# Patient Record
Sex: FEMALE | Race: OTHER | URBAN - METROPOLITAN AREA
[De-identification: names, ages, dates, MRNs, and addresses within clinical notes are randomized per-mention and may not be internally consistent; named-entity substitution may affect disease eponyms.]

---

## 2018-11-21 ENCOUNTER — INPATIENT (INPATIENT)
Facility: HOSPITAL | Age: 58
LOS: 6 days | Discharge: NURSING, REHAB OR LONG-TERM CARE | DRG: 38 | End: 2018-11-28
Attending: PSYCHIATRY & NEUROLOGY | Admitting: PSYCHIATRY & NEUROLOGY
Payer: COMMERCIAL

## 2018-11-21 ENCOUNTER — TRANSCRIPTION ENCOUNTER (OUTPATIENT)
Age: 58
End: 2018-11-21

## 2018-11-21 VITALS
WEIGHT: 186.95 LBS | SYSTOLIC BLOOD PRESSURE: 174 MMHG | HEART RATE: 102 BPM | RESPIRATION RATE: 19 BRPM | OXYGEN SATURATION: 98 % | DIASTOLIC BLOOD PRESSURE: 107 MMHG

## 2018-11-21 DIAGNOSIS — I63.9 CEREBRAL INFARCTION, UNSPECIFIED: ICD-10-CM

## 2018-11-21 LAB
ALBUMIN SERPL ELPH-MCNC: 4.4 G/DL — SIGNIFICANT CHANGE UP (ref 3.3–5)
ALP SERPL-CCNC: 111 U/L — SIGNIFICANT CHANGE UP (ref 40–120)
ALT FLD-CCNC: 20 U/L — SIGNIFICANT CHANGE UP (ref 10–45)
ANION GAP SERPL CALC-SCNC: 14 MMOL/L — SIGNIFICANT CHANGE UP (ref 5–17)
APTT BLD: 29.9 SEC — SIGNIFICANT CHANGE UP (ref 27.5–36.3)
AST SERPL-CCNC: 14 U/L — SIGNIFICANT CHANGE UP (ref 10–40)
BASOPHILS # BLD AUTO: 0 K/UL — SIGNIFICANT CHANGE UP (ref 0–0.2)
BASOPHILS NFR BLD AUTO: 0.8 % — SIGNIFICANT CHANGE UP (ref 0–2)
BILIRUB SERPL-MCNC: 0.3 MG/DL — SIGNIFICANT CHANGE UP (ref 0.2–1.2)
BUN SERPL-MCNC: 13 MG/DL — SIGNIFICANT CHANGE UP (ref 7–23)
CALCIUM SERPL-MCNC: 10.3 MG/DL — SIGNIFICANT CHANGE UP (ref 8.4–10.5)
CHLORIDE SERPL-SCNC: 96 MMOL/L — SIGNIFICANT CHANGE UP (ref 96–108)
CO2 SERPL-SCNC: 23 MMOL/L — SIGNIFICANT CHANGE UP (ref 22–31)
CREAT SERPL-MCNC: 0.6 MG/DL — SIGNIFICANT CHANGE UP (ref 0.5–1.3)
EOSINOPHIL # BLD AUTO: 0 K/UL — SIGNIFICANT CHANGE UP (ref 0–0.5)
EOSINOPHIL NFR BLD AUTO: 0.6 % — SIGNIFICANT CHANGE UP (ref 0–6)
GLUCOSE BLDC GLUCOMTR-MCNC: 181 MG/DL — HIGH (ref 70–99)
GLUCOSE SERPL-MCNC: 333 MG/DL — HIGH (ref 70–99)
HCT VFR BLD CALC: 37.7 % — SIGNIFICANT CHANGE UP (ref 34.5–45)
HGB BLD-MCNC: 13.8 G/DL — SIGNIFICANT CHANGE UP (ref 11.5–15.5)
INR BLD: 1.03 RATIO — SIGNIFICANT CHANGE UP (ref 0.88–1.16)
LYMPHOCYTES # BLD AUTO: 0.8 K/UL — LOW (ref 1–3.3)
LYMPHOCYTES # BLD AUTO: 16.2 % — SIGNIFICANT CHANGE UP (ref 13–44)
MCHC RBC-ENTMCNC: 30.4 PG — SIGNIFICANT CHANGE UP (ref 27–34)
MCHC RBC-ENTMCNC: 36.5 GM/DL — HIGH (ref 32–36)
MCV RBC AUTO: 83.3 FL — SIGNIFICANT CHANGE UP (ref 80–100)
MONOCYTES # BLD AUTO: 0.4 K/UL — SIGNIFICANT CHANGE UP (ref 0–0.9)
MONOCYTES NFR BLD AUTO: 7.2 % — SIGNIFICANT CHANGE UP (ref 2–14)
NEUTROPHILS # BLD AUTO: 3.7 K/UL — SIGNIFICANT CHANGE UP (ref 1.8–7.4)
NEUTROPHILS NFR BLD AUTO: 75.3 % — SIGNIFICANT CHANGE UP (ref 43–77)
PLATELET # BLD AUTO: 226 K/UL — SIGNIFICANT CHANGE UP (ref 150–400)
POTASSIUM SERPL-MCNC: 4.7 MMOL/L — SIGNIFICANT CHANGE UP (ref 3.5–5.3)
POTASSIUM SERPL-SCNC: 4.7 MMOL/L — SIGNIFICANT CHANGE UP (ref 3.5–5.3)
PROT SERPL-MCNC: 8.1 G/DL — SIGNIFICANT CHANGE UP (ref 6–8.3)
PROTHROM AB SERPL-ACNC: 11.7 SEC — SIGNIFICANT CHANGE UP (ref 10–12.9)
RBC # BLD: 4.52 M/UL — SIGNIFICANT CHANGE UP (ref 3.8–5.2)
RBC # FLD: 11.5 % — SIGNIFICANT CHANGE UP (ref 10.3–14.5)
SODIUM SERPL-SCNC: 133 MMOL/L — LOW (ref 135–145)
TROPONIN T, HIGH SENSITIVITY RESULT: 6 NG/L — SIGNIFICANT CHANGE UP (ref 0–51)
WBC # BLD: 4.9 K/UL — SIGNIFICANT CHANGE UP (ref 3.8–10.5)
WBC # FLD AUTO: 4.9 K/UL — SIGNIFICANT CHANGE UP (ref 3.8–10.5)

## 2018-11-21 PROCEDURE — 99255 IP/OBS CONSLTJ NEW/EST HI 80: CPT

## 2018-11-21 PROCEDURE — 99291 CRITICAL CARE FIRST HOUR: CPT

## 2018-11-21 PROCEDURE — 0042T: CPT

## 2018-11-21 PROCEDURE — 70450 CT HEAD/BRAIN W/O DYE: CPT | Mod: 26,59

## 2018-11-21 PROCEDURE — 70496 CT ANGIOGRAPHY HEAD: CPT | Mod: 26

## 2018-11-21 PROCEDURE — 70551 MRI BRAIN STEM W/O DYE: CPT | Mod: 26

## 2018-11-21 PROCEDURE — 70498 CT ANGIOGRAPHY NECK: CPT | Mod: 26

## 2018-11-21 RX ORDER — GLUCAGON INJECTION, SOLUTION 0.5 MG/.1ML
1 INJECTION, SOLUTION SUBCUTANEOUS ONCE
Qty: 0 | Refills: 0 | Status: DISCONTINUED | OUTPATIENT
Start: 2018-11-21 | End: 2018-11-28

## 2018-11-21 RX ORDER — CARBAMAZEPINE 200 MG
100 TABLET ORAL
Qty: 0 | Refills: 0 | Status: DISCONTINUED | OUTPATIENT
Start: 2018-11-21 | End: 2018-11-22

## 2018-11-21 RX ORDER — SODIUM CHLORIDE 9 MG/ML
1000 INJECTION INTRAMUSCULAR; INTRAVENOUS; SUBCUTANEOUS ONCE
Qty: 0 | Refills: 0 | Status: COMPLETED | OUTPATIENT
Start: 2018-11-21 | End: 2018-11-21

## 2018-11-21 RX ORDER — ASPIRIN/CALCIUM CARB/MAGNESIUM 324 MG
81 TABLET ORAL DAILY
Qty: 0 | Refills: 0 | Status: DISCONTINUED | OUTPATIENT
Start: 2018-11-21 | End: 2018-11-22

## 2018-11-21 RX ORDER — ATORVASTATIN CALCIUM 80 MG/1
80 TABLET, FILM COATED ORAL AT BEDTIME
Qty: 0 | Refills: 0 | Status: DISCONTINUED | OUTPATIENT
Start: 2018-11-21 | End: 2018-11-28

## 2018-11-21 RX ORDER — ACETAMINOPHEN 500 MG
1000 TABLET ORAL ONCE
Qty: 0 | Refills: 0 | Status: COMPLETED | OUTPATIENT
Start: 2018-11-21 | End: 2018-11-21

## 2018-11-21 RX ORDER — DEXTROSE 50 % IN WATER 50 %
15 SYRINGE (ML) INTRAVENOUS ONCE
Qty: 0 | Refills: 0 | Status: DISCONTINUED | OUTPATIENT
Start: 2018-11-21 | End: 2018-11-28

## 2018-11-21 RX ORDER — SODIUM CHLORIDE 9 MG/ML
1000 INJECTION INTRAMUSCULAR; INTRAVENOUS; SUBCUTANEOUS
Qty: 0 | Refills: 0 | Status: DISCONTINUED | OUTPATIENT
Start: 2018-11-21 | End: 2018-11-25

## 2018-11-21 RX ORDER — DEXTROSE 50 % IN WATER 50 %
25 SYRINGE (ML) INTRAVENOUS ONCE
Qty: 0 | Refills: 0 | Status: DISCONTINUED | OUTPATIENT
Start: 2018-11-21 | End: 2018-11-28

## 2018-11-21 RX ORDER — SODIUM CHLORIDE 9 MG/ML
1000 INJECTION, SOLUTION INTRAVENOUS
Qty: 0 | Refills: 0 | Status: DISCONTINUED | OUTPATIENT
Start: 2018-11-21 | End: 2018-11-28

## 2018-11-21 RX ORDER — ENOXAPARIN SODIUM 100 MG/ML
40 INJECTION SUBCUTANEOUS EVERY 24 HOURS
Qty: 0 | Refills: 0 | Status: DISCONTINUED | OUTPATIENT
Start: 2018-11-21 | End: 2018-11-28

## 2018-11-21 RX ORDER — INSULIN LISPRO 100/ML
VIAL (ML) SUBCUTANEOUS
Qty: 0 | Refills: 0 | Status: DISCONTINUED | OUTPATIENT
Start: 2018-11-21 | End: 2018-11-28

## 2018-11-21 RX ORDER — DEXTROSE 50 % IN WATER 50 %
12.5 SYRINGE (ML) INTRAVENOUS ONCE
Qty: 0 | Refills: 0 | Status: DISCONTINUED | OUTPATIENT
Start: 2018-11-21 | End: 2018-11-28

## 2018-11-21 RX ADMIN — Medication 1000 MILLIGRAM(S): at 18:00

## 2018-11-21 RX ADMIN — SODIUM CHLORIDE 125 MILLILITER(S): 9 INJECTION INTRAMUSCULAR; INTRAVENOUS; SUBCUTANEOUS at 15:37

## 2018-11-21 RX ADMIN — Medication 400 MILLIGRAM(S): at 17:33

## 2018-11-21 RX ADMIN — SODIUM CHLORIDE 1000 MILLILITER(S): 9 INJECTION INTRAMUSCULAR; INTRAVENOUS; SUBCUTANEOUS at 13:00

## 2018-11-21 RX ADMIN — ENOXAPARIN SODIUM 40 MILLIGRAM(S): 100 INJECTION SUBCUTANEOUS at 17:43

## 2018-11-21 RX ADMIN — Medication 100 MILLIGRAM(S): at 21:56

## 2018-11-21 RX ADMIN — ATORVASTATIN CALCIUM 80 MILLIGRAM(S): 80 TABLET, FILM COATED ORAL at 21:49

## 2018-11-21 NOTE — DISCHARGE NOTE ADULT - CARE PROVIDER_API CALL
Madisyn Suarez (NP; RN), NP in Family Health  [address]  [address]  [place] [address]  Phone: [phone]  Fax: [phone]    Shannon Gaspar), EndocrinologyMetabDiabetes; Internal Medicine  [address] [place] [address]  Phone: [phone]  Fax: [phone]

## 2018-11-21 NOTE — DISCHARGE NOTE ADULT - NS AS DC FOLLOWUP STROKE INST
Stroke (includes: TIA/SAH/ICH/Ischemic Stroke)/diabetes diabetes/Smoking Cessation/Stroke (includes: TIA/SAH/ICH/Ischemic Stroke)

## 2018-11-21 NOTE — DISCHARGE NOTE ADULT - MEDICATION SUMMARY - MEDICATIONS TO TAKE
I will START or STAY ON the medications listed below when I get home from the hospital:    Lorne Childrens Aspirin 81 mg oral tablet, chewable  -- 1 tab(s) by mouth once a day  -- Indication: For Cerebral infarction    Prinivil 5 mg oral tablet  -- 1 tab(s) by mouth once a day  -- Indication: For Hypertension    PROzac 20 mg oral capsule  -- 1 cap(s) by mouth once a day  -- Indication: For Muscle Spasm    Lantus 100 units/mL subcutaneous solution  -- 5 unit(s) subcutaneous once a day   -- Do not drink alcoholic beverages when taking this medication.  It is very important that you take or use this exactly as directed.  Do not skip doses or discontinue unless directed by your doctor.  Keep in refrigerator.  Do not freeze.    -- Indication: For DM    HumaLOG 100 units/mL injectable solution  -- 3 milliliter(s) injectable 3 times a day   -- Indication: For DM    Lipitor 80 mg oral tablet  -- 1 tab(s) by mouth once a day (at bedtime)  -- Indication: For CVA (cerebral vascular accident)    Plavix 75 mg oral tablet  -- 1 tab(s) by mouth once a day  -- Indication: For CVA (cerebral vascular accident) I will START or STAY ON the medications listed below when I get home from the hospital:    Lorne Childrens Aspirin 81 mg oral tablet, chewable  -- 1 tab(s) by mouth once a day  -- Indication: For Cerebral infarction    Prinivil 5 mg oral tablet  -- 1 tab(s) by mouth once a day  -- Indication: For Hypertension    PROzac 20 mg oral capsule  -- 1 cap(s) by mouth once a day  -- Indication: For Muscle Spasm    HumaLOG 100 units/mL injectable solution  -- 3 milliliter(s) injectable 3 times a day   -- Indication: For diabetes     Lantus 100 units/mL subcutaneous solution  -- 5 unit(s) subcutaneous once a day (at bedtime)   -- Do not drink alcoholic beverages when taking this medication.  It is very important that you take or use this exactly as directed.  Do not skip doses or discontinue unless directed by your doctor.  Keep in refrigerator.  Do not freeze.    -- Indication: For diabetes     Lipitor 80 mg oral tablet  -- 1 tab(s) by mouth once a day (at bedtime)  -- Indication: For CVA (cerebral vascular accident)    Plavix 75 mg oral tablet  -- 1 tab(s) by mouth once a day  -- Indication: For CVA (cerebral vascular accident)

## 2018-11-21 NOTE — DISCHARGE NOTE ADULT - MEDICATION SUMMARY - MEDICATIONS TO STOP TAKING
I will STOP taking the medications listed below when I get home from the hospital:    carBAMazepine 100 mg oral capsule, extended release  -- 1 cap(s) by mouth 2 times a day    fluvastatin  -- 20 milligram(s) by mouth once a day (at bedtime)    glipiZIDE    Norvasc 5 mg oral tablet  -- 1 tab(s) by mouth once a day

## 2018-11-21 NOTE — ED PROVIDER NOTE - PROGRESS NOTE DETAILS
Neurology met patient at doorway in ED.  Went to CT scan.  No neurointervention but neurology accepts to stroke unit.

## 2018-11-21 NOTE — H&P ADULT - HISTORY OF PRESENT ILLNESS
Pt is a 57 yo F with a PMH of HTN, DM and Stroke x2 (w/ residual left facial droop) who presented to [facility] with left sided hemiplegia, transferred for possible thrombectomy. LKN 0130. Symptoms unchanged since awakening with them at 0600. Was not a candidate for tPA. Pt denies any recent fevers, chills, HA, dizziness, neck pain, visual changes, CP, SOB, cough, nausea, vomiting, abdominal pain, changes in BMs/urination. NIHSS: 15. MRS: 1.

## 2018-11-21 NOTE — ED PROVIDER NOTE - OBJECTIVE STATEMENT
58F PMH CVAx2, HTN, DM presents with left sided weakness and facial droop.  Patient was last seen normal at 1:30am 58F PMH CVAx2, HTN, DM presents with left sided weakness and facial droop.  Patient was last seen normal at 1:30am.  Patient woke up with symptoms.  Patient does have history of left sided weakness s/p CVA but reports weakness is worse today.  Patient denies changes in vision or tingling.  PAtient was taken to another hospital and transferred to [facility] today for possible thrombectomy.  Patient denies fevers, chills, chest pain, abd pain, dyspnea.  Code Stroke activated upon arrival.

## 2018-11-21 NOTE — H&P ADULT - NSHPPHYSICALEXAM_GEN_ALL_CORE
General Exam:   General appearance: No acute distress      Neurological Exam:  Mental Status: Orientated to self, date and place.  Attention intact. Mild to Moderate dysarthria. Speech fluent.  Cranial Nerves:   PERRL, EOMI, LHH, sustained right beating nystagmus on right gzae, nonsustained left beating nystagmus on left gaze. Moderate left lower facial droop.  Hearing intact to finger rub bilaterally.  Tongue, uvula and palate midline.  Sternocleidomastoid and Trapezius intact bilaterally.    Motor:   Tone: slightly increased in the LLE, decreased in the LUE.              Strength: 5/5 RUE and RLE. 0/5 LUE and LLE.  Pronator drift: none                 Dysmetria: None to finger-nose-finger on the right  No truncal ataxia.    Tremor: No resting, postural or action tremor.  No myoclonus.    Sensation: loss of light touch on the left.    Left Toe Upgoing. Right Toe Downgoing.

## 2018-11-21 NOTE — ED ADULT NURSE NOTE - NSIMPLEMENTINTERV_GEN_ALL_ED
Implemented All Fall Risk Interventions:  [place] to call system. Call bell, personal items and telephone within reach. Instruct patient to call for assistance. Room bathroom lighting operational. Non-slip footwear when patient is off stretcher. Physically safe environment: no spills, clutter or unnecessary equipment. Stretcher in lowest position, wheels locked, appropriate side rails in place. Provide visual cue, wrist band, yellow gown, etc. Monitor gait and stability. Monitor for mental status changes and reorient to person, place, and time. Review medications for side effects contributing to fall risk. Reinforce activity limits and safety measures with patient and family.

## 2018-11-21 NOTE — DISCHARGE NOTE ADULT - HOSPITAL COURSE
HPI:   8-year-old right-handed white lady first evaluated at [facility] on 11/21/18 with left hemiparesis. 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 Perhaps her previous strokes were due to small vessel disease. On 11/21/18, she awoke with a dense left hemiparesis. CT head (11/21/18) showed a chronic right lentiform nucleus lacunar infarct. CTA neck (11/21/18) showed a probable right ICA occlusion with some calcified plaque. CTA head (11/21/18) showed that the right ICA reconstituted perhaps through the ACOM and possibly a large right PCOM. CTP (11/21/18) showed a core infarct of 27 cc and a large hypoperfused area of 125 cc in the right MCA distribution. Given that there was no intracranial occlusion, she was excluded from endovascular thrombectomy. MRI brain subsequently showed R MCA distribution infarct. MRA head and neck to my eye showed severe to critical stenosis of proximal/extracranial right ICA. Conventional angiogram confirmed severe proximal/extracranial right ICA stenosis s/p carotid artery stenting for secondary stroke prevention in this patient with symptomatic right ICA severe stenosis.     Impression:   Cerebral embolism with cerebral infarction. Right MCA distribution stroke - likely etiology being large vessel disease i.e. symptomatic extracranial R ICA severe stenosis leading to artery to artery embolism     NEURO: Neuro exam without acute change, continue close monitoring for neurologic deterioration, permissive hypertension; underwent selective cerebral angiography on  11/24/18 to eval for degree of sx stenosis, dual antiplatelet  and stain for secondary stroke prevention, titrate statin to LDL goal less than 70,MRI Brain, MRA head/Neck with T1 Fat sat results as above. CTA Head/Neck results as above, Physical therapy/OT recommends AR.  Patient enrolled in Stroke AF trial and randomized to a LOOP  .    Dysphagia: passed, regular diet    Pt was admitted to the Stroke Service for further evaluation.     Hospital Course:     Pt was started on Asa 81mg and Atorvastatin 80mg.   CAROTID DOPPLERS (11/2/7/18): The stent and stented cervical right common and internal   carotid arteries are widely patent. There is a flow-limiting stenosis of the right external carotid artery.No hemodynamically significant is present on the left.    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 Decreased, but present flow related enhancement of the more distal right   ICA. Previously, no flow related enhancement was identified within the right ICA on CTA from 11/21/2018.    MRA BRAIN: Diminished, but present flow related signal within the skull base and supraclinoid right internal carotid artery. iminished flow related signal within the right MCA.    MRI Head (11/21/18): Acute right MCA infarct.    Head CT, perfussion, CTA Head/Neck (11/21/18)   Brain CT: No identifiable or infarct. Neck   CTA: The abrupt cut off of the proximal right internal carotid artery with associated scattered areas of calcified plaque. This may be   secondary to occlusive thrombus or arterial dissection. A mild narrowing of the takeoff of the right vertebral artery.Normal left internal carotid and left vertebral arteries.    Brain CTA: Markedly diminished flow within the petrous and cavernous segments of the right internal carotid artery with reconstitution of the distal right internal carotid artery likely through collateralization and/or cross filling.. Mildly decreased flow within the right middle cerebral artery as compared with the left without focal stenosis.    Perfusion maps: Mismatch area are of 102 mL with evidence of core infarct and moderately large penumbra. There is a decreased cerebral blood flow and decreased cerebral blood volume in the right frontoparietal region with corresponding elevation of mean transit time.    A1c was 9.2; Endocrine recommended follow up with Dr Gaspar as outpatient, glipizide stopped, changed to lantus 5 units daily and humalog 3 units with meals TID  TTE: unremarkable  Cardiology recommended TTE with bubble study     Etiology of pt’s CVA is likely cardioembolic    Pt’s hospital course was complicated by none    Pt was seen and evaluated by OT/PT/Speech and Swallow, who recommended regular diet, acute rehab  Speech and Swallow recommended regular diet    Pt is currently medically stable for discharge    Discharge Plan:    Please continue all meds as listed above.    Follow-up: Madisyn Suarez (phone number [phone]) and Dr Gaspar

## 2018-11-21 NOTE — H&P ADULT - NSHPLABSRESULTS_GEN_ALL_CORE
Vital Signs Last 24 Hrs  T(C): 37.1 (21 Nov 2018 12:25), Max: 37.1 (21 Nov 2018 12:25)  T(F): 98.8 (21 Nov 2018 12:25), Max: 98.8 (21 Nov 2018 12:25)  HR: 94 (21 Nov 2018 12:25) (94 - 102)  BP: 186/75 (21 Nov 2018 12:25) (174/107 - 186/75)  BP(mean): 108 (21 Nov 2018 12:25) (108 - 108)  RR: 14 (21 Nov 2018 12:25) (14 - 19)  SpO2: 99% (21 Nov 2018 12:25) (98% - 99%)    11-21    133<L>  |  96  |  13  ----------------------------<  333<H>  4.7   |  23  |  0.60    Ca    10.3      21 Nov 2018 12:04    TPro  8.1  /  Alb  4.4  /  TBili  0.3  /  DBili  x   /  AST  14  /  ALT  20  /  AlkPhos  111  11-21    MEDICATIONS  (STANDING):  atorvastatin 80 milliGRAM(s) Oral at bedtime  carBAMazepine ER Capsule. 100 milliGRAM(s) Oral two times a day  sodium chloride 0.9% Bolus 1000 milliLiter(s) IV Bolus once  sodium chloride 0.9%. 1000 milliLiter(s) (125 mL/Hr) IV Continuous <Continuous>    MEDICATIONS  (PRN):

## 2018-11-21 NOTE — DISCHARGE NOTE ADULT - CARE PROVIDERS DIRECT ADDRESSES
,DirectAddress_[email or web address]

## 2018-11-21 NOTE — ED PROVIDER NOTE - MEDICAL DECISION MAKING DETAILS
58F pmh CVA presents with left sided weakness.  Code stroke.  CT CTA Head.  Neurology.  LAbs.  Admission 58F pmh CVA presents with left sided weakness.  Code stroke.  CT CTA Head.  Neurology.  LAbs.  Admission.   Diana: 58 year old female transferred from OSH for stroke rescue. no tpa given. left sided weakness. Patient arrived, code stroke activated. no intervention at this time. admit to neuro.

## 2018-11-21 NOTE — H&P ADULT - ASSESSMENT
Pt is a 57 yo F with a PMH of HTN, DM and Stroke x2 (w/ residual left facial droop) who presented with left sided hemiplegia. LKN 0130. Not a candidate for tPA. Exam shows LHH, Left lower facial droop, Moderate dysarthria, LUE/LLE 0/5, Left toe upgoing. CTA and CTP showing a right cervical ICA occlusion and a small core infarct with a large mismatch. Considering location of occlusion risk of propagation with occlusion of the right MCA is very high, therefore pt is not a candidate for thrombectomy. NIHSS: 15. MRS: 1.    Plan:  - Neuro Checks Q4hr  - MRI Head w/o  - TTE w/ bubble study  - PT/OT  - Dysphagia Screen, if fails then Speech and Swallow Eval  - Pt received ASA 162mg and Plavix 75mg at [facility]  - Start Aspirin 81mg PO QD (tomorrow) and Lipitor 80mg PO HS when passed dysphagia  - Lipid Panel and HbA1c  - 1L NS bolus (given)  - c/w NS @125 ml/hr  - Permissive HTN to SBP 220mmhg for now  - DVT ppx Pt is a 57 yo F with a PMH of HTN, DM and Stroke x2 (w/ residual left facial droop) who presented with left sided hemiplegia. LKN 0130. Not a candidate for tPA. Exam shows LHH, Left lower facial droop, Moderate dysarthria, LUE/LLE 0/5, Left toe upgoing. CTA and CTP showing a right cervical ICA occlusion and a small core infarct with a large mismatch. Considering location of occlusion risk of propagation with occlusion of the right MCA is very high, therefore pt is not a candidate for thrombectomy. NIHSS: 15. MRS: 1.    Plan:  - Neuro Checks Q4hr  - MRI Head w/o  - TTE w/ bubble study  - PT/OT  - Dysphagia Screen, if fails then Speech and Swallow Eval  - Pt received ASA 162mg and Plavix 75mg at [facility]  - Start Aspirin 81mg PO QD (tomorrow) and Lipitor 80mg PO HS when passed dysphagia  - Lipid Panel and HbA1c  - 1L NS bolus (given)  - c/w NS @125 ml/hr  - Permissive HTN to SBP 220mmhg for now  - Pt on Carbamazepine since stroke in 3/2018 for seizure ppx, though has never had a seizure  - DVT ppx

## 2018-11-21 NOTE — DISCHARGE NOTE ADULT - PATIENT PORTAL LINK FT
You can access the "[employer] Patient Portal, offered by [facility], by registering with the following website: [email or web address]

## 2018-11-21 NOTE — H&P ADULT - ATTENDING COMMENTS
58-year-old right-handed ?white lady first evaluated at [facility] on 11/21/18 with left hemiparesis. She has a history of "2 strokes" with residual left facial palsy. She was last known normal at 1:30 AM on 11/21/18. She went to bed, and awoke at 6 AM, tried to walk, and fell due to a dense left hemiparesis. Medications at home included possibly clopidogrel (uncertain) but not aspirin. She probably takes a cholesterol-lowering medication. She has been on carbamazepine for seizure prophylaxis since 3/18, but there is no history of seizures. ROS otherwise negative. Exam. Alert and attentive; probably a mild left homonymous hemianopia (delayed responses to hand motion in left visual field and extinction); mild-moderate left central facial palsy; a few beats of nystagmus at end gaze bilaterally-either physiological or perhaps due to carbamazepine; mild dysarthria; left arm-no movement; left leg-iliopsoas 2+/5; anterior tibialis 0/5; sensory-moderate-severely decreased light touch and temperature sensation on left; remainder of neurologic exam is nonfocal. CT head (11/21/18) to my eye showed a chronic right lentiform nucleus lacunar infarct. CTA neck (11/21/18) to my eye showed a probable right ICA occlusion with some calcified plaque. CTA head (11/21/18) to my eye showed that the right ICA reconstituted perhaps through the ACOM and possibly a large right PCOM. CTP (11/21/18) to my eye showed a core infarct of 27 cc, and a large hypoperfused area of 125 cc in the right MCA distribution. Impression. 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 Perhaps her previous strokes were due to small vessel disease. On 11/21/18, she awoke with a dense left hemiparesis, most likely due to right MCA infarction due to extracranial right ICA occlusion (large artery atherosclerosis more likely than dissection) probably with artery-artery embolism, and less likely with perfusion insufficiency. Given that there was no intracranial occlusion, she was excluded from endovascular thrombectomy. Plan. MRI brain/MRA neck and head; elective TTE; permissive hypertension; if she can swallow, would start Aggrenox; if she is unable to swallow, then start aspirin per rectum; stop carbamazepine; PT/OT.

## 2018-11-21 NOTE — DISCHARGE NOTE ADULT - CARE PLAN
Principal Discharge DX:	Cerebrovascular accident (CVA), unspecified mechanism  Goal:	prevent recurrent stroke  Assessment and plan of treatment:	- follow up with vascular neurology  - continue medications: plavix for 3 months, ASA and rest of other meds indefinitely

## 2018-11-21 NOTE — DISCHARGE NOTE ADULT - PLAN OF CARE
prevent recurrent stroke - follow up with vascular neurology  - continue medications: plavix for 3 months, ASA and rest of other meds indefinitely

## 2018-11-22 LAB
ANION GAP SERPL CALC-SCNC: 14 MMOL/L — SIGNIFICANT CHANGE UP (ref 5–17)
BUN SERPL-MCNC: 9 MG/DL — SIGNIFICANT CHANGE UP (ref 7–23)
CALCIUM SERPL-MCNC: 9.6 MG/DL — SIGNIFICANT CHANGE UP (ref 8.4–10.5)
CHLORIDE SERPL-SCNC: 97 MMOL/L — SIGNIFICANT CHANGE UP (ref 96–108)
CO2 SERPL-SCNC: 18 MMOL/L — LOW (ref 22–31)
CREAT SERPL-MCNC: 0.55 MG/DL — SIGNIFICANT CHANGE UP (ref 0.5–1.3)
GLUCOSE BLDC GLUCOMTR-MCNC: 148 MG/DL — HIGH (ref 70–99)
GLUCOSE BLDC GLUCOMTR-MCNC: 175 MG/DL — HIGH (ref 70–99)
GLUCOSE BLDC GLUCOMTR-MCNC: 187 MG/DL — HIGH (ref 70–99)
GLUCOSE BLDC GLUCOMTR-MCNC: 238 MG/DL — HIGH (ref 70–99)
GLUCOSE SERPL-MCNC: 221 MG/DL — HIGH (ref 70–99)
HBA1C BLD-MCNC: 9.2 % — HIGH (ref 4–5.6)
HCT VFR BLD CALC: 35.9 % — SIGNIFICANT CHANGE UP (ref 34.5–45)
HGB BLD-MCNC: 13.2 G/DL — SIGNIFICANT CHANGE UP (ref 11.5–15.5)
MCHC RBC-ENTMCNC: 30.7 PG — SIGNIFICANT CHANGE UP (ref 27–34)
MCHC RBC-ENTMCNC: 36.7 GM/DL — HIGH (ref 32–36)
MCV RBC AUTO: 83.8 FL — SIGNIFICANT CHANGE UP (ref 80–100)
PLATELET # BLD AUTO: 149 K/UL — LOW (ref 150–400)
POTASSIUM SERPL-MCNC: 4.6 MMOL/L — SIGNIFICANT CHANGE UP (ref 3.5–5.3)
POTASSIUM SERPL-SCNC: 4.6 MMOL/L — SIGNIFICANT CHANGE UP (ref 3.5–5.3)
RBC # BLD: 4.29 M/UL — SIGNIFICANT CHANGE UP (ref 3.8–5.2)
RBC # FLD: 11.6 % — SIGNIFICANT CHANGE UP (ref 10.3–14.5)
SODIUM SERPL-SCNC: 129 MMOL/L — LOW (ref 135–145)
WBC # BLD: 4.5 K/UL — SIGNIFICANT CHANGE UP (ref 3.8–10.5)
WBC # FLD AUTO: 4.5 K/UL — SIGNIFICANT CHANGE UP (ref 3.8–10.5)

## 2018-11-22 PROCEDURE — 70450 CT HEAD/BRAIN W/O DYE: CPT | Mod: 26

## 2018-11-22 PROCEDURE — 99233 SBSQ HOSP IP/OBS HIGH 50: CPT

## 2018-11-22 RX ORDER — ACETAMINOPHEN 500 MG
1000 TABLET ORAL ONCE
Qty: 0 | Refills: 0 | Status: COMPLETED | OUTPATIENT
Start: 2018-11-22 | End: 2018-11-22

## 2018-11-22 RX ORDER — METOCLOPRAMIDE HCL 10 MG
10 TABLET ORAL ONCE
Qty: 0 | Refills: 0 | Status: COMPLETED | OUTPATIENT
Start: 2018-11-22 | End: 2018-11-23

## 2018-11-22 RX ORDER — ONDANSETRON 8 MG/1
4 TABLET, FILM COATED ORAL ONCE
Qty: 0 | Refills: 0 | Status: COMPLETED | OUTPATIENT
Start: 2018-11-22 | End: 2018-11-22

## 2018-11-22 RX ORDER — ASPIRIN AND DIPYRIDAMOLE 25; 200 MG/1; MG/1
1 CAPSULE, EXTENDED RELEASE ORAL
Qty: 0 | Refills: 0 | Status: DISCONTINUED | OUTPATIENT
Start: 2018-11-22 | End: 2018-11-24

## 2018-11-22 RX ORDER — SODIUM CHLORIDE 9 MG/ML
2 INJECTION INTRAMUSCULAR; INTRAVENOUS; SUBCUTANEOUS
Qty: 0 | Refills: 0 | Status: DISCONTINUED | OUTPATIENT
Start: 2018-11-22 | End: 2018-11-24

## 2018-11-22 RX ORDER — TRAMADOL HYDROCHLORIDE 50 MG/1
25 TABLET ORAL ONCE
Qty: 0 | Refills: 0 | Status: DISCONTINUED | OUTPATIENT
Start: 2018-11-22 | End: 2018-11-22

## 2018-11-22 RX ADMIN — ASPIRIN AND DIPYRIDAMOLE 1 CAPSULE(S): 25; 200 CAPSULE, EXTENDED RELEASE ORAL at 17:13

## 2018-11-22 RX ADMIN — ONDANSETRON 4 MILLIGRAM(S): 8 TABLET, FILM COATED ORAL at 03:27

## 2018-11-22 RX ADMIN — Medication 1000 MILLIGRAM(S): at 13:10

## 2018-11-22 RX ADMIN — SODIUM CHLORIDE 125 MILLILITER(S): 9 INJECTION INTRAMUSCULAR; INTRAVENOUS; SUBCUTANEOUS at 05:00

## 2018-11-22 RX ADMIN — Medication 1000 MILLIGRAM(S): at 21:04

## 2018-11-22 RX ADMIN — Medication 1: at 17:13

## 2018-11-22 RX ADMIN — TRAMADOL HYDROCHLORIDE 25 MILLIGRAM(S): 50 TABLET ORAL at 22:57

## 2018-11-22 RX ADMIN — ATORVASTATIN CALCIUM 80 MILLIGRAM(S): 80 TABLET, FILM COATED ORAL at 21:29

## 2018-11-22 RX ADMIN — ENOXAPARIN SODIUM 40 MILLIGRAM(S): 100 INJECTION SUBCUTANEOUS at 17:14

## 2018-11-22 RX ADMIN — Medication 400 MILLIGRAM(S): at 03:32

## 2018-11-22 RX ADMIN — TRAMADOL HYDROCHLORIDE 25 MILLIGRAM(S): 50 TABLET ORAL at 23:27

## 2018-11-22 RX ADMIN — SODIUM CHLORIDE 2 GRAM(S): 9 INJECTION INTRAMUSCULAR; INTRAVENOUS; SUBCUTANEOUS at 17:13

## 2018-11-22 RX ADMIN — Medication 400 MILLIGRAM(S): at 12:46

## 2018-11-22 RX ADMIN — Medication 400 MILLIGRAM(S): at 20:34

## 2018-11-22 RX ADMIN — Medication 2: at 09:20

## 2018-11-22 RX ADMIN — Medication 1: at 12:13

## 2018-11-22 RX ADMIN — Medication 100 MILLIGRAM(S): at 05:50

## 2018-11-22 RX ADMIN — ONDANSETRON 4 MILLIGRAM(S): 8 TABLET, FILM COATED ORAL at 22:36

## 2018-11-22 RX ADMIN — Medication 1000 MILLIGRAM(S): at 04:02

## 2018-11-22 NOTE — PROVIDER CONTACT NOTE (OTHER) - ASSESSMENT
pt is alert and oriented. pt is vomiting after zofran IV push and guarding right side of head. /86 hr 82,  resp17/99%RA

## 2018-11-22 NOTE — PROGRESS NOTE ADULT - SUBJECTIVE AND OBJECTIVE BOX
THE PATIENT WAS SEEN AND EXAMINED BY ME WITH THE HOUSESTAFF AND STROKE TEAM DURING MORNING ROUNDS.   HPI:  57 y/o woman with PMH of HTN, DM and Stroke x2 (w/ residual left facial droop) who presented to [facility] with left sided hemiplegia, transferred for possible thrombectomy. LKN 0130. Symptoms unchanged since awakening with them at 0600. Was not a candidate for tPA. Pt denies any recent fevers, chills, HA, dizziness, neck pain, visual changes, CP, SOB, cough, nausea, vomiting, abdominal pain, changes in BMs/urination. NIHSS: 15. MRS: 1.     SUBJECTIVE: No events overnight.  No new neurologic complaints.      aspirin enteric coated 81 milliGRAM(s) Oral daily  atorvastatin 80 milliGRAM(s) Oral at bedtime  carBAMazepine ER Capsule. 100 milliGRAM(s) Oral two times a day  dextrose 40% Gel 15 Gram(s) Oral once PRN  dextrose 5%. 1000 milliLiter(s) IV Continuous <Continuous>  dextrose 50% Injectable 12.5 Gram(s) IV Push once  dextrose 50% Injectable 25 Gram(s) IV Push once  dextrose 50% Injectable 25 Gram(s) IV Push once  enoxaparin Injectable 40 milliGRAM(s) SubCutaneous every 24 hours  glucagon  Injectable 1 milliGRAM(s) IntraMuscular once PRN  insulin lispro (HumaLOG) corrective regimen sliding scale   SubCutaneous three times a day before meals  sodium chloride 2 Gram(s) Oral two times a day  sodium chloride 0.9%. 1000 milliLiter(s) IV Continuous <Continuous>      PHYSICAL EXAM:   Vital Signs Last 24 Hrs  T(C): 37.3 (22 Nov 2018 07:54), Max: 37.3 (22 Nov 2018 07:54)  T(F): 99.2 (22 Nov 2018 07:54), Max: 99.2 (22 Nov 2018 07:54)  HR: 80 (22 Nov 2018 06:00) (73 - 102)  BP: 133/89 (22 Nov 2018 06:00) (133/89 - 188/88)  BP(mean): 98 (22 Nov 2018 06:00) (98 - 108)  RR: 15 (22 Nov 2018 06:00) (14 - 24)  SpO2: 98% (22 Nov 2018 06:00) (98% - 100%)    General: No acute distress  HEENT: EOM intact, mild left homonymous hemianopia   Abdomen: Soft, nontender, nondistended   Extremities: No edema    NEUROLOGICAL EXAM:  Mental status: Awake, alert, oriented x3, fluent speech, no neglect, able to follow commands  Cranial Nerves: mild-moderate left central facial palsy, probably a mild left homonymous hemianopia (delayed responses to hand motion in left visual field and extinction), a few beats of nystagmus at end gaze bilaterally, mild dysarthria, Motor exam: Normal tone, no drift, RUE 5/5, RLE 5/5, Left side hemiparesis LUE 0/5, LLE iliopsoas 2+/5; anterior tibialis 0/5  Sensation: Moderate-severely decreased light touch and temperature sensation on left   Coordination/ Gait: No dysmetria    LABS:                        13.2   4.5   )-----------( 149      ( 22 Nov 2018 04:24 )             35.9    11-22    129<L>  |  97  |  9   ----------------------------<  221<H>  4.6   |  18<L>  |  0.55    Ca    9.6      22 Nov 2018 04:24    TPro  8.1  /  Alb  4.4  /  TBili  0.3  /  DBili  x   /  AST  14  /  ALT  20  /  AlkPhos  111  11-21  PT/INR - ( 21 Nov 2018 12:04 )   PT: 11.7 sec;   INR: 1.03 ratio         PTT - ( 21 Nov 2018 12:04 )  PTT:29.9 sec      IMAGING: Reviewed by me.     Head CT, perfussion, CTA Head/Neck (11/21/18) Brain CT: No identifiable or infarct. Neck CTA: The abrupt cut off of the proximal right internal carotid artery with associated scattered areas of calcified plaque. This may be   secondary to occlusive thrombus or arterial dissection. A mild narrowing of the takeoff of the right vertebral artery.Normal left internal carotid and left vertebral arteries.    Brain CTA: Markedly diminished flow within the petrous and cavernous segments of the right internal carotid artery with reconstitution of the distal right internal carotid artery likely through collateralization and/or cross filling.. Mildly decreased flow within the right middle cerebral artery as compared with the left without focal stenosis.    Perfusion maps: Mismatch area are of 102 mL with evidence of core infarct and moderately large penumbra. There is a decreased cerebral blood flow and decreased cerebral blood volume in the right frontoparietal region with corresponding elevation of mean transit time. THE PATIENT WAS SEEN AND EXAMINED BY ME WITH THE HOUSESTAFF AND STROKE TEAM DURING MORNING ROUNDS.   HPI:  59 y/o woman with PMH of HTN, DM and Stroke x2 (w/ residual left facial droop) who presented to [facility] with left sided hemiplegia, transferred for possible thrombectomy. MAYON 0130. Symptoms unchanged since awakening with them at 0600. Was not a candidate for tPA. Pt denies any recent fevers, chills, HA, dizziness, neck pain, visual changes, CP, SOB, cough, nausea, vomiting, abdominal pain, changes in BMs/urination. NIHSS: 15. MRS: 1.     SUBJECTIVE: States her LLE weakness  is improved vs admission  No new neurologic complaints.      aspirin enteric coated 81 milliGRAM(s) Oral daily  atorvastatin 80 milliGRAM(s) Oral at bedtime  carBAMazepine ER Capsule. 100 milliGRAM(s) Oral two times a day  dextrose 40% Gel 15 Gram(s) Oral once PRN  dextrose 5%. 1000 milliLiter(s) IV Continuous <Continuous>  dextrose 50% Injectable 12.5 Gram(s) IV Push once  dextrose 50% Injectable 25 Gram(s) IV Push once  dextrose 50% Injectable 25 Gram(s) IV Push once  enoxaparin Injectable 40 milliGRAM(s) SubCutaneous every 24 hours  glucagon  Injectable 1 milliGRAM(s) IntraMuscular once PRN  insulin lispro (HumaLOG) corrective regimen sliding scale   SubCutaneous three times a day before meals  sodium chloride 2 Gram(s) Oral two times a day  sodium chloride 0.9%. 1000 milliLiter(s) IV Continuous <Continuous>      PHYSICAL EXAM:   Vital Signs Last 24 Hrs  T(C): 37.3 (22 Nov 2018 07:54), Max: 37.3 (22 Nov 2018 07:54)  T(F): 99.2 (22 Nov 2018 07:54), Max: 99.2 (22 Nov 2018 07:54)  HR: 80 (22 Nov 2018 06:00) (73 - 102)  BP: 133/89 (22 Nov 2018 06:00) (133/89 - 188/88)  BP(mean): 98 (22 Nov 2018 06:00) (98 - 108)  RR: 15 (22 Nov 2018 06:00) (14 - 24)  SpO2: 98% (22 Nov 2018 06:00) (98% - 100%)    General: No acute distress  HEENT: EOM intact, mild left homonymous hemianopia   Abdomen: Soft, nontender, nondistended   Extremities: No edema    NEUROLOGICAL EXAM:  Mental status: Awake, alert, oriented x3, fluent speech, no neglect, able to follow commands  Cranial Nerves: mild left central facial palsy, probably a mild left homonymous hemianopia, a few beats of nystagmus at end gaze bilaterally, mild dysarthria, Motor exam: Normal tone, no drift, RUE 5/5, RLE 5/5, Left side hemiparesis LUE 0/5, LLE iliopsoas 2+/5; anterior tibialis 0/5  Sensation: Moderate-severely decreased light touch and temperature sensation on left   Coordination/ Gait: No dysmetria    LABS:                        13.2   4.5   )-----------( 149      ( 22 Nov 2018 04:24 )             35.9    11-22    129<L>  |  97  |  9   ----------------------------<  221<H>  4.6   |  18<L>  |  0.55    Ca    9.6      22 Nov 2018 04:24    TPro  8.1  /  Alb  4.4  /  TBili  0.3  /  DBili  x   /  AST  14  /  ALT  20  /  AlkPhos  111  11-21  PT/INR - ( 21 Nov 2018 12:04 )   PT: 11.7 sec;   INR: 1.03 ratio         PTT - ( 21 Nov 2018 12:04 )  PTT:29.9 sec      IMAGING: Reviewed by me.     Head CT, perfussion, CTA Head/Neck (11/21/18) Brain CT: No identifiable or infarct. Neck CTA: The abrupt cut off of the proximal right internal carotid artery with associated scattered areas of calcified plaque. This may be   secondary to occlusive thrombus or arterial dissection. A mild narrowing of the takeoff of the right vertebral artery.Normal left internal carotid and left vertebral arteries.    Brain CTA: Markedly diminished flow within the petrous and cavernous segments of the right internal carotid artery with reconstitution of the distal right internal carotid artery likely through collateralization and/or cross filling.. Mildly decreased flow within the right middle cerebral artery as compared with the left without focal stenosis.    Perfusion maps: Mismatch area are of 102 mL with evidence of core infarct and moderately large penumbra. There is a decreased cerebral blood flow and decreased cerebral blood volume in the right frontoparietal region with corresponding elevation of mean transit time. THE PATIENT WAS SEEN AND EXAMINED BY ME WITH THE HOUSESTAFF AND STROKE TEAM DURING MORNING ROUNDS.   HPI:  59 y/o woman with PMH of HTN, DM and Stroke x2 (w/ residual left facial droop) who presented to [facility] with left sided hemiplegia, transferred for possible thrombectomy. MAYON 0130. Symptoms unchanged since awakening with them at 0600. Was not a candidate for tPA. Pt denies any recent fevers, chills, HA, dizziness, neck pain, visual changes, CP, SOB, cough, nausea, vomiting, abdominal pain, changes in BMs/urination. NIHSS: 15. MRS: 1.     SUBJECTIVE: States her LLE weakness  is improved vs admission  No new neurologic complaints.      aspirin enteric coated 81 milliGRAM(s) Oral daily  atorvastatin 80 milliGRAM(s) Oral at bedtime  carBAMazepine ER Capsule. 100 milliGRAM(s) Oral two times a day  dextrose 40% Gel 15 Gram(s) Oral once PRN  dextrose 5%. 1000 milliLiter(s) IV Continuous <Continuous>  dextrose 50% Injectable 12.5 Gram(s) IV Push once  dextrose 50% Injectable 25 Gram(s) IV Push once  dextrose 50% Injectable 25 Gram(s) IV Push once  enoxaparin Injectable 40 milliGRAM(s) SubCutaneous every 24 hours  glucagon  Injectable 1 milliGRAM(s) IntraMuscular once PRN  insulin lispro (HumaLOG) corrective regimen sliding scale   SubCutaneous three times a day before meals  sodium chloride 2 Gram(s) Oral two times a day  sodium chloride 0.9%. 1000 milliLiter(s) IV Continuous <Continuous>      PHYSICAL EXAM:   Vital Signs Last 24 Hrs  T(C): 37.3 (22 Nov 2018 07:54), Max: 37.3 (22 Nov 2018 07:54)  T(F): 99.2 (22 Nov 2018 07:54), Max: 99.2 (22 Nov 2018 07:54)  HR: 80 (22 Nov 2018 06:00) (73 - 102)  BP: 133/89 (22 Nov 2018 06:00) (133/89 - 188/88)  BP(mean): 98 (22 Nov 2018 06:00) (98 - 108)  RR: 15 (22 Nov 2018 06:00) (14 - 24)  SpO2: 98% (22 Nov 2018 06:00) (98% - 100%)    General: No acute distress  HEENT: EOM intact, mild left homonymous hemianopia   Abdomen: Soft, nontender, nondistended   Extremities: No edema    NEUROLOGICAL EXAM:  Mental status: Awake, alert, oriented x3, fluent speech, no neglect, able to follow commands  Cranial Nerves: mild left central facial palsy,  left homonymous hemianopia, a few beats of nystagmus at end gaze bilaterally, mild dysarthria,   Motor exam: Normal tone, no drift, RUE 5/5, RLE 5/5, Left side hemiparesis LUE 0/5, LLE iliopsoas 2+/5; anterior tibialis 0/5  Sensation: Moderate-severely decreased light touch and temperature sensation on left   Coordination/ Gait: No dysmetria    LABS:                        13.2   4.5   )-----------( 149      ( 22 Nov 2018 04:24 )             35.9    11-22    129<L>  |  97  |  9   ----------------------------<  221<H>  4.6   |  18<L>  |  0.55    Ca    9.6      22 Nov 2018 04:24    TPro  8.1  /  Alb  4.4  /  TBili  0.3  /  DBili  x   /  AST  14  /  ALT  20  /  AlkPhos  111  11-21  PT/INR - ( 21 Nov 2018 12:04 )   PT: 11.7 sec;   INR: 1.03 ratio         PTT - ( 21 Nov 2018 12:04 )  PTT:29.9 sec      IMAGING: Reviewed by me.     Head CT, perfussion, CTA Head/Neck (11/21/18) Brain CT: No identifiable or infarct. Neck CTA: The abrupt cut off of the proximal right internal carotid artery with associated scattered areas of calcified plaque. This may be   secondary to occlusive thrombus or arterial dissection. A mild narrowing of the takeoff of the right vertebral artery.Normal left internal carotid and left vertebral arteries.    Brain CTA: Markedly diminished flow within the petrous and cavernous segments of the right internal carotid artery with reconstitution of the distal right internal carotid artery likely through collateralization and/or cross filling.. Mildly decreased flow within the right middle cerebral artery as compared with the left without focal stenosis.    Perfusion maps: Mismatch area are of 102 mL with evidence of core infarct and moderately large penumbra. There is a decreased cerebral blood flow and decreased cerebral blood volume in the right frontoparietal region with corresponding elevation of mean transit time.

## 2018-11-22 NOTE — PROVIDER CONTACT NOTE (OTHER) - ASSESSMENT
pt is alert and oriented. pt is neurologically stable. pt complain of right side of head pain/ headache 7/10, pt describe as throbbing pain.

## 2018-11-22 NOTE — PROGRESS NOTE ADULT - ASSESSMENT
ASSESSMENT: 58-year-old right-handed white lady first evaluated at [facility] on 11/21/18 with left hemiparesis. 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 Perhaps her previous strokes were due to small vessel disease. On 11/21/18, she awoke with a dense left hemiparesis. CT head (11/21/18) to my eye showed a chronic right lentiform nucleus lacunar infarct. CTA neck (11/21/18) to my eye showed a probable right ICA occlusion with some calcified plaque. CTA head (11/21/18) to my eye showed that the right ICA reconstituted perhaps through the ACOM and possibly a large right PCOM. CTP (11/21/18) to my eye showed a core infarct of 27 cc, and a large hypoperfused area of 125 cc in the right MCA distribution, MRI brain shows RMCA ischemic infract. Given that there was no intracranial occlusion, she was excluded from endovascular thrombectomy.    Impression: Left hemiparesis likely due to right MCA infarction due to extracranial right ICA occlusion (large artery atherosclerosis more likely than dissection) probably with artery-artery embolism, and less likely with perfusion insufficiency.     NEURO: Continue close monitoring for neurologic deterioration, SBP <160 with titration to normotensive, ASA and stain for secondary stroke prevention, titrate statin to LDL goal less than 70, Pending offivciat l report of MRI Brain. CTA Head/Neck results as above, Physical therapy/OT eval/treatment.     ANTITHROMBOTIC THERAPY: ASA for secondary stroke prevention    PULMONARY: CXR clear, protecting airway, saturating well     CARDIOVASCULAR: check TTE, cardiac monitoring no events, off ebony deluna, Dr Wheeler (Cardiology) contacted for further recommendations      SBP goal: <160    GASTROINTESTINAL: Dysphagia screen passed, tolerating diet     Diet: Pureed    RENAL: BUN/Cr stable, good urine output, hyponatremia, varsha start 2mg Na Tabs, will continue to monitor      Na Goal: Greater than 135     Harper: N    HEMATOLOGY: H/H stable, Platelets normal      DVT ppx:  LMWH     ID: afebrile, no leukocytosis     OTHER: Plan/goals of care discussed with pt at bedside    DISPOSITION: Rehab or home depending on PT eval once stable and workup is complete    CORE MEASURES:        Admission NIHSS: 15     TPA:  NO      LDL/HDL:     Depression Screen:      Statin Therapy: Y     Dysphagia Screen: PASS     Smoking  NO      Afib NO     Stroke Education YES ASSESSMENT: 58-year-old right-handed white lady first evaluated at [facility] on 11/21/18 with left hemiparesis. 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 Perhaps her previous strokes were due to small vessel disease. On 11/21/18, she awoke with a dense left hemiparesis. CT head (11/21/18) to my eye showed a chronic right lentiform nucleus lacunar infarct. CTA neck (11/21/18) to my eye showed a probable right ICA occlusion with some calcified plaque. CTA head (11/21/18) to my eye showed that the right ICA reconstituted perhaps through the ACOM and possibly a large right PCOM. CTP (11/21/18) to my eye showed a core infarct of 27 cc, and a large hypoperfused area of 125 cc in the right MCA distribution, MRI brain shows RMCA ischemic infract. Given that there was no intracranial occlusion, she was excluded from endovascular thrombectomy.    Impression: Left hemiparesis likely due to right MCA infarction due to extracranial right ICA occlusion (large artery atherosclerosis more likely than dissection) probably with artery-artery embolism, and less likely with perfusion insufficiency.     NEURO: Continue close monitoring for neurologic deterioration, SBP <160 with titration to normotensive, ASA and stain for secondary stroke prevention, titrate statin to LDL goal less than 70, will stop stop carbamazepine, no hx of seizures, MRA head/Neck with T1 Fat sat ordered to r/o dissection, Pending official report of MRI Brain. CTA Head/Neck results as above, Physical therapy/OT eval/treatment.     ANTITHROMBOTIC THERAPY: D/C ASA and will start Aggrenox for secondary stroke prevention    PULMONARY: CXR clear, protecting airway, saturating well     CARDIOVASCULAR: check TTE, cardiac monitoring no events, off ebony drip, Dr Wheeler (Cardiology) contacted for further recommendations, Will consider for possible AF study      SBP goal: <160    GASTROINTESTINAL: Dysphagia screen passed, tolerating diet     Diet: Pureed    RENAL: BUN/Cr stable, good urine output, hyponatremia, will start 2mg Na Tabs, will continue to monitor      Na Goal: Greater than 135     Harper: N    HEMATOLOGY: H/H stable, Platelets normal      DVT ppx:  LMWH     ID: afebrile, no leukocytosis     OTHER: Plan/goals of care discussed with pt at bedside    DISPOSITION: Rehab or home depending on PT eval once stable and workup is complete    CORE MEASURES:        Admission NIHSS: 15     TPA:  NO      LDL/HDL:     Depression Screen:      Statin Therapy: Y     Dysphagia Screen: PASS     Smoking  NO      Afib NO     Stroke Education YES ASSESSMENT: 58-year-old right-handed white lady first evaluated at [facility] on 11/21/18 with left hemiparesis. 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 Perhaps her previous strokes were due to small vessel disease. On 11/21/18, she awoke with a dense left hemiparesis. CT head (11/21/18) to my eye showed a chronic right lentiform nucleus lacunar infarct. CTA neck (11/21/18) to my eye showed a probable right ICA occlusion with some calcified plaque. CTA head (11/21/18) to my eye showed that the right ICA reconstituted perhaps through the ACOM and possibly a large right PCOM. CTP (11/21/18) to my eye showed a core infarct of 27 cc, and a large hypoperfused area of 125 cc in the right MCA distribution, MRI brain shows RMCA ischemic infarct. Given that there was no intracranial occlusion, she was excluded from endovascular thrombectomy.    Impression: Left hemiparesis likely due to right MCA infarction due to extracranial right ICA occlusion (large artery atherosclerosis more likely than dissection) probably with artery-artery embolism, and less likely with perfusion insufficiency.     NEURO: Continue close monitoring for neurologic deterioration, permissive hypertension;, antiplatelet agent and stain for secondary stroke prevention, titrate statin to LDL goal less than 70, will stop carbamazepine, no hx of seizures, MRA head/Neck with T1 Fat sat ordered to r/o dissection, Pending official report of MRI Brain. CTA Head/Neck results as above, Physical therapy/OT eval/treatment.     ANTITHROMBOTIC THERAPY: D/C ASA and will start Aggrenox for secondary stroke prevention    PULMONARY: CXR clear, protecting airway, saturating well     CARDIOVASCULAR: check TTE, cardiac monitoring no events, off ebony deluna, Dr Wheeler (Cardiology) contacted for further recommendations, Will consider for possible AF study      SBP goal: <160    GASTROINTESTINAL: Dysphagia screen passed, tolerating diet     Diet: Pureed    RENAL: BUN/Cr stable, good urine output, hyponatremia, will start 2mg Na Tabs, will continue to monitor      Na Goal: Greater than 135     Harper: N    HEMATOLOGY: H/H stable, Platelets normal      DVT ppx:  LMWH     ID: afebrile, no leukocytosis     OTHER: Plan/goals of care discussed with pt at bedside    DISPOSITION: Rehab or home depending on PT eval once stable and workup is complete    CORE MEASURES:        Admission NIHSS: 15     TPA:  NO      LDL/HDL:     Depression Screen:      Statin Therapy: Y     Dysphagia Screen: PASS     Smoking  NO      Afib NO     Stroke Education YES

## 2018-11-22 NOTE — CHART NOTE - NSCHARTNOTEFT_GEN_A_CORE
59 y/o woman with PMH of HTN, DM and Stroke x2 (w/ residual left facial droop) who presented to [facility] with left sided hemiplegia, transferred for possible thrombectomy. NIHSS: 15. MRS: 1- on admission.  Impression: Left hemiparesis likely due to right MCA infarction due to extracranial right ICA occlusion (large artery atherosclerosis more likely than dissection) probably with artery-artery embolism, and less likely with perfusion insufficiency.     Pt c/o of headache 8/10 and nausea. 1 episode of emesis. No c/p, sob, dizziness, visual changes, or abdominal pain. Had HA and nausea last night as well.     PHYSICAL EXAM:    General: No acute distress  HEENT: EOM intact, mild left homonymous hemianopia   Abdomen: Soft, nontender, nondistended   Extremities: No edema    NEUROLOGICAL EXAM:  Mental status: Awake, alert, oriented x3, fluent speech, no neglect, able to follow commands  Cranial Nerves: mild left central facial palsy,  left homonymous hemianopia, a few beats of nystagmus at end gaze bilaterally, mild dysarthria,   Motor exam: Normal tone, no drift, RUE 5/5, RLE 5/5, Left side hemiparesis LUE 0/5, LLE iliopsoas 2+/5; anterior tibialis 0/5  Sensation: Moderate-severely decreased light touch and temperature sensation on left   Coordination/ Gait: No dysmetria, gait deferred.    Plan: IV Tylenol ordered for HA, with some relief.  IV Zofran ordered for nausea/vomiting. Urgent Head CT no Cont ordered. On exam patient states her HA is better, no longer pounding on right temple, and she no longer feels nauseated. Will f/u results of Head CT no cont.

## 2018-11-23 DIAGNOSIS — I10 ESSENTIAL (PRIMARY) HYPERTENSION: ICD-10-CM

## 2018-11-23 DIAGNOSIS — E78.5 HYPERLIPIDEMIA, UNSPECIFIED: ICD-10-CM

## 2018-11-23 DIAGNOSIS — E11.65 TYPE 2 DIABETES MELLITUS WITH HYPERGLYCEMIA: ICD-10-CM

## 2018-11-23 LAB
ANION GAP SERPL CALC-SCNC: 12 MMOL/L — SIGNIFICANT CHANGE UP (ref 5–17)
BUN SERPL-MCNC: 9 MG/DL — SIGNIFICANT CHANGE UP (ref 7–23)
CALCIUM SERPL-MCNC: 9.4 MG/DL — SIGNIFICANT CHANGE UP (ref 8.4–10.5)
CHLORIDE SERPL-SCNC: 99 MMOL/L — SIGNIFICANT CHANGE UP (ref 96–108)
CO2 SERPL-SCNC: 21 MMOL/L — LOW (ref 22–31)
CREAT SERPL-MCNC: 0.62 MG/DL — SIGNIFICANT CHANGE UP (ref 0.5–1.3)
GLUCOSE BLDC GLUCOMTR-MCNC: 157 MG/DL — HIGH (ref 70–99)
GLUCOSE BLDC GLUCOMTR-MCNC: 174 MG/DL — HIGH (ref 70–99)
GLUCOSE BLDC GLUCOMTR-MCNC: 218 MG/DL — HIGH (ref 70–99)
GLUCOSE BLDC GLUCOMTR-MCNC: 227 MG/DL — HIGH (ref 70–99)
GLUCOSE SERPL-MCNC: 186 MG/DL — HIGH (ref 70–99)
HCT VFR BLD CALC: 37.1 % — SIGNIFICANT CHANGE UP (ref 34.5–45)
HGB BLD-MCNC: 13 G/DL — SIGNIFICANT CHANGE UP (ref 11.5–15.5)
MCHC RBC-ENTMCNC: 29.4 PG — SIGNIFICANT CHANGE UP (ref 27–34)
MCHC RBC-ENTMCNC: 35 GM/DL — SIGNIFICANT CHANGE UP (ref 32–36)
MCV RBC AUTO: 84 FL — SIGNIFICANT CHANGE UP (ref 80–100)
PLATELET # BLD AUTO: 219 K/UL — SIGNIFICANT CHANGE UP (ref 150–400)
POTASSIUM SERPL-MCNC: 4 MMOL/L — SIGNIFICANT CHANGE UP (ref 3.5–5.3)
POTASSIUM SERPL-SCNC: 4 MMOL/L — SIGNIFICANT CHANGE UP (ref 3.5–5.3)
RBC # BLD: 4.42 M/UL — SIGNIFICANT CHANGE UP (ref 3.8–5.2)
RBC # FLD: 12.3 % — SIGNIFICANT CHANGE UP (ref 10.3–14.5)
SODIUM SERPL-SCNC: 132 MMOL/L — LOW (ref 135–145)
WBC # BLD: 4.9 K/UL — SIGNIFICANT CHANGE UP (ref 3.8–10.5)
WBC # FLD AUTO: 4.9 K/UL — SIGNIFICANT CHANGE UP (ref 3.8–10.5)

## 2018-11-23 PROCEDURE — 70548 MR ANGIOGRAPHY NECK W/DYE: CPT | Mod: 26

## 2018-11-23 PROCEDURE — 33282: CPT

## 2018-11-23 PROCEDURE — 70544 MR ANGIOGRAPHY HEAD W/O DYE: CPT | Mod: 26

## 2018-11-23 PROCEDURE — 99233 SBSQ HOSP IP/OBS HIGH 50: CPT | Mod: 57

## 2018-11-23 PROCEDURE — 99255 IP/OBS CONSLTJ NEW/EST HI 80: CPT | Mod: GC

## 2018-11-23 RX ORDER — INSULIN LISPRO 100/ML
3 VIAL (ML) SUBCUTANEOUS
Qty: 0 | Refills: 0 | Status: DISCONTINUED | OUTPATIENT
Start: 2018-11-23 | End: 2018-11-28

## 2018-11-23 RX ORDER — ACETAMINOPHEN 500 MG
1000 TABLET ORAL EVERY 6 HOURS
Qty: 0 | Refills: 0 | Status: COMPLETED | OUTPATIENT
Start: 2018-11-23 | End: 2018-11-24

## 2018-11-23 RX ORDER — INSULIN LISPRO 100/ML
VIAL (ML) SUBCUTANEOUS AT BEDTIME
Qty: 0 | Refills: 0 | Status: DISCONTINUED | OUTPATIENT
Start: 2018-11-23 | End: 2018-11-28

## 2018-11-23 RX ORDER — ONDANSETRON 8 MG/1
4 TABLET, FILM COATED ORAL EVERY 8 HOURS
Qty: 0 | Refills: 0 | Status: DISCONTINUED | OUTPATIENT
Start: 2018-11-23 | End: 2018-11-28

## 2018-11-23 RX ORDER — INSULIN GLARGINE 100 [IU]/ML
5 INJECTION, SOLUTION SUBCUTANEOUS AT BEDTIME
Qty: 0 | Refills: 0 | Status: DISCONTINUED | OUTPATIENT
Start: 2018-11-23 | End: 2018-11-28

## 2018-11-23 RX ADMIN — ONDANSETRON 4 MILLIGRAM(S): 8 TABLET, FILM COATED ORAL at 20:15

## 2018-11-23 RX ADMIN — ONDANSETRON 4 MILLIGRAM(S): 8 TABLET, FILM COATED ORAL at 09:05

## 2018-11-23 RX ADMIN — Medication 10 MILLIGRAM(S): at 08:37

## 2018-11-23 RX ADMIN — ASPIRIN AND DIPYRIDAMOLE 1 CAPSULE(S): 25; 200 CAPSULE, EXTENDED RELEASE ORAL at 17:04

## 2018-11-23 RX ADMIN — Medication 3 UNIT(S): at 17:04

## 2018-11-23 RX ADMIN — SODIUM CHLORIDE 2 GRAM(S): 9 INJECTION INTRAMUSCULAR; INTRAVENOUS; SUBCUTANEOUS at 17:04

## 2018-11-23 RX ADMIN — SODIUM CHLORIDE 2 GRAM(S): 9 INJECTION INTRAMUSCULAR; INTRAVENOUS; SUBCUTANEOUS at 08:38

## 2018-11-23 RX ADMIN — ASPIRIN AND DIPYRIDAMOLE 1 CAPSULE(S): 25; 200 CAPSULE, EXTENDED RELEASE ORAL at 06:08

## 2018-11-23 RX ADMIN — Medication 1: at 17:03

## 2018-11-23 RX ADMIN — ENOXAPARIN SODIUM 40 MILLIGRAM(S): 100 INJECTION SUBCUTANEOUS at 17:04

## 2018-11-23 RX ADMIN — ATORVASTATIN CALCIUM 80 MILLIGRAM(S): 80 TABLET, FILM COATED ORAL at 21:32

## 2018-11-23 RX ADMIN — Medication 2: at 08:35

## 2018-11-23 RX ADMIN — INSULIN GLARGINE 5 UNIT(S): 100 INJECTION, SOLUTION SUBCUTANEOUS at 22:33

## 2018-11-23 RX ADMIN — SODIUM CHLORIDE 125 MILLILITER(S): 9 INJECTION INTRAMUSCULAR; INTRAVENOUS; SUBCUTANEOUS at 03:00

## 2018-11-23 RX ADMIN — Medication 2: at 12:42

## 2018-11-23 NOTE — PROGRESS NOTE ADULT - ASSESSMENT
58-year-old right-handed white lady first evaluated at [facility] on 11/21/18 with left hemiparesis. 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 Perhaps her previous strokes were due to small vessel disease. On 11/21/18, she awoke with a dense left hemiparesis. CT head (11/21/18) to my eye showed a chronic right lentiform nucleus lacunar infarct. CTA neck (11/21/18) to my eye showed a probable right ICA occlusion with some calcified plaque. CTA head (11/21/18) to my eye showed that the right ICA reconstituted perhaps through the ACOM and possibly a large right PCOM. CTP (11/21/18) to my eye showed a core infarct of 27 cc, and a large hypoperfused area of 125 cc in the right MCA distribution, MRI brain shows RMCA ischemic infarct. Given that there was no intracranial occlusion, she was excluded from endovascular thrombectomy.    Impression: Left hemiparesis likely due to right MCA infarction due to extracranial right ICA occlusion (large artery atherosclerosis more likely than dissection) probably with artery-artery embolism, and less likely with perfusion insufficiency.     NEURO: Continue close monitoring for neurologic deterioration, permissive hypertension;, antiplatelet agent and stain for secondary stroke prevention, titrate statin to LDL goal less than 70, will stop carbamazepine, no hx of seizures, MRA head/Neck with T1 Fat sat ordered to r/o dissection, Pending official report of MRI Brain. CTA Head/Neck results as above, Physical therapy/OT eval/treatment.     ANTITHROMBOTIC THERAPY:  Aggrenox for secondary stroke prevention    PULMONARY: CXR clear, protecting airway, saturating well     CARDIOVASCULAR: check TTE, cardiac monitoring no events, off ebony drkylee, Dr Wheeler (Cardiology) contacted for further recommendations, Will consider for possible AF study      SBP goal: <160    GASTROINTESTINAL: Dysphagia screen passed, tolerating diet     Diet: Pureed    RENAL: BUN/Cr stable, good urine output, hyponatremia, will start 2mg Na Tabs, will continue to monitor      Na Goal: Greater than 135     Harper: N    HEMATOLOGY: H/H stable, Platelets normal      DVT ppx:  LMWH     ID: afebrile, no leukocytosis     OTHER: Plan/goals of care discussed with pt at bedside    DISPOSITION: Rehab or home depending on PT eval once stable and workup is complete    CORE MEASURES:        Admission NIHSS: 15     TPA:  NO      LDL/HDL:     Depression Screen:      Statin Therapy: Y     Dysphagia Screen: PASS     Smoking  NO      Afib NO     Stroke Education YES 58-year-old right-handed white lady first evaluated at [facility] on 11/21/18 with left hemiparesis. 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 Perhaps her previous strokes were due to small vessel disease. On 11/21/18, she awoke with a dense left hemiparesis. CT head (11/21/18) to my eye showed a chronic right lentiform nucleus lacunar infarct. CTA neck (11/21/18) to my eye showed a probable right ICA occlusion with some calcified plaque. CTA head (11/21/18) to my eye showed that the right ICA reconstituted perhaps through the ACOM and possibly a large right PCOM. CTP (11/21/18) to my eye showed a core infarct of 27 cc, and a large hypoperfused area of 125 cc in the right MCA distribution, MRI brain shows RMCA ischemic infarct. Given that there was no intracranial occlusion, she was excluded from endovascular thrombectomy.    Impression: Left hemiparesis likely due to right MCA infarction due to extracranial right ICA occlusion (large artery atherosclerosis more likely than dissection) probably with artery-artery embolism, and less likely with perfusion insufficiency.     NEURO: Continue close monitoring for neurologic deterioration, permissive hypertension;, antiplatelet agent and stain for secondary stroke prevention, titrate statin to LDL goal less than 70, will stop carbamazepine, no hx of seizures, MRA head/Neck with T1 Fat sat ordered to r/o dissection, Pending official report of MRI Brain. CTA Head/Neck results as above, Physical therapy/OT recommends AR.  Patient enrolled in Stroke AF trial and will get a LOOP placed.    ANTITHROMBOTIC THERAPY:  Aggrenox for secondary stroke prevention    PULMONARY: CXR clear, protecting airway, saturating well     CARDIOVASCULAR: check TTE, cardiac monitoring no events, off ebony deluna, Dr Wheeler (Cardiology) contacted for further recommendations, enrolled in AF study      SBP goal: <160    GASTROINTESTINAL: Dysphagia screen passed, tolerating diet, episode of emesis this morning-if persists, we could discontinue the aggrenox and change to asa, will continue to monitor     Diet: Pureed    RENAL: BUN/Cr stable, good urine output, hyponatremia, will start 2mg Na Tabs, will continue to monitor      Na Goal: Greater than 135     Harper: N    HEMATOLOGY: H/H without acute change, Platelets normal      DVT ppx:  LMWH     ID: afebrile, no leukocytosis     OTHER: Endocrine consulted for A1C of 9.2: patient only on humalog correctional scale with meals. Would add a bedtime correctional as well.  hold off on lantus. Would just add humalog 3 units TID with meals.  -On discharge, patient going to acute rehab, so would c/w insulin, regimen to be determined. When home, would likely add metformin- titrated up to max dose as well as glipizide, consult appreciated.   Plan/goals of care discussed with pt at bedside    DISPOSITION: Acute rehab once stable and workup is complete    CORE MEASURES:        Admission NIHSS: 15     TPA:  NO      LDL/HDL:     Depression Screen:      Statin Therapy: Y     Dysphagia Screen: PASS     Smoking  NO      Afib NO     Stroke Education YES 58-year-old right-handed white lady first evaluated at [facility] on 11/21/18 with left hemiparesis. 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 Perhaps her previous strokes were due to small vessel disease. On 11/21/18, she awoke with a dense left hemiparesis. CT head (11/21/18) to my eye showed a chronic right lentiform nucleus lacunar infarct. CTA neck (11/21/18) to my eye showed a probable right ICA occlusion with some calcified plaque. CTA head (11/21/18) to my eye showed that the right ICA reconstituted perhaps through the ACOM and possibly a large right PCOM. CTP (11/21/18) to my eye showed a core infarct of 27 cc, and a large hypoperfused area of 125 cc in the right MCA distribution, MRI brain shows RMCA ischemic infarct. Given that there was no intracranial occlusion, she was excluded from endovascular thrombectomy.    Impression: Left hemiparesis likely due to right MCA infarction due to extracranial right ICA occlusion (large artery atherosclerosis more likely than dissection) probably with artery-artery embolism, and less likely with perfusion insufficiency.     NEURO: Continue close monitoring for neurologic deterioration, permissive hypertension;, antiplatelet agent and stain for secondary stroke prevention, titrate statin to LDL goal less than 70, will stop carbamazepine, no hx of seizures, MRA head/Neck with T1 Fat sat ordered to r/o dissection, Pending official report of MRI Brain. CTA Head/Neck results as above, Physical therapy/OT recommends AR. Headache occurred on 11/22/18 and was probably due to Aggrenox. If recurs and is intolerable, may change Aggrenox to ASA.    Patient enrolled in Stroke AF trial and will get a LOOP placed.    ANTITHROMBOTIC THERAPY:  Aggrenox for secondary stroke prevention    PULMONARY: CXR clear, protecting airway, saturating well     CARDIOVASCULAR: check TTE, cardiac monitoring no events, off ebony deluna, Dr Wheeler (Cardiology) contacted for further recommendations, enrolled in AF study      SBP goal: <160    GASTROINTESTINAL: Dysphagia screen passed, tolerating diet, episode of emesis this morning-if persists, we could discontinue the aggrenox and change to asa, will continue to monitor     Diet: Pureed    RENAL: BUN/Cr stable, good urine output, hyponatremia, will start 2mg Na Tabs, will continue to monitor      Na Goal: Greater than 135     Harper: N    HEMATOLOGY: H/H without acute change, Platelets normal      DVT ppx:  LMWH     ID: afebrile, no leukocytosis     OTHER: Endocrine consulted for A1C of 9.2: patient only on humalog correctional scale with meals. Would add a bedtime correctional as well.  hold off on lantus. Would just add humalog 3 units TID with meals.  -On discharge, patient going to acute rehab, so would c/w insulin, regimen to be determined. When home, would likely add metformin- titrated up to max dose as well as glipizide, consult appreciated.   Plan/goals of care discussed with pt at bedside    DISPOSITION: Acute rehab once stable and workup is complete    CORE MEASURES:        Admission NIHSS: 15     TPA:  NO      LDL/HDL:     Depression Screen:      Statin Therapy: Y     Dysphagia Screen: PASS     Smoking  NO      Afib NO     Stroke Education YES

## 2018-11-23 NOTE — DIETITIAN INITIAL EVALUATION ADULT. - PERTINENT MEDS FT
MEDICATIONS  (STANDING):  atorvastatin 80 milliGRAM(s) Oral at bedtime  dextrose 5%. 1000 milliLiter(s) (50 mL/Hr) IV Continuous <Continuous>  dextrose 50% Injectable 12.5 Gram(s) IV Push once  dextrose 50% Injectable 25 Gram(s) IV Push once  dextrose 50% Injectable 25 Gram(s) IV Push once  dipyridamole 200 mG/aspirin 25 mG 1 Capsule(s) Oral two times a day  enoxaparin Injectable 40 milliGRAM(s) SubCutaneous every 24 hours  insulin lispro (HumaLOG) corrective regimen sliding scale   SubCutaneous three times a day before meals  metoclopramide Injectable 10 milliGRAM(s) IV Push once  sodium chloride 2 Gram(s) Oral two times a day  sodium chloride 0.9%. 1000 milliLiter(s) (125 mL/Hr) IV Continuous <Continuous>

## 2018-11-23 NOTE — PROGRESS NOTE ADULT - SUBJECTIVE AND OBJECTIVE BOX
Neurology Follow up note    Patient is a 58y old  Female who presents with a chief complaint of     Subjective:Interval History - No events overnight    Objective:   Vital Signs Last 24 Hrs  T(C): 36.7 (23 Nov 2018 10:00), Max: 36.8 (22 Nov 2018 20:00)  T(F): 98.1 (23 Nov 2018 10:00), Max: 98.3 (22 Nov 2018 20:00)  HR: 97 (23 Nov 2018 12:00) (70 - 100)  BP: 138/75 (23 Nov 2018 12:00) (138/75 - 196/80)  BP(mean): 94 (23 Nov 2018 12:00) (84 - 119)  RR: 19 (23 Nov 2018 12:00) (13 - 19)  SpO2: 100% (23 Nov 2018 12:00) (98% - 100%)    General: No acute distress  HEENT: EOM intact, mild left homonymous hemianopia   Abdomen: Soft, nontender, nondistended   Extremities: No edema    NEUROLOGICAL EXAM:  Mental status: Awake, alert, oriented x3, fluent speech, no neglect, able to follow commands  Cranial Nerves: mild left central facial palsy,  left homonymous hemianopia, a few beats of nystagmus at end gaze bilaterally, mild dysarthria,   Motor exam: Normal tone, no drift, RUE 5/5, RLE 5/5, Left side hemiparesis LUE 0/5, LLE iliopsoas 2+/5; anterior tibialis 0/5  Sensation: Moderate-severely decreased light touch and temperature sensation on left   Coordination/ Gait: No dysmetria    Other:  11-23    132<L>  |  99  |  9   ----------------------------<  186<H>  4.0   |  21<L>  |  0.62    Ca    9.4      23 Nov 2018 06:00               13.0   4.9   )-----------( 219      ( 23 Nov 2018 06:00 )             37.1     MEDICATIONS  (STANDING):  atorvastatin 80 milliGRAM(s) Oral at bedtime  dextrose 5%. 1000 milliLiter(s) (50 mL/Hr) IV Continuous <Continuous>  dextrose 50% Injectable 12.5 Gram(s) IV Push once  dextrose 50% Injectable 25 Gram(s) IV Push once  dextrose 50% Injectable 25 Gram(s) IV Push once  dipyridamole 200 mG/aspirin 25 mG 1 Capsule(s) Oral two times a day  enoxaparin Injectable 40 milliGRAM(s) SubCutaneous every 24 hours  insulin lispro (HumaLOG) corrective regimen sliding scale   SubCutaneous three times a day before meals  sodium chloride 2 Gram(s) Oral two times a day  sodium chloride 0.9%. 1000 milliLiter(s) (125 mL/Hr) IV Continuous <Continuous>    MEDICATIONS  (PRN):  dextrose 40% Gel 15 Gram(s) Oral once PRN Blood Glucose LESS THAN 70 milliGRAM(s)/deciliter  glucagon  Injectable 1 milliGRAM(s) IntraMuscular once PRN Glucose LESS THAN 70 milligrams/deciliter  ondansetron Injectable 4 milliGRAM(s) IV Push every 8 hours PRN Nausea and/or Vomiting

## 2018-11-23 NOTE — PHYSICAL THERAPY INITIAL EVALUATION ADULT - DIAGNOSIS, PT EVAL
Decr. functional mobility 2/2 L-sided weakness, decr. sitting/standing balance, postural control; gait dysfunction

## 2018-11-23 NOTE — PROGRESS NOTE ADULT - ASSESSMENT
58-year-old right-handed white lady first evaluated at [facility] on 11/21/18 with left hemiparesis. 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 Perhaps her previous strokes were due to small vessel disease. On 11/21/18, she awoke with a dense left hemiparesis. CT head (11/21/18) to my eye showed a chronic right lentiform nucleus lacunar infarct. CTA neck (11/21/18) to my eye showed a probable right ICA occlusion with some calcified plaque. CTA head (11/21/18) to my eye showed that the right ICA reconstituted perhaps through the ACOM and possibly a large right PCOM. CTP (11/21/18) to my eye showed a core infarct of 27 cc, and a large hypoperfused area of 125 cc in the right MCA distribution, MRI brain shows RMCA ischemic infarct. Given that there was no intracranial occlusion, she was excluded from endovascular thrombectomy.  Impression: Left hemiparesis likely due to right MCA infarction due to extracranial right ICA occlusion (large artery atherosclerosis more likely than dissection) probably with artery-artery embolism, and less likely with perfusion insufficiency.     Plan  [] endorcrine consult for DM management, A1C 9.2  [] aggrenox BID, to be given with food to present nausea/vom  [] continue DVT ppx  [] continue lipitor  [] PT/OT: acute rehab

## 2018-11-23 NOTE — PHYSICAL THERAPY INITIAL EVALUATION ADULT - PERTINENT HX OF CURRENT PROBLEM, REHAB EVAL
59 yo F with a PMH of HTN, DM and Stroke x2 (w/ residual left facial droop) who presented to [facility] with left sided hemiplegia, transferred for possible thrombectomy. Pt not a candidate for tPA. 59 yo F with a PMH of HTN, DM and Stroke x2 (w/ residual left facial droop) who presented to [facility] with left sided hemiplegia, transferred for possible thrombectomy. Pt not a candidate for tPA. MR head (11/21): acute R MCA infarct.

## 2018-11-23 NOTE — CONSULT NOTE ADULT - PROBLEM SELECTOR RECOMMENDATION 9
-Patient with Type 2 DM (uncontrolled A1C 9.2) on glipizide only at home.  -Inpatient, patient only on humalog correctional scale with meals. Would add a bedtime correctional as well.   -AM FS on , so would hold off on lantus. Would just add humalog 3 units TID with meals.  -On discharge, patient going to acute rehab, so would c/w insulin, regimen to be determined. When home, would likely add metformin- titrated up to max dose as well as glipizide.  -Can f/u with endocrinology at [address] [place] [address] [phone] -Patient with Type 2 DM (uncontrolled A1C 9.2) on glipizide only at home.  -Inpatient, patient only on humalog correctional scale with meals. Would add a bedtime correctional as well.   -Would start lantus 5 units qhs. Would also add humalog 3 units TID with meals.  -On discharge, patient going to acute rehab, so would c/w insulin, regimen to be determined. When home, would likely add metformin- titrated up to max dose as well as glipizide.  -Can f/u with endocrinology at [address] [place] [address] [phone]

## 2018-11-23 NOTE — DIETITIAN INITIAL EVALUATION ADULT. - ENERGY NEEDS
Ht: 68"  Wt: 197  BMI: 30  kg/m2   IBW: 140 (+/-10%)     141% IBW  Edema: none        Skin: no pressure injuries documented

## 2018-11-23 NOTE — PHYSICAL THERAPY INITIAL EVALUATION ADULT - IMPAIRMENTS FOUND, PT EVAL
posture/gait, locomotion, and balance/muscle strength/ROM/cognitive impairment/fine motor/decreased midline orientation/gross motor

## 2018-11-23 NOTE — OCCUPATIONAL THERAPY INITIAL EVALUATION ADULT - ADL RETRAINING, OT EVAL
Goals: Pt will perform UB dressing with minimal assist within 2 weeks.  Pt will perform LB dressing with moderate assist within 2 weeks.

## 2018-11-23 NOTE — CONSULT NOTE ADULT - PROBLEM SELECTOR RECOMMENDATION 2
-BP uncontrolled. Goal <130/80, however with recent CVA, BP control as per neuro. Consider adding ACEi.

## 2018-11-23 NOTE — PHYSICAL THERAPY INITIAL EVALUATION ADULT - GENERAL OBSERVATIONS, REHAB EVAL
Pt received seated in [facility], (+) stroke unit monitoring, NAD. Pt alert, agreeable to PT eval. Pt noted to have mild L lower quadrant field cut.

## 2018-11-23 NOTE — PROVIDER CONTACT NOTE (OTHER) - ASSESSMENT
pt is alert and oriented, pt denies chest pain. pt had 5 beats wide complex. VS hr 61, resp 15, sao2 99%ra, b/p 145/79

## 2018-11-23 NOTE — PHYSICAL THERAPY INITIAL EVALUATION ADULT - IMPAIRED TRANSFERS: SIT/STAND, REHAB EVAL
impaired balance/impaired coordination/decreased sensation/impaired motor control/impaired postural control/decreased strength/decreased ROM

## 2018-11-23 NOTE — PROGRESS NOTE ADULT - SUBJECTIVE AND OBJECTIVE BOX
THE PATIENT WAS SEEN AND EXAMINED BY ME WITH THE HOUSESTAFF AND STROKE TEAM DURING MORNING ROUNDS.   HPI:  59 y/o woman with PMH of HTN, DM and Stroke x2 (w/ residual left facial droop) who presented to [facility] with left sided hemiplegia, transferred for possible thrombectomy. LKN 0130. Symptoms unchanged since awakening with them at 0600. Was not a candidate for tPA. Pt denies any recent fevers, chills, HA, dizziness, neck pain, visual changes, CP, SOB, cough, nausea, vomiting, abdominal pain, changes in BMs/urination. NIHSS: 15. MRS: 1.     SUBJECTIVE: No events overnight.  No new neurologic complaints.      atorvastatin 80 milliGRAM(s) Oral at bedtime  dextrose 40% Gel 15 Gram(s) Oral once PRN  dextrose 5%. 1000 milliLiter(s) IV Continuous <Continuous>  dextrose 50% Injectable 12.5 Gram(s) IV Push once  dextrose 50% Injectable 25 Gram(s) IV Push once  dextrose 50% Injectable 25 Gram(s) IV Push once  dipyridamole 200 mG/aspirin 25 mG 1 Capsule(s) Oral two times a day  enoxaparin Injectable 40 milliGRAM(s) SubCutaneous every 24 hours  glucagon  Injectable 1 milliGRAM(s) IntraMuscular once PRN  insulin lispro (HumaLOG) corrective regimen sliding scale   SubCutaneous three times a day before meals  metoclopramide Injectable 10 milliGRAM(s) IV Push once  sodium chloride 2 Gram(s) Oral two times a day  sodium chloride 0.9%. 1000 milliLiter(s) IV Continuous <Continuous>      PHYSICAL EXAM:   Vital Signs Last 24 Hrs  T(C): 36.8 (22 Nov 2018 20:00), Max: 36.8 (22 Nov 2018 20:00)  T(F): 98.3 (22 Nov 2018 20:00), Max: 98.3 (22 Nov 2018 20:00)  HR: 83 (23 Nov 2018 06:00) (70 - 93)  BP: 196/80 (23 Nov 2018 06:00) (140/69 - 196/80)  BP(mean): 115 (23 Nov 2018 06:00) (84 - 119)  RR: 14 (23 Nov 2018 06:00) (13 - 26)  SpO2: 99% (23 Nov 2018 06:00) (98% - 99%)    General: No acute distress  HEENT: EOM intact, mild left homonymous hemianopia   Abdomen: Soft, nontender, nondistended   Extremities: No edema    NEUROLOGICAL EXAM:  Mental status: Awake, alert, oriented x3, fluent speech, no neglect, able to follow commands  Cranial Nerves: mild left central facial palsy,  left homonymous hemianopia, a few beats of nystagmus at end gaze bilaterally, mild dysarthria,   Motor exam: Normal tone, no drift, RUE 5/5, RLE 5/5, Left side hemiparesis LUE 0/5, LLE iliopsoas 2+/5; anterior tibialis 0/5  Sensation: Moderate-severely decreased light touch and temperature sensation on left   Coordination/ Gait: No dysmetria    LABS:                        13.0   4.9   )-----------( 219      ( 23 Nov 2018 06:00 )             37.1    11-23    132<L>  |  99  |  9   ----------------------------<  186<H>  4.0   |  21<L>  |  0.62    Ca    9.4      23 Nov 2018 06:00    TPro  8.1  /  Alb  4.4  /  TBili  0.3  /  DBili  x   /  AST  14  /  ALT  20  /  AlkPhos  111  11-21  PT/INR - ( 21 Nov 2018 12:04 )   PT: 11.7 sec;   INR: 1.03 ratio         PTT - ( 21 Nov 2018 12:04 )  PTT:29.9 sec  Hemoglobin A1C, Whole Blood: 9.2 % (11-22 @ 08:24)      IMAGING: Reviewed by me.   MRI Head (11/21/18): Acute right MCA infarct.    Head CT, perfussion, CTA Head/Neck (11/21/18)   Brain CT: No identifiable or infarct. Neck   CTA: The abrupt cut off of the proximal right internal carotid artery with associated scattered areas of calcified plaque. This may be   secondary to occlusive thrombus or arterial dissection. A mild narrowing of the takeoff of the right vertebral artery.Normal left internal carotid and left vertebral arteries.    Brain CTA: Markedly diminished flow within the petrous and cavernous segments of the right internal carotid artery with reconstitution of the distal right internal carotid artery likely through collateralization and/or cross filling.. Mildly decreased flow within the right middle cerebral artery as compared with the left without focal stenosis.    Perfusion maps: Mismatch area are of 102 mL with evidence of core infarct and moderately large penumbra. There is a decreased cerebral blood flow and decreased cerebral blood volume in the right frontoparietal region with corresponding elevation of mean transit time. THE PATIENT WAS SEEN AND EXAMINED BY ME WITH THE HOUSESTAFF AND STROKE TEAM DURING MORNING ROUNDS.   HPI:  57 y/o woman with PMH of HTN, DM and Stroke x2 (w/ residual left facial droop) who presented to [facility] with left sided hemiplegia, transferred for possible thrombectomy. LKN 0130. Symptoms unchanged since awakening with them at 0600. Was not a candidate for tPA. Pt denies any recent fevers, chills, HA, dizziness, neck pain, visual changes, CP, SOB, cough, nausea, vomiting, abdominal pain, changes in BMs/urination. NIHSS: 15. MRS: 1.     SUBJECTIVE: No events overnight.  No new neurologic complaints.      atorvastatin 80 milliGRAM(s) Oral at bedtime  dextrose 40% Gel 15 Gram(s) Oral once PRN  dextrose 5%. 1000 milliLiter(s) IV Continuous <Continuous>  dextrose 50% Injectable 12.5 Gram(s) IV Push once  dextrose 50% Injectable 25 Gram(s) IV Push once  dextrose 50% Injectable 25 Gram(s) IV Push once  dipyridamole 200 mG/aspirin 25 mG 1 Capsule(s) Oral two times a day  enoxaparin Injectable 40 milliGRAM(s) SubCutaneous every 24 hours  glucagon  Injectable 1 milliGRAM(s) IntraMuscular once PRN  insulin lispro (HumaLOG) corrective regimen sliding scale   SubCutaneous three times a day before meals  metoclopramide Injectable 10 milliGRAM(s) IV Push once  sodium chloride 2 Gram(s) Oral two times a day  sodium chloride 0.9%. 1000 milliLiter(s) IV Continuous <Continuous>      PHYSICAL EXAM:   Vital Signs Last 24 Hrs  T(C): 36.8 (22 Nov 2018 20:00), Max: 36.8 (22 Nov 2018 20:00)  T(F): 98.3 (22 Nov 2018 20:00), Max: 98.3 (22 Nov 2018 20:00)  HR: 83 (23 Nov 2018 06:00) (70 - 93)  BP: 196/80 (23 Nov 2018 06:00) (140/69 - 196/80)  BP(mean): 115 (23 Nov 2018 06:00) (84 - 119)  RR: 14 (23 Nov 2018 06:00) (13 - 26)  SpO2: 99% (23 Nov 2018 06:00) (98% - 99%)    General: No acute distress  HEENT: EOM intact, mild left homonymous hemianopia   Abdomen: Soft, nontender, nondistended   Extremities: No edema    NEUROLOGICAL EXAM:  Mental status: Awake, alert, oriented x3, fluent speech, no neglect, able to follow commands  Cranial Nerves: mild left central facial palsy,  left homonymous hemianopia, a few beats of nystagmus at end gaze bilaterally, mild dysarthria,   Motor exam: Normal tone, no drift, RUE 5/5, RLE 5/5, Left side hemiparesis LUE 0/5, LLE iliopsoas 3/5; anterior tibialis 0/5  Sensation: Moderate-severely decreased light touch and temperature sensation on left   Coordination/ Gait: No dysmetria    LABS:                        13.0   4.9   )-----------( 219      ( 23 Nov 2018 06:00 )             37.1    11-23    132<L>  |  99  |  9   ----------------------------<  186<H>  4.0   |  21<L>  |  0.62    Ca    9.4      23 Nov 2018 06:00    TPro  8.1  /  Alb  4.4  /  TBili  0.3  /  DBili  x   /  AST  14  /  ALT  20  /  AlkPhos  111  11-21  PT/INR - ( 21 Nov 2018 12:04 )   PT: 11.7 sec;   INR: 1.03 ratio         PTT - ( 21 Nov 2018 12:04 )  PTT:29.9 sec  Hemoglobin A1C, Whole Blood: 9.2 % (11-22 @ 08:24)      IMAGING: Reviewed by me.   MRI Head (11/21/18): Acute right MCA infarct.    Head CT, perfussion, CTA Head/Neck (11/21/18)   Brain CT: No identifiable or infarct. Neck   CTA: The abrupt cut off of the proximal right internal carotid artery with associated scattered areas of calcified plaque. This may be   secondary to occlusive thrombus or arterial dissection. A mild narrowing of the takeoff of the right vertebral artery.Normal left internal carotid and left vertebral arteries.    Brain CTA: Markedly diminished flow within the petrous and cavernous segments of the right internal carotid artery with reconstitution of the distal right internal carotid artery likely through collateralization and/or cross filling.. Mildly decreased flow within the right middle cerebral artery as compared with the left without focal stenosis.    Perfusion maps: Mismatch area are of 102 mL with evidence of core infarct and moderately large penumbra. There is a decreased cerebral blood flow and decreased cerebral blood volume in the right frontoparietal region with corresponding elevation of mean transit time. THE PATIENT WAS SEEN AND EXAMINED BY ME WITH THE HOUSESTAFF AND STROKE TEAM DURING MORNING ROUNDS.   HPI:  57 y/o woman with PMH of HTN, DM and Stroke x2 (w/ residual left facial droop) who presented to [facility] with left sided hemiplegia, transferred for possible thrombectomy. LKN 0130. Symptoms unchanged since awakening with them at 0600. Was not a candidate for tPA. Pt denies any recent fevers, chills, HA, dizziness, neck pain, visual changes, CP, SOB, cough, nausea, vomiting, abdominal pain, changes in BMs/urination. NIHSS: 15. MRS: 1.     SUBJECTIVE: Last night developed H/A, nausea and vomiting. Now feels better.    atorvastatin 80 milliGRAM(s) Oral at bedtime  dextrose 40% Gel 15 Gram(s) Oral once PRN  dextrose 5%. 1000 milliLiter(s) IV Continuous <Continuous>  dextrose 50% Injectable 12.5 Gram(s) IV Push once  dextrose 50% Injectable 25 Gram(s) IV Push once  dextrose 50% Injectable 25 Gram(s) IV Push once  dipyridamole 200 mG/aspirin 25 mG 1 Capsule(s) Oral two times a day  enoxaparin Injectable 40 milliGRAM(s) SubCutaneous every 24 hours  glucagon  Injectable 1 milliGRAM(s) IntraMuscular once PRN  insulin lispro (HumaLOG) corrective regimen sliding scale   SubCutaneous three times a day before meals  metoclopramide Injectable 10 milliGRAM(s) IV Push once  sodium chloride 2 Gram(s) Oral two times a day  sodium chloride 0.9%. 1000 milliLiter(s) IV Continuous <Continuous>      PHYSICAL EXAM:   Vital Signs Last 24 Hrs  T(C): 36.8 (22 Nov 2018 20:00), Max: 36.8 (22 Nov 2018 20:00)  T(F): 98.3 (22 Nov 2018 20:00), Max: 98.3 (22 Nov 2018 20:00)  HR: 83 (23 Nov 2018 06:00) (70 - 93)  BP: 196/80 (23 Nov 2018 06:00) (140/69 - 196/80)  BP(mean): 115 (23 Nov 2018 06:00) (84 - 119)  RR: 14 (23 Nov 2018 06:00) (13 - 26)  SpO2: 99% (23 Nov 2018 06:00) (98% - 99%)    General: No acute distress  HEENT: EOM intact, mild left homonymous hemianopia   Abdomen: Soft, nontender, nondistended   Extremities: No edema    NEUROLOGICAL EXAM:  Mental status: Awake, alert, oriented x3, fluent speech, no neglect, able to follow commands  Cranial Nerves: mild left central facial palsy,  left homonymous hemianopia, a few beats of nystagmus at end gaze bilaterally, mild dysarthria,   Motor exam: Normal tone, no drift, RUE 5/5, RLE 5/5, Left side hemiparesis LUE 0/5, LLE iliopsoas 3/5; anterior tibialis 0/5  Sensation: Moderate-severely decreased light touch and temperature sensation on left   Coordination/ Gait: No dysmetria    LABS:                        13.0   4.9   )-----------( 219      ( 23 Nov 2018 06:00 )             37.1    11-23    132<L>  |  99  |  9   ----------------------------<  186<H>  4.0   |  21<L>  |  0.62    Ca    9.4      23 Nov 2018 06:00    TPro  8.1  /  Alb  4.4  /  TBili  0.3  /  DBili  x   /  AST  14  /  ALT  20  /  AlkPhos  111  11-21  PT/INR - ( 21 Nov 2018 12:04 )   PT: 11.7 sec;   INR: 1.03 ratio         PTT - ( 21 Nov 2018 12:04 )  PTT:29.9 sec  Hemoglobin A1C, Whole Blood: 9.2 % (11-22 @ 08:24)      IMAGING: Reviewed by me.   MRI Head (11/21/18): Acute right MCA infarct.    Head CT, perfussion, CTA Head/Neck (11/21/18)   Brain CT: No identifiable or infarct. Neck   CTA: The abrupt cut off of the proximal right internal carotid artery with associated scattered areas of calcified plaque. This may be   secondary to occlusive thrombus or arterial dissection. A mild narrowing of the takeoff of the right vertebral artery.Normal left internal carotid and left vertebral arteries.    Brain CTA: Markedly diminished flow within the petrous and cavernous segments of the right internal carotid artery with reconstitution of the distal right internal carotid artery likely through collateralization and/or cross filling.. Mildly decreased flow within the right middle cerebral artery as compared with the left without focal stenosis.    Perfusion maps: Mismatch area are of 102 mL with evidence of core infarct and moderately large penumbra. There is a decreased cerebral blood flow and decreased cerebral blood volume in the right frontoparietal region with corresponding elevation of mean transit time.

## 2018-11-23 NOTE — DIETITIAN INITIAL EVALUATION ADULT. - OTHER INFO
Pt seen for: Length Of Stay    Adm dx: CVA   GI issues: current N/V   Last BM: 11/20   Food allergies: shellfish   Vit/supplement PTA:  vitamin D

## 2018-11-23 NOTE — OCCUPATIONAL THERAPY INITIAL EVALUATION ADULT - PLANNED THERAPY INTERVENTIONS, OT EVAL
ADL retraining/bed mobility training/strengthening/orthotic fitting/training/stretching/transfer training/balance training/parent/caregiver training.../neuromuscular re-education/ROM

## 2018-11-23 NOTE — DIETITIAN INITIAL EVALUATION ADULT. - ADHERENCE
No diet restrictions, per diet hx: eats cereal or eggs at breakfast, skips lunch, no particular meal pattern at dinner, avoids juice, drinks diet beverages. Doesn't consistently check BG, when she does it's in low 200's, had A1c checked last month, can't remember result. A1c this ad 9.2

## 2018-11-23 NOTE — PHYSICAL THERAPY INITIAL EVALUATION ADULT - RANGE OF MOTION, PT EVAL
GOAL: Pt will improve R UE/LE ROM by 1/2 grade to improve level of independence with mobility skills and ADLs in 2 weeks.

## 2018-11-23 NOTE — OCCUPATIONAL THERAPY INITIAL EVALUATION ADULT - PERTINENT HX OF CURRENT PROBLEM, REHAB EVAL
57 y/o woman with PMH of HTN, DM and Stroke x2 (w/ residual left facial droop) who presented to [facility] with left sided hemiplegia, transferred for possible thrombectomy. MAYON 0130. Symptoms unchanged since awakening with them at 0600. Was not a candidate for tPA.

## 2018-11-23 NOTE — PHYSICAL THERAPY INITIAL EVALUATION ADULT - BALANCE DISTURBANCE, IDENTIFIED IMPAIRMENT CONTRIBUTE, REHAB EVAL
impaired coordination/decreased strength/impaired postural control/impaired motor control/decreased ROM/decreased sensation

## 2018-11-23 NOTE — CONSULT NOTE ADULT - SUBJECTIVE AND OBJECTIVE BOX
HPI:  Pt is a 57 yo F with a PMH of HTN, DM Type 2 (A1C 9.2) and Stroke x2 (w/ residual left facial droop) who presented to [facility] with left sided hemiplegia, transferred for possible thrombectomy. LKN 0130. Symptoms unchanged since awakening with them at 0600. Was not a candidate for tPA. Pt denies any recent fevers, chills, HA, dizziness, neck pain, visual changes, CP, SOB, cough, nausea, vomiting, abdominal pain, changes in BMs/urination. NIHSS: 15. MRS: 1. (21 Nov 2018 12:48)    Endocrine History:  Patient reports a hx of Type 2 DM for past 3 years. Follows with PMD. Patient currently taking glipizide 5mg BID. Patient reports that up until a year ago, patient was on mixed insulin Humulin 70/30 15 units BID and was told by PMD can switch to pills with good control. Patient denies ever having been on metformin. Checks FS 2X/day. In -220 and in PM also 200-250. Denies any hypoglycemia. For breakfast eats oatmeal, raisin bran cereal, or farina cereal. Skips lunch. For dinner eats soap, rice/chicken, pasta. No juices or soda. Followed with optho 1 year ago, no retinopathy. Reports neuropathy in feet.     PAST MEDICAL & SURGICAL HISTORY:  Hypertension  CVA (cerebral vascular accident)      FAMILY HISTORY:  Family hx of Type 2 DM in brother      Social History: No smoking. Social alcohol use    Outpatient Medications:  glipizide  carbamezapine  fluvastatin  amlodopine    MEDICATIONS  (STANDING):  atorvastatin 80 milliGRAM(s) Oral at bedtime  dextrose 5%. 1000 milliLiter(s) (50 mL/Hr) IV Continuous <Continuous>  dextrose 50% Injectable 12.5 Gram(s) IV Push once  dextrose 50% Injectable 25 Gram(s) IV Push once  dextrose 50% Injectable 25 Gram(s) IV Push once  dipyridamole 200 mG/aspirin 25 mG 1 Capsule(s) Oral two times a day  enoxaparin Injectable 40 milliGRAM(s) SubCutaneous every 24 hours  insulin lispro (HumaLOG) corrective regimen sliding scale   SubCutaneous three times a day before meals  sodium chloride 2 Gram(s) Oral two times a day  sodium chloride 0.9%. 1000 milliLiter(s) (125 mL/Hr) IV Continuous <Continuous>    MEDICATIONS  (PRN):  dextrose 40% Gel 15 Gram(s) Oral once PRN Blood Glucose LESS THAN 70 milliGRAM(s)/deciliter  glucagon  Injectable 1 milliGRAM(s) IntraMuscular once PRN Glucose LESS THAN 70 milligrams/deciliter  ondansetron Injectable 4 milliGRAM(s) IV Push every 8 hours PRN Nausea and/or Vomiting      Allergies    No Known Drug Allergies  shellfish (Anaphylaxis)    Intolerances      Review of Systems:  Constitutional: No fever  Eyes: No blurry vision  Neuro: No tremors. Weakness on left side  HEENT: No pain  Cardiovascular: No chest pain, palpitations  Respiratory: No SOB, no cough  GI: No nausea, vomiting, abdominal pain  : No dysuria  Skin: no rash  Endocrine: no polyuria, polydipsia  Hem/lymph: no swelling    ALL OTHER SYSTEMS REVIEWED AND NEGATIVE      PHYSICAL EXAM:  VITALS: T(C): 36.7 (11-23-18 @ 10:00)  T(F): 98.1 (11-23-18 @ 10:00), Max: 98.3 (11-22-18 @ 20:00)  HR: 97 (11-23-18 @ 12:00) (72 - 100)  BP: 138/75 (11-23-18 @ 12:00) (138/75 - 196/80)  RR:  (14 - 19)  SpO2:  (98% - 100%)  Wt(kg): --  GENERAL: NAD, well-groomed, well-developed  EYES: No proptosis, no lid lag, anicteric  HEENT:  Atraumatic, Normocephalic, moist mucous membranes. left facial droop  THYROID: Normal size, no palpable nodules  RESPIRATORY: Clear to auscultation bilaterally; No rales, rhonchi, wheezing  CARDIOVASCULAR: Regular rate and rhythm; No murmurs; no peripheral edema  GI: Soft, nontender, non distended, normal bowel sounds  SKIN: Dry, intact, No rashes or lesions  MUSCULOSKELETAL: Full range of motion, left sided UE and LE weakness  PSYCH: Alert and oriented x 3, normal affect, normal mood  CUSHING'S SIGNS: no striae    POCT Blood Glucose.: 227 mg/dL (11-23-18 @ 12:40)  POCT Blood Glucose.: 218 mg/dL (11-23-18 @ 08:33)  POCT Blood Glucose.: 148 mg/dL (11-22-18 @ 21:38)  POCT Blood Glucose.: 175 mg/dL (11-22-18 @ 16:40)  POCT Blood Glucose.: 187 mg/dL (11-22-18 @ 12:08)  POCT Blood Glucose.: 238 mg/dL (11-22-18 @ 09:17)  POCT Blood Glucose.: 181 mg/dL (11-21-18 @ 21:44)  POCT Blood Glucose.: 328 mg/dL (11-21-18 @ 11:50)                            13.0   4.9   )-----------( 219      ( 23 Nov 2018 06:00 )             37.1       11-23    132<L>  |  99  |  9   ----------------------------<  186<H>  4.0   |  21<L>  |  0.62    EGFR if : 117  EGFR if non : 101    Ca    9.4      11-23    TPro  8.1  /  Alb  4.4  /  TBili  0.3  /  DBili  x   /  AST  14  /  ALT  20  /  AlkPhos  111  11-21        Hemoglobin A1C, Whole Blood: 9.2 % <H> [4.0 - 5.6] (11-22-18 @ 08:24)

## 2018-11-24 DIAGNOSIS — I63.9 CEREBRAL INFARCTION, UNSPECIFIED: ICD-10-CM

## 2018-11-24 LAB
ANION GAP SERPL CALC-SCNC: 16 MMOL/L — SIGNIFICANT CHANGE UP (ref 5–17)
APPEARANCE UR: ABNORMAL
BASOPHILS # BLD AUTO: 0 K/UL — SIGNIFICANT CHANGE UP (ref 0–0.2)
BASOPHILS NFR BLD AUTO: 0.3 % — SIGNIFICANT CHANGE UP (ref 0–2)
BILIRUB UR-MCNC: NEGATIVE — SIGNIFICANT CHANGE UP
BUN SERPL-MCNC: 11 MG/DL — SIGNIFICANT CHANGE UP (ref 7–23)
CALCIUM SERPL-MCNC: 9.4 MG/DL — SIGNIFICANT CHANGE UP (ref 8.4–10.5)
CHLORIDE SERPL-SCNC: 97 MMOL/L — SIGNIFICANT CHANGE UP (ref 96–108)
CHOLEST SERPL-MCNC: 179 MG/DL — SIGNIFICANT CHANGE UP (ref 10–199)
CO2 SERPL-SCNC: 19 MMOL/L — LOW (ref 22–31)
COLOR SPEC: YELLOW — SIGNIFICANT CHANGE UP
CREAT SERPL-MCNC: 0.64 MG/DL — SIGNIFICANT CHANGE UP (ref 0.5–1.3)
DIFF PNL FLD: ABNORMAL
EOSINOPHIL # BLD AUTO: 0 K/UL — SIGNIFICANT CHANGE UP (ref 0–0.5)
EOSINOPHIL NFR BLD AUTO: 0.3 % — SIGNIFICANT CHANGE UP (ref 0–6)
GLUCOSE BLDC GLUCOMTR-MCNC: 149 MG/DL — HIGH (ref 70–99)
GLUCOSE BLDC GLUCOMTR-MCNC: 172 MG/DL — HIGH (ref 70–99)
GLUCOSE BLDC GLUCOMTR-MCNC: 194 MG/DL — HIGH (ref 70–99)
GLUCOSE BLDC GLUCOMTR-MCNC: 221 MG/DL — HIGH (ref 70–99)
GLUCOSE SERPL-MCNC: 200 MG/DL — HIGH (ref 70–99)
GLUCOSE UR QL: 1000 MG/DL
HCT VFR BLD CALC: 34.5 % — SIGNIFICANT CHANGE UP (ref 34.5–45)
HDLC SERPL-MCNC: 39 MG/DL — LOW
HGB BLD-MCNC: 12.3 G/DL — SIGNIFICANT CHANGE UP (ref 11.5–15.5)
KETONES UR-MCNC: ABNORMAL
LEUKOCYTE ESTERASE UR-ACNC: NEGATIVE — SIGNIFICANT CHANGE UP
LIPID PNL WITH DIRECT LDL SERPL: 106 MG/DL — SIGNIFICANT CHANGE UP
LYMPHOCYTES # BLD AUTO: 0.9 K/UL — LOW (ref 1–3.3)
LYMPHOCYTES # BLD AUTO: 13.9 % — SIGNIFICANT CHANGE UP (ref 13–44)
MCHC RBC-ENTMCNC: 29.9 PG — SIGNIFICANT CHANGE UP (ref 27–34)
MCHC RBC-ENTMCNC: 35.7 GM/DL — SIGNIFICANT CHANGE UP (ref 32–36)
MCV RBC AUTO: 83.7 FL — SIGNIFICANT CHANGE UP (ref 80–100)
MONOCYTES # BLD AUTO: 0.5 K/UL — SIGNIFICANT CHANGE UP (ref 0–0.9)
MONOCYTES NFR BLD AUTO: 7.8 % — SIGNIFICANT CHANGE UP (ref 2–14)
NEUTROPHILS # BLD AUTO: 4.8 K/UL — SIGNIFICANT CHANGE UP (ref 1.8–7.4)
NEUTROPHILS NFR BLD AUTO: 77.6 % — HIGH (ref 43–77)
NITRITE UR-MCNC: NEGATIVE — SIGNIFICANT CHANGE UP
PH UR: 5.5 — SIGNIFICANT CHANGE UP (ref 5–8)
PLATELET # BLD AUTO: 220 K/UL — SIGNIFICANT CHANGE UP (ref 150–400)
POTASSIUM SERPL-MCNC: 3.9 MMOL/L — SIGNIFICANT CHANGE UP (ref 3.5–5.3)
POTASSIUM SERPL-SCNC: 3.9 MMOL/L — SIGNIFICANT CHANGE UP (ref 3.5–5.3)
PROT UR-MCNC: NEGATIVE MG/DL — SIGNIFICANT CHANGE UP
RBC # BLD: 4.12 M/UL — SIGNIFICANT CHANGE UP (ref 3.8–5.2)
RBC # FLD: 11.6 % — SIGNIFICANT CHANGE UP (ref 10.3–14.5)
SODIUM SERPL-SCNC: 132 MMOL/L — LOW (ref 135–145)
SP GR SPEC: 1.02 — SIGNIFICANT CHANGE UP (ref 1.01–1.02)
TOTAL CHOLESTEROL/HDL RATIO MEASUREMENT: 4.6 RATIO — SIGNIFICANT CHANGE UP (ref 3.3–7.1)
TRIGL SERPL-MCNC: 171 MG/DL — HIGH (ref 10–149)
UROBILINOGEN FLD QL: 1 MG/DL — SIGNIFICANT CHANGE UP
WBC # BLD: 6.2 K/UL — SIGNIFICANT CHANGE UP (ref 3.8–10.5)
WBC # FLD AUTO: 6.2 K/UL — SIGNIFICANT CHANGE UP (ref 3.8–10.5)

## 2018-11-24 PROCEDURE — 99233 SBSQ HOSP IP/OBS HIGH 50: CPT

## 2018-11-24 PROCEDURE — 71045 X-RAY EXAM CHEST 1 VIEW: CPT | Mod: 26

## 2018-11-24 RX ORDER — ASPIRIN/CALCIUM CARB/MAGNESIUM 324 MG
81 TABLET ORAL DAILY
Qty: 0 | Refills: 0 | Status: DISCONTINUED | OUTPATIENT
Start: 2018-11-24 | End: 2018-11-28

## 2018-11-24 RX ORDER — AMLODIPINE BESYLATE 2.5 MG/1
5 TABLET ORAL DAILY
Qty: 0 | Refills: 0 | Status: DISCONTINUED | OUTPATIENT
Start: 2018-11-24 | End: 2018-11-24

## 2018-11-24 RX ORDER — CLOPIDOGREL BISULFATE 75 MG/1
75 TABLET, FILM COATED ORAL DAILY
Qty: 0 | Refills: 0 | Status: DISCONTINUED | OUTPATIENT
Start: 2018-11-24 | End: 2018-11-28

## 2018-11-24 RX ADMIN — Medication 400 MILLIGRAM(S): at 01:43

## 2018-11-24 RX ADMIN — ENOXAPARIN SODIUM 40 MILLIGRAM(S): 100 INJECTION SUBCUTANEOUS at 17:03

## 2018-11-24 RX ADMIN — SODIUM CHLORIDE 2 GRAM(S): 9 INJECTION INTRAMUSCULAR; INTRAVENOUS; SUBCUTANEOUS at 05:47

## 2018-11-24 RX ADMIN — INSULIN GLARGINE 5 UNIT(S): 100 INJECTION, SOLUTION SUBCUTANEOUS at 21:51

## 2018-11-24 RX ADMIN — SODIUM CHLORIDE 125 MILLILITER(S): 9 INJECTION INTRAMUSCULAR; INTRAVENOUS; SUBCUTANEOUS at 19:20

## 2018-11-24 RX ADMIN — Medication 1000 MILLIGRAM(S): at 02:13

## 2018-11-24 RX ADMIN — Medication 81 MILLIGRAM(S): at 17:03

## 2018-11-24 RX ADMIN — CLOPIDOGREL BISULFATE 75 MILLIGRAM(S): 75 TABLET, FILM COATED ORAL at 17:03

## 2018-11-24 RX ADMIN — Medication 1: at 17:42

## 2018-11-24 RX ADMIN — Medication 3 UNIT(S): at 17:44

## 2018-11-24 RX ADMIN — ONDANSETRON 4 MILLIGRAM(S): 8 TABLET, FILM COATED ORAL at 08:56

## 2018-11-24 RX ADMIN — ATORVASTATIN CALCIUM 80 MILLIGRAM(S): 80 TABLET, FILM COATED ORAL at 21:46

## 2018-11-24 RX ADMIN — ASPIRIN AND DIPYRIDAMOLE 1 CAPSULE(S): 25; 200 CAPSULE, EXTENDED RELEASE ORAL at 05:47

## 2018-11-24 NOTE — CONSULT NOTE ADULT - PROBLEM SELECTOR RECOMMENDATION 9
orders per Stroke unit team   For cerebral angiogram Monday   Check TTE   ASA   Permissive Hypertension  s/p ILR

## 2018-11-24 NOTE — CONSULT NOTE ADULT - SUBJECTIVE AND OBJECTIVE BOX
CHIEF COMPLAINT:  CVA     HISTORY OF PRESENT ILLNESS:      57 y/o female admitted to the Stroke unit with PMH of HTN, DM and Stroke x2 (w/ residual left facial droop) who presented to [facility] with left sided hemiplegia, transferred for possible thrombectomy. SANTOSH 0130. Symptoms unchanged since awakening with them at 0600. Was not a candidate for tPA. Pt denies any recent fevers, chills, HA, dizziness, neck pain, visual changes, CP, SOB, cough, nausea, vomiting, abdominal pain, changes in BMs/urination. NIHSS: 15. MRS: 1.   Underwent Internal loop recorder implant to rule out PAF.  Denies chest pain, shortness of breath or palpitations.         PAST MEDICAL & SURGICAL HISTORY:  Hypertension  CVA (cerebral vascular accident)          MEDICATIONS:  aspirin  chewable 81 milliGRAM(s) Oral daily  clopidogrel Tablet 75 milliGRAM(s) Oral daily  enoxaparin Injectable 40 milliGRAM(s) SubCutaneous every 24 hours        ondansetron Injectable 4 milliGRAM(s) IV Push every 8 hours PRN      atorvastatin 80 milliGRAM(s) Oral at bedtime  dextrose 40% Gel 15 Gram(s) Oral once PRN  dextrose 50% Injectable 12.5 Gram(s) IV Push once  dextrose 50% Injectable 25 Gram(s) IV Push once  dextrose 50% Injectable 25 Gram(s) IV Push once  glucagon  Injectable 1 milliGRAM(s) IntraMuscular once PRN  insulin glargine Injectable (LANTUS) 5 Unit(s) SubCutaneous at bedtime  insulin lispro (HumaLOG) corrective regimen sliding scale   SubCutaneous at bedtime  insulin lispro (HumaLOG) corrective regimen sliding scale   SubCutaneous three times a day before meals  insulin lispro Injectable (HumaLOG) 3 Unit(s) SubCutaneous three times a day before meals    dextrose 5%. 1000 milliLiter(s) IV Continuous <Continuous>  sodium chloride 0.9%. 1000 milliLiter(s) IV Continuous <Continuous>      FAMILY HISTORY:      SOCIAL HISTORY:    [ ] Non-smoker  [ ] Smoker  [ ] Alcohol    Allergies    No Known Drug Allergies  shellfish (Anaphylaxis)    Intolerances    	    REVIEW OF SYSTEMS:  CONSTITUTIONAL: No fever, weight loss, +fatigue  EYES: No eye pain, visual disturbances, or discharge  ENMT:  No difficulty hearing, tinnitus, vertigo; No sinus or throat pain  NECK: No pain or stiffness  RESPIRATORY: No cough, wheezing, chills or hemoptysis; No Shortness of Breath  CARDIOVASCULAR: No chest pain, palpitations, passing out, dizziness, or leg swelling  GASTROINTESTINAL: No abdominal or epigastric pain. No nausea, vomiting, or hematemesis; No diarrhea or constipation. No melena or hematochezia.  GENITOURINARY: No dysuria, frequency, hematuria, or incontinence  NEUROLOGICAL: + headaches, memory loss, loss of strength, numbness, or tremors  SKIN: No itching, burning, rashes, or lesions   LYMPH Nodes: No enlarged glands  ENDOCRINE: No heat or cold intolerance; No hair loss  MUSCULOSKELETAL:+joint pain or swelling; No muscle, back, or extremity pain  PSYCHIATRIC: No depression, anxiety, mood swings, or difficulty sleeping  HEME/LYMPH: No easy bruising, or bleeding gums  ALLERY AND IMMUNOLOGIC: No hives or eczema	    [ ] All others negative	  [ ] Unable to obtain    PHYSICAL EXAM:  T(C): 36.8 (11-24-18 @ 20:00), Max: 38.3 (11-24-18 @ 00:00)  HR: 86 (11-24-18 @ 20:00) (79 - 110)  BP: 157/78 (11-24-18 @ 20:00) (157/78 - 218/95)  RR: 16 (11-24-18 @ 20:00) (12 - 24)  SpO2: 97% (11-24-18 @ 20:00) (95% - 100%)  Wt(kg): --  I&O's Summary    23 Nov 2018 07:01  -  24 Nov 2018 07:00  --------------------------------------------------------  IN: 2875 mL / OUT: 0 mL / NET: 2875 mL        Appearance: NAD  HEENT:   Normal oral mucosa, PERRL, EOMI	  Lymphatic: No lymphadenopathy  Cardiovascular: Normal S1 S2, No JVD, No murmurs, No edema  Respiratory: Lungs clear to auscultation	  Psychiatry: A & O x 3, Mood & affect appropriate  Gastrointestinal:  Soft, Non-tender, + BS	  Skin: No rashes, No ecchymoses, No cyanosis	  Extremities: Decreased range of motion, No clubbing, cyanosis or edema  Vascular: Peripheral pulses palpable 2+ bilaterally  NEUROLOGICAL EXAM:  Mental status: Awake, alert, oriented x3, fluent speech, no neglect, able to follow commands  Cranial Nerves: mild left central facial palsy, VFF, a few beats of nystagmus at end gaze bilaterally, mild dysarthria,   Motor exam: Normal tone, no drift, RUE 5/5, RLE 5/5, Left side hemiparesis LUE prox/distally 0/5, LLE iliopsoas 4/5; anterior tibialis 0/5  Sensation: Moderate-severely decreased light touch and temperature sensation on left   Coordination/ Gait: No dysmetria    TELEMETRY: 	SR    ECG:  	NSR, no acute ischemic stt changes   RADIOLOGY:     INTERPRETATION:  Contrast-enhanced MRA of the neck and noncontrast MRA of   the [place].    CLINICAL INDICATION: Right ICA occlusion, acute right MCA territory   infarct.    TECHNIQUE: Precontrast 3-D time-of-flight images through the [place] and carotid bifurcations and 2-D time-of-flight images through the   neck were obtained.  3-D time of flight images through the neck and axial   T1-weighted images of the brain were also obtained after the dynamic   intravenous administration of 10 cc of Gadavist. 0 cc were discarded.    Finally, noncontrast axial T1 weighted images with fat saturation and   neck were obtained.    Images reformatted using MIP protocol targeted over the head and neck.      COMPARISON: CTA head neck 11/21/2018.    FINDINGS:     MRA NECK:    There is crescentic high signal involving the origin of the right   internal carotid artery on axial T1 fat-saturated images, consistent with   the presence of arterial dissection.    There is essentially lack of flow related enhancement of the origin of   the right ICA. There is decreased, but present flow related enhancement   of the more distal right ICA. No flow related enhancement was identified   within the right ICA on CT angiogram from 11/21/2018.    Normal flow-related enhancement of the left internal carotid artery,   bilateral common carotid arteries, and bilateral vertebral arteries.    Left carotid bifurcation and origins of the common carotid and vertebral   arteries are unremarkable.    MRA BRAIN:    There is diminished but present flow related signal within the skull base   and supraclinoid right internal carotid artery. There is diminished flow   related signal within the right MCA.    Normal flow-related signal within the bilateral anterior, left middle,   and bilateral posterior cerebral arteries, the basilar artery, and the   intradural vertebral arteries.    No vascular aneurysm.    IMPRESSION:    MRA NECK:    Findings consistent with arterial dissection involving the origin of the   right internal carotid artery.    Decreased, but present flow related enhancement of the more distal right   ICA. Previously, no flow related enhancement was identified within the   right ICA on CTA from 11/21/2018.    MRA BRAIN:    Diminished,  but present flow related signal within the skull base and   supraclinoid right internal carotid artery.    Diminished flow related signal within the right MCA.        EXAM:  MR ANGIO NECK IC                          EXAM:  MR ANGIO BRAIN                          INTERPRETATION:  Contrast-enhanced MRA of the neck and noncontrast MRA of   the [place].    CLINICAL INDICATION: Right ICA occlusion, acute right MCA territory   infarct.    TECHNIQUE: Precontrast 3-D time-of-flight images through the [place] and carotid bifurcations and 2-D time-of-flight images through the   neck were obtained.  3-D time of flight images through the neck and axial   T1-weighted images of the brain were also obtained after the dynamic   intravenous administration of 10 cc of Gadavist. 0 cc were discarded.    Finally, noncontrast axial T1 weighted images with fat saturation and   neck were obtained.    Images reformatted using MIP protocol targeted over the head and neck.      COMPARISON: CTA head neck 11/21/2018.    FINDINGS:     MRA NECK:    There is crescentic high signal involving the origin of the right   internal carotid artery on axial T1 fat-saturated images, consistent with   the presence of arterial dissection.    There is essentially lack of flow related enhancement of the origin of   the right ICA. There is decreased, but present flow related enhancement   of the more distal right ICA. No flow related enhancement was identified   within the right ICA on CT angiogram from 11/21/2018.    Normal flow-related enhancement of the left internal carotid artery,   bilateral common carotid arteries, and bilateral vertebral arteries.    Left carotid bifurcation and origins of the common carotid and vertebral   arteries are unremarkable.    MRA BRAIN:    There is diminished but present flow related signal within the skull base   and supraclinoid right internal carotid artery. There is diminished flow   related signal within the right MCA.    Normal flow-related signal within the bilateral anterior, left middle,   and bilateral posterior cerebral arteries, the basilar artery, and the   intradural vertebral arteries.    No vascular aneurysm.    IMPRESSION:    MRA NECK:    Findings consistent with arterial dissection involving the origin of the   right internal carotid artery.    Decreased, but present flow related enhancement of the more distal right   ICA. Previously, no flow related enhancement was identified within the   right ICA on CTA from 11/21/2018.    MRA BRAIN:    Diminished,  but present flow related signal within the skull base and   supraclinoid right internal carotid artery.    Diminished flow related signal within the right MCA.    EXAM:  CT BRAIN                            PROCEDURE DATE:  11/22/2018            INTERPRETATION:    CLINICAL INDICATION: Right middle cerebral artery infarct    5mm axial sections of the brain were obtained from base to vertex,   without the intravenous administration of contrast material. Coronal and   sagittal computer generated reconstructed views are available.    Comparison is made with the prior CT and MRI of 11/21/2018.    There has been normal evolution of the acute right middle cerebral artery   infarct in the right frontal temporal cortex and a small portion of the   posterior distribution of the right middle cerebral artery territory.   There is no hemorrhage. The ventricles are not enlarged. There is no   midline shift.        IMPRESSION: Normal evolution of an acute infarct in the posterior portion   of the right middle cerebral artery territory compared with 11/21/2018.   No hemorrhage                      MILA STAFFORD M.D., ATTENDING RADIOLOGIST  This document has been electronically signed. Nov 23 2018  8:59AM                < end of copied text >       < from: CT Angio Head w/ IV Cont (11.21.18 @ 12:39) >    EXAM:  CT ANGIO NECK (W)AW IC                          EXAM:  CT ANGIO BRAIN (W)AW IC                          EXAM:  CT BRAIN STROKE PROTOCOL                          EXAM:  CT PERFUSION W MAPS IC                            PROCEDURE DATE:  11/21/2018            INTERPRETATION:  CT angiography of the [place] and neck.   Noncontrast brain CT. Perfusion maps.    CLINICAL INDICATION: Left hemiplegia        TECHNIQUE: Direct axial CT scanning of the [place] and neck was   obtained from the vertex to the level of the clavicular heads after the   dynamic intravenous injection of 70 cc of Omnipaque 350. Sagittal and   coronal maximum intensity projection reformats were provided.    Three-dimensional reconstructions were performed by the radiologist using   the [employer] workstation.    COMPARISON: Left hemiplegia, rule out acute infarct    FINDINGS:     Brain CT:    No acute major distribution infarct is identified. A chronic appearing   right lentiform nucleus infarct is present. No acute hemorrhage is   identified.    No hydrocephalus, midline shift or extra-axial collections are present.   The orbits, paranasal sinuses and tympanomastoid cavities are not   remarkable in appearance.    Neck CTA:    The aortic arch and great vessels are patent.    The common carotid arteries are patent bilaterally. Both vertebral   arteries are well visualized and appear codominant contributing to a   normal-appearing vertebral basilar confluence.    There is minimal narrowing of the takeoff of the right vertebral artery.   A left vertebral artery origin is normal.    Evaluation of the right-sided circulation demonstrates abrupt cut off of   the right internal carotid artery or this may represent a dissection or   noncalcified thrombus. There are calcifications distal to the cut off   consistent with calcified plaque. There is no identifiable flow within   the cervical segment of the internal carotid artery. A    The left internal and external carotid arteries are patent.    Brain CTA:    There is diminished flow within the petrous and proximal cavernous   segments of the right internal carotid artery. There is a reconstitution   of the distal right ICA likely through cross filling. A patent anterior   communicating artery is present.    There is a calcified plaque involving the cavernous segments bilaterally.     The right middle cerebral artery is patent but demonstrates slightly   decreased flow compared with the left. No focal stenosis is identified.   The anterior cerebral and left middle cerebral arteries are patent.    There is a prominent right posterior communicating artery with hypoplasia   of the right P1 segment. The basilar artery and left posterior cerebral   arteries are patent.     Perfusion maps:    There is cerebral blood flow volume less than 30% is equal to 27 ml Tmax   greater than 6 seconds volume is 125 ml. there is a mismatch volume of   102 mL with a fairly large penumbra.    Impression:    Brain CT: No identifiable or infarct.      NeckCTA: The abrupt cut off of the proximal right internal carotid   artery with associated scattered areas of calcified plaque. This may be   secondary to occlusive thrombus or arterial dissection. A mild narrowing   of the takeoff of the right vertebral artery.  Normal left internal carotid and left vertebral arteries.    Brain CTA: Markedly diminished flow within the petrous and cavernous   segments of the right internal carotid artery with reconstitution of the   distal right internal carotid artery likely through collateralization   and/or cross filling.. Mildly decreased flow within the right middle   cerebral artery as compared with the left without focal stenosis.    Perfusion maps: Mismatch area are of 102 mL with evidence of core infarct   and moderately large penumbra. There is a decreased cerebral blood flow   and decreased cerebral blood volume in the right frontoparietal region   with corresponding elevation of mean transit time.    The results of this examination were discussedwith Dr. Valdez 12:30 PM on   11/21/2018                    TERESA REARDON M.D., ATTENDING RADIOLOGIST  This document has been electronically signed. Nov 21 2018 12:52PM                < end of copied text >    OTHER: 	  	  LABS:	 	    CARDIAC MARKERS:                                  12.3   6.2   )-----------( 220      ( 24 Nov 2018 02:13 )             34.5     11-24    132<L>  |  97  |  11  ----------------------------<  200<H>  3.9   |  19<L>  |  0.64    Ca    9.4      24 Nov 2018 02:02      proBNP:   Lipid Profile:   HgA1c:   TSH:

## 2018-11-24 NOTE — PROGRESS NOTE ADULT - SUBJECTIVE AND OBJECTIVE BOX
THE PATIENT WAS SE[facility] AND [facility]DS.   HPI:  57 y/o woman with PMH of HTN, DM and Stroke x2 (w/ residual left facial droop) who presented to [facility] with left sided hemiplegia, transferred for possible thrombectomy. MAYON 0130. Symptoms unchanged since awakening with them at 0600. Was not a candidate for tPA. Pt denies any recent fevers, chills, HA, dizziness, neck pain, visual changes, CP, SOB, cough, nausea, vomiting, abdominal pain, changes in BMs/urination. NIHSS: 15. MRS: 1.       SUBJECTIVE: No events overnight.  No new neurologic complaints.      amLODIPine   Tablet 5 milliGRAM(s) Oral daily  atorvastatin 80 milliGRAM(s) Oral at bedtime  dextrose 40% Gel 15 Gram(s) Oral once PRN  dextrose 5%. 1000 milliLiter(s) IV Continuous <Continuous>  dextrose 50% Injectable 12.5 Gram(s) IV Push once  dextrose 50% Injectable 25 Gram(s) IV Push once  dextrose 50% Injectable 25 Gram(s) IV Push once  dipyridamole 200 mG/aspirin 25 mG 1 Capsule(s) Oral two times a day  enoxaparin Injectable 40 milliGRAM(s) SubCutaneous every 24 hours  glucagon  Injectable 1 milliGRAM(s) IntraMuscular once PRN  insulin glargine Injectable (LANTUS) 5 Unit(s) SubCutaneous at bedtime  insulin lispro (HumaLOG) corrective regimen sliding scale   SubCutaneous at bedtime  insulin lispro (HumaLOG) corrective regimen sliding scale   SubCutaneous three times a day before meals  insulin lispro Injectable (HumaLOG) 3 Unit(s) SubCutaneous three times a day before meals  ondansetron Injectable 4 milliGRAM(s) IV Push every 8 hours PRN  sodium chloride 2 Gram(s) Oral two times a day  sodium chloride 0.9%. 1000 milliLiter(s) IV Continuous <Continuous>      PHYSICAL EXAM:   Vital Signs Last 24 Hrs  T(C): 37.5 (24 Nov 2018 04:00), Max: 38.3 (24 Nov 2018 00:00)  T(F): 99.5 (24 Nov 2018 04:00), Max: 101 (24 Nov 2018 00:00)  HR: 110 (24 Nov 2018 08:00) (79 - 110)  BP: 218/95 (24 Nov 2018 08:00) (138/75 - 218/95)  BP(mean): 128 (24 Nov 2018 08:00) (79 - 128)  RR: 21 (24 Nov 2018 08:00) (12 - 24)  SpO2: 98% (24 Nov 2018 08:00) (96% - 100%)    General: No acute distress  HEENT: EOM intact, mild left homonymous hemianopia   Abdomen: Soft, nontender, nondistended   Extremities: No edema    NEUROLOGICAL EXAM:  Mental status: Awake, alert, oriented x3, fluent speech, no neglect, able to follow commands  Cranial Nerves: mild left central facial palsy,  left homonymous hemianopia, a few beats of nystagmus at end gaze bilaterally, mild dysarthria,   Motor exam: Normal tone, no drift, RUE 5/5, RLE 5/5, Left side hemiparesis LUE 0/5, LLE iliopsoas 3/5; anterior tibialis 0/5  Sensation: Moderate-severely decreased light touch and temperature sensation on left   Coordination/ Gait: No dysmetria    LABS:                        12.3   6.2   )-----------( 220      ( 24 Nov 2018 02:13 )             34.5    11-24    132<L>  |  97  |  11  ----------------------------<  200<H>  3.9   |  19<L>  |  0.64    Ca    9.4      24 Nov 2018 02:02      Hemoglobin A1C, Whole Blood: 9.2 % (11-22 @ 08:24)      IMAGING: Reviewed by me.     MRI Head (11/21/18): Acute right MCA infarct.    Head CT, perfussion, CTA Head/Neck (11/21/18)   Brain CT: No identifiable or infarct. Neck   CTA: The abrupt cut off of the proximal right internal carotid artery with associated scattered areas of calcified plaque. This may be   secondary to occlusive thrombus or arterial dissection. A mild narrowing of the takeoff of the right vertebral artery.Normal left internal carotid and left vertebral arteries.    Brain CTA: Markedly diminished flow within the petrous and cavernous segments of the right internal carotid artery with reconstitution of the distal right internal carotid artery likely through collateralization and/or cross filling.. Mildly decreased flow within the right middle cerebral artery as compared with the left without focal stenosis.    Perfusion maps: Mismatch area are of 102 mL with evidence of core infarct and moderately large penumbra. There is a decreased cerebral blood flow and decreased cerebral blood volume in the right frontoparietal region with corresponding elevation of mean transit time. THE PATIENT WAS SE[facility]OUNDS.   HPI:  59 y/o woman with PMH of HTN, DM and Stroke x2 (w/ residual left facial droop) who presented to [facility] with left sided hemiplegia, transferred for possible thrombectomy. MAYON 0130. Symptoms unchanged since awakening with them at 0600. Was not a candidate for tPA. Pt denies any recent fevers, chills, HA, dizziness, neck pain, visual changes, CP, SOB, cough, nausea, vomiting, abdominal pain, changes in BMs/urination. NIHSS: 15. MRS: 1.       SUBJECTIVE: No events overnight.  No new neurologic complaints.      amLODIPine   Tablet 5 milliGRAM(s) Oral daily  atorvastatin 80 milliGRAM(s) Oral at bedtime  dextrose 40% Gel 15 Gram(s) Oral once PRN  dextrose 5%. 1000 milliLiter(s) IV Continuous <Continuous>  dextrose 50% Injectable 12.5 Gram(s) IV Push once  dextrose 50% Injectable 25 Gram(s) IV Push once  dextrose 50% Injectable 25 Gram(s) IV Push once  dipyridamole 200 mG/aspirin 25 mG 1 Capsule(s) Oral two times a day  enoxaparin Injectable 40 milliGRAM(s) SubCutaneous every 24 hours  glucagon  Injectable 1 milliGRAM(s) IntraMuscular once PRN  insulin glargine Injectable (LANTUS) 5 Unit(s) SubCutaneous at bedtime  insulin lispro (HumaLOG) corrective regimen sliding scale   SubCutaneous at bedtime  insulin lispro (HumaLOG) corrective regimen sliding scale   SubCutaneous three times a day before meals  insulin lispro Injectable (HumaLOG) 3 Unit(s) SubCutaneous three times a day before meals  ondansetron Injectable 4 milliGRAM(s) IV Push every 8 hours PRN  sodium chloride 2 Gram(s) Oral two times a day  sodium chloride 0.9%. 1000 milliLiter(s) IV Continuous <Continuous>      PHYSICAL EXAM:   Vital Signs Last 24 Hrs  T(C): 37.5 (24 Nov 2018 04:00), Max: 38.3 (24 Nov 2018 00:00)  T(F): 99.5 (24 Nov 2018 04:00), Max: 101 (24 Nov 2018 00:00)  HR: 110 (24 Nov 2018 08:00) (79 - 110)  BP: 218/95 (24 Nov 2018 08:00) (138/75 - 218/95)  BP(mean): 128 (24 Nov 2018 08:00) (79 - 128)  RR: 21 (24 Nov 2018 08:00) (12 - 24)  SpO2: 98% (24 Nov 2018 08:00) (96% - 100%)    General: No acute distress  HEENT: EOM intact, mild left homonymous hemianopia   Abdomen: Soft, nontender, nondistended   Extremities: No edema    NEUROLOGICAL EXAM:  Mental status: Awake, alert, oriented x3, fluent speech, no neglect, able to follow commands  Cranial Nerves: mild left central facial palsy,  left homonymous hemianopia, a few beats of nystagmus at end gaze bilaterally, mild dysarthria,   Motor exam: Normal tone, no drift, RUE 5/5, RLE 5/5, Left side hemiparesis LUE 0/5, LLE iliopsoas 3/5; anterior tibialis 0/5  Sensation: Moderate-severely decreased light touch and temperature sensation on left   Coordination/ Gait: No dysmetria    LABS:                        12.3   6.2   )-----------( 220      ( 24 Nov 2018 02:13 )             34.5    11-24    132<L>  |  97  |  11  ----------------------------<  200<H>  3.9   |  19<L>  |  0.64    Ca    9.4      24 Nov 2018 02:02      Hemoglobin A1C, Whole Blood: 9.2 % (11-22 @ 08:24)      IMAGING: Reviewed by me.   MRA T1 Fat Sat (11/23/18) Findings consistent with arterial dissection involving the origin of the   right internal carotid artery.    Decreased, but present flow related enhancement of the more distal right   ICA. Previously, no flow related enhancement was identified within the   right ICA on CTA from 11/21/2018.    MRA BRAIN:    Diminished,  but present flow related signal within the skull base and   supraclinoid right internal carotid artery.    Diminished flow related signal within the right MCA.    MRI Head (11/21/18): Acute right MCA infarct.    Head CT, perfussion, CTA Head/Neck (11/21/18)   Brain CT: No identifiable or infarct. Neck   CTA: The abrupt cut off of the proximal right internal carotid artery with associated scattered areas of calcified plaque. This may be   secondary to occlusive thrombus or arterial dissection. A mild narrowing of the takeoff of the right vertebral artery.Normal left internal carotid and left vertebral arteries.    Brain CTA: Markedly diminished flow within the petrous and cavernous segments of the right internal carotid artery with reconstitution of the distal right internal carotid artery likely through collateralization and/or cross filling.. Mildly decreased flow within the right middle cerebral artery as compared with the left without focal stenosis.    Perfusion maps: Mismatch area are of 102 mL with evidence of core infarct and moderately large penumbra. There is a decreased cerebral blood flow and decreased cerebral blood volume in the right frontoparietal region with corresponding elevation of mean transit time. THE PATIENT WAS SEEN [facility] ROUNDS.   HPI:  59 y/o woman with PMH of HTN, DM and Stroke x2 (w/ residual left facial droop) who presented to [facility] with left sided hemiplegia, transferred for possible thrombectomy. SANTOSH 0130. Symptoms unchanged since awakening with them at 0600. Was not a candidate for tPA. Pt denies any recent fevers, chills, HA, dizziness, neck pain, visual changes, CP, SOB, cough, nausea, vomiting, abdominal pain, changes in BMs/urination. NIHSS: 15. MRS: 1.       SUBJECTIVE: N/v in am  No new neurologic complaints.      amLODIPine   Tablet 5 milliGRAM(s) Oral daily  atorvastatin 80 milliGRAM(s) Oral at bedtime  dextrose 40% Gel 15 Gram(s) Oral once PRN  dextrose 5%. 1000 milliLiter(s) IV Continuous <Continuous>  dextrose 50% Injectable 12.5 Gram(s) IV Push once  dextrose 50% Injectable 25 Gram(s) IV Push once  dextrose 50% Injectable 25 Gram(s) IV Push once  dipyridamole 200 mG/aspirin 25 mG 1 Capsule(s) Oral two times a day  enoxaparin Injectable 40 milliGRAM(s) SubCutaneous every 24 hours  glucagon  Injectable 1 milliGRAM(s) IntraMuscular once PRN  insulin glargine Injectable (LANTUS) 5 Unit(s) SubCutaneous at bedtime  insulin lispro (HumaLOG) corrective regimen sliding scale   SubCutaneous at bedtime  insulin lispro (HumaLOG) corrective regimen sliding scale   SubCutaneous three times a day before meals  insulin lispro Injectable (HumaLOG) 3 Unit(s) SubCutaneous three times a day before meals  ondansetron Injectable 4 milliGRAM(s) IV Push every 8 hours PRN  sodium chloride 2 Gram(s) Oral two times a day  sodium chloride 0.9%. 1000 milliLiter(s) IV Continuous <Continuous>      PHYSICAL EXAM:   Vital Signs Last 24 Hrs  T(C): 37.5 (24 Nov 2018 04:00), Max: 38.3 (24 Nov 2018 00:00)  T(F): 99.5 (24 Nov 2018 04:00), Max: 101 (24 Nov 2018 00:00)  HR: 110 (24 Nov 2018 08:00) (79 - 110)  BP: 218/95 (24 Nov 2018 08:00) (138/75 - 218/95)  BP(mean): 128 (24 Nov 2018 08:00) (79 - 128)  RR: 21 (24 Nov 2018 08:00) (12 - 24)  SpO2: 98% (24 Nov 2018 08:00) (96% - 100%)    General: No acute distress  HEENT: EOM intact, mild left homonymous hemianopia   Abdomen: Soft, nontender, nondistended   Extremities: No edema    NEUROLOGICAL EXAM:  Mental status: Awake, alert, oriented x3, fluent speech, no neglect, able to follow commands  Cranial Nerves: mild left central facial palsy,  left homonymous hemianopia, a few beats of nystagmus at end gaze bilaterally, mild dysarthria,   Motor exam: Normal tone, no drift, RUE 5/5, RLE 5/5, Left side hemiparesis LUE 0/5, LLE iliopsoas 3/5; anterior tibialis 0/5  Sensation: Moderate-severely decreased light touch and temperature sensation on left   Coordination/ Gait: No dysmetria    LABS:                        12.3   6.2   )-----------( 220      ( 24 Nov 2018 02:13 )             34.5    11-24    132<L>  |  97  |  11  ----------------------------<  200<H>  3.9   |  19<L>  |  0.64    Ca    9.4      24 Nov 2018 02:02      Hemoglobin A1C, Whole Blood: 9.2 % (11-22 @ 08:24)      IMAGING: Reviewed by me.   MRA T1 Fat Sat (11/23/18) Findings consistent with arterial dissection involving the origin of the   right internal carotid artery.    Decreased, but present flow related enhancement of the more distal right   ICA. Previously, no flow related enhancement was identified within the   right ICA on CTA from 11/21/2018.    MRA BRAIN:    Diminished,  but present flow related signal within the skull base and   supraclinoid right internal carotid artery.    Diminished flow related signal within the right MCA.    MRI Head (11/21/18): Acute right MCA infarct.    Head CT, perfussion, CTA Head/Neck (11/21/18)   Brain CT: No identifiable or infarct. Neck   CTA: The abrupt cut off of the proximal right internal carotid artery with associated scattered areas of calcified plaque. This may be   secondary to occlusive thrombus or arterial dissection. A mild narrowing of the takeoff of the right vertebral artery.Normal left internal carotid and left vertebral arteries.    Brain CTA: Markedly diminished flow within the petrous and cavernous segments of the right internal carotid artery with reconstitution of the distal right internal carotid artery likely through collateralization and/or cross filling.. Mildly decreased flow within the right middle cerebral artery as compared with the left without focal stenosis.    Perfusion maps: Mismatch area are of 102 mL with evidence of core infarct and moderately large penumbra. There is a decreased cerebral blood flow and decreased cerebral blood volume in the right frontoparietal region with corresponding elevation of mean transit time. THE PATIENT WAS SE[facility] MORN[facility] ROUNDS.   HPI:  59 y/o woman with PMH of HTN, DM and Stroke x2 (w/ residual left facial droop) who presented to [facility] with left sided hemiplegia, transferred for possible thrombectomy. SANTOSH 0130. Symptoms unchanged since awakening with them at 0600. Was not a candidate for tPA. Pt denies any recent fevers, chills, HA, dizziness, neck pain, visual changes, CP, SOB, cough, nausea, vomiting, abdominal pain, changes in BMs/urination. NIHSS: 15. MRS: 1.       SUBJECTIVE: N/V in am  No new neurologic complaints.      amLODIPine   Tablet 5 milliGRAM(s) Oral daily  atorvastatin 80 milliGRAM(s) Oral at bedtime  dextrose 40% Gel 15 Gram(s) Oral once PRN  dextrose 5%. 1000 milliLiter(s) IV Continuous <Continuous>  dextrose 50% Injectable 12.5 Gram(s) IV Push once  dextrose 50% Injectable 25 Gram(s) IV Push once  dextrose 50% Injectable 25 Gram(s) IV Push once  dipyridamole 200 mG/aspirin 25 mG 1 Capsule(s) Oral two times a day  enoxaparin Injectable 40 milliGRAM(s) SubCutaneous every 24 hours  glucagon  Injectable 1 milliGRAM(s) IntraMuscular once PRN  insulin glargine Injectable (LANTUS) 5 Unit(s) SubCutaneous at bedtime  insulin lispro (HumaLOG) corrective regimen sliding scale   SubCutaneous at bedtime  insulin lispro (HumaLOG) corrective regimen sliding scale   SubCutaneous three times a day before meals  insulin lispro Injectable (HumaLOG) 3 Unit(s) SubCutaneous three times a day before meals  ondansetron Injectable 4 milliGRAM(s) IV Push every 8 hours PRN  sodium chloride 2 Gram(s) Oral two times a day  sodium chloride 0.9%. 1000 milliLiter(s) IV Continuous <Continuous>      PHYSICAL EXAM:   Vital Signs Last 24 Hrs  T(C): 37.5 (24 Nov 2018 04:00), Max: 38.3 (24 Nov 2018 00:00)  T(F): 99.5 (24 Nov 2018 04:00), Max: 101 (24 Nov 2018 00:00)  HR: 110 (24 Nov 2018 08:00) (79 - 110)  BP: 218/95 (24 Nov 2018 08:00) (138/75 - 218/95)  BP(mean): 128 (24 Nov 2018 08:00) (79 - 128)  RR: 21 (24 Nov 2018 08:00) (12 - 24)  SpO2: 98% (24 Nov 2018 08:00) (96% - 100%)    General: No acute distress  HEENT: EOM intact, VFF  Abdomen: Soft, nontender, nondistended   Extremities: No edema    NEUROLOGICAL EXAM:  Mental status: Awake, alert, oriented x3, fluent speech, no neglect, able to follow commands  Cranial Nerves: mild left central facial palsy, VFF, a few beats of nystagmus at end gaze bilaterally, mild dysarthria,   Motor exam: Normal tone, no drift, RUE 5/5, RLE 5/5, Left side hemiparesis LUE prox/distally 0/5, LLE iliopsoas 4/5; anterior tibialis 0/5  Sensation: Moderate-severely decreased light touch and temperature sensation on left   Coordination/ Gait: No dysmetria    LABS:                        12.3   6.2   )-----------( 220      ( 24 Nov 2018 02:13 )             34.5    11-24    132<L>  |  97  |  11  ----------------------------<  200<H>  3.9   |  19<L>  |  0.64    Ca    9.4      24 Nov 2018 02:02      Hemoglobin A1C, Whole Blood: 9.2 % (11-22 @ 08:24)      IMAGING: Reviewed by me.   MRA T1 Fat Sat (11/23/18) Findings consistent with arterial dissection involving the origin of the   right internal carotid artery.    Decreased, but present flow related enhancement of the more distal right   ICA. Previously, no flow related enhancement was identified within the   right ICA on CTA from 11/21/2018.    MRA BRAIN:    Diminished,  but present flow related signal within the skull base and   supraclinoid right internal carotid artery.    Diminished flow related signal within the right MCA.    MRI Head (11/21/18): Acute right MCA infarct.    Head CT, perfussion, CTA Head/Neck (11/21/18)   Brain CT: No identifiable or infarct. Neck   CTA: The abrupt cut off of the proximal right internal carotid artery with associated scattered areas of calcified plaque. This may be   secondary to occlusive thrombus or arterial dissection. A mild narrowing of the takeoff of the right vertebral artery.Normal left internal carotid and left vertebral arteries.    Brain CTA: Markedly diminished flow within the petrous and cavernous segments of the right internal carotid artery with reconstitution of the distal right internal carotid artery likely through collateralization and/or cross filling.. Mildly decreased flow within the right middle cerebral artery as compared with the left without focal stenosis.    Perfusion maps: Mismatch area are of 102 mL with evidence of core infarct and moderately large penumbra. There is a decreased cerebral blood flow and decreased cerebral blood volume in the right frontoparietal region with corresponding elevation of mean transit time. THE PATIENT WAS SEEN AND [facility] MORNING ROUNDS.   HPI:  59 y/o woman with PMH of HTN, DM and Stroke x2 (w/ residual left facial droop) who presented to [facility] with left sided hemiplegia, transferred for possible thrombectomy. SANTOSH 0130. Symptoms unchanged since awakening with them at 0600. Was not a candidate for tPA. Pt denies any recent fevers, chills, HA, dizziness, neck pain, visual changes, CP, SOB, cough, nausea, vomiting, abdominal pain, changes in BMs/urination. NIHSS: 15. MRS: 1.       SUBJECTIVE: N/V in am  No new neurologic complaints.      amLODIPine   Tablet 5 milliGRAM(s) Oral daily  atorvastatin 80 milliGRAM(s) Oral at bedtime  dextrose 40% Gel 15 Gram(s) Oral once PRN  dextrose 5%. 1000 milliLiter(s) IV Continuous <Continuous>  dextrose 50% Injectable 12.5 Gram(s) IV Push once  dextrose 50% Injectable 25 Gram(s) IV Push once  dextrose 50% Injectable 25 Gram(s) IV Push once  dipyridamole 200 mG/aspirin 25 mG 1 Capsule(s) Oral two times a day  enoxaparin Injectable 40 milliGRAM(s) SubCutaneous every 24 hours  glucagon  Injectable 1 milliGRAM(s) IntraMuscular once PRN  insulin glargine Injectable (LANTUS) 5 Unit(s) SubCutaneous at bedtime  insulin lispro (HumaLOG) corrective regimen sliding scale   SubCutaneous at bedtime  insulin lispro (HumaLOG) corrective regimen sliding scale   SubCutaneous three times a day before meals  insulin lispro Injectable (HumaLOG) 3 Unit(s) SubCutaneous three times a day before meals  ondansetron Injectable 4 milliGRAM(s) IV Push every 8 hours PRN  sodium chloride 2 Gram(s) Oral two times a day  sodium chloride 0.9%. 1000 milliLiter(s) IV Continuous <Continuous>      PHYSICAL EXAM:   Vital Signs Last 24 Hrs  T(C): 37.5 (24 Nov 2018 04:00), Max: 38.3 (24 Nov 2018 00:00)  T(F): 99.5 (24 Nov 2018 04:00), Max: 101 (24 Nov 2018 00:00)  HR: 110 (24 Nov 2018 08:00) (79 - 110)  BP: 218/95 (24 Nov 2018 08:00) (138/75 - 218/95)  BP(mean): 128 (24 Nov 2018 08:00) (79 - 128)  RR: 21 (24 Nov 2018 08:00) (12 - 24)  SpO2: 98% (24 Nov 2018 08:00) (96% - 100%)    General: No acute distress  HEENT: EOM intact, VFF  Abdomen: Soft, nontender, nondistended   Extremities: No edema    NEUROLOGICAL EXAM:  Mental status: Awake, alert, oriented x3, fluent speech, no neglect, able to follow commands  Cranial Nerves: mild left central facial palsy, VFF, a few beats of nystagmus at end gaze bilaterally, mild dysarthria,   Motor exam: Normal tone, no drift, RUE 5/5, RLE 5/5, Left side hemiparesis LUE prox/distally 0/5, LLE iliopsoas 4/5; anterior tibialis 0/5  Sensation: Moderate-severely decreased light touch and temperature sensation on left   Coordination/ Gait: No dysmetria    LABS:                        12.3   6.2   )-----------( 220      ( 24 Nov 2018 02:13 )             34.5    11-24    132<L>  |  97  |  11  ----------------------------<  200<H>  3.9   |  19<L>  |  0.64    Ca    9.4      24 Nov 2018 02:02      Hemoglobin A1C, Whole Blood: 9.2 % (11-22 @ 08:24)      IMAGING: Reviewed by me.   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 Decreased, but present flow related enhancement of the more distal right   ICA. Previously, no flow related enhancement was identified within the right ICA on CTA from 11/21/2018.    MRA BRAIN: Diminished, but present flow related signal within the skull base and supraclinoid right internal carotid artery. iminished flow related signal within the right MCA.    MRI Head (11/21/18): Acute right MCA infarct.    Head CT, perfussion, CTA Head/Neck (11/21/18)   Brain CT: No identifiable or infarct. Neck   CTA: The abrupt cut off of the proximal right internal carotid artery with associated scattered areas of calcified plaque. This may be   secondary to occlusive thrombus or arterial dissection. A mild narrowing of the takeoff of the right vertebral artery.Normal left internal carotid and left vertebral arteries.    Brain CTA: Markedly diminished flow within the petrous and cavernous segments of the right internal carotid artery with reconstitution of the distal right internal carotid artery likely through collateralization and/or cross filling.. Mildly decreased flow within the right middle cerebral artery as compared with the left without focal stenosis.    Perfusion maps: Mismatch area are of 102 mL with evidence of core infarct and moderately large penumbra. There is a decreased cerebral blood flow and decreased cerebral blood volume in the right frontoparietal region with corresponding elevation of mean transit time.

## 2018-11-24 NOTE — PROGRESS NOTE ADULT - ASSESSMENT
58-year-old right-handed white lady first evaluated at [facility] on 11/21/18 with left hemiparesis. 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 Perhaps her previous strokes were due to small vessel disease. On 11/21/18, she awoke with a dense left hemiparesis. CT head (11/21/18) to my eye showed a chronic right lentiform nucleus lacunar infarct. CTA neck (11/21/18) to my eye showed a probable right ICA occlusion with some calcified plaque. CTA head (11/21/18) to my eye showed that the right ICA reconstituted perhaps through the ACOM and possibly a large right PCOM. CTP (11/21/18) to my eye showed a core infarct of 27 cc, and a large hypoperfused area of 125 cc in the right MCA distribution, MRI brain shows RMCA ischemic infarct. Given that there was no intracranial occlusion, she was excluded from endovascular thrombectomy.    Impression: Left hemiparesis likely due to right MCA infarction due to extracranial right ICA occlusion (large artery atherosclerosis more likely than dissection) probably with artery-artery embolism, and less likely with perfusion insufficiency.     NEURO: Continue close monitoring for neurologic deterioration, permissive hypertension;, antiplatelet agent and stain for secondary stroke prevention, titrate statin to LDL goal less than 70, will stop carbamazepine, no hx of seizures, MRA head/Neck with T1 Fat sat results as above, Pending official report of MRI Brain. CTA Head/Neck results as above, Physical therapy/OT recommends AR. Headache occurred on 11/22/18 and was probably due to Aggrenox. If recurs and is intolerable, may change Aggrenox to ASA.    Patient enrolled in Stroke AF trial and will get a LOOP placed.    ANTITHROMBOTIC THERAPY:  Aggrenox for secondary stroke prevention    PULMONARY: CXR clear, protecting airway, saturating well     CARDIOVASCULAR: check TTE, cardiac monitoring no events, off ebony deluna, Dr Wheeler (Cardiology) contacted for further recommendations, enrolled in AF study      SBP goal: <160    GASTROINTESTINAL: Dysphagia screen passed, tolerating diet, episode of emesis this morning-if persists, we could discontinue the aggrenox and change to asa, will continue to monitor     Diet: Pureed    RENAL: BUN/Cr stable, good urine output, hyponatremia, will start 2mg Na Tabs, will continue to monitor      Na Goal: Greater than 135     Harper: N    HEMATOLOGY: H/H without acute change, Platelets normal      DVT ppx:  LMWH     ID: afebrile, no leukocytosis     OTHER: Endocrine consulted for A1C of 9.2: patient only on humalog correctional scale with meals. Would add a bedtime correctional as well.  hold off on lantus. Would just add humalog 3 units TID with meals.  -On discharge, patient going to acute rehab, so would c/w insulin, regimen to be determined. When home, would likely add metformin- titrated up to max dose as well as glipizide, consult appreciated.   Plan/goals of care discussed with pt at bedside    DISPOSITION: Acute rehab once stable and workup is complete    CORE MEASURES:        Admission NIHSS: 15     TPA:  NO      LDL/HDL:     Depression Screen:      Statin Therapy: Y     Dysphagia Screen: PASS     Smoking  NO      Afib NO     Stroke Education YES 58-year-old right-handed white lady first evaluated at [facility] on 11/21/18 with left hemiparesis. 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 Perhaps her previous strokes were due to small vessel disease. On 11/21/18, she awoke with a dense left hemiparesis. CT head (11/21/18) to my eye showed a chronic right lentiform nucleus lacunar infarct. CTA neck (11/21/18) to my eye showed a probable right ICA occlusion with some calcified plaque. CTA head (11/21/18) to my eye showed that the right ICA reconstituted perhaps through the ACOM and possibly a large right PCOM. CTP (11/21/18) to my eye showed a core infarct of 27 cc, and a large hypoperfused area of 125 cc in the right MCA distribution, MRI brain shows RMCA ischemic infarct. Given that there was no intracranial occlusion, she was excluded from endovascular thrombectomy.    Impression: Left hemiparesis likely due to right MCA infarction due to extracranial right ICA occlusion (large artery atherosclerosis more likely than dissection) probably with artery-artery embolism, and less likely with perfusion insufficiency.     NEURO: Continue close monitoring for neurologic deterioration, permissive hypertension;, antiplatelet agent and stain for secondary stroke prevention, titrate statin to LDL goal less than 70, will stop carbamazepine, no hx of seizures, MRA head/Neck with T1 Fat sat results as above, Pending official report of MRI Brain. CTA Head/Neck results as above, Physical therapy/OT recommends AR. Headache occurred on 11/22/18 and was probably due to Aggrenox. If recurs and is intolerable, may change Aggrenox to ASA.    Patient enrolled in Stroke AF trial and will get a LOOP placed.    ANTITHROMBOTIC THERAPY:  D/C Aggrenox and will start ASA/Plavix for possible right carotid stent   PULMONARY: CXR clear, protecting airway, saturating well     CARDIOVASCULAR: check TTE, cardiac monitoring no events, off ebony deluna, Dr Wheeler (Cardiology) contacted for further recommendations, enrolled in AF study      SBP goal: <160    GASTROINTESTINAL: Dysphagia screen passed, tolerating diet, episode of emesis this morning-if persists, we could discontinue the aggrenox and change to asa, will continue to monitor     Diet: Pureed    RENAL: BUN/Cr stable, good urine output, hyponatremia, will start 2mg Na Tabs, will continue to monitor      Na Goal: Greater than 135     Harper: N    HEMATOLOGY: H/H without acute change, Platelets normal      DVT ppx:  LMWH     ID: afebrile, no leukocytosis     OTHER: Endocrine consulted for A1C of 9.2: patient only on humalog correctional scale with meals. Would add a bedtime correctional as well.  hold off on lantus. Would just add humalog 3 units TID with meals.  -On discharge, patient going to acute rehab, so would c/w insulin, regimen to be determined. When home, would likely add metformin- titrated up to max dose as well as glipizide, consult appreciated.   Plan/goals of care discussed with pt at bedside    DISPOSITION: Acute rehab once stable and workup is complete    CORE MEASURES:        Admission NIHSS: 15     TPA:  NO      LDL/HDL:     Depression Screen:      Statin Therapy: Y     Dysphagia Screen: PASS     Smoking  NO      Afib NO     Stroke Education YES 58-year-old right-handed white lady first evaluated at [facility] on 11/21/18 with left hemiparesis. 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 Perhaps her previous strokes were due to small vessel disease. On 11/21/18, she awoke with a dense left hemiparesis. CT head (11/21/18) to my eye showed a chronic right lentiform nucleus lacunar infarct. CTA neck (11/21/18) to my eye showed a probable right ICA occlusion with some calcified plaque. CTA head (11/21/18) to my eye showed that the right ICA reconstituted perhaps through the ACOM and possibly a large right PCOM. CTP (11/21/18) to my eye showed a core infarct of 27 cc, and a large hypoperfused area of 125 cc in the right MCA distribution, MRI brain shows RMCA ischemic infarct. Given that there was no intracranial occlusion, she was excluded from endovascular thrombectomy.    Impression: Left hemiparesis likely due to right MCA infarction due to extracranial right ICA occlusion (large artery atherosclerosis more likely than dissection) probably with artery-artery embolism, and less likely with perfusion insufficiency.     NEURO: Continue close monitoring for neurologic deterioration, permissive hypertension; dual antiplatelet agent and stain for secondary stroke prevention, titrate statin to LDL goal less than 70, will stop carbamazepine, no hx of seizures, MRA head/Neck with T1 Fat sat results as above, Pending official report of MRI Brain. CTA Head/Neck results as above, Physical therapy/OT recommends AR. Headache occurred on 11/22/18 and was probably due to Aggrenox. If recurs and is intolerable, may change Aggrenox to ASA.    Patient enrolled in Stroke AF trial and will get a LOOP placed.    ANTITHROMBOTIC THERAPY:  D/C Aggrenox and will start ASA/Plavix for possible right carotid stent   PULMONARY: CXR clear, protecting airway, saturating well     CARDIOVASCULAR: check TTE, cardiac monitoring no events, off ebony deluna, Dr Wheeler (Cardiology) contacted for further recommendations, enrolled in AF study      SBP goal: <160    GASTROINTESTINAL: Dysphagia screen passed, tolerating diet, episode of emesis this morning, will d/c Aggrenox, will continue to monitor     Diet: Pureed    RENAL: BUN/Cr stable, good urine output, hyponatremia, will start 2mg Na Tabs, will continue to monitor      Na Goal: Greater than 135     Harper: N    HEMATOLOGY: H/H without acute change, Platelets normal      DVT ppx:  LMWH     ID: afebrile, no leukocytosis     OTHER: Endocrine consulted for A1C of 9.2: patient only on humalog correctional scale with meals. Would add a bedtime correctional as well.  hold off on lantus. Would just add humalog 3 units TID with meals.  -On discharge, patient going to acute rehab, so would c/w insulin, regimen to be determined. When home, would likely add metformin- titrated up to max dose as well as glipizide, consult appreciated.   Plan/goals of care discussed with pt at bedside    DISPOSITION: Acute rehab once stable and workup is complete    CORE MEASURES:        Admission NIHSS: 15     TPA:  NO      LDL/HDL:     Depression Screen:      Statin Therapy: Y     Dysphagia Screen: PASS     Smoking  NO      Afib NO     Stroke Education YES 58-year-old right-handed white lady first evaluated at [facility] on 11/21/18 with left hemiparesis. 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 Perhaps her previous strokes were due to small vessel disease. On 11/21/18, she awoke with a dense left hemiparesis. CT head (11/21/18) to my eye showed a chronic right lentiform nucleus lacunar infarct. CTA neck (11/21/18) to my eye showed a probable right ICA occlusion with some calcified plaque. CTA head (11/21/18) to my eye showed that the right ICA reconstituted perhaps through the ACOM and possibly a large right PCOM. CTP (11/21/18) to my eye showed a core infarct of 27 cc, and a large hypoperfused area of 125 cc in the right MCA distribution, MRI brain shows RMCA ischemic infarct. Given that there was no intracranial occlusion, she was excluded from endovascular thrombectomy. 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     NEURO: Neuro exam unchanged, continue close monitoring for neurologic deterioration, permissive hypertension; plan for selective cerebral angiography on Monday 11/24/18 to eval ADRY dissection, dual antiplatelet agent for possible carotid stent and stain for secondary stroke prevention, titrate statin to LDL goal less than 70,MRI Brain, MRA head/Neck with T1 Fat sat results as above. CTA Head/Neck results as above, Physical therapy/OT recommends AR.  Patient enrolled in Stroke AF trial and will get a LOOP placed.    ANTITHROMBOTIC THERAPY:  D/C Aggrenox and will start ASA/Plavix for possible right carotid stent     PULMONARY: CXR (11/24/18) clear, protecting airway, saturating well     CARDIOVASCULAR: check TTE, cardiac monitoring no events, maintain HTN, Dr Wheeler (Cardiology) consult appreciated, enrolled in AF study      SBP goal: <160    GASTROINTESTINAL: Dysphagia screen passed, tolerating diet, episode of emesis this morning (1/24/18), will d/c Aggrenox, will continue to monitor     Diet: Regular    RENAL: BUN/Cr stable, good urine output, hyponatremia, stable, d/c 2mg Na Tabs, will continue to monitor      Na Goal: Greater than 135     Harper: N    HEMATOLOGY: H/H without acute change, Platelets normal      DVT ppx:  LMWH     ID: afebrile, no leukocytosis     OTHER: Endocrine consulted for A1C of 9.2: continue correctional scale, hold off on lantus. -On discharge, patient going to acute rehab, so would c/w insulin, regimen to be determined. When home, would likely add metformin- titrated up to max dose as well as glipizide, endocrine consult appreciated. Plan/goals of care discussed with pt at bedside    DISPOSITION: Acute rehab once stable and workup is complete, will contact PMR for eval/recommendations    CORE MEASURES:        Admission NIHSS: 15     TPA:  NO      LDL/HDL: 106/39     Depression Screen:      Statin Therapy: Y     Dysphagia Screen: PASS     Smoking  NO      Afib NO     Stroke Education YES 58-year-old right-handed white lady first evaluated at [facility] on 11/21/18 with left hemiparesis. 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 Perhaps her previous strokes were due to small vessel disease. On 11/21/18, she awoke with a dense left hemiparesis. CT head (11/21/18) to my eye showed a chronic right lentiform nucleus lacunar infarct. CTA neck (11/21/18) to my eye showed a probable right ICA occlusion with some calcified plaque. CTA head (11/21/18) to my eye showed that the right ICA reconstituted perhaps through the ACOM and possibly a large right PCOM. CTP (11/21/18) to my eye showed a core infarct of 27 cc, and a large hypoperfused area of 125 cc in the right MCA distribution, MRI brain shows RMCA ischemic infarct. Given that there was no intracranial occlusion, she was excluded from endovascular thrombectomy. 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      NEURO: Neuro exam unchanged, continue close monitoring for neurologic deterioration, permissive hypertension; plan for selective cerebral angiography on Monday 11/24/18 to eval ADRY dissection, dual antiplatelet agent for possible carotid stent and stain for secondary stroke prevention, titrate statin to LDL goal less than 70,MRI Brain, MRA head/Neck with T1 Fat sat results as above. CTA Head/Neck results as above, Physical therapy/OT recommends AR.  Patient enrolled in Stroke AF trial and will get a LOOP placed.    ANTITHROMBOTIC THERAPY:  D/C Aggrenox and will start ASA/Plavix for possible right carotid stent     PULMONARY: CXR (11/24/18) clear, protecting airway, saturating well     CARDIOVASCULAR: check TTE, cardiac monitoring no events, maintain HTN, Dr Wheeler (Cardiology) consult appreciated, enrolled in AF study      SBP goal: <160    GASTROINTESTINAL: Dysphagia screen passed, tolerating diet, episode of emesis this morning (1/24/18), will d/c Aggrenox, will continue to monitor     Diet: Regular    RENAL: BUN/Cr stable, good urine output, hyponatremia, stable, d/c 2mg Na Tabs, will continue to monitor      Na Goal: Greater than 135     Harper: N    HEMATOLOGY: H/H without acute change, Platelets normal      DVT ppx:  LMWH     ID: afebrile, no leukocytosis     OTHER: Endocrine consulted for A1C of 9.2: continue correctional scale, hold off on lantus. -On discharge, patient going to acute rehab, so would c/w insulin, regimen to be determined. When home, would likely add metformin- titrated up to max dose as well as glipizide, endocrine consult appreciated. Plan/goals of care discussed with pt at bedside    DISPOSITION: Acute rehab once stable and workup is complete, will contact PMR for eval/recommendations    CORE MEASURES:        Admission NIHSS: 15     TPA:  NO      LDL/HDL: 106/39     Depression Screen:      Statin Therapy: Y     Dysphagia Screen: PASS     Smoking  NO      Afib NO     Stroke Education YES

## 2018-11-25 LAB
ANION GAP SERPL CALC-SCNC: 15 MMOL/L — SIGNIFICANT CHANGE UP (ref 5–17)
BUN SERPL-MCNC: 14 MG/DL — SIGNIFICANT CHANGE UP (ref 7–23)
CALCIUM SERPL-MCNC: 9.2 MG/DL — SIGNIFICANT CHANGE UP (ref 8.4–10.5)
CHLORIDE SERPL-SCNC: 99 MMOL/L — SIGNIFICANT CHANGE UP (ref 96–108)
CO2 SERPL-SCNC: 18 MMOL/L — LOW (ref 22–31)
CREAT SERPL-MCNC: 0.61 MG/DL — SIGNIFICANT CHANGE UP (ref 0.5–1.3)
GLUCOSE BLDC GLUCOMTR-MCNC: 110 MG/DL — HIGH (ref 70–99)
GLUCOSE BLDC GLUCOMTR-MCNC: 125 MG/DL — HIGH (ref 70–99)
GLUCOSE BLDC GLUCOMTR-MCNC: 155 MG/DL — HIGH (ref 70–99)
GLUCOSE BLDC GLUCOMTR-MCNC: 99 MG/DL — SIGNIFICANT CHANGE UP (ref 70–99)
GLUCOSE SERPL-MCNC: 165 MG/DL — HIGH (ref 70–99)
HCT VFR BLD CALC: 35.1 % — SIGNIFICANT CHANGE UP (ref 34.5–45)
HGB BLD-MCNC: 12.2 G/DL — SIGNIFICANT CHANGE UP (ref 11.5–15.5)
MCHC RBC-ENTMCNC: 29.5 PG — SIGNIFICANT CHANGE UP (ref 27–34)
MCHC RBC-ENTMCNC: 34.9 GM/DL — SIGNIFICANT CHANGE UP (ref 32–36)
MCV RBC AUTO: 84.5 FL — SIGNIFICANT CHANGE UP (ref 80–100)
PLATELET # BLD AUTO: 227 K/UL — SIGNIFICANT CHANGE UP (ref 150–400)
POTASSIUM SERPL-MCNC: 3.9 MMOL/L — SIGNIFICANT CHANGE UP (ref 3.5–5.3)
POTASSIUM SERPL-SCNC: 3.9 MMOL/L — SIGNIFICANT CHANGE UP (ref 3.5–5.3)
RBC # BLD: 4.15 M/UL — SIGNIFICANT CHANGE UP (ref 3.8–5.2)
RBC # FLD: 12.3 % — SIGNIFICANT CHANGE UP (ref 10.3–14.5)
SODIUM SERPL-SCNC: 132 MMOL/L — LOW (ref 135–145)
WBC # BLD: 5.4 K/UL — SIGNIFICANT CHANGE UP (ref 3.8–10.5)
WBC # FLD AUTO: 5.4 K/UL — SIGNIFICANT CHANGE UP (ref 3.8–10.5)

## 2018-11-25 PROCEDURE — 99233 SBSQ HOSP IP/OBS HIGH 50: CPT

## 2018-11-25 RX ADMIN — ATORVASTATIN CALCIUM 80 MILLIGRAM(S): 80 TABLET, FILM COATED ORAL at 22:03

## 2018-11-25 RX ADMIN — INSULIN GLARGINE 5 UNIT(S): 100 INJECTION, SOLUTION SUBCUTANEOUS at 22:06

## 2018-11-25 RX ADMIN — CLOPIDOGREL BISULFATE 75 MILLIGRAM(S): 75 TABLET, FILM COATED ORAL at 12:45

## 2018-11-25 RX ADMIN — ONDANSETRON 4 MILLIGRAM(S): 8 TABLET, FILM COATED ORAL at 17:12

## 2018-11-25 RX ADMIN — ENOXAPARIN SODIUM 40 MILLIGRAM(S): 100 INJECTION SUBCUTANEOUS at 18:02

## 2018-11-25 RX ADMIN — Medication 3 UNIT(S): at 12:45

## 2018-11-25 RX ADMIN — Medication 3 UNIT(S): at 18:03

## 2018-11-25 RX ADMIN — Medication 3 UNIT(S): at 09:15

## 2018-11-25 RX ADMIN — ONDANSETRON 4 MILLIGRAM(S): 8 TABLET, FILM COATED ORAL at 03:04

## 2018-11-25 RX ADMIN — SODIUM CHLORIDE 125 MILLILITER(S): 9 INJECTION INTRAMUSCULAR; INTRAVENOUS; SUBCUTANEOUS at 03:02

## 2018-11-25 RX ADMIN — Medication 81 MILLIGRAM(S): at 12:44

## 2018-11-25 RX ADMIN — Medication 1: at 09:16

## 2018-11-25 NOTE — PROGRESS NOTE ADULT - ASSESSMENT
57 yo female admitted to [facility]roke [facility] with Cerebral embolism with infarction at right MCA territory etiology artery to artery embolism due to high suspicion for right internal carotid artery dissection.

## 2018-11-25 NOTE — PROGRESS NOTE ADULT - ASSESSMENT
58-year-old right-handed white lady first evaluated at [facility] on 11/21/18 with left hemiparesis. 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 Perhaps her previous strokes were due to small vessel disease. On 11/21/18, she awoke with a dense left hemiparesis. CT head (11/21/18) to my eye showed a chronic right lentiform nucleus lacunar infarct. CTA neck (11/21/18) to my eye showed a probable right ICA occlusion with some calcified plaque. CTA head (11/21/18) to my eye showed that the right ICA reconstituted perhaps through the ACOM and possibly a large right PCOM. CTP (11/21/18) to my eye showed a core infarct of 27 cc, and a large hypoperfused area of 125 cc in the right MCA distribution, MRI brain shows RMCA ischemic infarct. Given that there was no intracranial occlusion, she was excluded from endovascular thrombectomy. 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      NEURO: Neuro exam unchanged, continue close monitoring for neurologic deterioration, permissive hypertension; plan for selective cerebral angiography on Monday 11/24/18 to eval ADRY dissection, dual antiplatelet agent for possible carotid stent and stain for secondary stroke prevention, titrate statin to LDL goal less than 70,MRI Brain, MRA head/Neck with T1 Fat sat results as above. CTA Head/Neck results as above, Physical therapy/OT recommends AR.  Patient enrolled in Stroke AF trial and will get a LOOP placed.    ANTITHROMBOTIC THERAPY:  ASA/Plavix for possible right carotid stent. Will obtain P2Y12 levels in am (11/26/18)     PULMONARY: CXR (11/24/18) clear, protecting airway, saturating well     CARDIOVASCULAR: check TTE, cardiac monitoring no events, maintain HTN, Dr Wheeler (Cardiology) consult appreciated, enrolled in AF study      SBP goal: <160    GASTROINTESTINAL: Dysphagia screen passed, tolerating diet, NPO after midnight for cerebral angiography     Diet: Regular    RENAL: BUN/Cr stable, good urine output, hyponatremia, stable, will continue to monitor      Na Goal: Greater than 135     Harper: N    HEMATOLOGY: H/H without acute change, Platelets normal      DVT ppx:  LMWH     ID: afebrile, no leukocytosis     OTHER: Endocrine consulted for A1C of 9.2: continue correctional scale, hold off on lantus. -On discharge, patient going to acute rehab, so would c/w insulin, regimen to be determined. When home, would likely add metformin- titrated up to max dose as well as glipizide, endocrine consult appreciated. Plan/goals of care discussed with pt at bedside    DISPOSITION: Acute rehab once stable and workup is complete, will contact PMR for eval/recommendations    CORE MEASURES:        Admission NIHSS: 15     TPA:  NO      LDL/HDL: 106/39     Depression Screen:      Statin Therapy: Y     Dysphagia Screen: PASS     Smoking  NO      Afib NO     Stroke Education YES

## 2018-11-25 NOTE — PROGRESS NOTE ADULT - SUBJECTIVE AND OBJECTIVE BOX
Subjective: Patient seen and examined. No new events except as noted.   remains in stroke unit   No cp or sob     REVIEW OF SYSTEMS:    CONSTITUTIONAL: + weakness, fevers or chills  EYES/ENT: No visual changes;  No vertigo or throat pain   NECK: No pain or stiffness  RESPIRATORY: No cough, wheezing, hemoptysis; No shortness of breath  CARDIOVASCULAR: No chest pain or palpitations  GASTROINTESTINAL: No abdominal or epigastric pain. No nausea, vomiting, or hematemesis; No diarrhea or constipation. No melena or hematochezia.  GENITOURINARY: No dysuria, frequency or hematuria  NEUROLOGICAL: +numbness or weakness  SKIN: No itching, burning, rashes, or lesions   All other review of systems is negative unless indicated above.    MEDICATIONS:  MEDICATIONS  (STANDING):  aspirin  chewable 81 milliGRAM(s) Oral daily  atorvastatin 80 milliGRAM(s) Oral at bedtime  clopidogrel Tablet 75 milliGRAM(s) Oral daily  dextrose 5%. 1000 milliLiter(s) (50 mL/Hr) IV Continuous <Continuous>  dextrose 50% Injectable 12.5 Gram(s) IV Push once  dextrose 50% Injectable 25 Gram(s) IV Push once  dextrose 50% Injectable 25 Gram(s) IV Push once  enoxaparin Injectable 40 milliGRAM(s) SubCutaneous every 24 hours  insulin glargine Injectable (LANTUS) 5 Unit(s) SubCutaneous at bedtime  insulin lispro (HumaLOG) corrective regimen sliding scale   SubCutaneous at bedtime  insulin lispro (HumaLOG) corrective regimen sliding scale   SubCutaneous three times a day before meals  insulin lispro Injectable (HumaLOG) 3 Unit(s) SubCutaneous three times a day before meals      PHYSICAL EXAM:  T(C): 37.4 (11-25-18 @ 08:00), Max: 37.4 (11-25-18 @ 08:00)  HR: 79 (11-25-18 @ 08:00) (79 - 93)  BP: 154/139 (11-25-18 @ 08:00) (154/139 - 190/87)  RR: 18 (11-25-18 @ 08:00) (16 - 24)  SpO2: 98% (11-25-18 @ 08:00) (95% - 100%)  Wt(kg): --  I&O's Summary    24 Nov 2018 07:01  -  25 Nov 2018 07:00  --------------------------------------------------------  IN: 1600 mL / OUT: 1350 mL / NET: 250 mL        Appearance: NAD  HEENT:   Normal oral mucosa, PERRL, EOMI	  Lymphatic: No lymphadenopathy  Cardiovascular: Normal S1 S2, No JVD, No murmurs, No edema  Respiratory: Lungs clear to auscultation	  Psychiatry: A & O x 3, Mood & affect appropriate  Gastrointestinal:  Soft, Non-tender, + BS	  Skin: No rashes, No ecchymoses, No cyanosis	  Extremities: Decreased range of motion, No clubbing, cyanosis or edema  Vascular: Peripheral pulses palpable 2+ bilaterally  NEUROLOGICAL EXAM:  Mental status: Awake, alert, oriented x3, fluent speech, no neglect, able to follow commands  Cranial Nerves: mild left central facial palsy, VFF, a few beats of nystagmus at end gaze bilaterally, mild dysarthria,   Motor exam: Normal tone, no drift, RUE 5/5, RLE 5/5, Left side hemiparesis LUE prox/distally 0/5, LLE iliopsoas 4/5; anterior tibialis 0/5  Sensation: Moderate-severely decreased light touch and temperature sensation on left   Coordination/ Gait: No dysmetria        LABS:    CARDIAC MARKERS:                                12.2   5.4   )-----------( 227      ( 25 Nov 2018 03:32 )             35.1     11-25    132<L>  |  99  |  14  ----------------------------<  165<H>  3.9   |  18<L>  |  0.61    Ca    9.2      25 Nov 2018 03:32      proBNP:   Lipid Profile:   HgA1c:   TSH:     2          TELEMETRY: SR	    ECG:  	  RADIOLOGY:   DIAGNOSTIC TESTING:  [ ] Echocardiogram:  [ ]  Catheterization:  [ ] Stress Test:    OTHER:

## 2018-11-25 NOTE — PROGRESS NOTE ADULT - SUBJECTIVE AND OBJECTIVE BOX
THE PATIENT [facility] AND [facility].   HPI:  59 y/o woman with PMH of HTN, DM and Stroke x2 (w/ residual left facial droop) who presented to [facility] with left sided hemiplegia, transferred for possible thrombectomy. MAYON 0130. Symptoms unchanged since awakening with them at 0600. Was not a candidate for tPA. Pt denies any recent fevers, chills, HA, dizziness, neck pain, visual changes, CP, SOB, cough, nausea, vomiting, abdominal pain, changes in BMs/urination. NIHSS: 15. MRS: 1.     SUBJECTIVE: No events overnight.  No new neurologic complaints.      aspirin  chewable 81 milliGRAM(s) Oral daily  atorvastatin 80 milliGRAM(s) Oral at bedtime  clopidogrel Tablet 75 milliGRAM(s) Oral daily  dextrose 40% Gel 15 Gram(s) Oral once PRN  dextrose 5%. 1000 milliLiter(s) IV Continuous <Continuous>  dextrose 50% Injectable 12.5 Gram(s) IV Push once  dextrose 50% Injectable 25 Gram(s) IV Push once  dextrose 50% Injectable 25 Gram(s) IV Push once  enoxaparin Injectable 40 milliGRAM(s) SubCutaneous every 24 hours  glucagon  Injectable 1 milliGRAM(s) IntraMuscular once PRN  insulin glargine Injectable (LANTUS) 5 Unit(s) SubCutaneous at bedtime  insulin lispro (HumaLOG) corrective regimen sliding scale   SubCutaneous at bedtime  insulin lispro (HumaLOG) corrective regimen sliding scale   SubCutaneous three times a day before meals  insulin lispro Injectable (HumaLOG) 3 Unit(s) SubCutaneous three times a day before meals  ondansetron Injectable 4 milliGRAM(s) IV Push every 8 hours PRN      PHYSICAL EXAM:   Vital Signs Last 24 Hrs  T(C): 37.1 (25 Nov 2018 12:54), Max: 37.4 (25 Nov 2018 08:00)  T(F): 98.8 (25 Nov 2018 12:54), Max: 99.3 (25 Nov 2018 08:00)  HR: 90 (25 Nov 2018 16:00) (75 - 93)  BP: 165/70 (25 Nov 2018 16:00) (153/80 - 181/77)  BP(mean): 97 (25 Nov 2018 16:00) (97 - 146)  RR: 30 (25 Nov 2018 16:00) (16 - 30)  SpO2: 99% (25 Nov 2018 16:00) (96% - 99%)    General: No acute distress  HEENT: EOM intact, VFF  Abdomen: Soft, nontender, nondistended   Extremities: No edema    NEUROLOGICAL EXAM:  Mental status: Awake, alert, oriented x3, fluent speech, no neglect, able to follow commands  Cranial Nerves: mild left central facial palsy, VFF, a few beats of nystagmus at end gaze bilaterally, mild dysarthria,   Motor exam: Normal tone, no drift, RUE 5/5, RLE 5/5, Left side hemiparesis LUE prox/distally 0/5, LLE iliopsoas 4/5; anterior tibialis 0/5  Sensation: Moderate-severely decreased light touch and temperature sensation on left   Coordination/ Gait: No dysmetria    LABS:                        12.2   5.4   )-----------( 227      ( 25 Nov 2018 03:32 )             35.1    11-25    132<L>  |  99  |  14  ----------------------------<  165<H>  3.9   |  18<L>  |  0.61    Ca    9.2      25 Nov 2018 03:32      Hemoglobin A1C, Whole Blood: 9.2 % (11-22 @ 08:24)      IMAGING: Reviewed by me.       HEENT: EOM intact, VFF  Abdomen: Soft, nontender, nondistended   Extremities: No edema    NEUROLOGICAL EXAM:  Mental status: Awake, alert, oriented x3, fluent speech, no neglect, able to follow commands  Cranial Nerves: mild left central facial palsy, VFF, a few beats of nystagmus at end gaze bilaterally, mild dysarthria,   Motor exam: Normal tone, no drift, RUE 5/5, RLE 5/5, Left side hemiparesis LUE prox/distally 0/5, LLE iliopsoas 4/5; anterior tibialis 0/5  Sensation: Moderate-severely decreased light touch and temperature sensation on left   Coordination/ Gait: No dysmetria    LABS:                        12.3   6.2   )-----------( 220      ( 24 Nov 2018 02:13 )             34.5    11-24    132<L>  |  97  |  11  ----------------------------<  200<H>  3.9   |  19<L>  |  0.64    Ca    9.4      24 Nov 2018 02:02      Hemoglobin A1C, Whole Blood: 9.2 % (11-22 @ 08:24)      IMAGING: Reviewed by me.   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 Decreased, but present flow related enhancement of the more distal right   ICA. Previously, no flow related enhancement was identified within the right ICA on CTA from 11/21/2018.    MRA BRAIN: Diminished, but present flow related signal within the skull base and supraclinoid right internal carotid artery. iminished flow related signal within the right MCA.    MRI Head (11/21/18): Acute right MCA infarct.    Head CT, perfussion, CTA Head/Neck (11/21/18)   Brain CT: No identifiable or infarct. Neck   CTA: The abrupt cut off of the proximal right internal carotid artery with associated scattered areas of calcified plaque. This may be   secondary to occlusive thrombus or arterial dissection. A mild narrowing of the takeoff of the right vertebral artery.Normal left internal carotid and left vertebral arteries.    Brain CTA: Markedly diminished flow within the petrous and cavernous segments of the right internal carotid artery with reconstitution of the distal right internal carotid artery likely through collateralization and/or cross filling.. Mildly decreased flow within the right middle cerebral artery as compared with the left without focal stenosis.    Perfusion maps: Mismatch area are of 102 mL with evidence of core infarct and moderately large penumbra. There is a decreased cerebral blood flow and decreased cerebral blood volume in the right frontoparietal region with corresponding elevation of mean transit time.

## 2018-11-26 ENCOUNTER — APPOINTMENT (OUTPATIENT)
Dept: NEUROSURGERY | Facility: HOSPITAL | Age: 58
End: 2018-11-26
Payer: COMMERCIAL

## 2018-11-26 LAB
ANION GAP SERPL CALC-SCNC: 12 MMOL/L — SIGNIFICANT CHANGE UP (ref 5–17)
BLD GP AB SCN SERPL QL: NEGATIVE — SIGNIFICANT CHANGE UP
BUN SERPL-MCNC: 16 MG/DL — SIGNIFICANT CHANGE UP (ref 7–23)
CALCIUM SERPL-MCNC: 9.7 MG/DL — SIGNIFICANT CHANGE UP (ref 8.4–10.5)
CHLORIDE SERPL-SCNC: 100 MMOL/L — SIGNIFICANT CHANGE UP (ref 96–108)
CO2 SERPL-SCNC: 21 MMOL/L — LOW (ref 22–31)
CREAT SERPL-MCNC: 0.61 MG/DL — SIGNIFICANT CHANGE UP (ref 0.5–1.3)
GLUCOSE BLDC GLUCOMTR-MCNC: 127 MG/DL — HIGH (ref 70–99)
GLUCOSE BLDC GLUCOMTR-MCNC: 129 MG/DL — HIGH (ref 70–99)
GLUCOSE BLDC GLUCOMTR-MCNC: 132 MG/DL — HIGH (ref 70–99)
GLUCOSE BLDC GLUCOMTR-MCNC: 132 MG/DL — HIGH (ref 70–99)
GLUCOSE SERPL-MCNC: 106 MG/DL — HIGH (ref 70–99)
HCT VFR BLD CALC: 34.8 % — SIGNIFICANT CHANGE UP (ref 34.5–45)
HGB BLD-MCNC: 12.5 G/DL — SIGNIFICANT CHANGE UP (ref 11.5–15.5)
MCHC RBC-ENTMCNC: 29.8 PG — SIGNIFICANT CHANGE UP (ref 27–34)
MCHC RBC-ENTMCNC: 35.9 GM/DL — SIGNIFICANT CHANGE UP (ref 32–36)
MCV RBC AUTO: 83 FL — SIGNIFICANT CHANGE UP (ref 80–100)
PA ADP PRP-ACNC: 156 PRU — LOW (ref 194–417)
PLATELET # BLD AUTO: 234 K/UL — SIGNIFICANT CHANGE UP (ref 150–400)
POTASSIUM SERPL-MCNC: 3.3 MMOL/L — LOW (ref 3.5–5.3)
POTASSIUM SERPL-SCNC: 3.3 MMOL/L — LOW (ref 3.5–5.3)
RBC # BLD: 4.19 M/UL — SIGNIFICANT CHANGE UP (ref 3.8–5.2)
RBC # FLD: 12.1 % — SIGNIFICANT CHANGE UP (ref 10.3–14.5)
RH IG SCN BLD-IMP: POSITIVE — SIGNIFICANT CHANGE UP
RH IG SCN BLD-IMP: POSITIVE — SIGNIFICANT CHANGE UP
SODIUM SERPL-SCNC: 133 MMOL/L — LOW (ref 135–145)
WBC # BLD: 5.2 K/UL — SIGNIFICANT CHANGE UP (ref 3.8–10.5)
WBC # FLD AUTO: 5.2 K/UL — SIGNIFICANT CHANGE UP (ref 3.8–10.5)

## 2018-11-26 PROCEDURE — 36224 PLACE CATH CAROTD ART: CPT | Mod: 59,LT

## 2018-11-26 PROCEDURE — 99232 SBSQ HOSP IP/OBS MODERATE 35: CPT

## 2018-11-26 PROCEDURE — 99233 SBSQ HOSP IP/OBS HIGH 50: CPT

## 2018-11-26 PROCEDURE — 93306 TTE W/DOPPLER COMPLETE: CPT | Mod: 26

## 2018-11-26 PROCEDURE — 37215 TRANSCATH STENT CCA W/EPS: CPT | Mod: RT

## 2018-11-26 PROCEDURE — 36227 PLACE CATH XTRNL CAROTID: CPT | Mod: LT

## 2018-11-26 PROCEDURE — 36226 PLACE CATH VERTEBRAL ART: CPT | Mod: 50

## 2018-11-26 RX ORDER — POTASSIUM CHLORIDE 20 MEQ
10 PACKET (EA) ORAL
Qty: 0 | Refills: 0 | Status: COMPLETED | OUTPATIENT
Start: 2018-11-26 | End: 2018-11-26

## 2018-11-26 RX ORDER — FLUOXETINE HCL 10 MG
20 CAPSULE ORAL DAILY
Qty: 0 | Refills: 0 | Status: DISCONTINUED | OUTPATIENT
Start: 2018-11-26 | End: 2018-11-28

## 2018-11-26 RX ADMIN — ATORVASTATIN CALCIUM 80 MILLIGRAM(S): 80 TABLET, FILM COATED ORAL at 21:09

## 2018-11-26 RX ADMIN — Medication 100 MILLIEQUIVALENT(S): at 18:40

## 2018-11-26 RX ADMIN — Medication 100 MILLIEQUIVALENT(S): at 06:22

## 2018-11-26 RX ADMIN — CLOPIDOGREL BISULFATE 75 MILLIGRAM(S): 75 TABLET, FILM COATED ORAL at 21:09

## 2018-11-26 RX ADMIN — Medication 81 MILLIGRAM(S): at 21:09

## 2018-11-26 RX ADMIN — ENOXAPARIN SODIUM 40 MILLIGRAM(S): 100 INJECTION SUBCUTANEOUS at 21:10

## 2018-11-26 RX ADMIN — Medication 100 MILLIEQUIVALENT(S): at 10:28

## 2018-11-26 RX ADMIN — INSULIN GLARGINE 5 UNIT(S): 100 INJECTION, SOLUTION SUBCUTANEOUS at 22:25

## 2018-11-26 NOTE — PROGRESS NOTE ADULT - SUBJECTIVE AND OBJECTIVE BOX
Subjective: Patient seen and examined. No new events except as noted.   remains in stroke unit   no cp or sob     REVIEW OF SYSTEMS:    CONSTITUTIONAL:+ weakness, fevers or chills  EYES/ENT: No visual changes;  No vertigo or throat pain   NECK: No pain or stiffness  RESPIRATORY: No cough, wheezing, hemoptysis; No shortness of breath  CARDIOVASCULAR: No chest pain or palpitations  GASTROINTESTINAL: No abdominal or epigastric pain. No nausea, vomiting, or hematemesis; No diarrhea or constipation. No melena or hematochezia.  GENITOURINARY: No dysuria, frequency or hematuria  NEUROLOGICAL: No numbness or weakness  SKIN: No itching, burning, rashes, or lesions   All other review of systems is negative unless indicated above.    MEDICATIONS:  MEDICATIONS  (STANDING):  aspirin  chewable 81 milliGRAM(s) Oral daily  atorvastatin 80 milliGRAM(s) Oral at bedtime  clopidogrel Tablet 75 milliGRAM(s) Oral daily  dextrose 5%. 1000 milliLiter(s) (50 mL/Hr) IV Continuous <Continuous>  dextrose 50% Injectable 12.5 Gram(s) IV Push once  dextrose 50% Injectable 25 Gram(s) IV Push once  dextrose 50% Injectable 25 Gram(s) IV Push once  enoxaparin Injectable 40 milliGRAM(s) SubCutaneous every 24 hours  FLUoxetine 20 milliGRAM(s) Oral daily  insulin glargine Injectable (LANTUS) 5 Unit(s) SubCutaneous at bedtime  insulin lispro (HumaLOG) corrective regimen sliding scale   SubCutaneous at bedtime  insulin lispro (HumaLOG) corrective regimen sliding scale   SubCutaneous three times a day before meals  insulin lispro Injectable (HumaLOG) 3 Unit(s) SubCutaneous three times a day before meals      PHYSICAL EXAM:  T(C): 36.6 (11-26-18 @ 14:25), Max: 37.1 (11-25-18 @ 20:00)  HR: 72 (11-26-18 @ 17:00) (66 - 86)  BP: 158/86 (11-26-18 @ 17:00) (140/70 - 165/75)  RR: 14 (11-26-18 @ 17:00) (13 - 22)  SpO2: 96% (11-26-18 @ 17:00) (95% - 100%)  Wt(kg): --  I&O's Summary    25 Nov 2018 07:01  -  26 Nov 2018 07:00  --------------------------------------------------------  IN: 300 mL / OUT: 1150 mL / NET: -850 mL    26 Nov 2018 07:01  -  26 Nov 2018 19:54  --------------------------------------------------------  IN: 120 mL / OUT: 355 mL / NET: -235 mL      Appearance: NAD  HEENT:   Normal oral mucosa, PERRL, EOMI	  Lymphatic: No lymphadenopathy  Cardiovascular: Normal S1 S2, No JVD, No murmurs, No edema  Respiratory: Lungs clear to auscultation	  Psychiatry: A & O x 3, Mood & affect appropriate  Gastrointestinal:  Soft, Non-tender, + BS	  Skin: No rashes, No ecchymoses, No cyanosis	  Extremities: Decreased range of motion, No clubbing, cyanosis or edema  Vascular: Peripheral pulses palpable 2+ bilaterally  NEUROLOGICAL EXAM:  Mental status: Awake, alert, oriented x3, fluent speech, no neglect, able to follow commands  Cranial Nerves: mild left central facial palsy, VFF, a few beats of nystagmus at end gaze bilaterally, mild dysarthria,   Motor exam: Normal tone, no drift, RUE 5/5, RLE 5/5, Left side hemiparesis LUE prox/distally 0/5, LLE iliopsoas 4/5; anterior tibialis 0/5  Sensation: Moderate-severely decreased light touch and temperature sensation on left   Coordination/ Gait: No dysmetria          LABS:    CARDIAC MARKERS:                                12.5   5.2   )-----------( 234      ( 26 Nov 2018 05:05 )             34.8     11-26    133<L>  |  100  |  16  ----------------------------<  106<H>  3.3<L>   |  21<L>  |  0.61    Ca    9.7      26 Nov 2018 05:05      proBNP:   Lipid Profile:   HgA1c:   TSH:     2          TELEMETRY: SR    ECG:  	  RADIOLOGY:   DIAGNOSTIC TESTING:  [ ] Echocardiogram:  [ ]  Catheterization:  [ ] Stress Test:    OTHER:

## 2018-11-26 NOTE — PROGRESS NOTE ADULT - SUBJECTIVE AND OBJECTIVE BOX
DIABETES FOLLOW UP NOTE: Saw pt earlier today  INTERVAL HX: 57 y/o F w/h/o uncontrolled T2DM, HTN, s/p stroke x2 with L facial drop. Here with L sided hemiplegia found to have CVA in R MCA plus ADRY dissection. NPO today going to IR for selective cerebral angiography and possible stent placement. Reports tolerating POs but eating very little due to loose BMs when she eats. Glycemic control at goal while on present insulin doses. No hypoglycemia.        Review of Systems:  Cardiovascular: No chest pain, palpitations  Respiratory: No SOB, no cough  GI: No nausea, vomiting, abdominal pain. On and off loose BMs  Endocrine: no polyuria, polydipsia or S&Sx of hypoglycemia    Allergies    No Known Drug Allergies  shellfish (Anaphylaxis)    Intolerances      MEDICATIONS:  atorvastatin 80 milliGRAM(s) Oral at bedtime  insulin glargine Injectable (LANTUS) 5 Unit(s) SubCutaneous at bedtime  insulin lispro (HumaLOG) corrective regimen sliding scale   SubCutaneous at bedtime  insulin lispro (HumaLOG) corrective regimen sliding scale   SubCutaneous three times a day before meals  insulin lispro Injectable (HumaLOG) 3 Unit(s) SubCutaneous three times a day before meals        PHYSICAL EXAM:  VITALS: T(C): 36.7 (11-26-18 @ 07:02)  T(F): 98 (11-26-18 @ 07:02), Max: 98.8 (11-25-18 @ 20:00)  HR: 85 (11-26-18 @ 10:00) (66 - 90)  BP: 165/75 (11-26-18 @ 10:00) (140/70 - 170/85)  RR:  (13 - 30)  SpO2:  (95% - 100%)  Wt(kg): --  GENERAL: Female laying in bed in NAD. Son and boyfriend at bedside.  Abdomen: Soft, nontender, non distended  Extremities: Warm, no edema in all 4 exts  NEURO: A&O X3. L sided weakness. Noted braces in LUE and LLE. Positive L facial drop    LABS:  POCT Blood Glucose.: 129 mg/dL (11-26-18 @ 08:40)  POCT Blood Glucose.: 99 mg/dL (11-25-18 @ 21:57)  POCT Blood Glucose.: 110 mg/dL (11-25-18 @ 17:38)  POCT Blood Glucose.: 125 mg/dL (11-25-18 @ 12:19)  POCT Blood Glucose.: 155 mg/dL (11-25-18 @ 08:47)  POCT Blood Glucose.: 149 mg/dL (11-24-18 @ 21:41)  POCT Blood Glucose.: 172 mg/dL (11-24-18 @ 17:35)  POCT Blood Glucose.: 221 mg/dL (11-24-18 @ 13:30)  POCT Blood Glucose.: 194 mg/dL (11-24-18 @ 09:01)  POCT Blood Glucose.: 157 mg/dL (11-23-18 @ 22:30)  POCT Blood Glucose.: 174 mg/dL (11-23-18 @ 16:56)                            12.5   5.2   )-----------( 234      ( 26 Nov 2018 05:05 )             34.8       11-26    133<L>  |  100  |  16  ----------------------------<  106<H>  3.3<L>   |  21<L>  |  0.61    EGFR if : 117  EGFR if non : 101    Ca    9.7      11-26    Hemoglobin A1C, Whole Blood: 9.2 % <H> [4.0 - 5.6] (11-22-18 @ 08:24)      11-24 Chol 179  HDL 39<L> Trig 171<H>

## 2018-11-26 NOTE — PROGRESS NOTE ADULT - ASSESSMENT
59 y/o F w/h/o uncontrolled T2DM, HTN, s/p stroke x2 with L facial drop. Here with L sided hemiplegia found to have CVA in R MCA plus ADRY dissection. NPO today going to IR for selective cerebral angiography and possible stent placement. Tolerating POs but eating very little due to loose BMs when she eats. Glycemic control at goal while on present insulin doses. No hypoglycemia.  Spoke to pt about the need to eat if receiving Humalog with meals. If unable to eat to inform staff so meal time insulin in withheld. Pt verbalized understanding.   She reports using insulin in the past> states she was switched to oral meds (Glipizide 5mg daily) and was taking it as prescribed. Pt doesn't test BG frequently.  Spoke to pt about the possible need of insulin due to high risk for further strokes or MIs if A1C remains elevated. Pt agreeable to insulin if needed. Will determine insulin versus oral regimen once pt eating more consistently. Per pt she lives alone and will needs assistance with insulin administration due to L sided weakness. Pt will likely go to rehab first and then home. If insulin is needed at home and if pt is unable to prepare or inject insulin due to physical limitations; she states her nephew lives in her building and can help her with it.     Pt would benefit from occupational therapy to assess and address hand dexterity issues.

## 2018-11-26 NOTE — PROGRESS NOTE ADULT - ASSESSMENT
57 y/o woman with PMH of HTN, DM and Stroke x2 (w/ residual left facial droop) who presented to [place] with left sided hemiplegia, transferred for possible thrombectomy. LKN 0130. Symptoms unchanged since awakening with them at 0600. Was not a candidate for tPA. Pt denies any recent fevers, chills, HA, dizziness, neck pain, visual changes, CP, SOB, cough, nausea, vomiting, abdominal pain, changes in BMs/urination.  Impression: Cerebral embolism with infarction at right MCA territory etiology artery to artery embolism due to high suspicion for right internal carotid artery dissection.      Plan  [] K given as supplementation  [] fluoxetine 20 mg daily started for muscle spasm  [] NPO for angio  [] TTe to be done  [] continue ASA 81 indefinitely, plavix 75 for 3 weeks

## 2018-11-26 NOTE — PROGRESS NOTE ADULT - SUBJECTIVE AND OBJECTIVE BOX
THE PATIENT WAS SEEN AND EXAMINED BY ME WITH THE \[facility]\""TAFF AND STROKE TEAM DURING MORNING ROUNDS.   HPI:  59 y/o woman with PMH of HTN, DM and Stroke x2 (w/ residual left facial droop) who presented to [facility] with left sided hemiplegia, transferred for possible thrombectomy. MAYON 0130. Symptoms unchanged since awakening with them at 0600. Was not a candidate for tPA. Pt denies any recent fevers, chills, HA, dizziness, neck pain, visual changes, CP, SOB, cough, nausea, vomiting, abdominal pain, changes in BMs/urination. NIHSS: 15. MRS: 1.      SUBJECTIVE: No events overnight.  No new neurologic complaints.      aspirin  chewable 81 milliGRAM(s) Oral daily  atorvastatin 80 milliGRAM(s) Oral at bedtime  clopidogrel Tablet 75 milliGRAM(s) Oral daily  dextrose 40% Gel 15 Gram(s) Oral once PRN  dextrose 5%. 1000 milliLiter(s) IV Continuous <Continuous>  dextrose 50% Injectable 12.5 Gram(s) IV Push once  dextrose 50% Injectable 25 Gram(s) IV Push once  dextrose 50% Injectable 25 Gram(s) IV Push once  enoxaparin Injectable 40 milliGRAM(s) SubCutaneous every 24 hours  FLUoxetine 20 milliGRAM(s) Oral daily  glucagon  Injectable 1 milliGRAM(s) IntraMuscular once PRN  insulin glargine Injectable (LANTUS) 5 Unit(s) SubCutaneous at bedtime  insulin lispro (HumaLOG) corrective regimen sliding scale   SubCutaneous at bedtime  insulin lispro (HumaLOG) corrective regimen sliding scale   SubCutaneous three times a day before meals  insulin lispro Injectable (HumaLOG) 3 Unit(s) SubCutaneous three times a day before meals  ondansetron Injectable 4 milliGRAM(s) IV Push every 8 hours PRN  potassium chloride  10 mEq/100 mL IVPB 10 milliEquivalent(s) IV Intermittent every 1 hour      PHYSICAL EXAM:   Vital Signs Last 24 Hrs  T(C): 36.6 (26 Nov 2018 14:25), Max: 37.1 (25 Nov 2018 20:00)  T(F): 97.9 (26 Nov 2018 14:25), Max: 98.8 (25 Nov 2018 20:00)  HR: 75 (26 Nov 2018 16:00) (66 - 89)  BP: 163/76 (26 Nov 2018 16:00) (140/70 - 170/85)  BP(mean): 109 (26 Nov 2018 16:00) (89 - 109)  RR: 16 (26 Nov 2018 16:00) (13 - 22)  SpO2: 100% (26 Nov 2018 16:00) (95% - 100%)    General: No acute distress  HEENT: EOM intact, VFF  Abdomen: Soft, nontender, nondistended   Extremities: No edema    NEUROLOGICAL EXAM:  Mental status: Awake, alert, oriented x3, fluent speech, no neglect, able to follow commands, repetition intact  Cranial Nerves: mild left central facial palsy, VFF, a few beats of nystagmus at end gaze bilaterally,  dysarthria,   Motor exam:  RUE 5/5, RLE 5/5, Left side hemiparesis LUE prox/distally 0/5, LLE iliopsoas 3/5; anterior tibialis 0/5  Sensation: Moderate-severely decreased light touch and temperature sensation on left   Coordination/ Gait: gait not assessed       LABS:                        12.5   5.2   )-----------( 234      ( 26 Nov 2018 05:05 )             34.8    11-26    133<L>  |  100  |  16  ----------------------------<  106<H>  3.3<L>   |  21<L>  |  0.61    Ca    9.7      26 Nov 2018 05:05      Hemoglobin A1C, Whole Blood: 9.2 % (11-22 @ 08:24)      IMAGING: Reviewed by me.   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 Decreased, but present flow related enhancement of the more distal right   ICA. Previously, no flow related enhancement was identified within the right ICA on CTA from 11/21/2018.    MRA BRAIN: Diminished, but present flow related signal within the skull base and supraclinoid right internal carotid artery. iminished flow related signal within the right MCA.    MRI Head (11/21/18): Acute right MCA infarct.    Head CT, perfussion, CTA Head/Neck (11/21/18)   Brain CT: No identifiable or infarct. Neck   CTA: The abrupt cut off of the proximal right internal carotid artery with associated scattered areas of calcified plaque. This may be   secondary to occlusive thrombus or arterial dissection. A mild narrowing of the takeoff of the right vertebral artery.Normal left internal carotid and left vertebral arteries.    Brain CTA: Markedly diminished flow within the petrous and cavernous segments of the right internal carotid artery with reconstitution of the distal right internal carotid artery likely through collateralization and/or cross filling.. Mildly decreased flow within the right middle cerebral artery as compared with the left without focal stenosis.    Perfusion maps: Mismatch area are of 102 mL with evidence of core infarct and moderately large penumbra. There is a decreased cerebral blood flow and decreased cerebral blood volume in the right frontoparietal region with corresponding elevation of mean transit time. THE PATIENT WAS SEEN AND EXAMINED BY ME WITH THE HOUSESTAFF AND STROKE TEAM DURING MORNING ROUNDS.     HPI:  59 y/o woman with PMH of HTN, DM and Stroke x2 (w/ residual left facial droop) who presented to [facility] with left sided hemiplegia, transferred for possible thrombectomy. MAYON 0130. Symptoms unchanged since awakening with them at 0600. Was not a candidate for tPA. Pt denies any recent fevers, chills, HA, dizziness, neck pain, visual changes, CP, SOB, cough, nausea, vomiting, abdominal pain, changes in BMs/urination. NIHSS: 15. MRS: 1.    SUBJECTIVE: No events overnight.  No new neurologic complaints.      ROS: All negative except documented above.    aspirin  chewable 81 milliGRAM(s) Oral daily  atorvastatin 80 milliGRAM(s) Oral at bedtime  clopidogrel Tablet 75 milliGRAM(s) Oral daily  dextrose 40% Gel 15 Gram(s) Oral once PRN  dextrose 5%. 1000 milliLiter(s) IV Continuous <Continuous>  dextrose 50% Injectable 12.5 Gram(s) IV Push once  dextrose 50% Injectable 25 Gram(s) IV Push once  dextrose 50% Injectable 25 Gram(s) IV Push once  enoxaparin Injectable 40 milliGRAM(s) SubCutaneous every 24 hours  FLUoxetine 20 milliGRAM(s) Oral daily  glucagon  Injectable 1 milliGRAM(s) IntraMuscular once PRN  insulin glargine Injectable (LANTUS) 5 Unit(s) SubCutaneous at bedtime  insulin lispro (HumaLOG) corrective regimen sliding scale   SubCutaneous at bedtime  insulin lispro (HumaLOG) corrective regimen sliding scale   SubCutaneous three times a day before meals  insulin lispro Injectable (HumaLOG) 3 Unit(s) SubCutaneous three times a day before meals  ondansetron Injectable 4 milliGRAM(s) IV Push every 8 hours PRN  potassium chloride  10 mEq/100 mL IVPB 10 milliEquivalent(s) IV Intermittent every 1 hour      PHYSICAL EXAM:   Vital Signs Last 24 Hrs  T(C): 36.6 (26 Nov 2018 14:25), Max: 37.1 (25 Nov 2018 20:00)  T(F): 97.9 (26 Nov 2018 14:25), Max: 98.8 (25 Nov 2018 20:00)  HR: 75 (26 Nov 2018 16:00) (66 - 89)  BP: 163/76 (26 Nov 2018 16:00) (140/70 - 170/85)  BP(mean): 109 (26 Nov 2018 16:00) (89 - 109)  RR: 16 (26 Nov 2018 16:00) (13 - 22)  SpO2: 100% (26 Nov 2018 16:00) (95% - 100%)    General: No acute distress  HEENT: EOM intact, VFF  Abdomen: Soft, nontender, nondistended   Extremities: No edema    NEUROLOGICAL EXAM:  Mental status: Awake, alert, oriented x3, fluent speech, no neglect, able to follow commands, repetition intact  Cranial Nerves: mild left central facial palsy, VFF, a few beats of nystagmus at end gaze bilaterally,  dysarthria,   Motor exam:  RUE 5/5, RLE 5/5, Left side hemiparesis LUE prox/distally 0/5, LLE iliopsoas 3/5; anterior tibialis 0/5  Sensation: Moderate-severely decreased light touch and temperature sensation on left   Coordination/ Gait: gait not assessed       LABS:                        12.5   5.2   )-----------( 234      ( 26 Nov 2018 05:05 )             34.8    11-26    133<L>  |  100  |  16  ----------------------------<  106<H>  3.3<L>   |  21<L>  |  0.61    Ca    9.7      26 Nov 2018 05:05      Hemoglobin A1C, Whole Blood: 9.2 % (11-22 @ 08:24)      IMAGING: Reviewed by me.   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 Decreased, but present flow related enhancement of the more distal right   ICA. Previously, no flow related enhancement was identified within the right ICA on CTA from 11/21/2018.    MRA BRAIN: Diminished, but present flow related signal within the skull base and supraclinoid right internal carotid artery. iminished flow related signal within the right MCA.    MRI Head (11/21/18): Acute right MCA infarct.    Head CT, perfussion, CTA Head/Neck (11/21/18)   Brain CT: No identifiable or infarct. Neck   CTA: The abrupt cut off of the proximal right internal carotid artery with associated scattered areas of calcified plaque. This may be   secondary to occlusive thrombus or arterial dissection. A mild narrowing of the takeoff of the right vertebral artery.Normal left internal carotid and left vertebral arteries.    Brain CTA: Markedly diminished flow within the petrous and cavernous segments of the right internal carotid artery with reconstitution of the distal right internal carotid artery likely through collateralization and/or cross filling.. Mildly decreased flow within the right middle cerebral artery as compared with the left without focal stenosis.    Perfusion maps: Mismatch area are of 102 mL with evidence of core infarct and moderately large penumbra. There is a decreased cerebral blood flow and decreased cerebral blood volume in the right frontoparietal region with corresponding elevation of mean transit time.

## 2018-11-26 NOTE — CONSULT NOTE ADULT - SUBJECTIVE AND OBJECTIVE BOX
p ([id number])     HPI:  Pt is a 59 yo F with a PMH of HTN, DM and Stroke x2 (w/ residual left facial droop) who presented to [facility] with left sided hemiplegia, transferred for possible thrombectomy. LKN 0130. Symptoms unchanged since awakening with them at 0600. Was not a candidate for tPA. Pt denies any recent fevers, chills, HA, dizziness, neck pain, visual changes, CP, SOB, cough, nausea, vomiting, abdominal pain, changes in BMs/urination. NIHSS: 15. MRS: 1. (21 Nov 2018 12:48)    Imaging: MRA shows R carotid dissection    Exam:  AOx3, FC, PERRL, EOMI, trace L facial (baseline)  L Side 0-1/5 except LHF 5/5 and L KF 4/5  L side decreased sensation      --Anticoagulation:  aspirin  chewable 81 milliGRAM(s) Oral daily  clopidogrel Tablet 75 milliGRAM(s) Oral daily  enoxaparin Injectable 40 milliGRAM(s) SubCutaneous every 24 hours    =====================  PAST MEDICAL HISTORY   Hypertension  CVA (cerebral vascular accident)    PAST SURGICAL HISTORY     shellfish (Anaphylaxis)      MEDICATIONS:  Antibiotics:    Neuro:  FLUoxetine 20 milliGRAM(s) Oral daily  ondansetron Injectable 4 milliGRAM(s) IV Push every 8 hours PRN    Other:  atorvastatin 80 milliGRAM(s) Oral at bedtime  dextrose 40% Gel 15 Gram(s) Oral once PRN  dextrose 5%. 1000 milliLiter(s) IV Continuous <Continuous>  dextrose 50% Injectable 12.5 Gram(s) IV Push once  dextrose 50% Injectable 25 Gram(s) IV Push once  dextrose 50% Injectable 25 Gram(s) IV Push once  glucagon  Injectable 1 milliGRAM(s) IntraMuscular once PRN  insulin glargine Injectable (LANTUS) 5 Unit(s) SubCutaneous at bedtime  insulin lispro (HumaLOG) corrective regimen sliding scale   SubCutaneous at bedtime  insulin lispro (HumaLOG) corrective regimen sliding scale   SubCutaneous three times a day before meals  insulin lispro Injectable (HumaLOG) 3 Unit(s) SubCutaneous three times a day before meals  potassium chloride  10 mEq/100 mL IVPB 10 milliEquivalent(s) IV Intermittent every 1 hour      SOCIAL HISTORY:   Occupation:   Marital Status:     FAMILY HISTORY:      ROS: Negative except per HPI    LABS:                          12.5   5.2   )-----------( 234      ( 26 Nov 2018 05:05 )             34.8     11-26    133<L>  |  100  |  16  ----------------------------<  106<H>  3.3<L>   |  21<L>  |  0.61    Ca    9.7      26 Nov 2018 05:05

## 2018-11-26 NOTE — PROGRESS NOTE ADULT - ASSESSMENT
58-year-old right-handed white lady first evaluated at [facility] on 11/21/18 with left hemiparesis. 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 Perhaps her previous strokes were due to small vessel disease. On 11/21/18, she awoke with a dense left hemiparesis. CT head (11/21/18) to my eye showed a chronic right lentiform nucleus lacunar infarct. CTA neck (11/21/18) to my eye showed a probable right ICA occlusion with some calcified plaque. CTA head (11/21/18) to my eye showed that the right ICA reconstituted perhaps through the ACOM and possibly a large right PCOM. CTP (11/21/18) to my eye showed a core infarct of 27 cc, and a large hypoperfused area of 125 cc in the right MCA distribution, MRI brain shows RMCA ischemic infarct. Given that there was no intracranial occlusion, she was excluded from endovascular thrombectomy.      Impression: Cerebral embolism with infarction at right MCA territory etiology artery to artery embolism due to extracranial ICA stenosis (large vessel atherosclerosis)    NEURO: Neuro exam without acute change, continue close monitoring for neurologic deterioration, permissive hypertension; for selective cerebral angiography on today 11/24/18 to eval for stent given degree of sx stenosis, dual antiplatelet  and stain for secondary stroke prevention, titrate statin to LDL goal less than 70,MRI Brain, MRA head/Neck with T1 Fat sat results as above. CTA Head/Neck results as above, Physical therapy/OT recommends AR.  Patient enrolled in Stroke AF trial and randomized to a LOOP  .    ANTITHROMBOTIC THERAPY:  ASA/Plavix for possible right carotid stent.  P2Y12 156    PULMONARY: CXR (11/24/18) clear, protecting airway, saturating well     CARDIOVASCULAR:  TTE: ef 75%, minimal MR , mild TR cardiac monitoring no events, maintain HTN, Dr Wheeler (Cardiology) consult appreciated, enrolled in AF study      SBP goal: <160    GASTROINTESTINAL: Dysphagia screen passed, tolerating diet      Diet: Regular    RENAL: BUN/Cr without acute change, good urine output, hyponatremia without acute change, will continue to monitor, KCl for hypokalemia.       Na Goal: Greater than 135     Harper: N    HEMATOLOGY: H/H without acute change, Platelets 234     DVT ppx:  LMWH     ID: afebrile, no leukocytosis     OTHER: Endocrine consulted for A1C of 9.2: continue correctional scale, hold off on lantus. -On discharge, patient going to acute rehab, so would c/w insulin, regimen to be determined. When home, would likely add metformin- titrated up to max dose as well as glipizide, endocrine consult appreciated. Plan/goals of care discussed with pt at bedside. Prozac 20mg per NORAS trial for improved motor recovery.     DISPOSITION: Acute rehab once stable and workup is complete.    CORE MEASURES:        Admission NIHSS: 15     TPA:  NO      LDL/HDL: 106/39     Depression Screen: p     Statin Therapy: Y     Dysphagia Screen: PASS     Smoking  NO      Afib NO     Stroke Education YES 58-year-old right-handed white lady first evaluated at [facility] on 11/21/18 with left hemiparesis. 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 Perhaps her previous strokes were due to small vessel disease. On 11/21/18, she awoke with a dense left hemiparesis. CT head (11/21/18) showed a chronic right lentiform nucleus lacunar infarct. CTA neck (11/21/18) showed a probable right ICA occlusion with some calcified plaque. CTA head (11/21/18) showed that the right ICA reconstituted perhaps through the ACOM and possibly a large right PCOM. CTP (11/21/18) showed a core infarct of 27 cc and a large hypoperfused area of 125 cc in the right MCA distribution. Given that there was no intracranial occlusion, she was excluded from endovascular thrombectomy. MRI brain subsequently showed R MCA distribution infarct. MRA head and neck to my eye showed severe to critical stenosis of proximal/extracranial right ICA.     Impression:   Cerebral embolism with cerebral infarction. Right MCA distribution stroke - likely etiology being large vessel disease i.e. symptomatic extracranial R ICA severe stenosis leading to artery to artery embolism and subsequent local thrombosis    NEURO: Neuro exam without acute change, continue close monitoring for neurologic deterioration, permissive hypertension; for selective cerebral angiography on today 11/24/18 to eval for stent given degree of sx stenosis, dual antiplatelet  and stain for secondary stroke prevention, titrate statin to LDL goal less than 70,MRI Brain, MRA head/Neck with T1 Fat sat results as above. CTA Head/Neck results as above, Physical therapy/OT recommends AR.  Patient enrolled in Stroke AF trial and randomized to a LOOP  .    ANTITHROMBOTIC THERAPY:  ASA/Plavix for possible right carotid stent. Agree to proceed with carotid revascularization with carotid artery stenting for secondary stroke prevention as benefits of ION would outweigh potential risks in this patient with symptomatic extracranial right ICA severe to critical stenosis. P2Y12 156 suggestive of optimal platelet inhibition with clopidogrel     PULMONARY: CXR (11/24/18) clear, protecting airway, saturating well     CARDIOVASCULAR:  TTE: ef 75%, minimal MR , mild TR cardiac monitoring no events, maintain HTN, Dr Wheeler (Cardiology) consult appreciated, enrolled in AF study      SBP goal: <160    GASTROINTESTINAL: Dysphagia screen passed, tolerating diet      Diet: Regular    RENAL: BUN/Cr without acute change, good urine output, hyponatremia without acute change, will continue to monitor, KCl for hypokalemia.       Na Goal: Greater than 135     Harper: N    HEMATOLOGY: H/H without acute change, Platelets 234     DVT ppx:  LMWH     ID: afebrile, no leukocytosis     OTHER: Endocrine consulted for A1C of 9.2: continue correctional scale, hold off on lantus. -On discharge, patient going to acute rehab, so would c/w insulin, regimen to be determined. When home, would likely add metformin- titrated up to max dose as well as glipizide, endocrine consult appreciated. Plan/goals of care discussed with pt at bedside. Prozac 20mg per FLAMES trial for improved motor recovery.     DISPOSITION: Acute rehab once stable and workup is complete.    CORE MEASURES:        Admission NIHSS: 15     TPA:  NO      LDL/HDL: 106/39     Depression Screen: p     Statin Therapy: Y     Dysphagia Screen: PASS     Smoking  NO      Afib NO     Stroke Education YES

## 2018-11-26 NOTE — CHART NOTE - NSCHARTNOTEFT_GEN_A_CORE
Interventional Neuro- Radiology   Procedure Note PA-STEWART    Procedure: Selective Cerebral Angiography   Pre- Procedure Diagnosis:  Post- Procedure Diagnosis:    : Dr Blaine Carpio  Fellow:  Resident:  Physician Assistant: Lisseth Foy PA-C    Nurse:                         Shanique Mesa   Radiologic tech:  Anesthesiologist:  Sheath:    I/Os:EBL less than 10cc  IV fluids:     cc     Urine output     cc        Contrast Omnipaque 240      cc           Antibiotics:     Vitals: BP         HR      Spo2       Preliminary Report:  Using a  to the right groin under MAC sedation via   left vertebral artery,  left common carotid artery, left external carotid artery, right vertebral artery  right common carotid artery, right external carotid artery  a selective cerebral angiography was performed and demonstrates ________. ( Official note to follow).  Patient tolerated procedure well, hemodynamically stable, no change in neurological status compared to baseline.  Results discussed with neurosurgery, patient and patient's  family.  Groin sheath was removed,  manual compression held to hemostasis  for  21 minutes, no active bleeding, no hematoma, quick clot and safeguard balloon dressing applied at _____h.  STAT labs:  CBC BMP  ____h.  Patient transfered to Recovery Room Interventional Neuro- Radiology   Procedure Note PA-C    Procedure: Selective Cerebral Angiography   Pre- Procedure Diagnosis:  carotid stenosis   Post- Procedure Diagnosis: right symptomatic, moderate carotid stenosis     : Dr Blaine Carpio  Fellow:  Resident:  Physician Assistant: Lisseth Foy PA-C    Nurse:                          Shanique Mesa RN   Radiologic tech:          Katiuska Ovieod LRT  Anesthesiologist:         Dr Owen Sotelo   Sheath:                        5 [place] short arrow 65cm     I/Os: EBL less than 10cc  IV fluids:400cc  Urine output 200cc  Contrast Omnipaque 240 114cc           Antibiotics: Ancef 2 grams          Vitals: /80     HR 95     Spo2 100%    Preliminary Report:  Using a  5 [place] arrow 65cm to the right groin under general anesthesia left vertebral artery, left common carotid artery, left internal carotid artery, right vertebral artery, right common carotid artery, right external carotid artery a selective cerebral angiography was performed and demonstrated a right carotid stenosis   Patient tolerated procedure well, hemodynamically stable, no change in neurological status compared to baseline.  Results discussed with neurology and patient. Right groin sheath was removed, star close and manual compression held to hemostasis for 21 minutes, no active bleeding, no hematoma, quick clot and safeguard balloon dressing applied at 1400 hours. Patient transported to Recovery Room 2nd floor. Interventional Neuro- Radiology   Procedure Note PA-C    Procedure: Selective Cerebral Angiography   Pre- Procedure Diagnosis:  carotid stenosis   Post- Procedure Diagnosis: right symptomatic, moderate carotid stenosis     : Dr Blaine Carpio  Fellow:    Dr Claribel Armijo  Resident: Dr Domenic Lee  Physician Assistant: Lisseth Foy PA-C    Nurse:                          Shanique Mesa RN   Radiologic tech:          Katiuska Oviedo LRT  Anesthesiologist:         Dr Owen Sotelo   Sheath:                        5 English short arrow 65cm     I/Os: EBL less than 10cc  IV fluids:400cc  Urine output 200cc  Contrast Omnipaque 240 114cc           Antibiotics: Ancef 2 grams    Glycopyrrolate 0.2 mg       Vitals: /80     HR 95     Spo2 100%    Preliminary Report:  Using a  5 English arrow 65cm to the right groin under general anesthesia left vertebral artery, left common carotid artery, left internal carotid artery, right vertebral artery, right common carotid artery, right external carotid artery a selective cerebral angiography was performed and demonstrated a right carotid stenosis   Patient tolerated procedure well, hemodynamically stable, no change in neurological status compared to baseline.  Results discussed with neurology and patient. Right groin sheath was removed, star close and manual compression held to hemostasis for 21 minutes, no active bleeding, no hematoma, quick clot and safeguard balloon dressing applied at 1400 hours. Patient transported to Recovery Room 2nd floor. Interventional Neuro- Radiology   Procedure Note PA-C    Procedure: Selective Cerebral Angiography and right carotid stent placement   Pre- Procedure Diagnosis:  carotid stenosis   Post- Procedure Diagnosis: 69% right symptomatic carotid stenosis     : Dr Blaine Carpio  Fellow:    Dr Claribel Armijo  Resident: Dr Domenic Lee  Physician Assistant: ALINA Parks-C    Nurse:                          Shanique Mesa RN   Radiologic tech:          Katiuska Oviedo LRT  Anesthesiologist:         Dr Owen Sotelo   Sheath:                        5 [place]ymra[place] short arrow 65cm     I/Os: EBL less than 10cc  IV fluids:400cc  Urine output 200cc  Contrast Omnipaque 240 114cc           Antibiotics: Ancef 2 grams    Glycopyrrolate 0.2 mg       Vitals: /80     HR 95     Spo2 100%    Preliminary Report:  Using a  5 [place]ym[place] arrow 65cm to the right groin under general anesthesia left vertebral artery, left common carotid artery, left internal carotid artery, right vertebral artery, right common carotid artery, right external carotid artery a selective cerebral angiography was performed and demonstrated 69% right carotid stenosis.    Patient tolerated procedure well, hemodynamically stable, no change in neurological status compared to baseline.  Results discussed with neurology and patient. Right groin sheath was removed, star close and manual compression held to hemostasis for 21 minutes, no active bleeding, no hematoma, quick clot and safeguard balloon dressing applied at 1400 hours. Patient transported to Recovery Room 2nd floor, bed 8.

## 2018-11-26 NOTE — CONSULT NOTE ADULT - SUBJECTIVE AND OBJECTIVE BOX
Patient is a 58y old Female who presents with a chief complaint of CVA (23 Nov 2018 14:30)      HPI:  57yo F with PMH of HTN, DM and CVA x2 (with residual left sided facial droop) who presented to [facility] with left sided hemiplegia, transferred to [facility] on 11/21/18 for possible thrombectomy. Patient was outside window for tPA; LKN 01:30, symptoms unchanged since awakening with them at 06:00. CT head showed right MCA territory infarct confirmed by MRI. MRA neck revealed right internal carotid arterial dissection. Patient seen by neurosurgery for possible DSA/carotid stent placement. TTE revealed EF 75% with hyperdynamic LV systolic function; no PFO found. Patient currently on ASA/Plavix, with loop recorder to be placed. Endocrine consult for DM2 control, started on Lantus qhs/Humalog TID.    Imaging showed:  HEAD CT - Normal evolution of an acute infarct in the posterior portion of the right middle cerebral artery territory  MRA NECK - Findings consistent with arterial dissection involving the origin of the right internal carotid artery. Decreased, but present flow related enhancement of the more distal right ICA. Previously, no flow related enhancement was identified within the right ICA on CTA from 11/21/2018.  MRA BRAIN - Diminished,  but present flow related signal within the skull base and supraclinoid right internal carotid artery. Diminished flow related signal within the right MCA.      REVIEW OF SYSTEMS: No fevers, chills, headache, dizziness, blurry vision, palpitations, chest pain, shortness of breath, nausea, vomiting, constipation or diarrhea.        PAST MEDICAL & SURGICAL HISTORY  Hypertension  CVA (cerebral vascular accident)      SOCIAL HISTORY  Smoking - Denied  EtOH - Denied   Drugs - Denied    FUNCTIONAL HISTORY  Lives alone in top floor of 2-family home, 1 flight of stairs to enter. Brother lives on 1st floor.  Independent with ADLs and functional mobility prior to admission.    CURRENT FUNCTIONAL STATUS  Bed mobility mod A  Transfers mod A  Ambulation to be evaluated    FAMILY HISTORY   Reviewed and non-contributory    RECENT LABS/IMAGING  CBC Full  -  ( 26 Nov 2018 05:05 )  WBC Count : 5.2 K/uL  Hemoglobin : 12.5 g/dL  Hematocrit : 34.8 %  Platelet Count - Automated : 234 K/uL  Mean Cell Volume : 83.0 fl  Mean Cell Hemoglobin : 29.8 pg  Mean Cell Hemoglobin Concentration : 35.9 gm/dL      11-26    133<L>  |  100  |  16  ----------------------------<  106<H>  3.3<L>   |  21<L>  |  0.61    Ca    9.7      26 Nov 2018 05:05    Hemoglobin A1C, Whole Blood in AM (11.22.18 @ 08:24)    Hemoglobin A1C, Whole Blood: 9.2    ALLERGIES  No Known Drug Allergies  shellfish (Anaphylaxis)      MEDICATIONS   aspirin  chewable 81 milliGRAM(s) Oral daily  atorvastatin 80 milliGRAM(s) Oral at bedtime  clopidogrel Tablet 75 milliGRAM(s) Oral daily  dextrose 40% Gel 15 Gram(s) Oral once PRN  dextrose 5%. 1000 milliLiter(s) IV Continuous <Continuous>  dextrose 50% Injectable 12.5 Gram(s) IV Push once  dextrose 50% Injectable 25 Gram(s) IV Push once  dextrose 50% Injectable 25 Gram(s) IV Push once  enoxaparin Injectable 40 milliGRAM(s) SubCutaneous every 24 hours  FLUoxetine 20 milliGRAM(s) Oral daily  glucagon  Injectable 1 milliGRAM(s) IntraMuscular once PRN  insulin glargine Injectable (LANTUS) 5 Unit(s) SubCutaneous at bedtime  insulin lispro (HumaLOG) corrective regimen sliding scale   SubCutaneous at bedtime  insulin lispro (HumaLOG) corrective regimen sliding scale   SubCutaneous three times a day before meals  insulin lispro Injectable (HumaLOG) 3 Unit(s) SubCutaneous three times a day before meals  ondansetron Injectable 4 milliGRAM(s) IV Push every 8 hours PRN  potassium chloride  10 mEq/100 mL IVPB 10 milliEquivalent(s) IV Intermittent every 1 hour Patient is a 58y old Female who presents with a chief complaint of CVA (23 Nov 2018 14:30)      HPI:  57yo F with PMH of HTN, DM and CVA x2 (with residual left sided facial droop) who presented to [facility] with left sided hemiplegia, transferred to [facility] on 11/21/18 for possible thrombectomy. Patient was outside window for tPA; LKN 01:30, symptoms unchanged since awakening with them at 06:00. CT head showed right MCA territory infarct confirmed by MRI. MRA neck revealed right internal carotid arterial dissection. Patient seen by neurosurgery s/p DSA/carotid stent placement on 11/26. TTE revealed EF 75% with hyperdynamic LV systolic function; no PFO found. Patient currently on ASA/Plavix, with loop recorder to be placed. Endocrine consult for DM2 control, started on Lantus qhs/Humalog TID.    Imaging showed:  HEAD CT - Normal evolution of an acute infarct in the posterior portion of the right middle cerebral artery territory  MRA NECK - Findings consistent with arterial dissection involving the origin of the right internal carotid artery. Decreased, but present flow related enhancement of the more distal right ICA. Previously, no flow related enhancement was identified within the right ICA on CTA from 11/21/2018.  MRA BRAIN - Diminished,  but present flow related signal within the skull base and supraclinoid right internal carotid artery. Diminished flow related signal within the right MCA.      REVIEW OF SYSTEMS: No fevers, chills, headache, dizziness, blurry vision, palpitations, chest pain, shortness of breath, nausea, vomiting, constipation or diarrhea.        PAST MEDICAL & SURGICAL HISTORY  Hypertension  CVA (cerebral vascular accident)      SOCIAL HISTORY  Smoking - Denied  EtOH - Denied   Drugs - Denied    FUNCTIONAL HISTORY  Lives alone in top floor of 2-family home, 1 flight of stairs to enter. Brother lives on 1st floor.  Independent with ADLs and functional mobility prior to admission.    CURRENT FUNCTIONAL STATUS  Bed mobility mod A  Transfers mod A  Ambulation to be evaluated    FAMILY HISTORY   Reviewed and non-contributory    RECENT LABS/IMAGING  CBC Full  -  ( 26 Nov 2018 05:05 )  WBC Count : 5.2 K/uL  Hemoglobin : 12.5 g/dL  Hematocrit : 34.8 %  Platelet Count - Automated : 234 K/uL  Mean Cell Volume : 83.0 fl  Mean Cell Hemoglobin : 29.8 pg  Mean Cell Hemoglobin Concentration : 35.9 gm/dL      11-26    133<L>  |  100  |  16  ----------------------------<  106<H>  3.3<L>   |  21<L>  |  0.61    Ca    9.7      26 Nov 2018 05:05    Hemoglobin A1C, Whole Blood in AM (11.22.18 @ 08:24)    Hemoglobin A1C, Whole Blood: 9.2    ALLERGIES  No Known Drug Allergies  shellfish (Anaphylaxis)      MEDICATIONS   aspirin  chewable 81 milliGRAM(s) Oral daily  atorvastatin 80 milliGRAM(s) Oral at bedtime  clopidogrel Tablet 75 milliGRAM(s) Oral daily  dextrose 40% Gel 15 Gram(s) Oral once PRN  dextrose 5%. 1000 milliLiter(s) IV Continuous <Continuous>  dextrose 50% Injectable 12.5 Gram(s) IV Push once  dextrose 50% Injectable 25 Gram(s) IV Push once  dextrose 50% Injectable 25 Gram(s) IV Push once  enoxaparin Injectable 40 milliGRAM(s) SubCutaneous every 24 hours  FLUoxetine 20 milliGRAM(s) Oral daily  glucagon  Injectable 1 milliGRAM(s) IntraMuscular once PRN  insulin glargine Injectable (LANTUS) 5 Unit(s) SubCutaneous at bedtime  insulin lispro (HumaLOG) corrective regimen sliding scale   SubCutaneous at bedtime  insulin lispro (HumaLOG) corrective regimen sliding scale   SubCutaneous three times a day before meals  insulin lispro Injectable (HumaLOG) 3 Unit(s) SubCutaneous three times a day before meals  ondansetron Injectable 4 milliGRAM(s) IV Push every 8 hours PRN  potassium chloride  10 mEq/100 mL IVPB 10 milliEquivalent(s) IV Intermittent every 1 hour Patient is a 58y old Female who presents with a chief complaint of CVA (23 Nov 2018 14:30)      HPI:  59yo right hand dominant F with PMH of HTN, DM and CVA x2 (with residual left sided facial droop) who presented to [facility] with left sided hemiplegia, transferred to [facility] on 11/21/18 for possible thrombectomy. Patient was outside window for tPA; LKN 01:30, symptoms unchanged since awakening with them at 06:00. CT head showed right MCA territory infarct confirmed by MRI. MRA neck revealed right internal carotid arterial dissection. Patient seen by neurosurgery s/p DSA/carotid stent placement on 11/26. TTE revealed EF 75% with hyperdynamic LV systolic function; no PFO found. Patient currently on ASA/Plavix, with loop recorder to be placed. Endocrine consult for DM2 control, started on Lantus qhs/Humalog TID.    Imaging showed:  HEAD CT - Normal evolution of an acute infarct in the posterior portion of the right middle cerebral artery territory  MRA NECK - Findings consistent with arterial dissection involving the origin of the right internal carotid artery. Decreased, but present flow related enhancement of the more distal right ICA. Previously, no flow related enhancement was identified within the right ICA on CTA from 11/21/2018.  MRA BRAIN - Diminished,  but present flow related signal within the skull base and supraclinoid right internal carotid artery. Diminished flow related signal within the right MCA.      REVIEW OF SYSTEMS: No fevers, chills, headache, dizziness, blurry vision, palpitations, chest pain, shortness of breath, nausea, vomiting, constipation or diarrhea.        PAST MEDICAL & SURGICAL HISTORY  Hypertension  CVA (cerebral vascular accident)      SOCIAL HISTORY  Smoking - Denied  EtOH - Denied   Drugs - Denied    FUNCTIONAL HISTORY  Lives alone in top floor of 2-family home, 1 flight of stairs to enter. Brother lives on 1st floor, patient states she can stay with brother temporarily on 1st floor if needed.  Independent with ADLs and functional mobility prior to admission.    CURRENT FUNCTIONAL STATUS  Bed mobility mod A  Transfers mod A  Ambulation to be evaluated    FAMILY HISTORY   Reviewed and non-contributory    RECENT LABS/IMAGING  CBC Full  -  ( 26 Nov 2018 05:05 )  WBC Count : 5.2 K/uL  Hemoglobin : 12.5 g/dL  Hematocrit : 34.8 %  Platelet Count - Automated : 234 K/uL  Mean Cell Volume : 83.0 fl  Mean Cell Hemoglobin : 29.8 pg  Mean Cell Hemoglobin Concentration : 35.9 gm/dL      11-26    133<L>  |  100  |  16  ----------------------------<  106<H>  3.3<L>   |  21<L>  |  0.61    Ca    9.7      26 Nov 2018 05:05    Hemoglobin A1C, Whole Blood in AM (11.22.18 @ 08:24)    Hemoglobin A1C, Whole Blood: 9.2    ALLERGIES  No Known Drug Allergies  shellfish (Anaphylaxis)      MEDICATIONS   aspirin  chewable 81 milliGRAM(s) Oral daily  atorvastatin 80 milliGRAM(s) Oral at bedtime  clopidogrel Tablet 75 milliGRAM(s) Oral daily  dextrose 40% Gel 15 Gram(s) Oral once PRN  dextrose 5%. 1000 milliLiter(s) IV Continuous <Continuous>  dextrose 50% Injectable 12.5 Gram(s) IV Push once  dextrose 50% Injectable 25 Gram(s) IV Push once  dextrose 50% Injectable 25 Gram(s) IV Push once  enoxaparin Injectable 40 milliGRAM(s) SubCutaneous every 24 hours  FLUoxetine 20 milliGRAM(s) Oral daily  glucagon  Injectable 1 milliGRAM(s) IntraMuscular once PRN  insulin glargine Injectable (LANTUS) 5 Unit(s) SubCutaneous at bedtime  insulin lispro (HumaLOG) corrective regimen sliding scale   SubCutaneous at bedtime  insulin lispro (HumaLOG) corrective regimen sliding scale   SubCutaneous three times a day before meals  insulin lispro Injectable (HumaLOG) 3 Unit(s) SubCutaneous three times a day before meals  ondansetron Injectable 4 milliGRAM(s) IV Push every 8 hours PRN  potassium chloride  10 mEq/100 mL IVPB 10 milliEquivalent(s) IV Intermittent every 1 hour    ----------------------------------------------------------------------------------------  T(C): 37 (11-27-18 @ 08:00), Max: 37.1 (11-26-18 @ 20:46)  HR: 66 (11-27-18 @ 08:00) (63 - 99)  BP: 142/64 (11-27-18 @ 08:00) (136/63 - 164/77)  RR: 16 (11-27-18 @ 08:00) (14 - 23)  SpO2: 100% (11-27-18 @ 08:00) (91% - 100%)    PHYSICAL EXAMINATION  Constitutional - NAD  HEENT - NCAT, EOMI  Chest - Breathing comfortably, No wheezing  Cardiovascular - S1S2   Abdomen - Soft   Extremities - No C/C/E, No calf tenderness   Neurologic Exam -                    Cognitive - AAO to self, place, date, situation     Communication - Fluent, No dysarthria     Cranial Nerves - CN 2-12 intact except left facial droop     Tone - Normal     Motor -                     LEFT    UE - ShAB 1/5, EF 0/5, EE 0/5, WE 0/5,  0/5                    RIGHT UE - ShAB 5/5, EF 5/5, EE 5/5, WE 5/5,  5/5                    LEFT    LE - HF 5/5, KE 4/5, DF 0/5, PF 0/5                    RIGHT LE - HF 5/5, KE 5/5, DF 5/5, PF 5/5        Sensory - decreased to LT on LLE compared to RLE     Reflexes - DTRs Intact  Psychiatric - Mood stable, Affect WNL

## 2018-11-26 NOTE — CONSULT NOTE ADULT - ASSESSMENT
58F pmhx multiple strokes now admitted with stroke and R carotid dissection  - Plan for DSA possible carotid stent today with Dr. Carpio  - Please keep NPO
57 yo F with a PMH of HTN, DM Type 2 (A1C 9.2) and Stroke x2 (w/ residual left facial droop) who presented with left sided hemiplegia. Admitted with CVA.
57 yo female admitted to [facility]roke [facility] with Cerebral embolism with infarction at right MCA territory etiology artery to artery embolism due to high suspicion for right internal carotid artery dissection.
------------------------------------------------------------------------------------------------  ASSESSMENT/PLAN  59yo Female with functional deficits after right MCA CVA with left sided hemiplegia.    #PT - strengthening, transfers, gait training  #OT - ADLs  #SLP - maintain aspiration precautions  #Pain - Tylenol prn  #Motor Recovery - Fluoxetine  #DVT PPX - Lovenox  #Dispo - Recommend ACUTE inpatient rehabilitation for the functional deficits consisting of 3 hours of therapy/day & 24 hour RN/daily PMR physician for comorbid medical management. Will continue to follow for ongoing rehab needs and recommendations. Patient will be able to tolerate 3 hours a day.

## 2018-11-26 NOTE — PROGRESS NOTE ADULT - PROBLEM SELECTOR PLAN 1
-Test BG ac and hs  -Continue with Lantus 5 units q hs  -Continue with Humalog 3 units ac meals> HOLD IF NOT EATING!!  -Continue with Humalog low dose correction scales ac and hs  -Consider occupational therapy to address L hand dexterity.  -Plan discussed with pt/team.  Contact info: [phone] (24/7). pager [phone]

## 2018-11-26 NOTE — CHART NOTE - NSCHARTNOTEFT_GEN_A_CORE
Interventional Neuro Radiology  Pre-Procedure Note PA-C    This is a 58 year old right hand dominant female with a PMH of HTN, DM and Stroke x2 (w/ residual left facial droop) who presented to [facility] with left sided hemiplegia, transferred for possible thrombectomy. LKN 0130. Symptoms unchanged since awakening with them at 0600. Was not a candidate for tPA. Patient is transported to Neuro IR for a selective cerebral angiography and possible stent placement.    Allergies: No Known Drug Allergies shellfish (Anaphylaxis)  PMHX: hypertension, cerebral vascular accident  PSHX:  Social History:   FAMILY HISTORY:    Current Medications:   aspirin  chewable 81 milliGRAM(s) Oral daily  atorvastatin 80 milliGRAM(s) Oral at bedtime  clopidogrel Tablet 75 milliGRAM(s) Oral daily  dextrose 40% Gel 15 Gram(s) Oral once PRN  dextrose 5%. 1000 milliLiter(s) IV Continuous <Continuous>  dextrose 50% Injectable 12.5 Gram(s) IV Push once  dextrose 50% Injectable 25 Gram(s) IV Push once  dextrose 50% Injectable 25 Gram(s) IV Push once  enoxaparin Injectable 40 milliGRAM(s) SubCutaneous every 24 hours  FLUoxetine 20 milliGRAM(s) Oral daily  glucagon  Injectable 1 milliGRAM(s) IntraMuscular once PRN  insulin glargine Injectable (LANTUS) 5 Unit(s) SubCutaneous at bedtime  insulin lispro (HumaLOG) corrective regimen sliding scale   SubCutaneous at bedtime  insulin lispro (HumaLOG) corrective regimen sliding scale   SubCutaneous three times a day before meals  insulin lispro Injectable (HumaLOG) 3 Unit(s) SubCutaneous three times a day before meals  ondansetron Injectable 4 milliGRAM(s) IV Push every 8 hours PRN  potassium chloride  10 mEq/100 mL IVPB 10 milliEquivalent(s) IV Intermittent every 1 hour    CBC                        12.5   5.2   )-----------( 234                  34.8       BMP    133<L>  |  100  |  16  ----------------------------<  106  3.3<L>   |  21<L>  |  0.61    Blood Bank: O positive available       Assessment/Plan:   This is a 58 year old right hand dominant female with  Procedure, goals, risks, benefits and alternatives were discussed with patient and patient's family. All questions were answered. Risks include but are not limited to stroke, vessel injury, hemorrhage, and or right groin hematoma. Patient demonstrates understanding of all risks involved with this procedure and wishes to continue.  Appropriate content was obtained from patient and consent is in the patient's chart.

## 2018-11-26 NOTE — PROGRESS NOTE ADULT - SUBJECTIVE AND OBJECTIVE BOX
Neurology Follow up note    Patient is a 58y old  Female who presents with a chief complaint of CVA (26 Nov 2018 11:11)    Subjective:Interval History - No events overnight    Objective:   Vital Signs Last 24 Hrs  T(C): 36.6 (26 Nov 2018 14:25), Max: 37.1 (25 Nov 2018 20:00)  T(F): 97.9 (26 Nov 2018 14:25), Max: 98.8 (25 Nov 2018 20:00)  HR: 77 (26 Nov 2018 15:30) (66 - 89)  BP: 151/73 (26 Nov 2018 15:30) (140/70 - 170/85)  BP(mean): 104 (26 Nov 2018 15:30) (89 - 106)  RR: 14 (26 Nov 2018 15:30) (13 - 22)  SpO2: 98% (26 Nov 2018 15:30) (95% - 100%)    General: No acute distress  HEENT: EOM intact, VFF  Abdomen: Soft, nontender, nondistended   Extremities: No edema    NEUROLOGICAL EXAM:  Mental status: Awake, alert, oriented x3, fluent speech, no neglect, able to follow commands  Cranial Nerves: mild left central facial droop, VFF, a few beats of nystagmus at end gaze bilaterally, mild dysarthria   Motor exam: Normal tone, no drift, RUE 5/5, RLE 5/5, Left side hemiparesis LUE prox/distally 1/5, LLE iliopsoas 4/5; anterior tibialis 2/5  Sensation: Moderate-severely decreased light touch and temperature sensation on left   Coordination/ Gait: No dysmetria    Other:  11-26    133<L>  |  100  |  16  ----------------------------<  106<H>  3.3<L>   |  21<L>  |  0.61    Ca    9.7      26 Nov 2018 05:05               12.5   5.2   )-----------( 234      ( 26 Nov 2018 05:05 )             34.8     MEDICATIONS  (STANDING):  aspirin  chewable 81 milliGRAM(s) Oral daily  atorvastatin 80 milliGRAM(s) Oral at bedtime  clopidogrel Tablet 75 milliGRAM(s) Oral daily  dextrose 5%. 1000 milliLiter(s) (50 mL/Hr) IV Continuous <Continuous>  dextrose 50% Injectable 12.5 Gram(s) IV Push once  dextrose 50% Injectable 25 Gram(s) IV Push once  dextrose 50% Injectable 25 Gram(s) IV Push once  enoxaparin Injectable 40 milliGRAM(s) SubCutaneous every 24 hours  FLUoxetine 20 milliGRAM(s) Oral daily  insulin glargine Injectable (LANTUS) 5 Unit(s) SubCutaneous at bedtime  insulin lispro (HumaLOG) corrective regimen sliding scale   SubCutaneous at bedtime  insulin lispro (HumaLOG) corrective regimen sliding scale   SubCutaneous three times a day before meals  insulin lispro Injectable (HumaLOG) 3 Unit(s) SubCutaneous three times a day before meals  potassium chloride  10 mEq/100 mL IVPB 10 milliEquivalent(s) IV Intermittent every 1 hour    MEDICATIONS  (PRN):  dextrose 40% Gel 15 Gram(s) Oral once PRN Blood Glucose LESS THAN 70 milliGRAM(s)/deciliter  glucagon  Injectable 1 milliGRAM(s) IntraMuscular once PRN Glucose LESS THAN 70 milligrams/deciliter  ondansetron Injectable 4 milliGRAM(s) IV Push every 8 hours PRN Nausea and/or Vomiting

## 2018-11-27 LAB
ANION GAP SERPL CALC-SCNC: 13 MMOL/L — SIGNIFICANT CHANGE UP (ref 5–17)
BUN SERPL-MCNC: 16 MG/DL — SIGNIFICANT CHANGE UP (ref 7–23)
CALCIUM SERPL-MCNC: 9.1 MG/DL — SIGNIFICANT CHANGE UP (ref 8.4–10.5)
CHLORIDE SERPL-SCNC: 102 MMOL/L — SIGNIFICANT CHANGE UP (ref 96–108)
CO2 SERPL-SCNC: 20 MMOL/L — LOW (ref 22–31)
CREAT SERPL-MCNC: 0.65 MG/DL — SIGNIFICANT CHANGE UP (ref 0.5–1.3)
GLUCOSE BLDC GLUCOMTR-MCNC: 122 MG/DL — HIGH (ref 70–99)
GLUCOSE BLDC GLUCOMTR-MCNC: 126 MG/DL — HIGH (ref 70–99)
GLUCOSE BLDC GLUCOMTR-MCNC: 146 MG/DL — HIGH (ref 70–99)
GLUCOSE BLDC GLUCOMTR-MCNC: 158 MG/DL — HIGH (ref 70–99)
GLUCOSE SERPL-MCNC: 117 MG/DL — HIGH (ref 70–99)
HCT VFR BLD CALC: 30.7 % — LOW (ref 34.5–45)
HGB BLD-MCNC: 11.2 G/DL — LOW (ref 11.5–15.5)
MCHC RBC-ENTMCNC: 30.3 PG — SIGNIFICANT CHANGE UP (ref 27–34)
MCHC RBC-ENTMCNC: 36.3 GM/DL — HIGH (ref 32–36)
MCV RBC AUTO: 83.4 FL — SIGNIFICANT CHANGE UP (ref 80–100)
PA ADP PRP-ACNC: 50 PRU — LOW (ref 194–417)
PLATELET # BLD AUTO: 233 K/UL — SIGNIFICANT CHANGE UP (ref 150–400)
POTASSIUM SERPL-MCNC: 3.7 MMOL/L — SIGNIFICANT CHANGE UP (ref 3.5–5.3)
POTASSIUM SERPL-SCNC: 3.7 MMOL/L — SIGNIFICANT CHANGE UP (ref 3.5–5.3)
RBC # BLD: 3.68 M/UL — LOW (ref 3.8–5.2)
RBC # FLD: 12.1 % — SIGNIFICANT CHANGE UP (ref 10.3–14.5)
SODIUM SERPL-SCNC: 135 MMOL/L — SIGNIFICANT CHANGE UP (ref 135–145)
WBC # BLD: 4.6 K/UL — SIGNIFICANT CHANGE UP (ref 3.8–10.5)
WBC # FLD AUTO: 4.6 K/UL — SIGNIFICANT CHANGE UP (ref 3.8–10.5)

## 2018-11-27 PROCEDURE — 93880 EXTRACRANIAL BILAT STUDY: CPT | Mod: 26

## 2018-11-27 PROCEDURE — 99233 SBSQ HOSP IP/OBS HIGH 50: CPT

## 2018-11-27 PROCEDURE — 99232 SBSQ HOSP IP/OBS MODERATE 35: CPT

## 2018-11-27 PROCEDURE — 99222 1ST HOSP IP/OBS MODERATE 55: CPT | Mod: GC

## 2018-11-27 RX ORDER — LISINOPRIL 2.5 MG/1
5 TABLET ORAL DAILY
Qty: 0 | Refills: 0 | Status: DISCONTINUED | OUTPATIENT
Start: 2018-11-27 | End: 2018-11-28

## 2018-11-27 RX ADMIN — Medication 1: at 12:32

## 2018-11-27 RX ADMIN — LISINOPRIL 5 MILLIGRAM(S): 2.5 TABLET ORAL at 11:52

## 2018-11-27 RX ADMIN — ENOXAPARIN SODIUM 40 MILLIGRAM(S): 100 INJECTION SUBCUTANEOUS at 16:34

## 2018-11-27 RX ADMIN — ONDANSETRON 4 MILLIGRAM(S): 8 TABLET, FILM COATED ORAL at 22:01

## 2018-11-27 RX ADMIN — Medication 3 UNIT(S): at 08:53

## 2018-11-27 RX ADMIN — Medication 20 MILLIGRAM(S): at 11:52

## 2018-11-27 RX ADMIN — CLOPIDOGREL BISULFATE 75 MILLIGRAM(S): 75 TABLET, FILM COATED ORAL at 11:52

## 2018-11-27 RX ADMIN — Medication 3 UNIT(S): at 12:32

## 2018-11-27 RX ADMIN — ATORVASTATIN CALCIUM 80 MILLIGRAM(S): 80 TABLET, FILM COATED ORAL at 22:00

## 2018-11-27 RX ADMIN — INSULIN GLARGINE 5 UNIT(S): 100 INJECTION, SOLUTION SUBCUTANEOUS at 22:01

## 2018-11-27 RX ADMIN — Medication 81 MILLIGRAM(S): at 11:52

## 2018-11-27 RX ADMIN — Medication 3 UNIT(S): at 17:38

## 2018-11-27 NOTE — PROGRESS NOTE ADULT - PROBLEM SELECTOR PLAN 1
-Test BG ac and hs  -Continue with Lantus 5 units q hs  -Continue with Humalog 3 units ac meals> HOLD IF NOT EATING!!  -Continue with Humalog low dose correction scales ac and hs  -Consider occupational therapy to address L hand dexterity. Unable to move LUE  -Plan discussed with pt/team.  Contact info: [phone] (24/7). pager [phone]

## 2018-11-27 NOTE — PROGRESS NOTE ADULT - ASSESSMENT
59 y/o F w/h/o uncontrolled T2DM, HTN, s/p stroke x2 with L facial drop. Here with L sided hemiplegia found to have CVA in R MCA plus ADRY dissection. Now s/p cerebral angiography and R carotid stent placement 11/26/18. Tolerating POs beating <50% for now.  Glycemic control at goal while on present insulin doses. No hypoglycemia. Need to evaluate BG levels once pt eating more consistently before making any changes to insulin regimen

## 2018-11-27 NOTE — PROGRESS NOTE ADULT - ASSESSMENT
58-year-old right-handed white lady first evaluated at [facility] on 11/21/18 with left hemiparesis. 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 Perhaps her previous strokes were due to small vessel disease. On 11/21/18, she awoke with a dense left hemiparesis. CT head (11/21/18) showed a chronic right lentiform nucleus lacunar infarct. CTA neck (11/21/18) showed a probable right ICA occlusion with some calcified plaque. CTA head (11/21/18) showed that the right ICA reconstituted perhaps through the ACOM and possibly a large right PCOM. CTP (11/21/18) showed a core infarct of 27 cc and a large hypoperfused area of 125 cc in the right MCA distribution. Given that there was no intracranial occlusion, she was excluded from endovascular thrombectomy. MRI brain subsequently showed R MCA distribution infarct. MRA head and neck to my eye showed severe to critical stenosis of proximal/extracranial right ICA.     Impression:   Cerebral embolism with cerebral infarction. Right MCA distribution stroke - likely etiology being large vessel disease i.e. symptomatic extracranial R ICA severe stenosis leading to artery to artery embolism and subsequent local thrombosis    NEURO: Neuro exam without acute change, continue close monitoring for neurologic deterioration, permissive hypertension; underwent selective cerebral angiography on  11/24/18 to eval for degree of sx stenosis, dual antiplatelet  and stain for secondary stroke prevention, titrate statin to LDL goal less than 70,MRI Brain, MRA head/Neck with T1 Fat sat results as above. CTA Head/Neck results as above, Physical therapy/OT recommends AR.  Patient enrolled in Stroke AF trial and randomized to a LOOP  .    ANTITHROMBOTIC THERAPY:  ASA/Plavix for possible right carotid stent which was placed on 11/26 for symptomatic extracranial right ICA severe to critical stenosis. P2Y12 156 suggestive of optimal platelet inhibition with clopidogrel     PULMONARY: CXR (11/24/18) clear, protecting airway, saturating well     CARDIOVASCULAR:  TTE: ef 75%, minimal MR , mild TR cardiac monitoring no events, maintain HTN, Dr Wheeler (Cardiology) consult appreciated, enrolled in AF study      SBP goal: <160    GASTROINTESTINAL: Dysphagia screen passed, tolerating diet      Diet: Regular    RENAL: BUN/Cr without acute change, good urine output, hyponatremia resolved,  will continue to monitor, KCl for hypokalemia.       Na Goal: Greater than 135     Harper: N    HEMATOLOGY: H/H without acute change, Platelets 233     DVT ppx:  LMWH     ID: afebrile, no leukocytosis     OTHER: Endocrine consulted for A1C of 9.2: continue correctional scale, hold off on lantus. -On discharge, patient going to acute rehab, so would c/w insulin, regimen to be determined. When home, would likely add metformin- titrated up to max dose as well as glipizide, endocrine consult appreciated. Plan/goals of care discussed with pt at bedside. Prozac 20mg per [facility] trial for improved motor recovery.     DISPOSITION: Acute rehab once stable and workup is complete.    CORE MEASURES:        Admission NIHSS: 15     TPA:  NO      LDL/HDL: 106/39     Depression Screen: p     Statin Therapy: Y     Dysphagia Screen: PASS     Smoking  NO      Afib NO     Stroke Education YES 58-year-old right-handed white lady first evaluated at [facility] on 11/21/18 with left hemiparesis. 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 Perhaps her previous strokes were due to small vessel disease. On 11/21/18, she awoke with a dense left hemiparesis. CT head (11/21/18) showed a chronic right lentiform nucleus lacunar infarct. CTA neck (11/21/18) showed a probable right ICA occlusion with some calcified plaque. CTA head (11/21/18) showed that the right ICA reconstituted perhaps through the ACOM and possibly a large right PCOM. CTP (11/21/18) showed a core infarct of 27 cc and a large hypoperfused area of 125 cc in the right MCA distribution. Given that there was no intracranial occlusion, she was excluded from endovascular thrombectomy. MRI brain subsequently showed R MCA distribution infarct. MRA head and neck to my eye showed severe to critical stenosis of proximal/extracranial right ICA.     Impression:   Cerebral embolism with cerebral infarction. Right MCA distribution stroke - likely etiology being large vessel disease i.e. symptomatic extracranial R ICA severe stenosis leading to artery to artery embolism and subsequent local thrombosis    NEURO: Neuro exam without acute change, continue close monitoring for neurologic deterioration, permissive hypertension; underwent selective cerebral angiography on  11/24/18 to eval for degree of sx stenosis, dual antiplatelet  and stain for secondary stroke prevention, titrate statin to LDL goal less than 70,MRI Brain, MRA head/Neck with T1 Fat sat results as above. CTA Head/Neck results as above, Physical therapy/OT recommends AR.  Patient enrolled in Stroke AF trial and randomized to a LOOP  .    ANTITHROMBOTIC THERAPY:  ASA/Plavix for possible right carotid stent which was placed on 11/26 for symptomatic extracranial right ICA severe to critical stenosis. P2Y12 156 suggestive of optimal platelet inhibition with clopidogrel     PULMONARY: CXR (11/24/18) clear, protecting airway, saturating well     CARDIOVASCULAR:  TTE: ef 75%, minimal MR , mild TR cardiac monitoring no events, maintain HTN, Dr Wheeler (Cardiology) consult appreciated, enrolled in AF study      SBP goal: <160    GASTROINTESTINAL: Dysphagia screen passed, tolerating diet      Diet: Regular    RENAL: BUN/Cr without acute change, good urine output, hyponatremia resolved,  will continue to monitor, KCl for hypokalemia.       Na Goal: Greater than 135     Harper: N    HEMATOLOGY: H/H without acute change, Platelets 233     DVT ppx:  LMWH     ID: afebrile, no leukocytosis     OTHER: Endocrine consulted for A1C of 9.2: continue correctional scale, hold off on lantus. -On discharge, patient going to acute rehab, so would c/w insulin, regimen to be determined. When home, would likely add metformin- titrated up to max dose as well as glipizide, endocrine consult appreciated. Plan/goals of care discussed with pt at bedside. Prozac 20mg per [facility] trial for improved motor recovery.     DISPOSITION: Acute rehab pending bed    CORE MEASURES:        Admission NIHSS: 15     TPA:  NO      LDL/HDL: 106/39     Depression Screen: p     Statin Therapy: Y     Dysphagia Screen: PASS     Smoking  NO      Afib NO     Stroke Education YES 58-year-old right-handed white lady first evaluated at [facility] on 11/21/18 with left hemiparesis. 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 Perhaps her previous strokes were due to small vessel disease. On 11/21/18, she awoke with a dense left hemiparesis. CT head (11/21/18) showed a chronic right lentiform nucleus lacunar infarct. CTA neck (11/21/18) showed a probable right ICA occlusion with some calcified plaque. CTA head (11/21/18) showed that the right ICA reconstituted perhaps through the ACOM and possibly a large right PCOM. CTP (11/21/18) showed a core infarct of 27 cc and a large hypoperfused area of 125 cc in the right MCA distribution. Given that there was no intracranial occlusion, she was excluded from endovascular thrombectomy. MRI brain subsequently showed R MCA distribution infarct. MRA head and neck to my eye showed severe to critical stenosis of proximal/extracranial right ICA. Conventional angiogram confirmed severe proximal/extracranial right ICA stenosis s/p carotid artery stenting for secondary stroke prevention in this patient with symptomatic right ICA severe stenosis.     Impression:   Cerebral embolism with cerebral infarction. Right MCA distribution stroke - likely etiology being large vessel disease i.e. symptomatic extracranial R ICA severe stenosis leading to artery to artery embolism     NEURO: Neuro exam without acute change, continue close monitoring for neurologic deterioration, permissive hypertension; underwent selective cerebral angiography on  11/24/18 to eval for degree of sx stenosis, dual antiplatelet  and stain for secondary stroke prevention, titrate statin to LDL goal less than 70,MRI Brain, MRA head/Neck with T1 Fat sat results as above. CTA Head/Neck results as above, Physical therapy/OT recommends AR.  Patient enrolled in Stroke AF trial and randomized to a LOOP  .    ANTITHROMBOTIC THERAPY:  ASA/Plavix for possible right carotid stent which was placed on 11/26 for symptomatic extracranial right ICA severe to critical stenosis. P2Y12 156 suggestive of optimal platelet inhibition with clopidogrel     PULMONARY: CXR (11/24/18) clear, protecting airway, saturating well     CARDIOVASCULAR:  TTE: ef 75%, minimal MR , mild TR cardiac monitoring no events, maintain HTN, Dr Wheeler (Cardiology) consult appreciated, enrolled in AF study      SBP goal: <160    GASTROINTESTINAL: Dysphagia screen passed, tolerating diet      Diet: Regular    RENAL: BUN/Cr without acute change, good urine output, hyponatremia resolved,  will continue to monitor, KCl for hypokalemia.       Na Goal: Greater than 135     Harper: N    HEMATOLOGY: H/H without acute change, Platelets 233     DVT ppx:  LMWH     ID: afebrile, no leukocytosis     OTHER: Endocrine consulted for A1C of 9.2: continue correctional scale, hold off on lantus. -On discharge, patient going to acute rehab, so would c/w insulin, regimen to be determined. When home, would likely add metformin- titrated up to max dose as well as glipizide, endocrine consult appreciated. Plan/goals of care discussed with pt at bedside. [facility] for improved motor recovery.     DISPOSITION: Acute rehab pending bed    CORE MEASURES:        Admission NIHSS: 15     TPA:  NO      LDL/HDL: 106/39     Depression Screen: p     Statin Therapy: Y     Dysphagia Screen: PASS     Smoking  NO      Afib NO     Stroke Education YES

## 2018-11-27 NOTE — PROGRESS NOTE ADULT - SUBJECTIVE AND OBJECTIVE BOX
DIABETES FOLLOW UP NOTE: Saw pt earlier today  INTERVAL HX: 57 y/o F w/h/o uncontrolled T2DM, HTN, s/p stroke x2 with L facial drop. Here with L sided hemiplegia found to have CVA in R MCA plus ADRY dissection. S/p angio with R carotid stent placement 11/26/18. Had carotid doppler today. Reports feeling better but still unable to move LUE and wiht very limited movement of LLE. Reports tolerating POs but was unable to eat much breakfast this am since she had to go for doppler. Glycemic control at goal while on present insulin doses. No hypoglycemia.        Review of Systems:  General: As above  Cardiovascular: No chest pain, palpitations  Respiratory: No SOB, no cough  GI: No nausea, vomiting, abdominal pain. No loose BMs  Endocrine: no polyuria, polydipsia or S&Sx of hypoglycemia    Allergies    No Known Drug Allergies  shellfish (Anaphylaxis)    Intolerances      MEDICATIONS:  atorvastatin 80 milliGRAM(s) Oral at bedtime  insulin glargine Injectable (LANTUS) 5 Unit(s) SubCutaneous at bedtime  insulin lispro (HumaLOG) corrective regimen sliding scale   SubCutaneous at bedtime  insulin lispro (HumaLOG) corrective regimen sliding scale   SubCutaneous three times a day before meals  insulin lispro Injectable (HumaLOG) 3 Unit(s) SubCutaneous three times a day before meals    PHYSICAL EXAM:  Vital Signs Last 24 Hrs  T(C): 36.8 (11-27-18 @ 16:03), Max: 37.1 (11-26-18 @ 20:46)  T(F): 98.2 (11-27-18 @ 16:03), Max: 98.8 (11-26-18 @ 20:46)  HR: 65 (11-27-18 @ 16:03) (63 - 99)  BP: 135/66 (11-27-18 @ 16:03) (133/78 - 162/75)  BP(mean): 96 (11-27-18 @ 14:00) (81 - 108)  RR: 14 (11-27-18 @ 16:03) (12 - 23)  SpO2: 99% (11-27-18 @ 16:03) (91% - 100%)  GENERAL: Female laying in bed in NAD.   Abdomen: Soft, nontender, non distended  Extremities: Warm, no edema in all 4 exts  NEURO: A&O X3. L sided weakness. Noted braces in LUE and LLE. Positive L facial drop. Able to move LLE but unable to move LLE toes    LABS:  POCT Blood Glucose.: 126 mg/dL (11-27-18 @ 17:28)  POCT Blood Glucose.: 158 mg/dL (11-27-18 @ 12:23)  POCT Blood Glucose.: 146 mg/dL (11-27-18 @ 08:45)  POCT Blood Glucose.: 132 mg/dL (11-26-18 @ 22:21)  POCT Blood Glucose.: 127 mg/dL (11-26-18 @ 20:48)  POCT Blood Glucose.: 132 mg/dL (11-26-18 @ 17:19)  POCT Blood Glucose.: 129 mg/dL (11-26-18 @ 08:40)  POCT Blood Glucose.: 99 mg/dL (11-25-18 @ 21:57)                          11.2   4.6   )-----------( 233      ( 27 Nov 2018 05:39 )             30.7     11-27    135  |  102  |  16  ----------------------------<  117<H>  3.7   |  20<L>  |  0.65    Ca    9.1      27 Nov 2018 05:39      EGFR if : 117  EGFR if non : 101    Ca    9.7      11-26    Hemoglobin A1C, Whole Blood: 9.2 % <H> [4.0 - 5.6] (11-22-18 @ 08:24)      11-24 Chol 179  HDL 39<L> Trig 171<H>

## 2018-11-27 NOTE — PROGRESS NOTE ADULT - SUBJECTIVE AND OBJECTIVE BOX
THE PATIENT [facility] SE[facility] AND [facility].   HPI:  59 y/o woman with PMH of HTN, DM and Stroke x2 (w/ residual left facial droop) who presented to [facility] with left sided hemiplegia, transferred for possible thrombectomy. MAYON 0130. Symptoms unchanged since awakening with them at 0600. Was not a candidate for tPA. Pt denies any recent fevers, chills, HA, dizziness, neck pain, visual changes, CP, SOB, cough, nausea, vomiting, abdominal pain, changes in BMs/urination. NIHSS: 15. MRS: 1.      SUBJECTIVE: No events overnight.  No new neurologic complaints.      aspirin  chewable 81 milliGRAM(s) Oral daily  atorvastatin 80 milliGRAM(s) Oral at bedtime  clopidogrel Tablet 75 milliGRAM(s) Oral daily  dextrose 40% Gel 15 Gram(s) Oral once PRN  dextrose 5%. 1000 milliLiter(s) IV Continuous <Continuous>  dextrose 50% Injectable 12.5 Gram(s) IV Push once  dextrose 50% Injectable 25 Gram(s) IV Push once  dextrose 50% Injectable 25 Gram(s) IV Push once  enoxaparin Injectable 40 milliGRAM(s) SubCutaneous every 24 hours  FLUoxetine 20 milliGRAM(s) Oral daily  glucagon  Injectable 1 milliGRAM(s) IntraMuscular once PRN  insulin glargine Injectable (LANTUS) 5 Unit(s) SubCutaneous at bedtime  insulin lispro (HumaLOG) corrective regimen sliding scale   SubCutaneous at bedtime  insulin lispro (HumaLOG) corrective regimen sliding scale   SubCutaneous three times a day before meals  insulin lispro Injectable (HumaLOG) 3 Unit(s) SubCutaneous three times a day before meals  ondansetron Injectable 4 milliGRAM(s) IV Push every 8 hours PRN      PHYSICAL EXAM:   Vital Signs Last 24 Hrs  T(C): 37.1 (26 Nov 2018 20:46), Max: 37.1 (26 Nov 2018 20:46)  T(F): 98.8 (26 Nov 2018 20:46), Max: 98.8 (26 Nov 2018 20:46)  HR: 65 (27 Nov 2018 06:00) (63 - 99)  BP: 143/67 (27 Nov 2018 06:00) (136/63 - 165/75)  BP(mean): 90 (27 Nov 2018 06:00) (81 - 114)  RR: 17 (27 Nov 2018 06:00) (14 - 23)  SpO2: 100% (27 Nov 2018 06:00) (91% - 100%)    General: No acute distress  HEENT: EOM intact, VFF  Abdomen: Soft, nontender, nondistended   Extremities: No edema    NEUROLOGICAL EXAM:  Mental status: Awake, alert, oriented x3, fluent speech, no neglect, able to follow commands, repetition intact  Cranial Nerves: mild left central facial palsy, VFF, a few beats of nystagmus at end gaze bilaterally,  dysarthria,   Motor exam:  RUE 5/5, RLE 5/5, Left side hemiparesis LUE prox/distally 0/5, LLE iliopsoas 3/5; anterior tibialis 0/5  Sensation: Moderate-severely decreased light touch and temperature sensation on left   Coordination/ Gait: gait not assessed     LABS:                        11.2   4.6   )-----------( 233      ( 27 Nov 2018 05:39 )             30.7    11-27    135  |  102  |  16  ----------------------------<  117<H>  3.7   |  20<L>  |  0.65    Ca    9.1      27 Nov 2018 05:39      Hemoglobin A1C, Whole Blood: 9.2 % (11-22 @ 08:24)      IMAGING: Reviewed by me.     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 Decreased, but present flow related enhancement of the more distal right   ICA. Previously, no flow related enhancement was identified within the right ICA on CTA from 11/21/2018.    MRA BRAIN: Diminished, but present flow related signal within the skull base and supraclinoid right internal carotid artery. iminished flow related signal within the right MCA.    MRI Head (11/21/18): Acute right MCA infarct.    Head CT, perfussion, CTA Head/Neck (11/21/18)   Brain CT: No identifiable or infarct. Neck   CTA: The abrupt cut off of the proximal right internal carotid artery with associated scattered areas of calcified plaque. This may be   secondary to occlusive thrombus or arterial dissection. A mild narrowing of the takeoff of the right vertebral artery.Normal left internal carotid and left vertebral arteries.    Brain CTA: Markedly diminished flow within the petrous and cavernous segments of the right internal carotid artery with reconstitution of the distal right internal carotid artery likely through collateralization and/or cross filling.. Mildly decreased flow within the right middle cerebral artery as compared with the left without focal stenosis.    Perfusion maps: Mismatch area are of 102 mL with evidence of core infarct and moderately large penumbra. There is a decreased cerebral blood flow and decreased cerebral blood volume in the right frontoparietal region with corresponding elevation of mean transit time. THE PATIENT WAS SEEN AND [facility]ING [facility]ING ROUNDS.   HPI:  57 y/o woman with PMH of HTN, DM and Stroke x2 (w/ residual left facial droop) who presented to [facility] with left sided hemiplegia, transferred for possible thrombectomy. MAYON 0130. Symptoms unchanged since awakening with them at 0600. Was not a candidate for tPA. Pt denies any recent fevers, chills, HA, dizziness, neck pain, visual changes, CP, SOB, cough, nausea, vomiting, abdominal pain, changes in BMs/urination. NIHSS: 15. MRS: 1.      SUBJECTIVE: No events overnight.  No new neurologic complaints.      aspirin  chewable 81 milliGRAM(s) Oral daily  atorvastatin 80 milliGRAM(s) Oral at bedtime  clopidogrel Tablet 75 milliGRAM(s) Oral daily  dextrose 40% Gel 15 Gram(s) Oral once PRN  dextrose 5%. 1000 milliLiter(s) IV Continuous <Continuous>  dextrose 50% Injectable 12.5 Gram(s) IV Push once  dextrose 50% Injectable 25 Gram(s) IV Push once  dextrose 50% Injectable 25 Gram(s) IV Push once  enoxaparin Injectable 40 milliGRAM(s) SubCutaneous every 24 hours  FLUoxetine 20 milliGRAM(s) Oral daily  glucagon  Injectable 1 milliGRAM(s) IntraMuscular once PRN  insulin glargine Injectable (LANTUS) 5 Unit(s) SubCutaneous at bedtime  insulin lispro (HumaLOG) corrective regimen sliding scale   SubCutaneous at bedtime  insulin lispro (HumaLOG) corrective regimen sliding scale   SubCutaneous three times a day before meals  insulin lispro Injectable (HumaLOG) 3 Unit(s) SubCutaneous three times a day before meals  ondansetron Injectable 4 milliGRAM(s) IV Push every 8 hours PRN      PHYSICAL EXAM:   Vital Signs Last 24 Hrs  T(C): 37.1 (26 Nov 2018 20:46), Max: 37.1 (26 Nov 2018 20:46)  T(F): 98.8 (26 Nov 2018 20:46), Max: 98.8 (26 Nov 2018 20:46)  HR: 65 (27 Nov 2018 06:00) (63 - 99)  BP: 143/67 (27 Nov 2018 06:00) (136/63 - 165/75)  BP(mean): 90 (27 Nov 2018 06:00) (81 - 114)  RR: 17 (27 Nov 2018 06:00) (14 - 23)  SpO2: 100% (27 Nov 2018 06:00) (91% - 100%)    General: No acute distress  HEENT: EOM intact, VFF  Abdomen: Soft, nontender, nondistended   Extremities: No edema    NEUROLOGICAL EXAM:  Mental status: Awake, alert, oriented x3, fluent speech, no neglect, able to follow commands, repetition intact  Cranial Nerves: mild left central facial palsy, VFF, a few beats of nystagmus at end gaze bilaterally,  dysarthria,   Motor exam:  RUE 5/5, RLE 5/5, Left side hemiparesis LUE prox 1/5,distally 0/5, LLE iliopsoas 3/5; anterior tibialis 0/5  Sensation: Moderate-severely decreased light touch and temperature sensation on left   Coordination/ Gait: gait not assessed     LABS:                        11.2   4.6   )-----------( 233      ( 27 Nov 2018 05:39 )             30.7    11-27    135  |  102  |  16  ----------------------------<  117<H>  3.7   |  20<L>  |  0.65    Ca    9.1      27 Nov 2018 05:39      Hemoglobin A1C, Whole Blood: 9.2 % (11-22 @ 08:24)      IMAGING: Reviewed by me.   CAROTID DOPPLERS (11/2/7/18): The stent and stented cervical right common and internal   carotid arteries are widely patent. There is a flow-limiting stenosis of the right external carotid artery.No hemodynamically significant is present on the left.    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 Decreased, but present flow related enhancement of the more distal right   ICA. Previously, no flow related enhancement was identified within the right ICA on CTA from 11/21/2018.    MRA BRAIN: Diminished, but present flow related signal within the skull base and supraclinoid right internal carotid artery. iminished flow related signal within the right MCA.    MRI Head (11/21/18): Acute right MCA infarct.    Head CT, perfussion, CTA Head/Neck (11/21/18)   Brain CT: No identifiable or infarct. Neck   CTA: The abrupt cut off of the proximal right internal carotid artery with associated scattered areas of calcified plaque. This may be   secondary to occlusive thrombus or arterial dissection. A mild narrowing of the takeoff of the right vertebral artery.Normal left internal carotid and left vertebral arteries.    Brain CTA: Markedly diminished flow within the petrous and cavernous segments of the right internal carotid artery with reconstitution of the distal right internal carotid artery likely through collateralization and/or cross filling.. Mildly decreased flow within the right middle cerebral artery as compared with the left without focal stenosis.    Perfusion maps: Mismatch area are of 102 mL with evidence of core infarct and moderately large penumbra. There is a decreased cerebral blood flow and decreased cerebral blood volume in the right frontoparietal region with corresponding elevation of mean transit time. THE PATIENT WAS SEEN AND EXAMINED BY ME WITH THE [employer] AND STROKE TEAM DURING MORNING ROUNDS.     HPI:  57 y/o woman with PMH of HTN, DM and Stroke x2 (w/ residual left facial droop) who presented to [facility] with left sided hemiplegia, transferred for possible thrombectomy. MAYON 0130. Symptoms unchanged since awakening with them at 0600. Was not a candidate for tPA. Pt denies any recent fevers, chills, HA, dizziness, neck pain, visual changes, CP, SOB, cough, nausea, vomiting, abdominal pain, changes in BMs/urination. NIHSS: 15. MRS: 1.    SUBJECTIVE: No events overnight.  No new neurologic complaints.      ROS: All negative except documented above.    aspirin  chewable 81 milliGRAM(s) Oral daily  atorvastatin 80 milliGRAM(s) Oral at bedtime  clopidogrel Tablet 75 milliGRAM(s) Oral daily  dextrose 40% Gel 15 Gram(s) Oral once PRN  dextrose 5%. 1000 milliLiter(s) IV Continuous <Continuous>  dextrose 50% Injectable 12.5 Gram(s) IV Push once  dextrose 50% Injectable 25 Gram(s) IV Push once  dextrose 50% Injectable 25 Gram(s) IV Push once  enoxaparin Injectable 40 milliGRAM(s) SubCutaneous every 24 hours  FLUoxetine 20 milliGRAM(s) Oral daily  glucagon  Injectable 1 milliGRAM(s) IntraMuscular once PRN  insulin glargine Injectable (LANTUS) 5 Unit(s) SubCutaneous at bedtime  insulin lispro (HumaLOG) corrective regimen sliding scale   SubCutaneous at bedtime  insulin lispro (HumaLOG) corrective regimen sliding scale   SubCutaneous three times a day before meals  insulin lispro Injectable (HumaLOG) 3 Unit(s) SubCutaneous three times a day before meals  ondansetron Injectable 4 milliGRAM(s) IV Push every 8 hours PRN      PHYSICAL EXAM:   Vital Signs Last 24 Hrs  T(C): 37.1 (26 Nov 2018 20:46), Max: 37.1 (26 Nov 2018 20:46)  T(F): 98.8 (26 Nov 2018 20:46), Max: 98.8 (26 Nov 2018 20:46)  HR: 65 (27 Nov 2018 06:00) (63 - 99)  BP: 143/67 (27 Nov 2018 06:00) (136/63 - 165/75)  BP(mean): 90 (27 Nov 2018 06:00) (81 - 114)  RR: 17 (27 Nov 2018 06:00) (14 - 23)  SpO2: 100% (27 Nov 2018 06:00) (91% - 100%)    General: No acute distress  HEENT: EOM intact, VFF  Abdomen: Soft, nontender, nondistended   Extremities: No edema    NEUROLOGICAL EXAM:  Mental status: Awake, alert, oriented x3, fluent speech, no neglect, able to follow commands, repetition intact  Cranial Nerves: mild left central facial palsy, VFF, a few beats of nystagmus at end gaze bilaterally,  dysarthria,   Motor exam:  RUE 5/5, RLE 5/5, Left side hemiparesis LUE prox 1/5,distally 0/5, LLE iliopsoas 3/5; anterior tibialis 0/5  Sensation: Moderate-severely decreased light touch and temperature sensation on left   Coordination/ Gait: gait not assessed     LABS:                        11.2   4.6   )-----------( 233      ( 27 Nov 2018 05:39 )             30.7    11-27    135  |  102  |  16  ----------------------------<  117<H>  3.7   |  20<L>  |  0.65    Ca    9.1      27 Nov 2018 05:39      Hemoglobin A1C, Whole Blood: 9.2 % (11-22 @ 08:24)      IMAGING: Reviewed by me.   CAROTID DOPPLERS (11/2/7/18): The stent and stented cervical right common and internal   carotid arteries are widely patent. There is a flow-limiting stenosis of the right external carotid artery.No hemodynamically significant is present on the left.    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 Decreased, but present flow related enhancement of the more distal right   ICA. Previously, no flow related enhancement was identified within the right ICA on CTA from 11/21/2018.    MRA BRAIN: Diminished, but present flow related signal within the skull base and supraclinoid right internal carotid artery. iminished flow related signal within the right MCA.    MRI Head (11/21/18): Acute right MCA infarct.    Head CT, perfussion, CTA Head/Neck (11/21/18)   Brain CT: No identifiable or infarct. Neck   CTA: The abrupt cut off of the proximal right internal carotid artery with associated scattered areas of calcified plaque. This may be   secondary to occlusive thrombus or arterial dissection. A mild narrowing of the takeoff of the right vertebral artery.Normal left internal carotid and left vertebral arteries.    Brain CTA: Markedly diminished flow within the petrous and cavernous segments of the right internal carotid artery with reconstitution of the distal right internal carotid artery likely through collateralization and/or cross filling.. Mildly decreased flow within the right middle cerebral artery as compared with the left without focal stenosis.    Perfusion maps: Mismatch area are of 102 mL with evidence of core infarct and moderately large penumbra. There is a decreased cerebral blood flow and decreased cerebral blood volume in the right frontoparietal region with corresponding elevation of mean transit time.

## 2018-11-27 NOTE — PROGRESS NOTE ADULT - SUBJECTIVE AND OBJECTIVE BOX
Subjective: Patient seen and examined. No new events except as noted.   remains in stroke unit   no cp or sob  REVIEW OF SYSTEMS:    CONSTITUTIONAL: +weakness, fevers or chills  EYES/ENT: No visual changes;  No vertigo or throat pain   NECK: No pain or stiffness  RESPIRATORY: No cough, wheezing, hemoptysis; No shortness of breath  CARDIOVASCULAR: No chest pain or palpitations  GASTROINTESTINAL: No abdominal or epigastric pain. No nausea, vomiting, or hematemesis; No diarrhea or constipation. No melena or hematochezia.  GENITOURINARY: No dysuria, frequency or hematuria  NEUROLOGICAL: + numbness or weakness  SKIN: No itching, burning, rashes, or lesions   All other review of systems is negative unless indicated above.    MEDICATIONS:  MEDICATIONS  (STANDING):  aspirin  chewable 81 milliGRAM(s) Oral daily  atorvastatin 80 milliGRAM(s) Oral at bedtime  clopidogrel Tablet 75 milliGRAM(s) Oral daily  dextrose 5%. 1000 milliLiter(s) (50 mL/Hr) IV Continuous <Continuous>  dextrose 50% Injectable 12.5 Gram(s) IV Push once  dextrose 50% Injectable 25 Gram(s) IV Push once  dextrose 50% Injectable 25 Gram(s) IV Push once  enoxaparin Injectable 40 milliGRAM(s) SubCutaneous every 24 hours  FLUoxetine 20 milliGRAM(s) Oral daily  insulin glargine Injectable (LANTUS) 5 Unit(s) SubCutaneous at bedtime  insulin lispro (HumaLOG) corrective regimen sliding scale   SubCutaneous at bedtime  insulin lispro (HumaLOG) corrective regimen sliding scale   SubCutaneous three times a day before meals  insulin lispro Injectable (HumaLOG) 3 Unit(s) SubCutaneous three times a day before meals  lisinopril 5 milliGRAM(s) Oral daily      PHYSICAL EXAM:  T(C): 37 (11-27-18 @ 08:00), Max: 37.1 (11-26-18 @ 20:46)  HR: 66 (11-27-18 @ 08:00) (63 - 99)  BP: 142/64 (11-27-18 @ 08:00) (136/63 - 164/77)  RR: 16 (11-27-18 @ 08:00) (14 - 23)  SpO2: 100% (11-27-18 @ 08:00) (91% - 100%)  Wt(kg): --  I&O's Summary    26 Nov 2018 07:01  -  27 Nov 2018 07:00  --------------------------------------------------------  IN: 320 mL / OUT: 1080 mL / NET: -760 mL          Appearance: NAD  HEENT:   Normal oral mucosa, PERRL, EOMI	  Lymphatic: No lymphadenopathy  Cardiovascular: Normal S1 S2, No JVD, No murmurs, No edema  Respiratory: Lungs clear to auscultation	  Psychiatry: A & O x 3, Mood & affect appropriate  Gastrointestinal:  Soft, Non-tender, + BS	  Skin: No rashes, No ecchymoses, No cyanosis	  Extremities: Decreased range of motion, No clubbing, cyanosis or edema  Vascular: Peripheral pulses palpable 2+ bilaterally  NEUROLOGICAL EXAM:  Mental status: Awake, alert, oriented x3, fluent speech, no neglect, able to follow commands  Cranial Nerves: mild left central facial palsy, VFF, a few beats of nystagmus at end gaze bilaterally, mild dysarthria,   Motor exam: Normal tone, no drift, RUE 5/5, RLE 5/5, Left side hemiparesis LUE prox/distally 0/5, LLE iliopsoas 4/5; anterior tibialis 0/5  Sensation: Moderate-severely decreased light touch and temperature sensation on left   Coordination/ Gait: No dysmetria    LABS:    CARDIAC MARKERS:                                11.2   4.6   )-----------( 233      ( 27 Nov 2018 05:39 )             30.7     11-27    135  |  102  |  16  ----------------------------<  117<H>  3.7   |  20<L>  |  0.65    Ca    9.1      27 Nov 2018 05:39      proBNP:   Lipid Profile:   HgA1c:   TSH:             TELEMETRY: SR	    ECG:  	  RADIOLOGY:   DIAGNOSTIC TESTING:  [ ] Echocardiogram:  [ ]  Catheterization:    [ ] Stress Test:    OTHER:

## 2018-11-27 NOTE — PROGRESS NOTE ADULT - ASSESSMENT
58F pmhx multiple strokes now admitted with stroke and R carotid dissection POD 1 from carotid stent  - groin site shows CDI with no hematoma, pt moving R side well with therapeutic P2y12  - Pt to see Dr. Carpio in the office in 1-2 weeks post discharge  - Continue ASA / Plavix  - No further neurosurgical intervention, reconsult as needed

## 2018-11-27 NOTE — PROGRESS NOTE ADULT - SUBJECTIVE AND OBJECTIVE BOX
Patient seen and examined at bedside.    --Anticoagulation--  aspirin  chewable 81 milliGRAM(s) Oral daily  clopidogrel Tablet 75 milliGRAM(s) Oral daily    T(C): 37.1 (11-26-18 @ 20:46), Max: 37.1 (11-26-18 @ 20:46)  HR: 65 (11-27-18 @ 06:00) (63 - 99)  BP: 143/67 (11-27-18 @ 06:00) (136/63 - 165/75)  RR: 17 (11-27-18 @ 06:00) (14 - 23)  SpO2: 100% (11-27-18 @ 06:00) (91% - 100%)  Wt(kg): --    Exam:  AOx3, FC, PERRL, EOMI, trace L facial (baseline)  L Side 0-1/5 except LHF 5/5 and L KF 4/5  L side decreased sensation

## 2018-11-28 ENCOUNTER — INPATIENT (INPATIENT)
Facility: HOSPITAL | Age: 58
LOS: 22 days | Discharge: HOME HEALTH | DRG: 949 | End: 2018-12-21
Attending: PSYCHIATRY & NEUROLOGY | Admitting: PSYCHIATRY & NEUROLOGY
Payer: COMMERCIAL

## 2018-11-28 VITALS
TEMPERATURE: 98 F | DIASTOLIC BLOOD PRESSURE: 77 MMHG | OXYGEN SATURATION: 98 % | RESPIRATION RATE: 18 BRPM | SYSTOLIC BLOOD PRESSURE: 124 MMHG | HEART RATE: 76 BPM

## 2018-11-28 VITALS
OXYGEN SATURATION: 96 % | DIASTOLIC BLOOD PRESSURE: 76 MMHG | TEMPERATURE: 98 F | WEIGHT: 175.27 LBS | HEART RATE: 65 BPM | RESPIRATION RATE: 14 BRPM | SYSTOLIC BLOOD PRESSURE: 127 MMHG | HEIGHT: 68 IN

## 2018-11-28 DIAGNOSIS — I69.819 UNSPECIFIED SYMPTOMS AND SIGNS INVOLVING COGNITIVE FUNCTIONS FOLLOWING OTHER CEREBROVASCULAR DISEASE: ICD-10-CM

## 2018-11-28 DIAGNOSIS — I63.9 CEREBRAL INFARCTION, UNSPECIFIED: ICD-10-CM

## 2018-11-28 DIAGNOSIS — E11.65 TYPE 2 DIABETES MELLITUS WITH HYPERGLYCEMIA: ICD-10-CM

## 2018-11-28 DIAGNOSIS — L89.151 PRESSURE ULCER OF SACRAL REGION, STAGE 1: ICD-10-CM

## 2018-11-28 DIAGNOSIS — I69.891 DYSPHAGIA FOLLOWING OTHER CEREBROVASCULAR DISEASE: ICD-10-CM

## 2018-11-28 DIAGNOSIS — Z51.89 ENCOUNTER FOR OTHER SPECIFIED AFTERCARE: ICD-10-CM

## 2018-11-28 DIAGNOSIS — I69.854 HEMIPLEGIA AND HEMIPARESIS FOLLOWING OTHER CEREBROVASCULAR DISEASE AFFECTING LEFT NON-DOMINANT SIDE: ICD-10-CM

## 2018-11-28 DIAGNOSIS — E78.5 HYPERLIPIDEMIA, UNSPECIFIED: ICD-10-CM

## 2018-11-28 DIAGNOSIS — R26.9 UNSPECIFIED ABNORMALITIES OF GAIT AND MOBILITY: ICD-10-CM

## 2018-11-28 DIAGNOSIS — K59.00 CONSTIPATION, UNSPECIFIED: ICD-10-CM

## 2018-11-28 DIAGNOSIS — Z79.4 LONG TERM (CURRENT) USE OF INSULIN: ICD-10-CM

## 2018-11-28 DIAGNOSIS — R11.0 NAUSEA: ICD-10-CM

## 2018-11-28 DIAGNOSIS — I69.392 FACIAL WEAKNESS FOLLOWING CEREBRAL INFARCTION: ICD-10-CM

## 2018-11-28 DIAGNOSIS — I69.822 DYSARTHRIA FOLLOWING OTHER CEREBROVASCULAR DISEASE: ICD-10-CM

## 2018-11-28 DIAGNOSIS — F43.20 ADJUSTMENT DISORDER, UNSPECIFIED: ICD-10-CM

## 2018-11-28 DIAGNOSIS — I10 ESSENTIAL (PRIMARY) HYPERTENSION: ICD-10-CM

## 2018-11-28 LAB
GLUCOSE BLDC GLUCOMTR-MCNC: 125 MG/DL — HIGH (ref 70–99)
GLUCOSE BLDC GLUCOMTR-MCNC: 136 MG/DL — HIGH (ref 70–99)
GLUCOSE BLDC GLUCOMTR-MCNC: 156 MG/DL — HIGH (ref 70–99)
GLUCOSE BLDC GLUCOMTR-MCNC: 178 MG/DL — HIGH (ref 70–99)

## 2018-11-28 PROCEDURE — 80053 COMPREHEN METABOLIC PANEL: CPT

## 2018-11-28 PROCEDURE — 99232 SBSQ HOSP IP/OBS MODERATE 35: CPT

## 2018-11-28 PROCEDURE — 97166 OT EVAL MOD COMPLEX 45 MIN: CPT

## 2018-11-28 PROCEDURE — 99291 CRITICAL CARE FIRST HOUR: CPT | Mod: 25

## 2018-11-28 PROCEDURE — 80061 LIPID PANEL: CPT

## 2018-11-28 PROCEDURE — 93880 EXTRACRANIAL BILAT STUDY: CPT

## 2018-11-28 PROCEDURE — C1760: CPT

## 2018-11-28 PROCEDURE — 70548 MR ANGIOGRAPHY NECK W/DYE: CPT

## 2018-11-28 PROCEDURE — 99223 1ST HOSP IP/OBS HIGH 75: CPT

## 2018-11-28 PROCEDURE — 36227 PLACE CATH XTRNL CAROTID: CPT

## 2018-11-28 PROCEDURE — 86850 RBC ANTIBODY SCREEN: CPT

## 2018-11-28 PROCEDURE — 99233 SBSQ HOSP IP/OBS HIGH 50: CPT

## 2018-11-28 PROCEDURE — 97112 NEUROMUSCULAR REEDUCATION: CPT

## 2018-11-28 PROCEDURE — 83036 HEMOGLOBIN GLYCOSYLATED A1C: CPT

## 2018-11-28 PROCEDURE — 97116 GAIT TRAINING THERAPY: CPT

## 2018-11-28 PROCEDURE — 85027 COMPLETE CBC AUTOMATED: CPT

## 2018-11-28 PROCEDURE — 36224 PLACE CATH CAROTD ART: CPT | Mod: 59

## 2018-11-28 PROCEDURE — C1725: CPT

## 2018-11-28 PROCEDURE — 80048 BASIC METABOLIC PNL TOTAL CA: CPT

## 2018-11-28 PROCEDURE — 81001 URINALYSIS AUTO W/SCOPE: CPT

## 2018-11-28 PROCEDURE — 70496 CT ANGIOGRAPHY HEAD: CPT

## 2018-11-28 PROCEDURE — 86900 BLOOD TYPING SEROLOGIC ABO: CPT

## 2018-11-28 PROCEDURE — 97760 ORTHOTIC MGMT&TRAING 1ST ENC: CPT

## 2018-11-28 PROCEDURE — 85730 THROMBOPLASTIN TIME PARTIAL: CPT

## 2018-11-28 PROCEDURE — 86901 BLOOD TYPING SEROLOGIC RH(D): CPT

## 2018-11-28 PROCEDURE — C1894: CPT

## 2018-11-28 PROCEDURE — C1887: CPT

## 2018-11-28 PROCEDURE — 84484 ASSAY OF TROPONIN QUANT: CPT

## 2018-11-28 PROCEDURE — C1876: CPT

## 2018-11-28 PROCEDURE — 93306 TTE W/DOPPLER COMPLETE: CPT

## 2018-11-28 PROCEDURE — C1884: CPT

## 2018-11-28 PROCEDURE — C1764: CPT

## 2018-11-28 PROCEDURE — 0042T: CPT

## 2018-11-28 PROCEDURE — 70544 MR ANGIOGRAPHY HEAD W/O DYE: CPT

## 2018-11-28 PROCEDURE — 70450 CT HEAD/BRAIN W/O DYE: CPT

## 2018-11-28 PROCEDURE — C1769: CPT

## 2018-11-28 PROCEDURE — 33282: CPT

## 2018-11-28 PROCEDURE — 36226 PLACE CATH VERTEBRAL ART: CPT

## 2018-11-28 PROCEDURE — 71045 X-RAY EXAM CHEST 1 VIEW: CPT

## 2018-11-28 PROCEDURE — 97162 PT EVAL MOD COMPLEX 30 MIN: CPT

## 2018-11-28 PROCEDURE — 82962 GLUCOSE BLOOD TEST: CPT

## 2018-11-28 PROCEDURE — 87040 BLOOD CULTURE FOR BACTERIA: CPT

## 2018-11-28 PROCEDURE — 85610 PROTHROMBIN TIME: CPT

## 2018-11-28 PROCEDURE — 70551 MRI BRAIN STEM W/O DYE: CPT

## 2018-11-28 PROCEDURE — 93005 ELECTROCARDIOGRAM TRACING: CPT

## 2018-11-28 PROCEDURE — A9585: CPT

## 2018-11-28 PROCEDURE — 85576 BLOOD PLATELET AGGREGATION: CPT

## 2018-11-28 PROCEDURE — 37215 TRANSCATH STENT CCA W/EPS: CPT

## 2018-11-28 PROCEDURE — 70498 CT ANGIOGRAPHY NECK: CPT

## 2018-11-28 RX ORDER — ATORVASTATIN CALCIUM 80 MG/1
1 TABLET, FILM COATED ORAL
Qty: 30 | Refills: 0 | OUTPATIENT
Start: 2018-11-28 | End: 2018-12-27

## 2018-11-28 RX ORDER — ATORVASTATIN CALCIUM 80 MG/1
80 TABLET, FILM COATED ORAL AT BEDTIME
Qty: 0 | Refills: 0 | Status: DISCONTINUED | OUTPATIENT
Start: 2018-11-28 | End: 2018-12-21

## 2018-11-28 RX ORDER — DEXTROSE 50 % IN WATER 50 %
25 SYRINGE (ML) INTRAVENOUS ONCE
Qty: 0 | Refills: 0 | Status: DISCONTINUED | OUTPATIENT
Start: 2018-11-28 | End: 2018-12-21

## 2018-11-28 RX ORDER — DEXTROSE 50 % IN WATER 50 %
12.5 SYRINGE (ML) INTRAVENOUS ONCE
Qty: 0 | Refills: 0 | Status: DISCONTINUED | OUTPATIENT
Start: 2018-11-28 | End: 2018-12-21

## 2018-11-28 RX ORDER — INSULIN GLARGINE 100 [IU]/ML
5 INJECTION, SOLUTION SUBCUTANEOUS
Qty: 100 | Refills: 0 | OUTPATIENT
Start: 2018-11-28 | End: 2018-12-27

## 2018-11-28 RX ORDER — LISINOPRIL 2.5 MG/1
5 TABLET ORAL DAILY
Qty: 0 | Refills: 0 | Status: DISCONTINUED | OUTPATIENT
Start: 2018-11-28 | End: 2018-12-06

## 2018-11-28 RX ORDER — ASPIRIN/CALCIUM CARB/MAGNESIUM 324 MG
1 TABLET ORAL
Qty: 30 | Refills: 0 | OUTPATIENT
Start: 2018-11-28 | End: 2018-12-27

## 2018-11-28 RX ORDER — CLOPIDOGREL BISULFATE 75 MG/1
1 TABLET, FILM COATED ORAL
Qty: 90 | Refills: 0 | OUTPATIENT
Start: 2018-11-28 | End: 2019-02-25

## 2018-11-28 RX ORDER — INSULIN LISPRO 100/ML
VIAL (ML) SUBCUTANEOUS AT BEDTIME
Qty: 0 | Refills: 0 | Status: DISCONTINUED | OUTPATIENT
Start: 2018-11-28 | End: 2018-12-21

## 2018-11-28 RX ORDER — GLUCAGON INJECTION, SOLUTION 0.5 MG/.1ML
1 INJECTION, SOLUTION SUBCUTANEOUS ONCE
Qty: 0 | Refills: 0 | Status: DISCONTINUED | OUTPATIENT
Start: 2018-11-28 | End: 2018-12-21

## 2018-11-28 RX ORDER — LISINOPRIL 2.5 MG/1
1 TABLET ORAL
Qty: 30 | Refills: 0 | OUTPATIENT
Start: 2018-11-28 | End: 2018-12-27

## 2018-11-28 RX ORDER — INSULIN GLARGINE 100 [IU]/ML
5 INJECTION, SOLUTION SUBCUTANEOUS
Qty: 1 | Refills: 0 | OUTPATIENT
Start: 2018-11-28 | End: 2018-12-27

## 2018-11-28 RX ORDER — INSULIN GLARGINE 100 [IU]/ML
5 INJECTION, SOLUTION SUBCUTANEOUS AT BEDTIME
Qty: 0 | Refills: 0 | Status: DISCONTINUED | OUTPATIENT
Start: 2018-11-28 | End: 2018-11-29

## 2018-11-28 RX ORDER — CARBAMAZEPINE 200 MG
1 TABLET ORAL
Qty: 0 | Refills: 0 | COMMUNITY

## 2018-11-28 RX ORDER — ENOXAPARIN SODIUM 100 MG/ML
40 INJECTION SUBCUTANEOUS EVERY 24 HOURS
Qty: 0 | Refills: 0 | Status: DISCONTINUED | OUTPATIENT
Start: 2018-11-28 | End: 2018-12-21

## 2018-11-28 RX ORDER — SODIUM CHLORIDE 9 MG/ML
1000 INJECTION, SOLUTION INTRAVENOUS
Qty: 0 | Refills: 0 | Status: DISCONTINUED | OUTPATIENT
Start: 2018-11-28 | End: 2018-12-21

## 2018-11-28 RX ORDER — INSULIN LISPRO 100/ML
3 VIAL (ML) SUBCUTANEOUS
Qty: 100 | Refills: 0 | OUTPATIENT
Start: 2018-11-28 | End: 2018-12-27

## 2018-11-28 RX ORDER — FLUVASTATIN SODIUM 80 MG/1
20 TABLET, FILM COATED, EXTENDED RELEASE ORAL
Qty: 0 | Refills: 0 | COMMUNITY

## 2018-11-28 RX ORDER — DEXTROSE 50 % IN WATER 50 %
15 SYRINGE (ML) INTRAVENOUS ONCE
Qty: 0 | Refills: 0 | Status: DISCONTINUED | OUTPATIENT
Start: 2018-11-28 | End: 2018-12-21

## 2018-11-28 RX ORDER — CLOPIDOGREL BISULFATE 75 MG/1
75 TABLET, FILM COATED ORAL DAILY
Qty: 0 | Refills: 0 | Status: DISCONTINUED | OUTPATIENT
Start: 2018-11-28 | End: 2018-12-21

## 2018-11-28 RX ORDER — FLUOXETINE HCL 10 MG
1 CAPSULE ORAL
Qty: 30 | Refills: 0 | OUTPATIENT
Start: 2018-11-28 | End: 2018-12-27

## 2018-11-28 RX ORDER — FLUOXETINE HCL 10 MG
20 CAPSULE ORAL DAILY
Qty: 0 | Refills: 0 | Status: DISCONTINUED | OUTPATIENT
Start: 2018-11-28 | End: 2018-12-21

## 2018-11-28 RX ORDER — INSULIN LISPRO 100/ML
VIAL (ML) SUBCUTANEOUS
Qty: 0 | Refills: 0 | Status: DISCONTINUED | OUTPATIENT
Start: 2018-11-28 | End: 2018-12-21

## 2018-11-28 RX ORDER — AMLODIPINE BESYLATE 2.5 MG/1
1 TABLET ORAL
Qty: 0 | Refills: 0 | COMMUNITY

## 2018-11-28 RX ORDER — ASPIRIN/CALCIUM CARB/MAGNESIUM 324 MG
81 TABLET ORAL DAILY
Qty: 0 | Refills: 0 | Status: DISCONTINUED | OUTPATIENT
Start: 2018-11-28 | End: 2018-12-21

## 2018-11-28 RX ADMIN — LISINOPRIL 5 MILLIGRAM(S): 2.5 TABLET ORAL at 05:20

## 2018-11-28 RX ADMIN — Medication 3 UNIT(S): at 12:55

## 2018-11-28 RX ADMIN — Medication 81 MILLIGRAM(S): at 12:55

## 2018-11-28 RX ADMIN — Medication 3 UNIT(S): at 08:38

## 2018-11-28 RX ADMIN — Medication 0: at 21:46

## 2018-11-28 RX ADMIN — ATORVASTATIN CALCIUM 80 MILLIGRAM(S): 80 TABLET, FILM COATED ORAL at 21:46

## 2018-11-28 RX ADMIN — CLOPIDOGREL BISULFATE 75 MILLIGRAM(S): 75 TABLET, FILM COATED ORAL at 12:55

## 2018-11-28 RX ADMIN — Medication 1: at 12:56

## 2018-11-28 RX ADMIN — Medication 20 MILLIGRAM(S): at 12:55

## 2018-11-28 RX ADMIN — Medication 1: at 08:39

## 2018-11-28 RX ADMIN — INSULIN GLARGINE 5 UNIT(S): 100 INJECTION, SOLUTION SUBCUTANEOUS at 21:45

## 2018-11-28 NOTE — PROGRESS NOTE ADULT - SUBJECTIVE AND OBJECTIVE BOX
DIABETES FOLLOW UP NOTE: Saw pt earlier today  INTERVAL HX: 57 y/o F w/h/o uncontrolled T2DM, HTN, s/p stroke x2 with L facial drop. Here with L sided hemiplegia found to have CVA in R MCA plus ADRY dissection. S/p angio with R carotid stent placement 11/26/18. Reports eating well with glycemic control at goal while on present insulin doses. No hypoglycemia. Going to rehab today        Review of Systems:  General: As above  Cardiovascular: No chest pain, palpitations  Respiratory: No SOB, no cough  GI: No nausea, vomiting, abdominal pain. No loose BMs  Endocrine: no polyuria, polydipsia or S&Sx of hypoglycemia    Allergies    No Known Drug Allergies  shellfish (Anaphylaxis)    Intolerances      MEDICATIONS:  atorvastatin 80 milliGRAM(s) Oral at bedtime  insulin glargine Injectable (LANTUS) 5 Unit(s) SubCutaneous at bedtime  insulin lispro (HumaLOG) corrective regimen sliding scale   SubCutaneous at bedtime  insulin lispro (HumaLOG) corrective regimen sliding scale   SubCutaneous three times a day before meals  insulin lispro Injectable (HumaLOG) 3 Unit(s) SubCutaneous three times a day before meals      PHYSICAL EXAM:  Vital Signs Last 24 Hrs  T(C): 36.9 (11-28-18 @ 14:00), Max: 37.2 (11-27-18 @ 23:12)  T(F): 98.4 (11-28-18 @ 14:00), Max: 99 (11-27-18 @ 23:12)  HR: 76 (11-28-18 @ 14:00) (73 - 78)  BP: 124/77 (11-28-18 @ 14:00) (118/62 - 162/71)  BP(mean): --  RR: 18 (11-28-18 @ 14:00) (18 - 18)  SpO2: 98% (11-28-18 @ 14:00) (97% - 98%)  Height (cm): 172.7 (11-28-18 @ 15:01)  Weight (kg): 89.6 (11-28-18 @ 15:05)  BMI (kg/m2): 30 (11-28-18 @ 15:05)  GENERAL: Female sitting in chair in NAD.   Abdomen: Soft, nontender, non distended  Extremities: Warm, no edema in all 4 exts  NEURO: A&O X3. L sided weakness. Noted braces in LUE and LLE. Positive L facial drop.     LABS:  POCT Blood Glucose.: 178 mg/dL (11-28-18 @ 12:49)  POCT Blood Glucose.: 156 mg/dL (11-28-18 @ 08:32)  POCT Blood Glucose.: 122 mg/dL (11-27-18 @ 21:44)  POCT Blood Glucose.: 126 mg/dL (11-27-18 @ 17:28)  POCT Blood Glucose.: 158 mg/dL (11-27-18 @ 12:23)  POCT Blood Glucose.: 146 mg/dL (11-27-18 @ 08:45)  POCT Blood Glucose.: 132 mg/dL (11-26-18 @ 22:21)  POCT Blood Glucose.: 127 mg/dL (11-26-18 @ 20:48)                          11.2   4.6   )-----------( 233      ( 27 Nov 2018 05:39 )             30.7     11-27    135  |  102  |  16  ----------------------------<  117<H>  3.7   |  20<L>  |  0.65    Ca    9.1      27 Nov 2018 05:39      EGFR if : 117  EGFR if non : 101    Ca    9.7      11-26    Hemoglobin A1C, Whole Blood: 9.2 % <H> [4.0 - 5.6] (11-22-18 @ 08:24)      11-24 Chol 179  HDL 39<L> Trig 171<H>

## 2018-11-28 NOTE — H&P ADULT - ATTENDING COMMENTS
57 yo RHWF with recent Right MCA infarct due to large cerebral vessel, Right ECA/ICA stenosis with stent placement and residual left hemiparesis, dysarthria, dysphagia and gait disorder.    Patient examined , chart and transfer documentation reviewed.  Admit to BIU   Resume all medications, dual antiplatelet therapy and statin for stroke ppx  Tight glucose control  BB management  Medicine evaluation

## 2018-11-28 NOTE — H&P ADULT - NSHPLABSRESULTS_GEN_ALL_CORE
EXAM:  MR BRAIN                            PROCEDURE DATE:  11/21/2018            INTERPRETATION:  History: Left-sided weakness involving the legs and arms.    MRI of the brain was performed using sagittal T1, axial T 1 fast with   sequence with FLAIR diffusion and susceptibility weighted sequence.     This exam is compared with prior noncontrast head CT and CT angiogram   performed on November 21, 2018.    Parenchymal volume loss and chronic microvascular ischemic changes are   identified    Abnormal T2 prolongation with restricted diffusion is seen involving the   right posterior frontal/parietal cortical subcortical region. Smaller but   similar areas seen throughout the right centrum semiovale, right frontal   subcortical and right splenium the corpus callosum. These findings are   compatible with acute right MCA infarct.. No hemorrhagic transformation   is seen. No significant shift or herniation is seen. Localized mass   effect is seen consisting of sulcal effacement.    There areno abnormal intra-axial or extra collections seen    A normal flow void is not identified involving the distal right internal   carotid artery.    The visualized paranasal sinuses mastoid and middle ear regions appear   clear.    Impression: Acute right MCA infarct.                    MARILY ROGERS M.D., ATTENDING RADIOLOGIST  This document has been electronically signed. Nov 22 2018  2:46PM

## 2018-11-28 NOTE — PROGRESS NOTE ADULT - PROBLEM SELECTOR PLAN 1
-Test BG ac and hs  -Continue with Lantus 5 units q hs  -Continue with Humalog 3 units ac meals> HOLD IF NOT EATING!!  -Discontinue with Humalog low dose correction scales ac and hs at time of discharge  -Plan discussed with pt/team.  Contact info: [phone] (24/7). pager [phone]

## 2018-11-28 NOTE — PROGRESS NOTE ADULT - SUBJECTIVE AND OBJECTIVE BOX
THE PATIENT WAS SEEN AND EXAMINED BY ME WITH THE \[facility]\""TAFF AND STROKE TEAM DURING MORNING ROUNDS.     HPI:  59 y/o woman with PMH of HTN, DM and Stroke x2 (w/ residual left facial droop) who presented to [facility] with left sided hemiplegia, transferred for possible thrombectomy. MAYON 0130. Symptoms unchanged since awakening with them at 0600. Was not a candidate for tPA. Pt denies any recent fevers, chills, HA, dizziness, neck pain, visual changes, CP, SOB, cough, nausea, vomiting, abdominal pain, changes in BMs/urination. NIHSS: 15. MRS: 1.    SUBJECTIVE: No events overnight.  No new neurologic complaints.      aspirin  chewable 81 milliGRAM(s) Oral daily  atorvastatin 80 milliGRAM(s) Oral at bedtime  clopidogrel Tablet 75 milliGRAM(s) Oral daily  dextrose 40% Gel 15 Gram(s) Oral once PRN  dextrose 5%. 1000 milliLiter(s) IV Continuous <Continuous>  dextrose 50% Injectable 12.5 Gram(s) IV Push once  dextrose 50% Injectable 25 Gram(s) IV Push once  dextrose 50% Injectable 25 Gram(s) IV Push once  enoxaparin Injectable 40 milliGRAM(s) SubCutaneous every 24 hours  FLUoxetine 20 milliGRAM(s) Oral daily  glucagon  Injectable 1 milliGRAM(s) IntraMuscular once PRN  insulin glargine Injectable (LANTUS) 5 Unit(s) SubCutaneous at bedtime  insulin lispro (HumaLOG) corrective regimen sliding scale   SubCutaneous at bedtime  insulin lispro (HumaLOG) corrective regimen sliding scale   SubCutaneous three times a day before meals  insulin lispro Injectable (HumaLOG) 3 Unit(s) SubCutaneous three times a day before meals  lisinopril 5 milliGRAM(s) Oral daily  ondansetron Injectable 4 milliGRAM(s) IV Push every 8 hours PRN    PHYSICAL EXAM:   Vital Signs Last 24 Hrs  T(C): 36.6 (28 Nov 2018 05:03), Max: 37.2 (27 Nov 2018 23:12)  T(F): 97.8 (28 Nov 2018 05:03), Max: 99 (27 Nov 2018 23:12)  HR: 78 (28 Nov 2018 05:03) (65 - 78)  BP: 162/71 (28 Nov 2018 05:03) (132/63 - 162/71)  BP(mean): 96 (27 Nov 2018 14:00) (82 - 96)  RR: 18 (28 Nov 2018 05:03) (12 - 18)  SpO2: 97% (28 Nov 2018 05:03) (97% - 100%)    General: No acute distress  HEENT: EOM intact, VFF  Abdomen: Soft, nontender, nondistended   Extremities: No edema    NEUROLOGICAL EXAM:  Mental status: Awake, alert, oriented x3, fluent speech, no neglect, able to follow commands, repetition intact  Cranial Nerves: mild left central facial palsy, VFF, a few beats of nystagmus at end gaze bilaterally,  dysarthria,   Motor exam:  RUE 5/5, RLE 5/5, Left side hemiparesis LUE prox 1/5,distally 0/5, LLE iliopsoas 3/5; anterior tibialis 0/5  Sensation: Moderate-severely decreased light touch and temperature sensation on left   Coordination/ Gait: gait not assessed     LABS:                        11.2   4.6   )-----------( 233      ( 27 Nov 2018 05:39 )             30.7    11-27    135  |  102  |  16  ----------------------------<  117<H>  3.7   |  20<L>  |  0.65    Ca    9.1      27 Nov 2018 05:39    Hemoglobin A1C, Whole Blood: 9.2 % (11-22 @ 08:24)    IMAGING: Reviewed by me.     CAROTID DOPPLERS (11/2/7/18): The stent and stented cervical right common and internal   carotid arteries are widely patent. There is a flow-limiting stenosis of the right external carotid artery.No hemodynamically significant is present on the left.    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 Decreased, but present flow related enhancement of the more distal right   ICA. Previously, no flow related enhancement was identified within the right ICA on CTA from 11/21/2018.    MRA BRAIN: Diminished, but present flow related signal within the skull base and supraclinoid right internal carotid artery. iminished flow related signal within the right MCA.    MRI Head (11/21/18): Acute right MCA infarct.    Head CT, perfussion, CTA Head/Neck (11/21/18)   Brain CT: No identifiable or infarct. Neck   CTA: The abrupt cut off of the proximal right internal carotid artery with associated scattered areas of calcified plaque. This may be   secondary to occlusive thrombus or arterial dissection. A mild narrowing of the takeoff of the right vertebral artery.Normal left internal carotid and left vertebral arteries.    Brain CTA: Markedly diminished flow within the petrous and cavernous segments of the right internal carotid artery with reconstitution of the distal right internal carotid artery likely through collateralization and/or cross filling.. Mildly decreased flow within the right middle cerebral artery as compared with the left without focal stenosis.    Perfusion maps: Mismatch area are of 102 mL with evidence of core infarct and moderately large penumbra. There is a decreased cerebral blood flow and decreased cerebral blood volume in the right frontoparietal region with corresponding elevation of mean transit time. THE PAT[facility].     HPI:  59 y/o woman with PMH of HTN, DM and Stroke x2 (w/ residual left facial droop) who presented to [facility] with left sided hemiplegia, transferred for possible thrombectomy. MAYON 0130. Symptoms unchanged since awakening with them at 0600. Was not a candidate for tPA. Pt denies any recent fevers, chills, HA, dizziness, neck pain, visual changes, CP, SOB, cough, nausea, vomiting, abdominal pain, changes in BMs/urination. NIHSS: 15. MRS: 1.    SUBJECTIVE: No events overnight.  No new neurologic complaints.      ROS: All negative except documented above.    aspirin  chewable 81 milliGRAM(s) Oral daily  atorvastatin 80 milliGRAM(s) Oral at bedtime  clopidogrel Tablet 75 milliGRAM(s) Oral daily  dextrose 40% Gel 15 Gram(s) Oral once PRN  dextrose 5%. 1000 milliLiter(s) IV Continuous <Continuous>  dextrose 50% Injectable 12.5 Gram(s) IV Push once  dextrose 50% Injectable 25 Gram(s) IV Push once  dextrose 50% Injectable 25 Gram(s) IV Push once  enoxaparin Injectable 40 milliGRAM(s) SubCutaneous every 24 hours  FLUoxetine 20 milliGRAM(s) Oral daily  glucagon  Injectable 1 milliGRAM(s) IntraMuscular once PRN  insulin glargine Injectable (LANTUS) 5 Unit(s) SubCutaneous at bedtime  insulin lispro (HumaLOG) corrective regimen sliding scale   SubCutaneous at bedtime  insulin lispro (HumaLOG) corrective regimen sliding scale   SubCutaneous three times a day before meals  insulin lispro Injectable (HumaLOG) 3 Unit(s) SubCutaneous three times a day before meals  lisinopril 5 milliGRAM(s) Oral daily  ondansetron Injectable 4 milliGRAM(s) IV Push every 8 hours PRN    PHYSICAL EXAM:   Vital Signs Last 24 Hrs  T(C): 36.6 (28 Nov 2018 05:03), Max: 37.2 (27 Nov 2018 23:12)  T(F): 97.8 (28 Nov 2018 05:03), Max: 99 (27 Nov 2018 23:12)  HR: 78 (28 Nov 2018 05:03) (65 - 78)  BP: 162/71 (28 Nov 2018 05:03) (132/63 - 162/71)  BP(mean): 96 (27 Nov 2018 14:00) (82 - 96)  RR: 18 (28 Nov 2018 05:03) (12 - 18)  SpO2: 97% (28 Nov 2018 05:03) (97% - 100%)    General: No acute distress  HEENT: EOM intact, VFF  Abdomen: Soft, nontender, nondistended   Extremities: No edema    NEUROLOGICAL EXAM:  Mental status: Awake, alert, oriented x3, fluent speech, no neglect, able to follow commands, repetition intact  Cranial Nerves: mild left central facial palsy, VFF, a few beats of nystagmus at end gaze bilaterally,  dysarthria,   Motor exam:  RUE 5/5, RLE 5/5, Left side hemiparesis LUE prox 1/5,distally 0/5, LLE iliopsoas 3/5; anterior tibialis 0/5  Sensation: Moderate-severely decreased light touch and temperature sensation on left   Coordination/ Gait: gait not assessed     LABS:                        11.2   4.6   )-----------( 233      ( 27 Nov 2018 05:39 )             30.7    11-27    135  |  102  |  16  ----------------------------<  117<H>  3.7   |  20<L>  |  0.65    Ca    9.1      27 Nov 2018 05:39    Hemoglobin A1C, Whole Blood: 9.2 % (11-22 @ 08:24)    IMAGING: Reviewed by me.     CAROTID DOPPLERS (11/2/7/18): The stent and stented cervical right common and internal   carotid arteries are widely patent. There is a flow-limiting stenosis of the right external carotid artery.No hemodynamically significant is present on the left.    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 Decreased, but present flow related enhancement of the more distal right   ICA. Previously, no flow related enhancement was identified within the right ICA on CTA from 11/21/2018.    MRA BRAIN: Diminished, but present flow related signal within the skull base and supraclinoid right internal carotid artery. iminished flow related signal within the right MCA.    MRI Head (11/21/18): Acute right MCA infarct.    Head CT, perfussion, CTA Head/Neck (11/21/18)   Brain CT: No identifiable or infarct. Neck   CTA: The abrupt cut off of the proximal right internal carotid artery with associated scattered areas of calcified plaque. This may be   secondary to occlusive thrombus or arterial dissection. A mild narrowing of the takeoff of the right vertebral artery.Normal left internal carotid and left vertebral arteries.    Brain CTA: Markedly diminished flow within the petrous and cavernous segments of the right internal carotid artery with reconstitution of the distal right internal carotid artery likely through collateralization and/or cross filling.. Mildly decreased flow within the right middle cerebral artery as compared with the left without focal stenosis.    Perfusion maps: Mismatch area are of 102 mL with evidence of core infarct and moderately large penumbra. There is a decreased cerebral blood flow and decreased cerebral blood volume in the right frontoparietal region with corresponding elevation of mean transit time.

## 2018-11-28 NOTE — H&P ADULT - NSHPPHYSICALEXAM_GEN_ALL_CORE
Vitals on arrival  T 98.2  /76  HR 65  O2 96% on room air    ----------------------------------------------------------------------------------------  PHYSICAL EXAM  Constitutional - NAD, Comfortable  HEENT - NCAT, EOMI  Neck - Supple, No limited ROM  Chest - Breathing comfortably, No wheezing  Cardiovascular - S1S2   Abdomen - Soft  Extremities - No C/C/E, No calf tenderness   Neurologic Exam -                    Cognitive - Awake, Alert, AAO to self, place, date, year, situation     Communication - Fluent, No dysarthria, 3 word repetition and recall intact after 5 minutes     Attention: able to spell world backwards     Cranial Nerves - Left sided facial droop- labial, tongue midline,     Motor -                     LEFT    UE - ShAB 0/5, EF 0/5, EE 0/5, WE 0/5,  1/5                    RIGHT UE - ShAB 5/5, EF 5/5, EE 5/5, WE 5/5,  5/5                    LEFT    LE - HF 3/5, KE 1/5, DF 0/5, PF 0/5                    RIGHT LE - HF 5/5, KE 5/5, DF 5/5, PF 5/5        Sensory - decreased sensation to light touch on left side throughout     Coordination - FTN intact on R     Proprioception: intact on R, cannot feel left toe  Psychiatric - Mood stable, Affect WNL

## 2018-11-28 NOTE — PROGRESS NOTE ADULT - ASSESSMENT
57 y/o F w/h/o uncontrolled T2DM, HTN, s/p stroke x2 with L facial drop. Here with L sided hemiplegia found to have CVA in R MCA plus ADRY dissection. Now s/p cerebral angiography and R carotid stent placement 11/26/18. Tolerating POs with improved PO intake  Glycemic control at goal while on present insulin doses. No hypoglycemia. Going to rehab  today

## 2018-11-28 NOTE — PROGRESS NOTE ADULT - SUBJECTIVE AND OBJECTIVE BOX
Subjective: Patient seen and examined. No new events except as noted.   moved out of Stroke unit     REVIEW OF SYSTEMS:    CONSTITUTIONAL: No weakness, fevers or chills  EYES/ENT: No visual changes;  No vertigo or throat pain   NECK: No pain or stiffness  RESPIRATORY: No cough, wheezing, hemoptysis; No shortness of breath  CARDIOVASCULAR: No chest pain or palpitations  GASTROINTESTINAL: No abdominal or epigastric pain. No nausea, vomiting, or hematemesis; No diarrhea or constipation. No melena or hematochezia.  GENITOURINARY: No dysuria, frequency or hematuria  NEUROLOGICAL: No numbness or weakness  SKIN: No itching, burning, rashes, or lesions   All other review of systems is negative unless indicated above.    MEDICATIONS:  MEDICATIONS  (STANDING):  aspirin  chewable 81 milliGRAM(s) Oral daily  atorvastatin 80 milliGRAM(s) Oral at bedtime  clopidogrel Tablet 75 milliGRAM(s) Oral daily  dextrose 5%. 1000 milliLiter(s) (50 mL/Hr) IV Continuous <Continuous>  dextrose 50% Injectable 12.5 Gram(s) IV Push once  dextrose 50% Injectable 25 Gram(s) IV Push once  dextrose 50% Injectable 25 Gram(s) IV Push once  enoxaparin Injectable 40 milliGRAM(s) SubCutaneous every 24 hours  FLUoxetine 20 milliGRAM(s) Oral daily  insulin glargine Injectable (LANTUS) 5 Unit(s) SubCutaneous at bedtime  insulin lispro (HumaLOG) corrective regimen sliding scale   SubCutaneous at bedtime  insulin lispro (HumaLOG) corrective regimen sliding scale   SubCutaneous three times a day before meals  insulin lispro Injectable (HumaLOG) 3 Unit(s) SubCutaneous three times a day before meals  lisinopril 5 milliGRAM(s) Oral daily      PHYSICAL EXAM:  T(C): 37.1 (11-28-18 @ 09:08), Max: 37.2 (11-27-18 @ 23:12)  HR: 73 (11-28-18 @ 09:08) (65 - 78)  BP: 118/62 (11-28-18 @ 09:08) (118/62 - 162/71)  RR: 18 (11-28-18 @ 09:08) (12 - 18)  SpO2: 97% (11-28-18 @ 09:08) (97% - 99%)  Wt(kg): --  I&O's Summary        Appearance: NAD  HEENT:   Normal oral mucosa, PERRL, EOMI	  Lymphatic: No lymphadenopathy  Cardiovascular: Normal S1 S2, No JVD, No murmurs, No edema  Respiratory: Lungs clear to auscultation	  Psychiatry: A & O x 3, Mood & affect appropriate  Gastrointestinal:  Soft, Non-tender, + BS	  Skin: No rashes, No ecchymoses, No cyanosis	  Extremities: Decreased range of motion, No clubbing, cyanosis or edema  Vascular: Peripheral pulses palpable 2+ bilaterally  NEUROLOGICAL EXAM:  Mental status: Awake, alert, oriented x3, fluent speech, no neglect, able to follow commands  Cranial Nerves: mild left central facial palsy, VFF, a few beats of nystagmus at end gaze bilaterally, mild dysarthria,   Motor exam: Normal tone, no drift, RUE 5/5, RLE 5/5, Left side hemiparesis LUE prox/distally 0/5, LLE iliopsoas 4/5; anterior tibialis 0/5  Sensation: Moderate-severely decreased light touch and temperature sensation on left   Coordination/ Gait: No dysmetria    edema      LABS:    CARDIAC MARKERS:                                11.2   4.6   )-----------( 233      ( 27 Nov 2018 05:39 )             30.7     11-27    135  |  102  |  16  ----------------------------<  117<H>  3.7   |  20<L>  |  0.65    Ca    9.1      27 Nov 2018 05:39      proBNP:   Lipid Profile:   HgA1c:   TSH:             TELEMETRY: 	    ECG:  	  RADIOLOGY:   < from: [facility] (11.27.18 @ 10:25) >    EXAM:  CAROTID DUPLEX BILATERAL                            PROCEDURE DATE:  11/27/2018            INTERPRETATION:  Technique: Grayscale, color and spectral Doppler   examination of both carotid arteries was performed.     HISTORY:  A prior CT examination, dated 11/21/2018 showed an occlusion of   the intracranial right internal carotid artery in the region of the   petrous bone and cavernous sinus.    There is a stent spanning the distal right common carotid and proximal   internal carotid artery.    The stent is patent.    There is a normal low resistance pattern of antegrade flow through the   right common carotid artery, through the stent and through the right   internal carotid artery.    Moderate atheromatous plaque is present at the bifurcation of the left   common carotid artery into internal and external branches.    Blood flow velocities are as follows:    RIGHT:    PROX CCA = 97 ;  DIST CCA = 66 ;  PROX ICA = 87 ;  DIST ICA =   121 ;  ECA = 200    LEFT   :    PROX CCA = 134 ;DIST CCA = 89 ;  PROX ICA = 115 ;  DIST ICA   = 97 ;  ECA = 111    There is antegrade flow through both vertebral arteries.    IMPRESSION: The stent and stented cervical right common and internal   carotid arteries are widely patent.  There is a flow-limiting stenosis of the right external carotid artery.  No hemodynamically significant is present on the left.    Measurement of carotid stenosis is based on velocity parameters that   correlate the residual internal carotid diameter with that of the more   distal vessel in accordance with a method such as the [place] [employer]   Symptomatic Carotid Endarterectomy Trial (NASCET).                            ALVERTO JACKSON M.D., ATTENDING RADIOLOGIST  This document has been electronically signed. Nov 27 2018 11:10AM                < end of copied text >    DIAGNOSTIC TESTING:  [ ] Echocardiogram:  [ ]  Catheterization:  [ ] Stress Test:    OTHER:

## 2018-11-28 NOTE — PROGRESS NOTE ADULT - ASSESSMENT
58-year-old right-handed white lady first evaluated at [facility] on 11/21/18 with left hemiparesis. 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 Perhaps her previous strokes were due to small vessel disease. On 11/21/18, she awoke with a dense left hemiparesis. CT head (11/21/18) showed a chronic right lentiform nucleus lacunar infarct. CTA neck (11/21/18) showed a probable right ICA occlusion with some calcified plaque. CTA head (11/21/18) showed that the right ICA reconstituted perhaps through the ACOM and possibly a large right PCOM. CTP (11/21/18) showed a core infarct of 27 cc and a large hypoperfused area of 125 cc in the right MCA distribution. Given that there was no intracranial occlusion, she was excluded from endovascular thrombectomy. MRI brain subsequently showed R MCA distribution infarct. MRA head and neck to my eye showed severe to critical stenosis of proximal/extracranial right ICA. Conventional angiogram confirmed severe proximal/extracranial right ICA stenosis s/p carotid artery stenting for secondary stroke prevention in this patient with symptomatic right ICA severe stenosis.     Impression:   Cerebral embolism with cerebral infarction. Right MCA distribution stroke - likely etiology being large vessel disease i.e. symptomatic extracranial R ICA severe stenosis leading to artery to artery embolism     NEURO: Neuro exam without acute change, continue close monitoring for neurologic deterioration, permissive hypertension; underwent selective cerebral angiography on  11/24/18 to eval for degree of sx stenosis, dual antiplatelet  and stain for secondary stroke prevention, titrate statin to LDL goal less than 70,MRI Brain, MRA head/Neck with T1 Fat sat results as above. CTA Head/Neck results as above, Physical therapy/OT recommends AR.  Patient enrolled in Stroke AF trial and randomized to a LOOP.     ANTITHROMBOTIC THERAPY:  ASA/Plavix for possible right carotid stent which was placed on 11/26 for symptomatic extracranial right ICA severe to critical stenosis. P2Y12 156 suggestive of optimal platelet inhibition with clopidogrel     PULMONARY: CXR (11/24/18) clear, protecting airway, saturating well     CARDIOVASCULAR:  TTE: ef 75%, minimal MR , mild TR cardiac monitoring no events, maintain HTN, Dr Wheeler (Cardiology) consult appreciated, enrolled in AF study      SBP goal: <160    GASTROINTESTINAL: Dysphagia screen passed, tolerating diet      Diet: Regular    RENAL: good urine output, hyponatremia resolved,  will continue to monitor, KCl for hypokalemia.       Na Goal: Greater than 135     Harper: N    HEMATOLOGY: no signs of bleeding noted     DVT ppx:  LMWH     ID: afebrile, no overt sign of infection noted     OTHER: Endocrine consulted for A1C of 9.2: continue correctional scale, hold off on lantus. -On discharge, patient going to acute rehab, so would c/w insulin, regimen to be determined. When home, would likely add metformin- titrated up to max dose as well as glipizide, endocrine consult appreciated. Plan/goals of care discussed with pt at bedside. [facility].     DISPOSITION: Acute rehab pending bed    CORE MEASURES:        Admission NIHSS: 15     TPA:  NO      LDL/HDL: 106/39     Depression Screen: 0     Statin Therapy: Y     Dysphagia Screen: PASS     Smoking  NO      Afib NO     Stroke Education YES 58-year-old right-handed white lady first evaluated at [facility] on 11/21/18 with left hemiparesis. 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 Perhaps her previous strokes were due to small vessel disease. On 11/21/18, she awoke with a dense left hemiparesis. CT head (11/21/18) showed a chronic right lentiform nucleus lacunar infarct. CTA neck (11/21/18) showed a probable right ICA occlusion with some calcified plaque. CTA head (11/21/18) showed that the right ICA reconstituted perhaps through the ACOM and possibly a large right PCOM. CTP (11/21/18) showed a core infarct of 27 cc and a large hypoperfused area of 125 cc in the right MCA distribution. Given that there was no intracranial occlusion, she was excluded from endovascular thrombectomy. MRI brain subsequently showed R MCA distribution infarct. MRA head and neck to my eye showed severe to critical stenosis of proximal/extracranial right ICA. Conventional angiogram confirmed severe proximal/extracranial right ICA stenosis s/p carotid artery stenting for secondary stroke prevention in this patient with symptomatic right ICA severe stenosis.     Impression:   Cerebral embolism with cerebral infarction. Right MCA distribution stroke - likely etiology being large vessel disease i.e. symptomatic extracranial R ICA severe stenosis leading to artery to artery embolism     NEURO: Neuro exam without acute change, underwent selective cerebral angiography on  11/24/18 to eval for degree of sx stenosis, dual antiplatelet  and stain for secondary stroke prevention, titrate statin to LDL goal less than 70,MRI Brain, MRA head/Neck with T1 Fat sat results as above. CTA Head/Neck results as above, Physical therapy/OT recommends AR.  Patient enrolled in Stroke AF trial and randomized to a LOOP.     ANTITHROMBOTIC THERAPY:  ASA/Plavix for possible right carotid stent which was placed on 11/26 for symptomatic extracranial right ICA severe to critical stenosis. P2Y12 156 suggestive of optimal platelet inhibition with clopidogrel     PULMONARY: CXR (11/24/18) clear, protecting airway, saturating well     CARDIOVASCULAR:  TTE: ef 75%, minimal MR , mild TR cardiac monitoring no events, maintain HTN, Dr Wheeler (Cardiology) consult appreciated, enrolled in AF study      SBP goal: <160    GASTROINTESTINAL: Dysphagia screen passed, tolerating diet      Diet: Regular    RENAL: good urine output, hyponatremia resolved,  will continue to monitor, KCl for hypokalemia.       Na Goal: Greater than 135     Harper: N    HEMATOLOGY: no signs of bleeding noted     DVT ppx:  LMWH     ID: afebrile, no overt sign of infection noted     OTHER: Endocrine consulted for A1C of 9.2: continue correctional scale, hold off on lantus. -On discharge, patient going to acute rehab, so would c/w insulin, regimen to be determined. When home, would likely add metformin- titrated up to max dose as well as glipizide, endocrine consult appreciated. Plan/goals of care discussed with pt at bedside. [facility].     DISPOSITION: Acute rehab    CORE MEASURES:        Admission NIHSS: 15     TPA:  NO      LDL/HDL: 106/39     Depression Screen: 0     Statin Therapy: Y     Dysphagia Screen: PASS     Smoking  NO      Afib NO     Stroke Education YES 58-year-old right-handed white lady first evaluated at [facility] on 11/21/18 with left hemiparesis. 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 Perhaps her previous strokes were due to small vessel disease. On 11/21/18, she awoke with a dense left hemiparesis. CT head (11/21/18) showed a chronic right lentiform nucleus lacunar infarct. CTA neck (11/21/18) showed a probable right ICA occlusion with some calcified plaque. CTA head (11/21/18) showed that the right ICA reconstituted perhaps through the ACOM and possibly a large right PCOM. CTP (11/21/18) showed a core infarct of 27 cc and a large hypoperfused area of 125 cc in the right MCA distribution. Given that there was no intracranial occlusion, she was excluded from endovascular thrombectomy. MRI brain subsequently showed R MCA distribution infarct. MRA head and neck to my eye showed severe to critical stenosis of proximal/extracranial right ICA. Conventional angiogram confirmed severe proximal/extracranial right ICA stenosis s/p carotid artery stenting for secondary stroke prevention in this patient with symptomatic right ICA severe stenosis.     Impression:   Cerebral embolism with cerebral infarction. Right MCA distribution stroke - likely etiology being large vessel disease i.e. symptomatic extracranial R ICA severe stenosis leading to artery to artery embolism     NEURO: Neuro exam without acute change, underwent selective cerebral angiography on 11/24/18 to eval for degree of sx stenosis, dual antiplatelet and stain for secondary stroke prevention, atorvastatin 80 mg q HS considering likely atheroembolic etiology of her stroke, MRI Brain, MRA head/Neck with T1 Fat sat results as above. CTA Head/Neck results as above, Physical therapy/OT recommends AR.  Patient enrolled in Stroke AF trial and randomized to a LOOP.     ANTITHROMBOTIC THERAPY:  ASA/clopidogrel for right carotid stent which was placed on 11/26 for symptomatic extracranial right ICA severe to critical stenosis. P2Y12 156 suggestive of optimal platelet inhibition with clopidogrel     PULMONARY: CXR (11/24/18) clear, protecting airway, saturating well     CARDIOVASCULAR:  TTE: LVEF 75%, minimal MR , mild TR cardiac monitoring no events, maintain HTN, Dr Wheeler (Cardiology) consult appreciated, enrolled in Stroke-AF study      SBP goal: Gradual normotension in the setting of recent ION     GASTROINTESTINAL: Dysphagia screen passed, tolerating diet      Diet: Regular    RENAL: good urine output, hyponatremia resolved,  will continue to monitor, KCl for hypokalemia.       Na Goal: Greater than 135     Harper: N    HEMATOLOGY: no signs of bleeding noted     DVT ppx:  LMWH     ID: afebrile, no overt sign of infection noted     OTHER: Endocrine consulted for A1C of 9.2: continue correctional scale, hold off on lantus - On discharge, patient going to acute rehab, so would c/w insulin, regimen to be determined. When home, would likely add metformin- titrated up to max dose as well as glipizide, endocrine consult appreciated. Plan/goals of care discussed with pt at bedside. [facility].     DISPOSITION: Acute rehab    CORE MEASURES:        Admission NIHSS: 15     TPA:  NO      LDL/HDL: 106/39     Depression Screen: 0     Statin Therapy: Y     Dysphagia Screen: PASS     Smoking  NO      Afib NO     Stroke Education YES

## 2018-11-28 NOTE — H&P ADULT - NSHPREVIEWOFSYSTEMS_GEN_ALL_CORE
REVIEW OF SYSTEMS  Constitutional: No fever, No Chills, No fatigue  HEENT: No eye pain, No visual disturbances, No difficulty hearing  Pulm: No cough,  No shortness of breath  Cardio: No chest pain, No palpitations  GI:  No abdominal pain, No nausea, No vomiting, No diarrhea, No constipation  : No dysuria, No frequency, No hematuria  Neuro: No headaches, No memory loss, No loss of strength, No numbness, No tremors  Skin: lesion left buttock  Endo: No temperature intolerance  MSK: No joint pain, No joint swelling, No muscle pain, No Neck or back pain  Psych:  No depression, No anxiety

## 2018-11-28 NOTE — H&P ADULT - HISTORY OF PRESENT ILLNESS
58-year-old right-handed white lady first evaluated at [facility] on 11/21/18 with left hemiparesis. 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 Perhaps her previous strokes were due to small vessel disease. On 11/21/18, she awoke with a dense left hemiparesis. CT head (11/21/18) showed a chronic right lentiform nucleus lacunar infarct. CTA neck (11/21/18) showed a probable right ICA occlusion with some calcified plaque. CTA head (11/21/18) showed that the right ICA reconstituted perhaps through the ACOM and possibly a large right PCOM. CTP (11/21/18) showed a core infarct of 27 cc and a large hypoperfused area of 125 cc in the right MCA distribution. Given that there was no intracranial occlusion, she was excluded from endovascular thrombectomy. MRI brain subsequently showed R MCA distribution infarct. MRA head and neck to my eye showed severe to critical stenosis of proximal/extracranial right ICA. Conventional angiogram confirmed severe proximal/extracranial right ICA stenosis s/p carotid artery stenting for secondary stroke prevention in this patient with symptomatic right ICA severe stenosis.     Impression:   Cerebral embolism with cerebral infarction. Right MCA distribution stroke - likely etiology being large vessel disease i.e. symptomatic extracranial R ICA severe stenosis leading to artery to artery embolism   NEURO: Neuro exam without acute change, continue close monitoring for neurologic deterioration, permissive hypertension; underwent selective cerebral angiography on  11/24/18 to eval for degree of sx stenosis, dual antiplatelet  and stain for secondary stroke prevention, titrate statin to LDL goal less than 70,MRI Brain, MRA head/Neck with T1 Fat sat results as above. CTA Head/Neck results as above, Physical therapy/OT recommends AR.  Patient enrolled in Stroke AF trial and randomized to a LOOP  .  Dysphagia: passed, regular diet  Pt was started on Asa 81mg and Atorvastatin 80mg.   CAROTID DOPPLERS (11/2/7/18): The stent and stented cervical right common and internal   carotid arteries are widely patent. There is a flow-limiting stenosis of the right external carotid artery.No hemodynamically significant is present on the left.    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 Decreased, but present flow related enhancement of the more distal right   ICA. Previously, no flow related enhancement was identified within the right ICA on CTA from 11/21/2018.    MRA BRAIN: Diminished, but present flow related signal within the skull base and supraclinoid right internal carotid artery. iminished flow related signal within the right MCA.    MRI Head (11/21/18): Acute right MCA infarct.  A1c was 9.2; Endocrine recommended follow up with Dr Gaspar as outpatient, glipizide stopped, changed to lantus 5 units daily and humalog 3 units with meals TID  TTE: unremarkable  Cardiology recommended TTE with bubble study     Etiology of pt’s CVA is likely cardioembolic 58-year-old right-handed white lady first evaluated at [facility] on 11/21/18 with left hemiparesis. 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 On 11/21/18, she awoke with a dense left hemiparesis. CT head (11/21/18) showed a chronic right lentiform nucleus lacunar infarct. CTA neck (11/21/18) showed a probable right ICA occlusion with some calcified plaque. CTA head (11/21/18) showed that the right ICA reconstituted perhaps through the ACOM and possibly a large right PCOM. No intracranial occlusion, she was excluded from endovascular thrombectomy. MRI brain subsequently showed R MCA distribution infarct. MRA head and neck showed severe to critical stenosis of proximal  /extracranial right ICA. Conventional angiogram confirmed severe proximal/extracranial right ICA stenosis s/p carotid artery stenting for secondary stroke prevention in this patient with symptomatic right ICA severe stenosis.   Impression:   Cerebral embolism with cerebral infarction. Right MCA distribution stroke - likely etiology being large vessel disease i.e. symptomatic extracranial R ICA severe stenosis leading to artery to artery embolism. Selective cerebral angiography on 11/24/18. Patient enrolled in Stroke AF trial and randomized to a LOOP. ASA/Plavix/Lipitor. Dysphagia: passed, regular diet.  CAROTID DOPPLERS (11/2/7/18): The stent and stented cervical right common and internal carotid arteries are widely patent. There is a flow-limiting stenosis of the right external carotid artery. No hemodynamically significant is present on the left.  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 Decreased, but present flow related enhancement of the more distal right ICA. Previously, no flow related enhancement was identified within the right ICA on CTA from 11/21/2018.  MRA BRAIN: Diminished, but present flow related signal within the skull base and supraclinoid right internal carotid artery. Diminished flow related signal within the right MCA.  MRI Head (11/21/18): Acute right MCA infarct.  A1c was 9.2; TTE: unremarkable. 58-year-old right-handed female to [facility] on 11/21/18 with new onset left hemiparesis upon awaking. 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 CT head (11/21/18) showed a chronic right lentiform nucleus lacunar infarct. CTA neck (11/21/18) showed a probable right ICA occlusion with some calcified plaque. CTA head (11/21/18) showed that the right ICA reconstituted perhaps through the ACOM and possibly a large right PCOM. No intracranial occlusion, she was excluded from endovascular thrombectomy. MRI brain subsequently showed R MCA distribution infarct. MRA head and neck showed severe to critical stenosis of proximal  /extracranial right ICA. Conventional angiogram confirmed severe proximal/extracranial right ICA stenosis s/p carotid artery stenting for secondary stroke prevention in this patient with symptomatic right ICA severe stenosis.   Impression:   Cerebral embolism with cerebral infarction. Right MCA distribution stroke - likely etiology being large vessel disease i.e. symptomatic extracranial R ICA severe stenosis leading to artery to artery embolism. Selective cerebral angiography on 11/24/18. Patient enrolled in Stroke AF trial and randomized to a LOOP. ASA/Plavix/Lipitor. Dysphagia: passed, regular diet.  CAROTID DOPPLERS (11/2/7/18): The stent and stented cervical right common and internal carotid arteries are widely patent. There is a flow-limiting stenosis of the right external carotid artery. No hemodynamically significant is present on the left.  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 Decreased, but present flow related enhancement of the more distal right ICA. Previously, no flow related enhancement was identified within the right ICA on CTA from 11/21/2018.  MRA BRAIN: Diminished, but present flow related signal within the skull base and supraclinoid right internal carotid artery. Diminished flow related signal within the right MCA.  MRI Head (11/21/18): Acute right MCA infarct.  A1c was 9.2; TTE: unremarkable.

## 2018-11-28 NOTE — H&P ADULT - NSHPSOCIALHISTORY_GEN_ALL_CORE
Denies Smoking  Occasional Alcohol  No other drug use    Lives in upstairs apartment with her brother and brothers girlfriend. 4 steps to enter with handrails, she can stay in nephews room on first floor- otherwise 11 more steps to her upstairs apartment.  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   Was independent in ADLs and ambulation, no assistive device.    Bed Mobility- min to mod assist  Transfers: mod assist  Gait: mod assist 2 person 3 steps bed to stretcher

## 2018-11-28 NOTE — H&P ADULT - ASSESSMENT
59 yo F c prior hx of CVAs, now s/p right MCA CVA c functional deficits.      Precautions:  fall                                                                                Diet: diabetic, reg c thins    DVT Prophylaxis:     lovenox                                                                     Medical Prognosis: good    Prescreen Comparison: I have reviewed the prescreen information and I found no relevant changes between the preadmission  screening and my post admission evaluation.     Expected Therapy:   P.T.   1     hrs/day           O. T.   1   hrs/day           S.L.P.    1    hrs/day                    P&O  eval                                                 Excpected Frequency: 5 days/7 day period    Rehab Potential:          good                                 Estimated Disposition:    good                      ELOS:   14d           days      Rationale For Inpatient Rehab Admission- Patient demonstrates the following:     [X] Medically appropriate for rehabilitation admission   [X] Has attainable rehab goals with an approrpriate discharge plan  [X] Has rehabilitation potential (expected to make significant improvement within a reasonable period of time)  [X] Requires close medical management by a rehab physician, rehab nursing care and comprehensive interdisciplinary team (including         PT, OT, SLP and/or prosthetics and orthotics) 57 yo F c prior hx of CVAs, now s/p right MCA CVA c functional deficits.    #Right MCA distribution stroke - likely etiology being large vessel disease i.e. symptomatic extracranial R ICA severe stenosis leading to artery to artery embolism s/p R carotid stent  -Aspirin, Plavix, Statin  -Fluoxetine for motor recovery    #HTN  -Lisinopril    #Uncontrolled DM A1C 9.2  -Lantus, SHELLY        Precautions:  fall                                                                                  Diet: diabetic, reg c thins    DVT Prophylaxis:     lovenox                                                                         Medical Prognosis: good    Prescreen Comparison: I have reviewed the prescreen information and I found no relevant changes between the preadmission  screening and my post admission evaluation.     Expected Therapy:   P.T.   1     hrs/day           O. T.   1   hrs/day           S.L.P.    1    hrs/day                    P&O  eval                                                 Excpected Frequency: 5 days/7 day period    Rehab Potential:          good                                 Estimated Disposition:    good                      ELOS:   14d           days      Rationale For Inpatient Rehab Admission- Patient demonstrates the following:     [X] Medically appropriate for rehabilitation admission   [X] Has attainable rehab goals with an approrpriate discharge plan  [X] Has rehabilitation potential (expected to make significant improvement within a reasonable period of time)  [X] Requires close medical management by a rehab physician, rehab nursing care and comprehensive interdisciplinary team (including         PT, OT, SLP and/or prosthetics and orthotics)

## 2018-11-28 NOTE — PROGRESS NOTE ADULT - ASSESSMENT
59 yo female admitted to [facility]roke [facility] with Cerebral embolism with infarction at right MCA territory etiology artery to artery embolism due to high suspicion for right internal carotid artery dissection.

## 2018-11-28 NOTE — PROGRESS NOTE ADULT - NSHPATTENDINGPLANDISCUSS_GEN_ALL_CORE
ALINA Monreal and Key NEVAREZ vascular neurology fellow
Neurology residents, PA, NP and medical students on stroke service
Neurology residents, PA, NP and medical students on stroke service
Neurology resident, PA, NP and medical students on stroke service
neurology PA and stroke fellow
neurology residents, PA and NP
ALINA Monreal

## 2018-11-29 DIAGNOSIS — I10 ESSENTIAL (PRIMARY) HYPERTENSION: ICD-10-CM

## 2018-11-29 DIAGNOSIS — I63.9 CEREBRAL INFARCTION, UNSPECIFIED: ICD-10-CM

## 2018-11-29 DIAGNOSIS — I63.231 CEREBRAL INFARCTION DUE TO UNSPECIFIED OCCLUSION OR STENOSIS OF RIGHT CAROTID ARTERIES: ICD-10-CM

## 2018-11-29 DIAGNOSIS — E11.9 TYPE 2 DIABETES MELLITUS WITHOUT COMPLICATIONS: ICD-10-CM

## 2018-11-29 LAB
ALBUMIN SERPL ELPH-MCNC: 2.9 G/DL — LOW (ref 3.3–5)
ALP SERPL-CCNC: 92 U/L — SIGNIFICANT CHANGE UP (ref 40–120)
ALT FLD-CCNC: 63 U/L DA — HIGH (ref 10–45)
ANION GAP SERPL CALC-SCNC: 9 MMOL/L — SIGNIFICANT CHANGE UP (ref 5–17)
AST SERPL-CCNC: 51 U/L — HIGH (ref 10–40)
BASOPHILS # BLD AUTO: 0 K/UL — SIGNIFICANT CHANGE UP (ref 0–0.2)
BASOPHILS NFR BLD AUTO: 0.8 % — SIGNIFICANT CHANGE UP (ref 0–2)
BILIRUB SERPL-MCNC: 0.5 MG/DL — SIGNIFICANT CHANGE UP (ref 0.2–1.2)
BUN SERPL-MCNC: 18 MG/DL — SIGNIFICANT CHANGE UP (ref 7–23)
CALCIUM SERPL-MCNC: 9.4 MG/DL — SIGNIFICANT CHANGE UP (ref 8.4–10.5)
CHLORIDE SERPL-SCNC: 102 MMOL/L — SIGNIFICANT CHANGE UP (ref 96–108)
CO2 SERPL-SCNC: 27 MMOL/L — SIGNIFICANT CHANGE UP (ref 22–31)
CREAT SERPL-MCNC: 0.77 MG/DL — SIGNIFICANT CHANGE UP (ref 0.5–1.3)
CULTURE RESULTS: SIGNIFICANT CHANGE UP
CULTURE RESULTS: SIGNIFICANT CHANGE UP
EOSINOPHIL # BLD AUTO: 0.1 K/UL — SIGNIFICANT CHANGE UP (ref 0–0.5)
EOSINOPHIL NFR BLD AUTO: 1.1 % — SIGNIFICANT CHANGE UP (ref 0–6)
GLUCOSE BLDC GLUCOMTR-MCNC: 169 MG/DL — HIGH (ref 70–99)
GLUCOSE BLDC GLUCOMTR-MCNC: 184 MG/DL — HIGH (ref 70–99)
GLUCOSE BLDC GLUCOMTR-MCNC: 204 MG/DL — HIGH (ref 70–99)
GLUCOSE BLDC GLUCOMTR-MCNC: 208 MG/DL — HIGH (ref 70–99)
GLUCOSE SERPL-MCNC: 178 MG/DL — HIGH (ref 70–99)
HCT VFR BLD CALC: 31.8 % — LOW (ref 34.5–45)
HGB BLD-MCNC: 11 G/DL — LOW (ref 11.5–15.5)
LYMPHOCYTES # BLD AUTO: 1.1 K/UL — SIGNIFICANT CHANGE UP (ref 1–3.3)
LYMPHOCYTES # BLD AUTO: 20.8 % — SIGNIFICANT CHANGE UP (ref 13–44)
MCHC RBC-ENTMCNC: 30.3 PG — SIGNIFICANT CHANGE UP (ref 27–34)
MCHC RBC-ENTMCNC: 34.7 GM/DL — SIGNIFICANT CHANGE UP (ref 32–36)
MCV RBC AUTO: 87.3 FL — SIGNIFICANT CHANGE UP (ref 80–100)
MONOCYTES # BLD AUTO: 0.6 K/UL — SIGNIFICANT CHANGE UP (ref 0–0.9)
MONOCYTES NFR BLD AUTO: 10.2 % — SIGNIFICANT CHANGE UP (ref 2–14)
NEUTROPHILS # BLD AUTO: 3.6 K/UL — SIGNIFICANT CHANGE UP (ref 1.8–7.4)
NEUTROPHILS NFR BLD AUTO: 67.1 % — SIGNIFICANT CHANGE UP (ref 43–77)
PLATELET # BLD AUTO: 266 K/UL — SIGNIFICANT CHANGE UP (ref 150–400)
POTASSIUM SERPL-MCNC: 3.8 MMOL/L — SIGNIFICANT CHANGE UP (ref 3.5–5.3)
POTASSIUM SERPL-SCNC: 3.8 MMOL/L — SIGNIFICANT CHANGE UP (ref 3.5–5.3)
PROT SERPL-MCNC: 6.9 G/DL — SIGNIFICANT CHANGE UP (ref 6–8.3)
RBC # BLD: 3.64 M/UL — LOW (ref 3.8–5.2)
RBC # FLD: 11.1 % — SIGNIFICANT CHANGE UP (ref 10.3–14.5)
SODIUM SERPL-SCNC: 138 MMOL/L — SIGNIFICANT CHANGE UP (ref 135–145)
SPECIMEN SOURCE: SIGNIFICANT CHANGE UP
SPECIMEN SOURCE: SIGNIFICANT CHANGE UP
WBC # BLD: 5.4 K/UL — SIGNIFICANT CHANGE UP (ref 3.8–10.5)
WBC # FLD AUTO: 5.4 K/UL — SIGNIFICANT CHANGE UP (ref 3.8–10.5)

## 2018-11-29 PROCEDURE — 99232 SBSQ HOSP IP/OBS MODERATE 35: CPT

## 2018-11-29 PROCEDURE — 99255 IP/OBS CONSLTJ NEW/EST HI 80: CPT

## 2018-11-29 RX ORDER — INSULIN GLARGINE 100 [IU]/ML
7 INJECTION, SOLUTION SUBCUTANEOUS AT BEDTIME
Qty: 0 | Refills: 0 | Status: DISCONTINUED | OUTPATIENT
Start: 2018-11-29 | End: 2018-11-30

## 2018-11-29 RX ORDER — ACETAMINOPHEN 500 MG
650 TABLET ORAL EVERY 6 HOURS
Qty: 0 | Refills: 0 | Status: DISCONTINUED | OUTPATIENT
Start: 2018-11-29 | End: 2018-12-21

## 2018-11-29 RX ADMIN — Medication 650 MILLIGRAM(S): at 10:26

## 2018-11-29 RX ADMIN — Medication 20 MILLIGRAM(S): at 11:37

## 2018-11-29 RX ADMIN — Medication 650 MILLIGRAM(S): at 11:13

## 2018-11-29 RX ADMIN — Medication 2: at 11:41

## 2018-11-29 RX ADMIN — Medication 2: at 16:46

## 2018-11-29 RX ADMIN — CLOPIDOGREL BISULFATE 75 MILLIGRAM(S): 75 TABLET, FILM COATED ORAL at 11:37

## 2018-11-29 RX ADMIN — INSULIN GLARGINE 7 UNIT(S): 100 INJECTION, SOLUTION SUBCUTANEOUS at 21:48

## 2018-11-29 RX ADMIN — ATORVASTATIN CALCIUM 80 MILLIGRAM(S): 80 TABLET, FILM COATED ORAL at 21:48

## 2018-11-29 RX ADMIN — Medication 1: at 08:10

## 2018-11-29 RX ADMIN — Medication 81 MILLIGRAM(S): at 11:37

## 2018-11-29 RX ADMIN — ENOXAPARIN SODIUM 40 MILLIGRAM(S): 100 INJECTION SUBCUTANEOUS at 06:16

## 2018-11-29 RX ADMIN — LISINOPRIL 5 MILLIGRAM(S): 2.5 TABLET ORAL at 06:16

## 2018-11-29 NOTE — CHART NOTE - NSCHARTNOTEFT_GEN_A_CORE
Skin check:   Non blanchable erythema to sacral-coccygeal area measuring approx 1x2cm with intact periwound and blanchable erythema.   Blanchable erythema to left buttock.   Skin intact to elbows, heels, back. No skin breakdown noted in these areas.   A/P   Stage 1 pressure wound to sacral-coccygeal area. Continue offloading pressure with frequent turning and repositioning.   Apply critic aid to buttocks and sacral areas BID and PRN to protect skin from urine (patient reports occasional incontinence when she can not be taken to the restroom quick enough). Patient verbalizes that she will try to wear cotton underwear during the day to avoid use of the briefs.   Patient verbalized understanding of turning/repositioning q2hrs while in bed, keeping HOB less then 30 degrees while sleeping and shifting weight while in wc about every 15 minutes.   Encouraged to continue good po intake of food and liquids. Will add MVI-patient amenable.

## 2018-11-29 NOTE — PROGRESS NOTE ADULT - ASSESSMENT
58 year old female with prior history of CVAs, now s/p right MCA CVA with functional deficits.    >Right MCA CVA - likely etiology being large vessel disease i.e. symptomatic extracranial right ICA severe stenosis leading to artery to artery embolism s/p right carotid stent  -She underwent selective cerebral angiography on 11/24/18.  -Continue Aspirin, Plavix, Statin  -Continue Fluoxetine for motor recovery  -Continue close monitoring for neurologic deterioration,   - Continue PT/OT/ rehab.  Patient enrolled in Stroke AF trial and randomized to a LOOP        >HTN  -BP controlled  -Lisinopril    >Uncontrolled DM II   - Hb A1C 9.2  - Accu-Cheks at goal  -Continue Lantus 5U, SHELLY    >Precautions:  fall, aspiration, decub, seizure                                                                               >DVT Prophylaxis: Lovenox

## 2018-11-29 NOTE — PROGRESS NOTE ADULT - SUBJECTIVE AND OBJECTIVE BOX
CHIEF COMPLAINT: No acute events overnight, NAD.       HISTORY OF PRESENT ILLNESS  58-year-old right-handed female to [facility] on 11/21/18 with new onset left hemiparesis upon awaking. 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 CT head (11/21/18) showed a chronic right lentiform nucleus lacunar infarct. CTA neck (11/21/18) showed a probable right ICA occlusion with some calcified plaque. CTA head (11/21/18) showed that the right ICA reconstituted perhaps through the ACOM and possibly a large right PCOM. No intracranial occlusion, she was excluded from endovascular thrombectomy. MRI brain subsequently showed R MCA distribution infarct. MRA head and neck showed severe to critical stenosis of proximal  /extracranial right ICA. Conventional angiogram confirmed severe proximal/extracranial right ICA stenosis s/p carotid artery stenting for secondary stroke prevention in this patient with symptomatic right ICA severe stenosis.   Impression:   Cerebral embolism with cerebral infarction. Right MCA distribution stroke - likely etiology being large vessel disease i.e. symptomatic extracranial R ICA severe stenosis leading to artery to artery embolism. Selective cerebral angiography on 11/24/18. Patient enrolled in Stroke AF trial and randomized to a LOOP. ASA/Plavix/Lipitor. Dysphagia: passed, regular diet.  CAROTID DOPPLERS (11/2/7/18): The stent and stented cervical right common and internal carotid arteries are widely patent. There is a flow-limiting stenosis of the right external carotid artery. No hemodynamically significant is present on the left.  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 Decreased, but present flow related enhancement of the more distal right ICA. Previously, no flow related enhancement was identified within the right ICA on CTA from 11/21/2018.  MRA BRAIN: Diminished, but present flow related signal within the skull base and supraclinoid right internal carotid artery. Diminished flow related signal within the right MCA.  MRI Head (11/21/18): Acute right MCA infarct.  A1c was 9.2; TTE: unremarkable. (28 Nov 2018 13:35)      PAST MEDICAL & SURGICAL HISTORY:  Hypertension  CVA (cerebral vascular accident)       REVIEW OF SYMPTOMS  [X] Constitutional WNL     [X] Cardio WNL            [X] Resp WNL           [X] GI WNL                          [X]  WNL                   [X] Heme WNL              [X] Endo WNL                     [X] Skin WNL                 [X] MSK WNL                              [X] Cognitive WNL        [X] Psych WNL      VITALS  Vital Signs Last 24 Hrs  T(C): 36.9 (29 Nov 2018 10:08), Max: 36.9 (29 Nov 2018 10:08)  T(F): 98.4 (29 Nov 2018 10:08), Max: 98.4 (29 Nov 2018 10:08)  HR: 76 (29 Nov 2018 10:08) (65 - 76)  BP: 121/64 (29 Nov 2018 10:08) (121/64 - 142/75)  BP(mean): --  RR: 14 (29 Nov 2018 10:08) (14 - 14)  SpO2: 99% (29 Nov 2018 10:08) (96% - 99%)      PHYSICAL EXAM  Constitutional - NAD, Comfortable  HEENT - NCAT, EOMI  Neck - Supple, No limited ROM  Chest - CTA bilaterally, No wheeze, No rhonchi, No crackles  Cardiovascular - RRR, S1S2, No murmurs  Abdomen - BS+, Soft, NTND  Extremities - No C/C/E, No calf tenderness   Skin-no rash  Wounds-na      Neurologic Exam -                  Psychiatric - Mood stable, Affect WNL         FUNCTIONAL PROGRESS  Gait - eval   ADLs - eval  Transfers -eval  Functional transfer - eval    RECENT LABS                        11.0   5.4   )-----------( 266      ( 29 Nov 2018 07:12 )             31.8     11-29    138  |  102  |  18  ----------------------------<  178<H>  3.8   |  27  |  0.77    Ca    9.4      29 Nov 2018 07:12    TPro  6.9  /  Alb  2.9<L>  /  TBili  0.5  /  DBili  x   /  AST  51<H>  /  ALT  63<H>  /  AlkPhos  92  11-29      LIVER FUNCTIONS - ( 29 Nov 2018 07:12 )  Alb: 2.9 g/dL / Pro: 6.9 g/dL / ALK PHOS: 92 U/L / ALT: 63 U/L DA / AST: 51 U/L / GGT: x             Direct LDL: 106 mg/dL (11-24-18 @ 07:18)    Hemoglobin A1C, Whole Blood: 9.2 % (11-22-18 @ 08:24)              RADIOLOGY/OTHER RESULTS      CURRENT MEDICATIONS  MEDICATIONS  (STANDING):  aspirin  chewable 81 milliGRAM(s) Oral daily  atorvastatin 80 milliGRAM(s) Oral at bedtime  clopidogrel Tablet 75 milliGRAM(s) Oral daily  dextrose 5%. 1000 milliLiter(s) (50 mL/Hr) IV Continuous <Continuous>  dextrose 50% Injectable 12.5 Gram(s) IV Push once  dextrose 50% Injectable 25 Gram(s) IV Push once  dextrose 50% Injectable 25 Gram(s) IV Push once  enoxaparin Injectable 40 milliGRAM(s) SubCutaneous every 24 hours  FLUoxetine 20 milliGRAM(s) Oral daily  insulin glargine Injectable (LANTUS) 7 Unit(s) SubCutaneous at bedtime  insulin lispro (HumaLOG) corrective regimen sliding scale   SubCutaneous three times a day before meals  insulin lispro (HumaLOG) corrective regimen sliding scale   SubCutaneous at bedtime  lisinopril 5 milliGRAM(s) Oral daily    MEDICATIONS  (PRN):  acetaminophen   Tablet .. 650 milliGRAM(s) Oral every 6 hours PRN Moderate Pain (4 - 6)  dextrose 40% Gel 15 Gram(s) Oral once PRN Blood Glucose LESS THAN 70 milliGRAM(s)/deciliter  glucagon  Injectable 1 milliGRAM(s) IntraMuscular once PRN Glucose LESS THAN 70 milligrams/deciliter      ASSESSMENT & PLAN          GI/Bowel Management - Miralax   Management - Toilet Q2  Skin - Turn Q2  Pain - Tylenol PRN  DVT PPX - Lovenox  Diet - diabetic, reg c thins    Start comprehensive acute rehab program consisting of 3hrs/day of OT/PT and SLP.

## 2018-11-29 NOTE — PROGRESS NOTE ADULT - ATTENDING COMMENTS
No new complaints  Stable neurological exam  Observed in therapy, plan and patient's needs discussed with team

## 2018-11-29 NOTE — PROGRESS NOTE ADULT - SUBJECTIVE AND OBJECTIVE BOX
Patient is a 58 year old  Female who presents with a chief complaint of s/p right MCA CVA with deficits (28 Nov 2018 13:35)    HPI: 58-year-old right-handed female first evaluated at [facility] on 11/21/2018 with new left hemiparesis. 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 Her previous strokes were probably due to small vessel disease. On 11/21/18, she awoke with a dense left hemiparesis. CT head (11/21/18) showed a chronic right lentiform nucleus lacunar infarct. CTA neck (11/21/18) showed a probable right ICA occlusion with some calcified plaque. CTA head (11/21/18) showed that the right ICA reconstituted perhaps through the ACOM and possibly a large right PCOM. CTP (11/21/18) showed a core infarct of 27 cc and a large hypoperfused area of 125 cc in the right MCA distribution. Given that there was no intracranial occlusion, she was excluded from endovascular thrombectomy. MRI brain subsequently showed right MCA distribution infarct. MRA head and neck showed severe to critical stenosis of proximal/extracranial right ICA. Conventional angiogram confirmed severe proximal/extracranial right ICA stenosis s/p carotid artery stenting for secondary stroke prevention in this patient with symptomatic right ICA severe stenosis.     Impression:   Cerebral embolism with cerebral infarction. Right MCA distribution stroke - likely etiology being large vessel disease i.e. symptomatic extracranial R ICA severe stenosis leading to artery to artery embolism   Selective cerebral angiography on 11/24/18. Patient enrolled in Stroke AF trial and randomized to a LOOP. ASA/Plavix/Lipitor. Dysphagia testing: passed, regular diet.    At time of assessment, patient reports left UE and LE weakness, and states she wants to get stronger with therapy.  She denies any other complaints.     Past Medical History:  CVA (cerebral vascular accident)    Hypertension.     Past Surgical History:     Family History:       Social History:    MEDICATIONS  (STANDING):  aspirin  chewable 81 milliGRAM(s) Oral daily  atorvastatin 80 milliGRAM(s) Oral at bedtime  clopidogrel Tablet 75 milliGRAM(s) Oral daily  dextrose 5%. 1000 milliLiter(s) (50 mL/Hr) IV Continuous <Continuous>  dextrose 50% Injectable 12.5 Gram(s) IV Push once  dextrose 50% Injectable 25 Gram(s) IV Push once  dextrose 50% Injectable 25 Gram(s) IV Push once  enoxaparin Injectable 40 milliGRAM(s) SubCutaneous every 24 hours  FLUoxetine 20 milliGRAM(s) Oral daily  insulin glargine SubCutaneous Injection (LANTUS) - Peds 5 Unit(s) SubCutaneous at bedtime  insulin lispro (HumaLOG) corrective regimen sliding scale   SubCutaneous three times a day before meals  insulin lispro (HumaLOG) corrective regimen sliding scale   SubCutaneous at bedtime  lisinopril 5 milliGRAM(s) Oral daily    MEDICATIONS  (PRN):  acetaminophen   Tablet .. 650 milliGRAM(s) Oral every 6 hours PRN Moderate Pain (4 - 6)  dextrose 40% Gel 15 Gram(s) Oral once PRN Blood Glucose LESS THAN 70 milliGRAM(s)/deciliter  glucagon  Injectable 1 milliGRAM(s) IntraMuscular once PRN Glucose LESS THAN 70 milligrams/deciliter      REVIEW OF SYSTEMS:  CONSTITUTIONAL: No fever, weight loss, or fatigue  EYES: No eye pain, visual disturbances, or discharge  ENMT:  No difficulty hearing, tinnitus, vertigo; No sinus or throat pain  NECK: No pain or stiffness  RESPIRATORY: No cough, wheezing, chills or hemoptysis; No shortness of breath  CARDIOVASCULAR: No chest pain, palpitations, dizziness, or leg swelling  GASTROINTESTINAL: No abdominal or epigastric pain. No nausea, vomiting, or hematemesis; No diarrhea or constipation. No melena or hematochezia.  GENITOURINARY: No dysuria, frequency, hematuria, or incontinence  NEUROLOGICAL: No headaches, memory loss, loss of strength, numbness, or tremors  SKIN: No itching, burning, rashes, or lesions   LYMPH NODES: No enlarged glands  ENDOCRINE: No heat or cold intolerance; No hair loss  MUSCULOSKELETAL: No joint pain or swelling; No muscle, back, or extremity pain  PSYCHIATRIC: No depression, anxiety, mood swings, or difficulty sleeping  HEME/LYMPH: No easy bruising, or bleeding gums  ALLERY AND IMMUNOLOGIC: No hives or eczema    PHYSICAL EXAM:  T(C): 36.9 (11-29-18 @ 10:08), Max: 36.9 (11-28-18 @ 14:00)  HR: 76 (11-29-18 @ 10:08) (65 - 76)  BP: 121/64 (11-29-18 @ 10:08) (121/64 - 142/75)  RR: 14 (11-29-18 @ 10:08) (14 - 18)  SpO2: 99% (11-29-18 @ 10:08) (96% - 99%)        GENERAL: NAD, well-groomed, well-developed  HEAD:  Atraumatic, Normocephalic  EYES: EOMI, PERRL, conjunctiva and sclera clear  ENMT: No tonsillar erythema, exudates, or enlargement; Moist mucous membranes, Good dentition, No lesions  NECK: Supple, No JVD, Normal thyroid  NERVOUS SYSTEM:  Alert & Oriented X3, Good concentration; Motor Strength 5/5 B/L upper and lower extremities; DTRs 2+ intact and symmetric  CHEST/LUNG: Clear to ascultation bilaterally; No rales, rhonchi, wheezing, or rubs  HEART: Regular rate and rhythm; No murmurs, rubs, or gallops  ABDOMEN: Soft, Nontender, Nondistended; Bowel sounds present  EXTREMITIES:  2+ Peripheral Pulses, No clubbing, cyanosis, or edema  LYMPH: No lymphadenopathy noted  SKIN: No rashes or lesions    LABS:                        11.0   5.4   )-----------( 266      ( 29 Nov 2018 07:12 )             31.8     11-29    138  |  102  |  18  ----------------------------<  178<H>  3.8   |  27  |  0.77    Ca    9.4      29 Nov 2018 07:12    TPro  6.9  /  Alb  2.9<L>  /  TBili  0.5  /  DBili  x   /  AST  51<H>  /  ALT  63<H>  /  AlkPhos  92  11-29        CAPILLARY BLOOD GLUCOSE  POCT Blood Glucose.: 184 mg/dL (29 Nov 2018 08:04)  POCT Blood Glucose.: 136 mg/dL (28 Nov 2018 21:01)  POCT Blood Glucose.: 125 mg/dL (28 Nov 2018 17:36)  POCT Blood Glucose.: 178 mg/dL (28 Nov 2018 12:49)            RADIOLOGY & ADDITIONAL TESTS:    Imaging Personally Reviewed:  [x] YES  [ ] NO    Consultant(s) Notes Reviewed:  [x] YES  [ ] NO    Care Discussed with Consultants/Other Providers [x] YES  [ ] NO Patient is a 58 year old  Female who presents with a chief complaint of s/p right MCA CVA with deficits (28 Nov 2018 13:35)    HPI: 58-year-old right-handed female first evaluated at [facility] on 11/21/2018 with new left hemiparesis. 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 Her previous strokes were probably due to small vessel disease. On 11/21/18, she awoke with a dense left hemiparesis. CT head (11/21/18) showed a chronic right lentiform nucleus lacunar infarct. CTA neck (11/21/18) showed a probable right ICA occlusion with some calcified plaque. CTA head (11/21/18) showed that the right ICA reconstituted perhaps through the ACOM and possibly a large right PCOM. CTP (11/21/18) showed a core infarct of 27 cc and a large hypoperfused area of 125 cc in the right MCA distribution. Given that there was no intracranial occlusion, she was excluded from endovascular thrombectomy. MRI brain subsequently showed right MCA distribution infarct. MRA head and neck showed severe to critical stenosis of proximal/extracranial right ICA. Conventional angiogram confirmed severe proximal/extracranial right ICA stenosis s/p carotid artery stenting for secondary stroke prevention in this patient with symptomatic right ICA severe stenosis.     Impression:   Cerebral embolism with cerebral infarction. Right MCA distribution stroke - likely etiology being large vessel disease i.e. symptomatic extracranial right ICA severe stenosis leading to artery to artery embolism   She underwent selective cerebral angiography on 11/24/18. Patient enrolled in Stroke AF trial and randomized to a LOOP. ASA/Plavix/Lipitor. Dysphagia testing: passed, regular diet.    At time of assessment, patient reports left sided weakness monika. UE and LE weakness, and states she wants to get stronger with therapy.  She denies any other complaints.    Past Medical History:  CVA (cerebral vascular accident)    Hypertension.  DM II    Past Surgical History:  None reported    Family History:  Patient reports history of heart problems in her brother    Social History:  Patient denies smoking, drinks alcohol socially, and denies illicit drug use    Allergies: No Known Allergies    MEDICATIONS  (STANDING):  aspirin  chewable 81 milliGRAM(s) Oral daily  atorvastatin 80 milliGRAM(s) Oral at bedtime  clopidogrel Tablet 75 milliGRAM(s) Oral daily  dextrose 5%. 1000 milliLiter(s) (50 mL/Hr) IV Continuous <Continuous>  dextrose 50% Injectable 12.5 Gram(s) IV Push once  dextrose 50% Injectable 25 Gram(s) IV Push once  dextrose 50% Injectable 25 Gram(s) IV Push once  enoxaparin Injectable 40 milliGRAM(s) SubCutaneous every 24 hours  FLUoxetine 20 milliGRAM(s) Oral daily  insulin glargine SubCutaneous Injection (LANTUS) - Peds 5 Unit(s) SubCutaneous at bedtime  insulin lispro (HumaLOG) corrective regimen sliding scale   SubCutaneous three times a day before meals  insulin lispro (HumaLOG) corrective regimen sliding scale   SubCutaneous at bedtime  lisinopril 5 milliGRAM(s) Oral daily    MEDICATIONS  (PRN):  acetaminophen   Tablet .. 650 milliGRAM(s) Oral every 6 hours PRN Moderate Pain (4 - 6)  dextrose 40% Gel 15 Gram(s) Oral once PRN Blood Glucose LESS THAN 70 milliGRAM(s)/deciliter  glucagon  Injectable 1 milliGRAM(s) IntraMuscular once PRN Glucose LESS THAN 70 milligrams/deciliter      REVIEW OF SYSTEMS:  CONSTITUTIONAL: No fever, weight loss, or fatigue  EYES: No eye pain, visual disturbances, or discharge  ENMT:  No difficulty hearing, tinnitus, vertigo; No sinus or throat pain  NECK: No pain or stiffness  RESPIRATORY: No cough, wheezing, chills or hemoptysis; No shortness of breath  CARDIOVASCULAR: No chest pain, palpitations, dizziness, or leg swelling  GASTROINTESTINAL: No abdominal or epigastric pain. No nausea, vomiting, or hematemesis; No diarrhea or constipation. No melena or hematochezia.  GENITOURINARY: No dysuria, frequency, hematuria, or incontinence  NEUROLOGICAL: No headaches, memory loss, loss of strength, numbness, or tremors  SKIN: Lesion left buttock. No itching, burning  ENDOCRINE: No heat or cold intolerance; No hair loss  MUSCULOSKELETAL: No joint pain or swelling; No muscle, back, or extremity pain  PSYCHIATRIC: No depression, anxiety, mood swings, or difficulty sleeping  HEME/LYMPH: No easy bruising, or bleeding gums  ALLERGY AND IMMUNOLOGIC: No hives or eczema    PHYSICAL EXAM:  T(C): 36.9 (11-29-18 @ 10:08), Max: 36.9 (11-28-18 @ 14:00)  HR: 76 (11-29-18 @ 10:08) (65 - 76)  BP: 121/64 (11-29-18 @ 10:08) (121/64 - 142/75)  RR: 14 (11-29-18 @ 10:08) (14 - 18)  SpO2: 99% (11-29-18 @ 10:08) (96% - 99%)    GENERAL: NAD, well-groomed, well-developed  HEAD:  Atraumatic, Normocephalic  EYES: EOMI, PERRL, conjunctiva and sclera clear  ENMT: Moist mucous membranes, Good dentition, No lesions  NECK: Supple, No JVD, Normal thyroid  NERVOUS SYSTEM:  Alert & Oriented X3, Good concentration; no new deficit, left sided facial droop, left UE and LE weakness, decreased sensation on left side.  CHEST/LUNG: Clear to ascultation bilaterally; No rales, rhonchi, wheezing, or rubs  HEART: Regular rate and rhythm; No murmurs, rubs, or gallops  ABDOMEN: Soft, Nontender, Nondistended; Bowel sounds present  EXTREMITIES:  2+ Peripheral Pulses, No clubbing, cyanosis, or edema  SKIN: No rash      LABS:                        11.0   5.4   )-----------( 266      ( 29 Nov 2018 07:12 )             31.8     11-29    138  |  102  |  18  ----------------------------<  178<H>  3.8   |  27  |  0.77    Ca    9.4      29 Nov 2018 07:12    TPro  6.9  /  Alb  2.9<L>  /  TBili  0.5  /  DBili  x   /  AST  51<H>  /  ALT  63<H>  /  AlkPhos  92  11-29        CAPILLARY BLOOD GLUCOSE  POCT Blood Glucose.: 184 mg/dL (29 Nov 2018 08:04)  POCT Blood Glucose.: 136 mg/dL (28 Nov 2018 21:01)  POCT Blood Glucose.: 125 mg/dL (28 Nov 2018 17:36)  POCT Blood Glucose.: 178 mg/dL (28 Nov 2018 12:49)            RADIOLOGY & ADDITIONAL TESTS:    Imaging Personally Reviewed:  [x] YES  [ ] NO    Consultant(s) Notes Reviewed:  [x] YES  [ ] NO    Care Discussed with Consultants/Other Providers [x] YES  [ ] NO NEW CONSULT NOTE:     CC: Patient is a 58 year old  Female who presents with a chief complaint of s/p right MCA CVA with deficits (28 Nov 2018 13:35)    HPI: 58-year-old right-handed female first evaluated at [facility] on 11/21/2018 with new left hemiparesis. 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 Her previous strokes were probably due to small vessel disease. On 11/21/18, she awoke with a dense left hemiparesis. CT head (11/21/18) showed a chronic right lentiform nucleus lacunar infarct. CTA neck (11/21/18) showed a probable right ICA occlusion with some calcified plaque. CTA head (11/21/18) showed that the right ICA reconstituted perhaps through the ACOM and possibly a large right PCOM. CTP (11/21/18) showed a core infarct of 27 cc and a large hypoperfused area of 125 cc in the right MCA distribution. Given that there was no intracranial occlusion, she was excluded from endovascular thrombectomy. MRI brain subsequently showed right MCA distribution infarct. MRA head and neck showed severe to critical stenosis of proximal/extracranial right ICA. Conventional angiogram confirmed severe proximal/extracranial right ICA stenosis s/p carotid artery stenting for secondary stroke prevention in this patient with symptomatic right ICA severe stenosis.     Impression:   Cerebral embolism with cerebral infarction. Right MCA distribution stroke - likely etiology being large vessel disease i.e. symptomatic extracranial right ICA severe stenosis leading to artery to artery embolism   She underwent selective cerebral angiography on 11/24/18. Patient enrolled in Stroke AF trial and randomized to a LOOP. ASA/Plavix/Lipitor. Dysphagia testing: passed, regular diet.    At time of assessment, patient reports left sided weakness monika. UE and LE weakness, and states she wants to get stronger with therapy.  She denies any other complaints.    Past Medical History:  CVA (cerebral vascular accident)    Hypertension.  DM II    Past Surgical History:  None reported    Family History:  Patient reports history of heart problems in her brother    Social History:  Patient denies smoking, drinks alcohol socially, and denies illicit drug use    Allergies: No Known Allergies    MEDICATIONS  (STANDING):  aspirin  chewable 81 milliGRAM(s) Oral daily  atorvastatin 80 milliGRAM(s) Oral at bedtime  clopidogrel Tablet 75 milliGRAM(s) Oral daily  dextrose 5%. 1000 milliLiter(s) (50 mL/Hr) IV Continuous <Continuous>  dextrose 50% Injectable 12.5 Gram(s) IV Push once  dextrose 50% Injectable 25 Gram(s) IV Push once  dextrose 50% Injectable 25 Gram(s) IV Push once  enoxaparin Injectable 40 milliGRAM(s) SubCutaneous every 24 hours  FLUoxetine 20 milliGRAM(s) Oral daily  insulin glargine SubCutaneous Injection (LANTUS) - Peds 5 Unit(s) SubCutaneous at bedtime  insulin lispro (HumaLOG) corrective regimen sliding scale   SubCutaneous three times a day before meals  insulin lispro (HumaLOG) corrective regimen sliding scale   SubCutaneous at bedtime  lisinopril 5 milliGRAM(s) Oral daily    MEDICATIONS  (PRN):  acetaminophen   Tablet .. 650 milliGRAM(s) Oral every 6 hours PRN Moderate Pain (4 - 6)  dextrose 40% Gel 15 Gram(s) Oral once PRN Blood Glucose LESS THAN 70 milliGRAM(s)/deciliter  glucagon  Injectable 1 milliGRAM(s) IntraMuscular once PRN Glucose LESS THAN 70 milligrams/deciliter      REVIEW OF SYSTEMS:  CONSTITUTIONAL: No fever, weight loss, or fatigue  EYES: No eye pain, visual disturbances, or discharge  ENMT:  No difficulty hearing, tinnitus, vertigo; No sinus or throat pain  NECK: No pain or stiffness  RESPIRATORY: No cough, wheezing, chills or hemoptysis; No shortness of breath  CARDIOVASCULAR: No chest pain, palpitations, dizziness, or leg swelling  GASTROINTESTINAL: No abdominal or epigastric pain. No nausea, vomiting, or hematemesis; No diarrhea or constipation. No melena or hematochezia.  GENITOURINARY: No dysuria, frequency, hematuria, or incontinence  NEUROLOGICAL: No headaches, memory loss, loss of strength, numbness, or tremors  SKIN: Lesion left buttock. No itching, burning  ENDOCRINE: No heat or cold intolerance; No hair loss  MUSCULOSKELETAL: No joint pain or swelling; No muscle, back, or extremity pain  PSYCHIATRIC: No depression, anxiety, mood swings, or difficulty sleeping  HEME/LYMPH: No easy bruising, or bleeding gums  ALLERGY AND IMMUNOLOGIC: No hives or eczema    PHYSICAL EXAM:  T(C): 36.9 (11-29-18 @ 10:08), Max: 36.9 (11-28-18 @ 14:00)  HR: 76 (11-29-18 @ 10:08) (65 - 76)  BP: 121/64 (11-29-18 @ 10:08) (121/64 - 142/75)  RR: 14 (11-29-18 @ 10:08) (14 - 18)  SpO2: 99% (11-29-18 @ 10:08) (96% - 99%)    GENERAL: NAD, well-groomed, well-developed  HEAD:  Atraumatic, Normocephalic  EYES: EOMI, PERRL, conjunctiva and sclera clear  ENMT: Moist mucous membranes, Good dentition, No lesions  NECK: Supple, No JVD, Normal thyroid  NERVOUS SYSTEM:  Alert & Oriented X3, Good concentration; no new deficit, left sided facial droop, left UE and LE weakness, decreased sensation on left side.  CHEST/LUNG: Clear to ascultation bilaterally; No rales, rhonchi, wheezing, or rubs  HEART: Regular rate and rhythm; No murmurs, rubs, or gallops  ABDOMEN: Soft, Nontender, Nondistended; Bowel sounds present  EXTREMITIES:  2+ Peripheral Pulses, No clubbing, cyanosis, or edema  SKIN: No rash      LABS:                        11.0   5.4   )-----------( 266      ( 29 Nov 2018 07:12 )             31.8     11-29    138  |  102  |  18  ----------------------------<  178<H>  3.8   |  27  |  0.77    Ca    9.4      29 Nov 2018 07:12    TPro  6.9  /  Alb  2.9<L>  /  TBili  0.5  /  DBili  x   /  AST  51<H>  /  ALT  63<H>  /  AlkPhos  92  11-29        CAPILLARY BLOOD GLUCOSE  POCT Blood Glucose.: 184 mg/dL (29 Nov 2018 08:04)  POCT Blood Glucose.: 136 mg/dL (28 Nov 2018 21:01)  POCT Blood Glucose.: 125 mg/dL (28 Nov 2018 17:36)  POCT Blood Glucose.: 178 mg/dL (28 Nov 2018 12:49)            RADIOLOGY & ADDITIONAL TESTS:    Imaging Personally Reviewed:  [x] YES  [ ] NO    Consultant(s) Notes Reviewed:  [x] YES  [ ] NO    Care Discussed with Consultants/Other Providers [x] YES  [ ] NO

## 2018-11-30 LAB
GLUCOSE BLDC GLUCOMTR-MCNC: 158 MG/DL — HIGH (ref 70–99)
GLUCOSE BLDC GLUCOMTR-MCNC: 170 MG/DL — HIGH (ref 70–99)
GLUCOSE BLDC GLUCOMTR-MCNC: 186 MG/DL — HIGH (ref 70–99)
GLUCOSE BLDC GLUCOMTR-MCNC: 279 MG/DL — HIGH (ref 70–99)

## 2018-11-30 PROCEDURE — 99232 SBSQ HOSP IP/OBS MODERATE 35: CPT

## 2018-11-30 PROCEDURE — 99233 SBSQ HOSP IP/OBS HIGH 50: CPT

## 2018-11-30 RX ORDER — SODIUM CHLORIDE 9 MG/ML
1000 INJECTION INTRAMUSCULAR; INTRAVENOUS; SUBCUTANEOUS
Qty: 0 | Refills: 0 | Status: DISCONTINUED | OUTPATIENT
Start: 2018-11-30 | End: 2018-12-01

## 2018-11-30 RX ORDER — INSULIN GLARGINE 100 [IU]/ML
10 INJECTION, SOLUTION SUBCUTANEOUS AT BEDTIME
Qty: 0 | Refills: 0 | Status: DISCONTINUED | OUTPATIENT
Start: 2018-11-30 | End: 2018-12-05

## 2018-11-30 RX ORDER — ONDANSETRON 8 MG/1
4 TABLET, FILM COATED ORAL EVERY 6 HOURS
Qty: 0 | Refills: 0 | Status: DISCONTINUED | OUTPATIENT
Start: 2018-11-30 | End: 2018-12-21

## 2018-11-30 RX ADMIN — Medication 3: at 12:37

## 2018-11-30 RX ADMIN — Medication 81 MILLIGRAM(S): at 12:40

## 2018-11-30 RX ADMIN — Medication 1: at 08:57

## 2018-11-30 RX ADMIN — Medication 1 TABLET(S): at 12:40

## 2018-11-30 RX ADMIN — SODIUM CHLORIDE 75 MILLILITER(S): 9 INJECTION INTRAMUSCULAR; INTRAVENOUS; SUBCUTANEOUS at 12:36

## 2018-11-30 RX ADMIN — Medication 650 MILLIGRAM(S): at 08:54

## 2018-11-30 RX ADMIN — LISINOPRIL 5 MILLIGRAM(S): 2.5 TABLET ORAL at 06:00

## 2018-11-30 RX ADMIN — ENOXAPARIN SODIUM 40 MILLIGRAM(S): 100 INJECTION SUBCUTANEOUS at 06:01

## 2018-11-30 RX ADMIN — ATORVASTATIN CALCIUM 80 MILLIGRAM(S): 80 TABLET, FILM COATED ORAL at 21:15

## 2018-11-30 RX ADMIN — ONDANSETRON 4 MILLIGRAM(S): 8 TABLET, FILM COATED ORAL at 08:47

## 2018-11-30 RX ADMIN — Medication 1: at 17:26

## 2018-11-30 RX ADMIN — CLOPIDOGREL BISULFATE 75 MILLIGRAM(S): 75 TABLET, FILM COATED ORAL at 12:40

## 2018-11-30 RX ADMIN — Medication 20 MILLIGRAM(S): at 12:40

## 2018-11-30 RX ADMIN — INSULIN GLARGINE 10 UNIT(S): 100 INJECTION, SOLUTION SUBCUTANEOUS at 21:15

## 2018-11-30 RX ADMIN — Medication 650 MILLIGRAM(S): at 11:43

## 2018-11-30 NOTE — PROGRESS NOTE ADULT - SUBJECTIVE AND OBJECTIVE BOX
Patient is a 58 year old  Female who presents with a chief complaint of s/p right MCA CVA with deficits (29 Nov 2018 14:27)    Patient seen and examined. Patient reports nausea this morning and high BP to . Denies vomiting, abdominal pain or any other complaints at this time.    MEDICATIONS  (STANDING):  aspirin  chewable 81 milliGRAM(s) Oral daily  atorvastatin 80 milliGRAM(s) Oral at bedtime  clopidogrel Tablet 75 milliGRAM(s) Oral daily  dextrose 5%. 1000 milliLiter(s) (50 mL/Hr) IV Continuous <Continuous>  dextrose 50% Injectable 12.5 Gram(s) IV Push once  dextrose 50% Injectable 25 Gram(s) IV Push once  dextrose 50% Injectable 25 Gram(s) IV Push once  enoxaparin Injectable 40 milliGRAM(s) SubCutaneous every 24 hours  FLUoxetine 20 milliGRAM(s) Oral daily  insulin glargine Injectable (LANTUS) 7 Unit(s) SubCutaneous at bedtime  insulin lispro (HumaLOG) corrective regimen sliding scale   SubCutaneous three times a day before meals  insulin lispro (HumaLOG) corrective regimen sliding scale   SubCutaneous at bedtime  lisinopril 5 milliGRAM(s) Oral daily  multivitamin 1 Tablet(s) Oral daily    MEDICATIONS  (PRN):  acetaminophen   Tablet .. 650 milliGRAM(s) Oral every 6 hours PRN Moderate Pain (4 - 6)  dextrose 40% Gel 15 Gram(s) Oral once PRN Blood Glucose LESS THAN 70 milliGRAM(s)/deciliter  glucagon  Injectable 1 milliGRAM(s) IntraMuscular once PRN Glucose LESS THAN 70 milligrams/deciliter  ondansetron    Tablet 4 milliGRAM(s) Oral every 6 hours PRN Nausea and/or Vomiting      REVIEW OF SYSTEMS:  CONSTITUTIONAL: No fever, weight loss, has fatigue  EYES: No eye pain, visual disturbances, or discharge  ENMT:  No difficulty hearing, tinnitus, vertigo; No sinus or throat pain  NECK: No pain or stiffness  RESPIRATORY: No cough, wheezing, chills or hemoptysis; No shortness of breath  CARDIOVASCULAR: No chest pain, palpitations, dizziness, or leg swelling  GASTROINTESTINAL: No abdominal or epigastric pain, has nausea, no vomiting, or hematemesis; No diarrhea or constipation. No melena or hematochezia.  GENITOURINARY: No dysuria, frequency, hematuria, or incontinence  NEUROLOGICAL: No headaches, memory loss, loss of strength, numbness, or tremors  SKIN: Lesion left buttock. No itching, burning  ENDOCRINE: No heat or cold intolerance; No hair loss  MUSCULOSKELETAL: No joint pain or swelling; No muscle, back, or extremity pain  PSYCHIATRIC: No depression, anxiety, mood swings, or difficulty sleeping  HEME/LYMPH: No easy bruising, or bleeding gums  ALLERGY AND IMMUNOLOGIC: No hives or eczema        PHYSICAL EXAM:  T(C): 36.8 (11-29-18 @ 21:53), Max: 36.8 (11-29-18 @ 21:53)  HR: 72 (11-30-18 @ 06:02) (72 - 77)  BP: 157/82 (11-30-18 @ 06:02) (132/66 - 157/82)  RR: 14 (11-29-18 @ 21:53) (14 - 14)  SpO2: 99% (11-29-18 @ 21:53) (99% - 99%)        GENERAL: NAD, well-groomed, well-developed  HEAD:  Atraumatic, Normocephalic  EYES: EOMI, PERRL, conjunctiva and sclera clear  ENMT: Moist mucous membranes, Good dentition, No lesions  NECK: Supple, No JVD, Normal thyroid  NERVOUS SYSTEM:  Alert & Oriented X3, Good concentration; no new deficit, left sided facial droop, left UE and LE weakness, decreased sensation on left side.  CHEST/LUNG: Clear to ascultation bilaterally; No rales, rhonchi, wheezing, or rubs  HEART: Regular rate and rhythm; No murmurs, rubs, or gallops  ABDOMEN: Soft, Nontender, Nondistended; Bowel sounds present  EXTREMITIES:  2+ Peripheral Pulses, No clubbing, cyanosis, or edema  SKIN: No rash      LABS:                        11.0   5.4   )-----------( 266      ( 29 Nov 2018 07:12 )             31.8     11-29    138  |  102  |  18  ----------------------------<  178<H>  3.8   |  27  |  0.77    Ca    9.4      29 Nov 2018 07:12    TPro  6.9  /  Alb  2.9<L>  /  TBili  0.5  /  DBili  x   /  AST  51<H>  /  ALT  63<H>  /  AlkPhos  92  11-29        CAPILLARY BLOOD GLUCOSE  POCT Blood Glucose.: 186 mg/dL (30 Nov 2018 07:47)  POCT Blood Glucose.: 169 mg/dL (29 Nov 2018 21:47)  POCT Blood Glucose.: 204 mg/dL (29 Nov 2018 16:42)  POCT Blood Glucose.: 208 mg/dL (29 Nov 2018 11:40)            RADIOLOGY & ADDITIONAL TESTS:    Imaging Personally Reviewed:  [x] YES  [ ] NO    Consultant(s) Notes Reviewed:  [x] YES  [ ] NO    Care Discussed with Consultants/Other Providers [x] YES  [ ] NO

## 2018-11-30 NOTE — PROGRESS NOTE ADULT - SUBJECTIVE AND OBJECTIVE BOX
CHIEF COMPLAINT: Hypotension in therapy with dizziness/weakness. SBP 94/60. Nausea and hypertension this am.       HISTORY OF PRESENT ILLNESS  58-year-old right-handed female to [facility] on 11/21/18 with new onset left hemiparesis upon awaking. 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 CT head (11/21/18) showed a chronic right lentiform nucleus lacunar infarct. CTA neck (11/21/18) showed a probable right ICA occlusion with some calcified plaque. CTA head (11/21/18) showed that the right ICA reconstituted perhaps through the ACOM and possibly a large right PCOM. No intracranial occlusion, she was excluded from endovascular thrombectomy. MRI brain subsequently showed R MCA distribution infarct. MRA head and neck showed severe to critical stenosis of proximal  /extracranial right ICA. Conventional angiogram confirmed severe proximal/extracranial right ICA stenosis s/p carotid artery stenting for secondary stroke prevention in this patient with symptomatic right ICA severe stenosis.   Impression:   Cerebral embolism with cerebral infarction. Right MCA distribution stroke - likely etiology being large vessel disease i.e. symptomatic extracranial R ICA severe stenosis leading to artery to artery embolism. Selective cerebral angiography on 11/24/18. Patient enrolled in Stroke AF trial and randomized to a LOOP. ASA/Plavix/Lipitor. Dysphagia: passed, regular diet.  CAROTID DOPPLERS (11/2/7/18): The stent and stented cervical right common and internal carotid arteries are widely patent. There is a flow-limiting stenosis of the right external carotid artery. No hemodynamically significant is present on the left.  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 Decreased, but present flow related enhancement of the more distal right ICA. Previously, no flow related enhancement was identified within the right ICA on CTA from 11/21/2018.  MRA BRAIN: Diminished, but present flow related signal within the skull base and supraclinoid right internal carotid artery. Diminished flow related signal within the right MCA.  MRI Head (11/21/18): Acute right MCA infarct.  A1c was 9.2; TTE: unremarkable. (28 Nov 2018 13:35)      PAST MEDICAL & SURGICAL HISTORY:  Hypertension  CVA (cerebral vascular accident)       REVIEW OF SYMPTOMS  [X] Constitutional WNL            [X] Resp WNL                                 [X]  WNL                   [X] Heme WNL              [X] Endo WNL                     [X] Skin WNL                 [X] MSK WNL                               [X] Cognitive WNL        [X] Psych WNL      VITALS  Vital Signs Last 24 Hrs  T(C): 37.1 (30 Nov 2018 09:46), Max: 37.1 (30 Nov 2018 09:46)  T(F): 98.8 (30 Nov 2018 09:46), Max: 98.8 (30 Nov 2018 09:46)  HR: 68 (30 Nov 2018 09:46) (68 - 77)  BP: 150/73 (30 Nov 2018 09:46) (132/66 - 157/82)  BP(mean): --  RR: 14 (30 Nov 2018 09:46) (14 - 14)  SpO2: 99% (30 Nov 2018 09:46) (99% - 99%)      PHYSICAL EXAM  Constitutional - Lightheaded  HEENT - NCAT, EOMI  Neck - Supple, No limited ROM  Chest - CTA bilaterally, No wheeze, No rhonchi, No crackles  Cardiovascular - RRR, S1S2, No murmurs, hypotension  Abdomen - BS+, Soft, NTND  Extremities - No C/C/E, No calf tenderness   Skin-no rash  Wounds-na      Neurologic Exam - no new deficits.  Hemiparesis.                   Balance - impaired     Psychiatric - Mood stable, Affect WNL         FUNCTIONAL PROGRESS  Gait -   ADLs -   Transfers -  Functional transfer -     RECENT LABS                        11.0   5.4   )-----------( 266      ( 29 Nov 2018 07:12 )             31.8     11-29    138  |  102  |  18  ----------------------------<  178<H>  3.8   |  27  |  0.77    Ca    9.4      29 Nov 2018 07:12    TPro  6.9  /  Alb  2.9<L>  /  TBili  0.5  /  DBili  x   /  AST  51<H>  /  ALT  63<H>  /  AlkPhos  92  11-29      LIVER FUNCTIONS - ( 29 Nov 2018 07:12 )  Alb: 2.9 g/dL / Pro: 6.9 g/dL / ALK PHOS: 92 U/L / ALT: 63 U/L DA / AST: 51 U/L / GGT: x             Direct LDL: 106 mg/dL (11-24-18 @ 07:18)    Hemoglobin A1C, Whole Blood: 9.2 % (11-22-18 @ 08:24)              RADIOLOGY/OTHER RESULTS      CURRENT MEDICATIONS  MEDICATIONS  (STANDING):  aspirin  chewable 81 milliGRAM(s) Oral daily  atorvastatin 80 milliGRAM(s) Oral at bedtime  clopidogrel Tablet 75 milliGRAM(s) Oral daily  dextrose 5%. 1000 milliLiter(s) (50 mL/Hr) IV Continuous <Continuous>  dextrose 50% Injectable 12.5 Gram(s) IV Push once  dextrose 50% Injectable 25 Gram(s) IV Push once  dextrose 50% Injectable 25 Gram(s) IV Push once  enoxaparin Injectable 40 milliGRAM(s) SubCutaneous every 24 hours  FLUoxetine 20 milliGRAM(s) Oral daily  insulin glargine Injectable (LANTUS) 10 Unit(s) SubCutaneous at bedtime  insulin lispro (HumaLOG) corrective regimen sliding scale   SubCutaneous three times a day before meals  insulin lispro (HumaLOG) corrective regimen sliding scale   SubCutaneous at bedtime  lisinopril 5 milliGRAM(s) Oral daily  multivitamin 1 Tablet(s) Oral daily  sodium chloride 0.9%. 1000 milliLiter(s) (75 mL/Hr) IV Continuous <Continuous>    MEDICATIONS  (PRN):  acetaminophen   Tablet .. 650 milliGRAM(s) Oral every 6 hours PRN Moderate Pain (4 - 6)  dextrose 40% Gel 15 Gram(s) Oral once PRN Blood Glucose LESS THAN 70 milliGRAM(s)/deciliter  glucagon  Injectable 1 milliGRAM(s) IntraMuscular once PRN Glucose LESS THAN 70 milligrams/deciliter  ondansetron    Tablet 4 milliGRAM(s) Oral every 6 hours PRN Nausea and/or Vomiting      ASSESSMENT & PLAN    GI/Bowel Management - Miralax   Management - Toilet Q2  Skin - Turn Q2  Pain - Tylenol PRN  DVT PPX - Lovenox  Diet - diabetic, reg c thins      Continue comprehensive acute rehab program consisting of 3hrs/day of OT/PT and SLP.

## 2018-11-30 NOTE — PROGRESS NOTE ADULT - ASSESSMENT
58 year old female with prior history of CVAs, now s/p right MCA CVA with functional deficits.    >Right MCA CVA - likely etiology being large vessel disease i.e. symptomatic extracranial right ICA severe stenosis leading to artery to artery embolism s/p right carotid stent  -She underwent selective cerebral angiography on 11/24/18.  -Continue Aspirin, Plavix, Statin  -Continue Fluoxetine for motor recovery  -Continue close monitoring for neurologic deterioration,   -Continue PT/OT/ rehab.  Patient enrolled in Stroke AF trial and randomized to a LOOP        >HTN -uncontrolled  -monitor BP   -continue Lisinopril, repeat BP improved to SBP of 150    >Nausea- likely in the setting of above, anti-emetic prn, monitor      >Uncontrolled DM II   - Hb A1C 9.2%  - Accu-Cheks at goal  -Continue Lantus 5U, SHELLY    >Precautions:  fall, aspiration, decub, seizure                                                                               >DVT Prophylaxis: Lovenox

## 2018-12-01 DIAGNOSIS — K59.00 CONSTIPATION, UNSPECIFIED: ICD-10-CM

## 2018-12-01 LAB
GLUCOSE BLDC GLUCOMTR-MCNC: 154 MG/DL — HIGH (ref 70–99)
GLUCOSE BLDC GLUCOMTR-MCNC: 178 MG/DL — HIGH (ref 70–99)
GLUCOSE BLDC GLUCOMTR-MCNC: 184 MG/DL — HIGH (ref 70–99)
GLUCOSE BLDC GLUCOMTR-MCNC: 210 MG/DL — HIGH (ref 70–99)

## 2018-12-01 PROCEDURE — 99233 SBSQ HOSP IP/OBS HIGH 50: CPT

## 2018-12-01 PROCEDURE — 99231 SBSQ HOSP IP/OBS SF/LOW 25: CPT

## 2018-12-01 RX ORDER — POLYETHYLENE GLYCOL 3350 17 G/17G
17 POWDER, FOR SOLUTION ORAL ONCE
Qty: 0 | Refills: 0 | Status: COMPLETED | OUTPATIENT
Start: 2018-12-01 | End: 2018-12-01

## 2018-12-01 RX ORDER — SENNA PLUS 8.6 MG/1
2 TABLET ORAL AT BEDTIME
Qty: 0 | Refills: 0 | Status: DISCONTINUED | OUTPATIENT
Start: 2018-12-01 | End: 2018-12-21

## 2018-12-01 RX ORDER — POLYETHYLENE GLYCOL 3350 17 G/17G
17 POWDER, FOR SOLUTION ORAL DAILY
Qty: 0 | Refills: 0 | Status: DISCONTINUED | OUTPATIENT
Start: 2018-12-02 | End: 2018-12-21

## 2018-12-01 RX ORDER — DOCUSATE SODIUM 100 MG
100 CAPSULE ORAL THREE TIMES A DAY
Qty: 0 | Refills: 0 | Status: DISCONTINUED | OUTPATIENT
Start: 2018-12-01 | End: 2018-12-21

## 2018-12-01 RX ADMIN — SENNA PLUS 2 TABLET(S): 8.6 TABLET ORAL at 21:38

## 2018-12-01 RX ADMIN — Medication 1: at 17:14

## 2018-12-01 RX ADMIN — Medication 100 MILLIGRAM(S): at 21:38

## 2018-12-01 RX ADMIN — ENOXAPARIN SODIUM 40 MILLIGRAM(S): 100 INJECTION SUBCUTANEOUS at 05:06

## 2018-12-01 RX ADMIN — SODIUM CHLORIDE 75 MILLILITER(S): 9 INJECTION INTRAMUSCULAR; INTRAVENOUS; SUBCUTANEOUS at 05:06

## 2018-12-01 RX ADMIN — Medication 81 MILLIGRAM(S): at 11:49

## 2018-12-01 RX ADMIN — Medication 100 MILLIGRAM(S): at 14:49

## 2018-12-01 RX ADMIN — ATORVASTATIN CALCIUM 80 MILLIGRAM(S): 80 TABLET, FILM COATED ORAL at 21:38

## 2018-12-01 RX ADMIN — Medication 1: at 07:26

## 2018-12-01 RX ADMIN — Medication 1 TABLET(S): at 11:49

## 2018-12-01 RX ADMIN — SODIUM CHLORIDE 75 MILLILITER(S): 9 INJECTION INTRAMUSCULAR; INTRAVENOUS; SUBCUTANEOUS at 07:26

## 2018-12-01 RX ADMIN — POLYETHYLENE GLYCOL 3350 17 GRAM(S): 17 POWDER, FOR SOLUTION ORAL at 12:20

## 2018-12-01 RX ADMIN — CLOPIDOGREL BISULFATE 75 MILLIGRAM(S): 75 TABLET, FILM COATED ORAL at 11:49

## 2018-12-01 RX ADMIN — LISINOPRIL 5 MILLIGRAM(S): 2.5 TABLET ORAL at 05:05

## 2018-12-01 RX ADMIN — Medication 20 MILLIGRAM(S): at 11:49

## 2018-12-01 RX ADMIN — INSULIN GLARGINE 10 UNIT(S): 100 INJECTION, SOLUTION SUBCUTANEOUS at 21:37

## 2018-12-01 RX ADMIN — Medication 2: at 12:20

## 2018-12-01 NOTE — PROGRESS NOTE ADULT - SUBJECTIVE AND OBJECTIVE BOX
Patient is a 58 year old  Female who presents with a chief complaint of s/p right MCA CVA c deficits (30 Nov 2018 13:45)    Patient seen and examined. Patient reports nausea resolved and BP improved. Denies vomiting, abdominal pain or any other complaints at this time.    MEDICATIONS  (STANDING):  aspirin  chewable 81 milliGRAM(s) Oral daily  atorvastatin 80 milliGRAM(s) Oral at bedtime  clopidogrel Tablet 75 milliGRAM(s) Oral daily  dextrose 5%. 1000 milliLiter(s) (50 mL/Hr) IV Continuous <Continuous>  dextrose 50% Injectable 12.5 Gram(s) IV Push once  dextrose 50% Injectable 25 Gram(s) IV Push once  dextrose 50% Injectable 25 Gram(s) IV Push once  docusate sodium 100 milliGRAM(s) Oral three times a day  enoxaparin Injectable 40 milliGRAM(s) SubCutaneous every 24 hours  FLUoxetine 20 milliGRAM(s) Oral daily  insulin glargine Injectable (LANTUS) 10 Unit(s) SubCutaneous at bedtime  insulin lispro (HumaLOG) corrective regimen sliding scale   SubCutaneous three times a day before meals  insulin lispro (HumaLOG) corrective regimen sliding scale   SubCutaneous at bedtime  lisinopril 5 milliGRAM(s) Oral daily  multivitamin 1 Tablet(s) Oral daily  senna 2 Tablet(s) Oral at bedtime  sodium chloride 0.9%. 1000 milliLiter(s) (75 mL/Hr) IV Continuous <Continuous>    MEDICATIONS  (PRN):  acetaminophen   Tablet .. 650 milliGRAM(s) Oral every 6 hours PRN Moderate Pain (4 - 6)  dextrose 40% Gel 15 Gram(s) Oral once PRN Blood Glucose LESS THAN 70 milliGRAM(s)/deciliter  glucagon  Injectable 1 milliGRAM(s) IntraMuscular once PRN Glucose LESS THAN 70 milligrams/deciliter  ondansetron    Tablet 4 milliGRAM(s) Oral every 6 hours PRN Nausea and/or Vomiting      REVIEW OF SYSTEMS:  CONSTITUTIONAL: No fever, weight loss, has fatigue  EYES: No eye pain, visual disturbances, or discharge  ENMT:  No difficulty hearing, tinnitus, vertigo; No sinus or throat pain  NECK: No pain or stiffness  RESPIRATORY: No cough, wheezing, chills or hemoptysis; No shortness of breath  CARDIOVASCULAR: No chest pain, palpitations, dizziness, or leg swelling  GASTROINTESTINAL: No abdominal or epigastric pain, has nausea, no vomiting, or hematemesis; No diarrhea or constipation. No melena or hematochezia.  GENITOURINARY: No dysuria, frequency, hematuria, or incontinence  NEUROLOGICAL: No headaches, memory loss, loss of strength, numbness, or tremors  SKIN: Lesion left buttock. No itching, burning  ENDOCRINE: No heat or cold intolerance; No hair loss  MUSCULOSKELETAL: No joint pain or swelling; No muscle, back, or extremity pain  PSYCHIATRIC: No depression, anxiety, mood swings, or difficulty sleeping  HEME/LYMPH: No easy bruising, or bleeding gums  ALLERGY AND IMMUNOLOGIC: No hives or eczema      PHYSICAL EXAM:  T(C): 36.8 (12-01-18 @ 08:55), Max: 36.8 (11-30-18 @ 20:44)  HR: 70 (12-01-18 @ 08:55) (62 - 71)  BP: 148/77 (12-01-18 @ 08:55) (132/76 - 165/76)  RR: 15 (12-01-18 @ 08:55) (14 - 15)  SpO2: 99% (12-01-18 @ 08:55) (97% - 99%)    I&O's Summary    30 Nov 2018 07:01  -  01 Dec 2018 07:00  --------------------------------------------------------  IN: 825 mL / OUT: 0 mL / NET: 825 mL    GENERAL: NAD, well-groomed, well-developed  HEAD:  Atraumatic, Normocephalic  EYES: EOMI, PERRL, conjunctiva and sclera clear  ENMT: Moist mucous membranes, Good dentition, No lesions  NECK: Supple, No JVD, Normal thyroid  NERVOUS SYSTEM:  Alert & Oriented X3, Good concentration; no new deficit, left sided facial droop, left UE and LE weakness, decreased sensation on left side.  CHEST/LUNG: Clear to ascultation bilaterally; No rales, rhonchi, wheezing, or rubs  HEART: Regular rate and rhythm; No murmurs, rubs, or gallops  ABDOMEN: Soft, Nontender, Nondistended; Bowel sounds present  EXTREMITIES:  2+ Peripheral Pulses, No clubbing, cyanosis, or edema  SKIN: No rash        LABS: none today        CAPILLARY BLOOD GLUCOSE  POCT Blood Glucose.: 210 mg/dL (01 Dec 2018 12:09)  POCT Blood Glucose.: 184 mg/dL (01 Dec 2018 07:22)  POCT Blood Glucose.: 170 mg/dL (30 Nov 2018 21:13)  POCT Blood Glucose.: 158 mg/dL (30 Nov 2018 17:23)  POCT Blood Glucose.: 279 mg/dL (30 Nov 2018 12:30)            RADIOLOGY & ADDITIONAL TESTS:    Imaging Personally Reviewed:  [x] YES  [ ] NO    Consultant(s) Notes Reviewed:  [x] YES  [ ] NO    Care Discussed with Consultants/Other Providers [x] YES  [ ] NO

## 2018-12-01 NOTE — PROGRESS NOTE ADULT - ASSESSMENT
58 year old female with prior history of CVAs, now s/p right MCA CVA with functional deficits.    >Right MCA CVA - likely etiology being large vessel disease i.e. symptomatic extracranial right ICA severe stenosis leading to artery to artery embolism s/p right carotid stent  -She underwent selective cerebral angiography on 11/24/18.  -Continue Aspirin, Plavix, Statin  -Continue Fluoxetine for motor recovery  -Continue close monitoring for neurologic deterioration,   -Continue PT/OT/ rehab.  Patient enrolled in Stroke AF trial and randomized to a LOOP        >HTN  -monitor BP   -continue Lisinopril, BP improving    >Nausea- resolved, anti-emetic prn, monitor      >Uncontrolled DM II   - Hb A1C 9.2%  - Accu-Cheks at goal  -Continue Lantus 5U, SHELLY    >Precautions:  fall, aspiration, decub, seizure                                                                               >DVT Prophylaxis: Lovenox

## 2018-12-02 LAB
GLUCOSE BLDC GLUCOMTR-MCNC: 126 MG/DL — HIGH (ref 70–99)
GLUCOSE BLDC GLUCOMTR-MCNC: 130 MG/DL — HIGH (ref 70–99)
GLUCOSE BLDC GLUCOMTR-MCNC: 154 MG/DL — HIGH (ref 70–99)
GLUCOSE BLDC GLUCOMTR-MCNC: 162 MG/DL — HIGH (ref 70–99)

## 2018-12-02 PROCEDURE — 99232 SBSQ HOSP IP/OBS MODERATE 35: CPT

## 2018-12-02 PROCEDURE — 99233 SBSQ HOSP IP/OBS HIGH 50: CPT

## 2018-12-02 RX ADMIN — INSULIN GLARGINE 10 UNIT(S): 100 INJECTION, SOLUTION SUBCUTANEOUS at 22:08

## 2018-12-02 RX ADMIN — Medication 1: at 12:03

## 2018-12-02 RX ADMIN — Medication 1 TABLET(S): at 12:02

## 2018-12-02 RX ADMIN — ENOXAPARIN SODIUM 40 MILLIGRAM(S): 100 INJECTION SUBCUTANEOUS at 05:13

## 2018-12-02 RX ADMIN — CLOPIDOGREL BISULFATE 75 MILLIGRAM(S): 75 TABLET, FILM COATED ORAL at 12:02

## 2018-12-02 RX ADMIN — Medication 100 MILLIGRAM(S): at 05:12

## 2018-12-02 RX ADMIN — ATORVASTATIN CALCIUM 80 MILLIGRAM(S): 80 TABLET, FILM COATED ORAL at 22:09

## 2018-12-02 RX ADMIN — Medication 0: at 22:09

## 2018-12-02 RX ADMIN — LISINOPRIL 5 MILLIGRAM(S): 2.5 TABLET ORAL at 05:12

## 2018-12-02 RX ADMIN — Medication 1: at 07:49

## 2018-12-02 RX ADMIN — Medication 20 MILLIGRAM(S): at 12:02

## 2018-12-02 RX ADMIN — Medication 81 MILLIGRAM(S): at 12:02

## 2018-12-02 NOTE — PROGRESS NOTE ADULT - SUBJECTIVE AND OBJECTIVE BOX
No overnight events.  Patient feels well.  Reports no pain, slept well.    REVIEW OF SYSTEMS  Constitutional - No fever,  No fatigue  HEENT - No vertigo, No neck pain  Neurological - No headaches, +loss of strength  Musculoskeletal - No joint pain, No joint swelling, No muscle pain    VITALS  T(C): 36.8 (12-02-18 @ 07:44), Max: 36.8 (12-01-18 @ 21:42)  HR: 56 (12-02-18 @ 07:44) (56 - 70)  BP: 158/69 (12-02-18 @ 07:44) (148/74 - 170/81)  RR: 14 (12-02-18 @ 07:44) (14 - 14)  SpO2: 97% (12-02-18 @ 07:44) (97% - 100%)  Wt(kg): --       MEDICATIONS   acetaminophen   Tablet .. 650 milliGRAM(s) every 6 hours PRN  aspirin  chewable 81 milliGRAM(s) daily  atorvastatin 80 milliGRAM(s) at bedtime  clopidogrel Tablet 75 milliGRAM(s) daily  dextrose 40% Gel 15 Gram(s) once PRN  dextrose 5%. 1000 milliLiter(s) <Continuous>  dextrose 50% Injectable 12.5 Gram(s) once  dextrose 50% Injectable 25 Gram(s) once  dextrose 50% Injectable 25 Gram(s) once  docusate sodium 100 milliGRAM(s) three times a day  enoxaparin Injectable 40 milliGRAM(s) every 24 hours  FLUoxetine 20 milliGRAM(s) daily  glucagon  Injectable 1 milliGRAM(s) once PRN  insulin glargine Injectable (LANTUS) 10 Unit(s) at bedtime  insulin lispro (HumaLOG) corrective regimen sliding scale   three times a day before meals  insulin lispro (HumaLOG) corrective regimen sliding scale   at bedtime  lisinopril 5 milliGRAM(s) daily  multivitamin 1 Tablet(s) daily  ondansetron    Tablet 4 milliGRAM(s) every 6 hours PRN  polyethylene glycol 3350 17 Gram(s) daily PRN  senna 2 Tablet(s) at bedtime      RECENT LABS/IMAGING              POCT Blood Glucose.: 154 mg/dL (12-02-18 @ 07:46)  POCT Blood Glucose.: 154 mg/dL (12-01-18 @ 21:36)  POCT Blood Glucose.: 178 mg/dL (12-01-18 @ 17:12)  POCT Blood Glucose.: 210 mg/dL (12-01-18 @ 12:09)    ---------  PHYSICAL EXAM  Constitutional - NAD, Comfortable	  Pulm - Breathing comfortably, No wheezing  Abd - Soft, NTND  Extremities - No edema, No calf tenderness  Neurologic Exam -                    Cognitive - Awake, Alert x3     Communication - Fluent     Motor - Left hemiparesis     Sensory - Intact to LT  Psychiatric - Mood WNL, Affect WNL    ASSESSMENT/PLAN  58y Female with functional deficits after CVA  CVA - ASA, Plavix, Lipitor  Gi - Colace, Senna, Miralax, Zofran  DM2 - Lantus, ISS  Mood/Motor Recovery - Prozac  Pain - Tylenol PRN  DVT PPX -     Continue 3hrs a day of comprehensive rehab program.

## 2018-12-02 NOTE — PROGRESS NOTE ADULT - ASSESSMENT
58 year old female with prior history of CVAs, now s/p right MCA CVA with functional deficits.    >Right MCA CVA - likely etiology being large vessel disease i.e. symptomatic extracranial right ICA severe stenosis leading to artery to artery embolism s/p right carotid stent  -She underwent selective cerebral angiography on 11/24/18.  -Continue Aspirin, Plavix, Statin  -Continue Fluoxetine for motor recovery  -Continue close monitoring for neurologic deterioration,   -Continue PT/OT/ rehab.  Patient enrolled in Stroke AF trial and randomized to a LOOP        >HTN  -monitor BP, stable   -continue Lisinopril, BP improving    >Nausea- resolved, anti-emetic prn, monitor      >Uncontrolled DM II   - Hb A1C 9.2%  - Accu-Cheks at goal  -Continue Lantus 5U, SHELLY    >Precautions:  fall, aspiration, decub, seizure                                                                               >DVT Prophylaxis: Lovenox 58 year old female with prior history of CVAs, now s/p right MCA CVA with functional deficits.    >Right MCA CVA - likely etiology being large vessel disease i.e. symptomatic extracranial right ICA severe stenosis leading to artery to artery embolism s/p right carotid stent  -She underwent selective cerebral angiography on 11/24/18.  -Continue Aspirin, Plavix, Statin  -Continue Fluoxetine for motor recovery  -Continue close monitoring for neurologic deterioration,   -Continue PT/OT/ rehab.  Patient enrolled in Stroke AF trial and randomized to a LOOP        >HTN  -monitor BP, stable   -continue Lisinopril, BP improving    >Nausea- resolved, anti-emetic prn, monitor    >Uncontrolled DM II   - Hb A1C 9.2%  - Accu-Cheks at goal  -Continue Lantus 5U, SHELLY    > Constipation: bowel regimen added, monitor BM    >DVT Prophylaxis: Lovenox

## 2018-12-02 NOTE — PROGRESS NOTE ADULT - NSHPATTENDINGPLANDISCUSS_GEN_ALL_CORE
patient, agrees, all questions answered; rehab team

## 2018-12-03 LAB
ANION GAP SERPL CALC-SCNC: 11 MMOL/L — SIGNIFICANT CHANGE UP (ref 5–17)
BUN SERPL-MCNC: 15 MG/DL — SIGNIFICANT CHANGE UP (ref 7–23)
CALCIUM SERPL-MCNC: 9.4 MG/DL — SIGNIFICANT CHANGE UP (ref 8.4–10.5)
CHLORIDE SERPL-SCNC: 101 MMOL/L — SIGNIFICANT CHANGE UP (ref 96–108)
CO2 SERPL-SCNC: 22 MMOL/L — SIGNIFICANT CHANGE UP (ref 22–31)
CREAT SERPL-MCNC: 0.7 MG/DL — SIGNIFICANT CHANGE UP (ref 0.5–1.3)
GLUCOSE BLDC GLUCOMTR-MCNC: 162 MG/DL — HIGH (ref 70–99)
GLUCOSE BLDC GLUCOMTR-MCNC: 171 MG/DL — HIGH (ref 70–99)
GLUCOSE BLDC GLUCOMTR-MCNC: 181 MG/DL — HIGH (ref 70–99)
GLUCOSE BLDC GLUCOMTR-MCNC: 231 MG/DL — HIGH (ref 70–99)
GLUCOSE SERPL-MCNC: 136 MG/DL — HIGH (ref 70–99)
HCT VFR BLD CALC: 32.4 % — LOW (ref 34.5–45)
HGB BLD-MCNC: 11.2 G/DL — LOW (ref 11.5–15.5)
MCHC RBC-ENTMCNC: 29.9 PG — SIGNIFICANT CHANGE UP (ref 27–34)
MCHC RBC-ENTMCNC: 34.5 GM/DL — SIGNIFICANT CHANGE UP (ref 32–36)
MCV RBC AUTO: 86.7 FL — SIGNIFICANT CHANGE UP (ref 80–100)
PLATELET # BLD AUTO: 301 K/UL — SIGNIFICANT CHANGE UP (ref 150–400)
POTASSIUM SERPL-MCNC: 3.7 MMOL/L — SIGNIFICANT CHANGE UP (ref 3.5–5.3)
POTASSIUM SERPL-SCNC: 3.7 MMOL/L — SIGNIFICANT CHANGE UP (ref 3.5–5.3)
RBC # BLD: 3.74 M/UL — LOW (ref 3.8–5.2)
RBC # FLD: 11.2 % — SIGNIFICANT CHANGE UP (ref 10.3–14.5)
SODIUM SERPL-SCNC: 134 MMOL/L — LOW (ref 135–145)
WBC # BLD: 5.3 K/UL — SIGNIFICANT CHANGE UP (ref 3.8–10.5)
WBC # FLD AUTO: 5.3 K/UL — SIGNIFICANT CHANGE UP (ref 3.8–10.5)

## 2018-12-03 PROCEDURE — 99233 SBSQ HOSP IP/OBS HIGH 50: CPT

## 2018-12-03 PROCEDURE — 99232 SBSQ HOSP IP/OBS MODERATE 35: CPT

## 2018-12-03 RX ADMIN — Medication 2: at 12:22

## 2018-12-03 RX ADMIN — Medication 100 MILLIGRAM(S): at 21:57

## 2018-12-03 RX ADMIN — Medication 1: at 08:34

## 2018-12-03 RX ADMIN — CLOPIDOGREL BISULFATE 75 MILLIGRAM(S): 75 TABLET, FILM COATED ORAL at 12:27

## 2018-12-03 RX ADMIN — SENNA PLUS 2 TABLET(S): 8.6 TABLET ORAL at 21:57

## 2018-12-03 RX ADMIN — Medication 20 MILLIGRAM(S): at 12:27

## 2018-12-03 RX ADMIN — LISINOPRIL 5 MILLIGRAM(S): 2.5 TABLET ORAL at 05:41

## 2018-12-03 RX ADMIN — INSULIN GLARGINE 10 UNIT(S): 100 INJECTION, SOLUTION SUBCUTANEOUS at 21:54

## 2018-12-03 RX ADMIN — ENOXAPARIN SODIUM 40 MILLIGRAM(S): 100 INJECTION SUBCUTANEOUS at 05:41

## 2018-12-03 RX ADMIN — Medication 1: at 16:40

## 2018-12-03 RX ADMIN — Medication 100 MILLIGRAM(S): at 05:41

## 2018-12-03 RX ADMIN — Medication 1 TABLET(S): at 12:27

## 2018-12-03 RX ADMIN — ATORVASTATIN CALCIUM 80 MILLIGRAM(S): 80 TABLET, FILM COATED ORAL at 21:57

## 2018-12-03 RX ADMIN — Medication 81 MILLIGRAM(S): at 12:27

## 2018-12-03 NOTE — PROGRESS NOTE ADULT - SUBJECTIVE AND OBJECTIVE BOX
CHIEF COMPLAINT: No pain, no weakness, no dizziness, no NV. No new neuro deficits.       HISTORY OF PRESENT ILLNESS  58-year-old right-handed female to [facility] on 11/21/18 with new onset left hemiparesis upon awaking. 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 CT head (11/21/18) showed a chronic right lentiform nucleus lacunar infarct. CTA neck (11/21/18) showed a probable right ICA occlusion with some calcified plaque. CTA head (11/21/18) showed that the right ICA reconstituted perhaps through the ACOM and possibly a large right PCOM. No intracranial occlusion, she was excluded from endovascular thrombectomy. MRI brain subsequently showed R MCA distribution infarct. MRA head and neck showed severe to critical stenosis of proximal  /extracranial right ICA. Conventional angiogram confirmed severe proximal/extracranial right ICA stenosis s/p carotid artery stenting for secondary stroke prevention in this patient with symptomatic right ICA severe stenosis.   Impression:   Cerebral embolism with cerebral infarction. Right MCA distribution stroke - likely etiology being large vessel disease i.e. symptomatic extracranial R ICA severe stenosis leading to artery to artery embolism. Selective cerebral angiography on 11/24/18. Patient enrolled in Stroke AF trial and randomized to a LOOP. ASA/Plavix/Lipitor. Dysphagia: passed, regular diet.  CAROTID DOPPLERS (11/2/7/18): The stent and stented cervical right common and internal carotid arteries are widely patent. There is a flow-limiting stenosis of the right external carotid artery. No hemodynamically significant is present on the left.  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 Decreased, but present flow related enhancement of the more distal right ICA. Previously, no flow related enhancement was identified within the right ICA on CTA from 11/21/2018.  MRA BRAIN: Diminished, but present flow related signal within the skull base and supraclinoid right internal carotid artery. Diminished flow related signal within the right MCA.  MRI Head (11/21/18): Acute right MCA infarct.  A1c was 9.2; TTE: unremarkable. (28 Nov 2018 13:35)      PAST MEDICAL & SURGICAL HISTORY:  Hypertension  CVA (cerebral vascular accident)       REVIEW OF SYMPTOMS  [X] Constitutional WNL     [X] Cardio WNL            [X] Resp WNL           [X] GI WNL                          [X]  WNL                   [X] Heme WNL                                  [X] Skin WNL                 [X] MSK WNL                               [X] Cognitive WNL        [X] Psych WNL      VITALS  Vital Signs Last 24 Hrs  T(C): 36.6 (03 Dec 2018 08:02), Max: 36.9 (02 Dec 2018 20:29)  T(F): 97.9 (03 Dec 2018 08:02), Max: 98.5 (02 Dec 2018 20:29)  HR: 76 (03 Dec 2018 08:02) (57 - 83)  BP: 109/70 (03 Dec 2018 08:02) (109/70 - 138/69)  BP(mean): --  RR: 12 (03 Dec 2018 08:02) (12 - 14)  SpO2: 99% (03 Dec 2018 08:02) (97% - 99%)      Neurologic Exam - no new deficits.  Hemiparesis.                   Balance - impaired     Psychiatric - Mood stable, Affect WNL       FUNCTIONAL PROGRESS  Gait - max A   ADLs - mod/min A  Transfers -mod A  Functional transfer -mod A     RECENT LABS                        11.2   5.3   )-----------( 301      ( 03 Dec 2018 05:52 )             32.4     12-03    134<L>  |  101  |  15  ----------------------------<  136<H>  3.7   |  22  |  0.70    Ca    9.4      03 Dec 2018 05:52            Direct LDL: 106 mg/dL (11-24-18 @ 07:18)    Hemoglobin A1C, Whole Blood: 9.2 % (11-22-18 @ 08:24)              RADIOLOGY/OTHER RESULTS      CURRENT MEDICATIONS  MEDICATIONS  (STANDING):  aspirin  chewable 81 milliGRAM(s) Oral daily  atorvastatin 80 milliGRAM(s) Oral at bedtime  clopidogrel Tablet 75 milliGRAM(s) Oral daily  dextrose 5%. 1000 milliLiter(s) (50 mL/Hr) IV Continuous <Continuous>  dextrose 50% Injectable 12.5 Gram(s) IV Push once  dextrose 50% Injectable 25 Gram(s) IV Push once  dextrose 50% Injectable 25 Gram(s) IV Push once  docusate sodium 100 milliGRAM(s) Oral three times a day  enoxaparin Injectable 40 milliGRAM(s) SubCutaneous every 24 hours  FLUoxetine 20 milliGRAM(s) Oral daily  insulin glargine Injectable (LANTUS) 10 Unit(s) SubCutaneous at bedtime  insulin lispro (HumaLOG) corrective regimen sliding scale   SubCutaneous three times a day before meals  insulin lispro (HumaLOG) corrective regimen sliding scale   SubCutaneous at bedtime  lisinopril 5 milliGRAM(s) Oral daily  multivitamin 1 Tablet(s) Oral daily  senna 2 Tablet(s) Oral at bedtime    MEDICATIONS  (PRN):  acetaminophen   Tablet .. 650 milliGRAM(s) Oral every 6 hours PRN Moderate Pain (4 - 6)  dextrose 40% Gel 15 Gram(s) Oral once PRN Blood Glucose LESS THAN 70 milliGRAM(s)/deciliter  glucagon  Injectable 1 milliGRAM(s) IntraMuscular once PRN Glucose LESS THAN 70 milligrams/deciliter  ondansetron    Tablet 4 milliGRAM(s) Oral every 6 hours PRN Nausea and/or Vomiting  polyethylene glycol 3350 17 Gram(s) Oral daily PRN Constipation      ASSESSMENT & PLAN          GI/Bowel Management - Dulcolax PRN, Fleet PRN   Management - Toilet Q2  Skin - Turn Q2  Pain - Tylenol PRN  DVT PPX - TEDs, SCDs  Diet -diabetic, reg c thins     Continue comprehensive acute rehab program consisting of 3hrs/day of OT/PT and SLP. CHIEF COMPLAINT: No pain, no weakness, no dizziness, no NV. No new neuro deficits.       HISTORY OF PRESENT ILLNESS  58-year-old right-handed female to [facility] on 11/21/18 with new onset left hemiparesis upon awaking. 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 CT head (11/21/18) showed a chronic right lentiform nucleus lacunar infarct. CTA neck (11/21/18) showed a probable right ICA occlusion with some calcified plaque. CTA head (11/21/18) showed that the right ICA reconstituted perhaps through the ACOM and possibly a large right PCOM. No intracranial occlusion, she was excluded from endovascular thrombectomy. MRI brain subsequently showed R MCA distribution infarct. MRA head and neck showed severe to critical stenosis of proximal  /extracranial right ICA. Conventional angiogram confirmed severe proximal/extracranial right ICA stenosis s/p carotid artery stenting for secondary stroke prevention in this patient with symptomatic right ICA severe stenosis.   Impression:   Cerebral embolism with cerebral infarction. Right MCA distribution stroke - likely etiology being large vessel disease i.e. symptomatic extracranial R ICA severe stenosis leading to artery to artery embolism. Selective cerebral angiography on 11/24/18. Patient enrolled in Stroke AF trial and randomized to a LOOP. ASA/Plavix/Lipitor. Dysphagia: passed, regular diet.  CAROTID DOPPLERS (11/2/7/18): The stent and stented cervical right common and internal carotid arteries are widely patent. There is a flow-limiting stenosis of the right external carotid artery. No hemodynamically significant is present on the left.  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 Decreased, but present flow related enhancement of the more distal right ICA. Previously, no flow related enhancement was identified within the right ICA on CTA from 11/21/2018.  MRA BRAIN: Diminished, but present flow related signal within the skull base and supraclinoid right internal carotid artery. Diminished flow related signal within the right MCA.  MRI Head (11/21/18): Acute right MCA infarct.  A1c was 9.2; TTE: unremarkable. (28 Nov 2018 13:35)      PAST MEDICAL & SURGICAL HISTORY:  Hypertension  CVA (cerebral vascular accident)       REVIEW OF SYMPTOMS  [X] Constitutional WNL     [X] Cardio WNL            [X] Resp WNL           [X] GI WNL                          [X]  WNL                   [X] Heme WNL                                  [X] Skin WNL                 [X] MSK WNL                               [X] Cognitive WNL        [X] Psych WNL      VITALS  Vital Signs Last 24 Hrs  T(C): 36.6 (03 Dec 2018 08:02), Max: 36.9 (02 Dec 2018 20:29)  T(F): 97.9 (03 Dec 2018 08:02), Max: 98.5 (02 Dec 2018 20:29)  HR: 76 (03 Dec 2018 08:02) (57 - 83)  BP: 109/70 (03 Dec 2018 08:02) (109/70 - 138/69)  BP(mean): --  RR: 12 (03 Dec 2018 08:02) (12 - 14)  SpO2: 99% (03 Dec 2018 08:02) (97% - 99%)    PHYSICAL EXAM  Constitutional - back pain R  HEENT - NCAT, EOMI  Neck - Supple, No limited ROM  Chest - No wheeze, No rhonchi, No crackles  Cardiovascular - RRR, S1S2, No murmurs  Abdomen - BS+, Soft, NTND  Extremities - No C/C/E, No calf tenderness   Skin-no rash  Wounds-stage I sacrum    Neurologic Exam - no new deficits.  Hemiparesis.                   Balance - impaired     Psychiatric - Mood stable, Affect WNL       FUNCTIONAL PROGRESS  Gait - max A   ADLs - mod/min A  Transfers -mod A  Functional transfer -mod A     RECENT LABS                        11.2   5.3   )-----------( 301      ( 03 Dec 2018 05:52 )             32.4     12-03    134<L>  |  101  |  15  ----------------------------<  136<H>  3.7   |  22  |  0.70    Ca    9.4      03 Dec 2018 05:52            Direct LDL: 106 mg/dL (11-24-18 @ 07:18)    Hemoglobin A1C, Whole Blood: 9.2 % (11-22-18 @ 08:24)              RADIOLOGY/OTHER RESULTS      CURRENT MEDICATIONS  MEDICATIONS  (STANDING):  aspirin  chewable 81 milliGRAM(s) Oral daily  atorvastatin 80 milliGRAM(s) Oral at bedtime  clopidogrel Tablet 75 milliGRAM(s) Oral daily  dextrose 5%. 1000 milliLiter(s) (50 mL/Hr) IV Continuous <Continuous>  dextrose 50% Injectable 12.5 Gram(s) IV Push once  dextrose 50% Injectable 25 Gram(s) IV Push once  dextrose 50% Injectable 25 Gram(s) IV Push once  docusate sodium 100 milliGRAM(s) Oral three times a day  enoxaparin Injectable 40 milliGRAM(s) SubCutaneous every 24 hours  FLUoxetine 20 milliGRAM(s) Oral daily  insulin glargine Injectable (LANTUS) 10 Unit(s) SubCutaneous at bedtime  insulin lispro (HumaLOG) corrective regimen sliding scale   SubCutaneous three times a day before meals  insulin lispro (HumaLOG) corrective regimen sliding scale   SubCutaneous at bedtime  lisinopril 5 milliGRAM(s) Oral daily  multivitamin 1 Tablet(s) Oral daily  senna 2 Tablet(s) Oral at bedtime    MEDICATIONS  (PRN):  acetaminophen   Tablet .. 650 milliGRAM(s) Oral every 6 hours PRN Moderate Pain (4 - 6)  dextrose 40% Gel 15 Gram(s) Oral once PRN Blood Glucose LESS THAN 70 milliGRAM(s)/deciliter  glucagon  Injectable 1 milliGRAM(s) IntraMuscular once PRN Glucose LESS THAN 70 milligrams/deciliter  ondansetron    Tablet 4 milliGRAM(s) Oral every 6 hours PRN Nausea and/or Vomiting  polyethylene glycol 3350 17 Gram(s) Oral daily PRN Constipation      ASSESSMENT & PLAN          GI/Bowel Management - Senna, Miralax prn   Management - Toilet Q2  Skin - Turn Q2  Pain - Tylenol PRN  DVT PPX - TEDs, SCDs  Diet -diabetic, reg c thins     Continue comprehensive acute rehab program consisting of 3hrs/day of OT/PT and SLP.

## 2018-12-03 NOTE — PROGRESS NOTE ADULT - SUBJECTIVE AND OBJECTIVE BOX
Patient is a 58y old  Female who presents with a chief complaint of s/p right MCA CVA c deficits without complaints doing well.      Patient seen and examined at bedside.    ALLERGIES:  No Known Drug Allergies  shellfish (Anaphylaxis)    MEDICATIONS  (STANDING):  aspirin  chewable 81 milliGRAM(s) Oral daily  atorvastatin 80 milliGRAM(s) Oral at bedtime  clopidogrel Tablet 75 milliGRAM(s) Oral daily  dextrose 5%. 1000 milliLiter(s) (50 mL/Hr) IV Continuous <Continuous>  dextrose 50% Injectable 12.5 Gram(s) IV Push once  dextrose 50% Injectable 25 Gram(s) IV Push once  dextrose 50% Injectable 25 Gram(s) IV Push once  docusate sodium 100 milliGRAM(s) Oral three times a day  enoxaparin Injectable 40 milliGRAM(s) SubCutaneous every 24 hours  FLUoxetine 20 milliGRAM(s) Oral daily  insulin glargine Injectable (LANTUS) 10 Unit(s) SubCutaneous at bedtime  insulin lispro (HumaLOG) corrective regimen sliding scale   SubCutaneous three times a day before meals  insulin lispro (HumaLOG) corrective regimen sliding scale   SubCutaneous at bedtime  lisinopril 5 milliGRAM(s) Oral daily  multivitamin 1 Tablet(s) Oral daily  senna 2 Tablet(s) Oral at bedtime    MEDICATIONS  (PRN):  acetaminophen   Tablet .. 650 milliGRAM(s) Oral every 6 hours PRN Moderate Pain (4 - 6)  dextrose 40% Gel 15 Gram(s) Oral once PRN Blood Glucose LESS THAN 70 milliGRAM(s)/deciliter  glucagon  Injectable 1 milliGRAM(s) IntraMuscular once PRN Glucose LESS THAN 70 milligrams/deciliter  ondansetron    Tablet 4 milliGRAM(s) Oral every 6 hours PRN Nausea and/or Vomiting  polyethylene glycol 3350 17 Gram(s) Oral daily PRN Constipation    Vital Signs Last 24 Hrs  T(F): 97.9 (03 Dec 2018 08:02), Max: 98.5 (02 Dec 2018 20:29)  HR: 76 (03 Dec 2018 08:02) (57 - 83)  BP: 109/70 (03 Dec 2018 08:02) (109/70 - 138/69)  RR: 12 (03 Dec 2018 08:02) (12 - 14)  SpO2: 99% (03 Dec 2018 08:02) (97% - 99%)  I&O's Summary    PHYSICAL EXAM:  General: NAD, A/O x 3  ENT: MMM  Neck: Supple, No JVD  Lungs: Clear to auscultation bilaterally  Cardio: RRR, S1/S2, No murmurs  Abdomen: Soft, Nontender, Nondistended; Bowel sounds present  Extremities: No calf tenderness, No pitting edema, left sided weakness    LABS:                        11.2   5.3   )-----------( 301      ( 03 Dec 2018 05:52 )             32.4     12-03    134  |  101  |  15  ----------------------------<  136  3.7   |  22  |  0.70    Ca    9.4      03 Dec 2018 05:52      eGFR if : 112 mL/min/1.73M2 (12-03-18 @ 05:52)  eGFR if Non African American: 97 mL/min/1.73M2 (12-03-18 @ 05:52)            11-24 Chol 179 mg/dL  mg/dL HDL 39 mg/dL Trig 171 mg/dL        CAPILLARY BLOOD GLUCOSE      POCT Blood Glucose.: 162 mg/dL (03 Dec 2018 08:05)  POCT Blood Glucose.: 130 mg/dL (02 Dec 2018 22:08)  POCT Blood Glucose.: 126 mg/dL (02 Dec 2018 16:47)  POCT Blood Glucose.: 162 mg/dL (02 Dec 2018 11:47)    11-22 [id number] 9.2        RADIOLOGY & ADDITIONAL TESTS:    Care Discussed with Consultants/Other Providers:

## 2018-12-03 NOTE — PROGRESS NOTE ADULT - ATTENDING COMMENTS
Patient medically stable. Making progress towards rehab goals.   Continue rehab program.   Multidisciplinary team meeting today:  patient's functional goals and needs, functional and clinical  progress were discussed, barriers to discharge were identified. Anticipate discharge home with home care, 24/7 supervision for safety.  ELOS 2-3 weeks

## 2018-12-03 NOTE — PROGRESS NOTE ADULT - PROBLEM SELECTOR PLAN 1
likely etiology being large vessel disease i.e. symptomatic extracranial right ICA severe stenosis leading to artery to artery embolism s/p right carotid stent  -She underwent selective cerebral angiography on 11/24/18.  -Continue Aspirin, Plavix, Statin  -Continue Fluoxetine for motor recovery  -Continue close monitoring for neurologic deterioration,   -Continue PT/OT/ rehab.  Patient enrolled in Stroke AF trial and randomized to a LOOP

## 2018-12-04 LAB
GLUCOSE BLDC GLUCOMTR-MCNC: 159 MG/DL — HIGH (ref 70–99)
GLUCOSE BLDC GLUCOMTR-MCNC: 165 MG/DL — HIGH (ref 70–99)
GLUCOSE BLDC GLUCOMTR-MCNC: 192 MG/DL — HIGH (ref 70–99)
GLUCOSE BLDC GLUCOMTR-MCNC: 233 MG/DL — HIGH (ref 70–99)

## 2018-12-04 PROCEDURE — 99232 SBSQ HOSP IP/OBS MODERATE 35: CPT

## 2018-12-04 RX ORDER — LIDOCAINE 4 G/100G
1 CREAM TOPICAL DAILY
Qty: 0 | Refills: 0 | Status: DISCONTINUED | OUTPATIENT
Start: 2018-12-04 | End: 2018-12-05

## 2018-12-04 RX ADMIN — INSULIN GLARGINE 10 UNIT(S): 100 INJECTION, SOLUTION SUBCUTANEOUS at 21:47

## 2018-12-04 RX ADMIN — LISINOPRIL 5 MILLIGRAM(S): 2.5 TABLET ORAL at 05:21

## 2018-12-04 RX ADMIN — LIDOCAINE 1 PATCH: 4 CREAM TOPICAL at 18:22

## 2018-12-04 RX ADMIN — Medication 2: at 12:09

## 2018-12-04 RX ADMIN — Medication 81 MILLIGRAM(S): at 12:14

## 2018-12-04 RX ADMIN — Medication 1: at 16:44

## 2018-12-04 RX ADMIN — Medication 1: at 08:09

## 2018-12-04 RX ADMIN — Medication 1 TABLET(S): at 12:13

## 2018-12-04 RX ADMIN — ATORVASTATIN CALCIUM 80 MILLIGRAM(S): 80 TABLET, FILM COATED ORAL at 21:46

## 2018-12-04 RX ADMIN — Medication 20 MILLIGRAM(S): at 12:14

## 2018-12-04 RX ADMIN — LIDOCAINE 1 PATCH: 4 CREAM TOPICAL at 12:14

## 2018-12-04 RX ADMIN — LIDOCAINE 1 PATCH: 4 CREAM TOPICAL at 23:49

## 2018-12-04 RX ADMIN — ENOXAPARIN SODIUM 40 MILLIGRAM(S): 100 INJECTION SUBCUTANEOUS at 05:22

## 2018-12-04 RX ADMIN — CLOPIDOGREL BISULFATE 75 MILLIGRAM(S): 75 TABLET, FILM COATED ORAL at 12:13

## 2018-12-04 NOTE — CHART NOTE - NSCHARTNOTEFT_GEN_A_CORE
Nutrition Follow Up   Hospital Course (Per Electronic Medical Record):   Source: Patient [X]    Family [ ]     Medical Record [X]     Diet: Consistent Carbohydrate Diet w/ Thin Liquids - Changed on 12/1  on Ensure Enlive 8oz PO TID - Per MD  Tolerates Diet Well  No Chewing/Swallowing Difficulties   No Recent Nausea, Vomiting, Diarrhea or Constipation   Consumes % of Meals (as Per Documentation)   States Intake Improving    Enteral/Parenteral Nutrition: N/A    Current Weight: 179.8lb on 12/3  % Weight Change: N/A  Weight Stable  Obtain Weights Weekly     Pertinent Medications: MEDICATIONS  (STANDING):  aspirin  chewable 81 milliGRAM(s) Oral daily  atorvastatin 80 milliGRAM(s) Oral at bedtime  clopidogrel Tablet 75 milliGRAM(s) Oral daily  dextrose 5%. 1000 milliLiter(s) (50 mL/Hr) IV Continuous <Continuous>  dextrose 50% Injectable 12.5 Gram(s) IV Push once  dextrose 50% Injectable 25 Gram(s) IV Push once  dextrose 50% Injectable 25 Gram(s) IV Push once  docusate sodium 100 milliGRAM(s) Oral three times a day  enoxaparin Injectable 40 milliGRAM(s) SubCutaneous every 24 hours  FLUoxetine 20 milliGRAM(s) Oral daily  insulin glargine Injectable (LANTUS) 10 Unit(s) SubCutaneous at bedtime  insulin lispro (HumaLOG) corrective regimen sliding scale   SubCutaneous three times a day before meals  insulin lispro (HumaLOG) corrective regimen sliding scale   SubCutaneous at bedtime  lisinopril 5 milliGRAM(s) Oral daily  multivitamin 1 Tablet(s) Oral daily  senna 2 Tablet(s) Oral at bedtime    MEDICATIONS  (PRN):  acetaminophen   Tablet .. 650 milliGRAM(s) Oral every 6 hours PRN Moderate Pain (4 - 6)  dextrose 40% Gel 15 Gram(s) Oral once PRN Blood Glucose LESS THAN 70 milliGRAM(s)/deciliter  glucagon  Injectable 1 milliGRAM(s) IntraMuscular once PRN Glucose LESS THAN 70 milligrams/deciliter  ondansetron    Tablet 4 milliGRAM(s) Oral every 6 hours PRN Nausea and/or Vomiting  polyethylene glycol 3350 17 Gram(s) Oral daily PRN Constipation      Pertinent Labs:  12-03 Na134 mmol/L<L> Glu 136 mg/dL<H> K+ 3.7 mmol/L Cr  0.70 mg/dL BUN 15 mg/dL 11-29 Alb 2.9 g/dL<L> 11-22 [id number] 9.2 %<H> 11-24 Chol 179 mg/dL  mg/dL HDL 39 mg/dL<L> Trig 171 mg/dL<H>    PCOT: 126-231  (x3 Days)    Skin: No Pressure Ulcers     Edema: None Noted    Last BM: 12/3    Estimated Needs:   [X] No Change since Previous Assessment  [ ] Recalculated:     Previous Nutrition Diagnosis:   Increased nutrient needs (specify); Calories & Protien    Nutrition Diagnosis is [ ] Ongoing    [X] Resolved - Wound Healed Per Nursing Documentation    New Nutrition Diagnosis: [X] Not Applicable    Interventions:   1. Recommend Continue Nutrition Plan of Care     Monitoring & Evaluation:   [X] PO Intake   [X] Tolerance to Diet Prescription   [X] Weights   [X] Follow Up (Per Protocol)  [X] Other: Labs & PCOT    RD Remains Available.  Arvin Baron RD

## 2018-12-04 NOTE — PROGRESS NOTE ADULT - SUBJECTIVE AND OBJECTIVE BOX
Patient is a 58y old  Female who presents with a chief complaint of s/p right MCA CVA c deficits (04 Dec 2018 11:54)      Patient seen and examined at bedside, no events overnight, in no acute distress.     ALLERGIES:  No Known Drug Allergies  shellfish (Anaphylaxis)    MEDICATIONS:  acetaminophen   Tablet .. 650 milliGRAM(s) Oral every 6 hours PRN  docusate sodium 100 milliGRAM(s) Oral three times a day  insulin glargine Injectable (LANTUS) 10 Unit(s) SubCutaneous at bedtime  lidocaine   Patch 1 Patch Transdermal daily  multivitamin 1 Tablet(s) Oral daily  ondansetron    Tablet 4 milliGRAM(s) Oral every 6 hours PRN  polyethylene glycol 3350 17 Gram(s) Oral daily PRN  senna 2 Tablet(s) Oral at bedtime    Vital Signs Last 24 Hrs  T(C): 36.4 (04 Dec 2018 08:43), Max: 36.8 (03 Dec 2018 21:40)  T(F): 97.6 (04 Dec 2018 08:43), Max: 98.2 (03 Dec 2018 21:40)  HR: 58 (04 Dec 2018 08:43) (58 - 71)  BP: 127/73 (04 Dec 2018 08:43) (127/73 - 144/76)  BP(mean): --  RR: 14 (04 Dec 2018 08:43) (14 - 14)  SpO2: 99% (04 Dec 2018 08:43) (99% - 99%)  I&O's Summary    04 Dec 2018 07:01  -  04 Dec 2018 16:02  --------------------------------------------------------  IN: 600 mL / OUT: 400 mL / NET: 200 mL          PAST MEDICAL & SURGICAL HISTORY:  Hypertension  CVA (cerebral vascular accident)      Home Medications:      PHYSICAL EXAM:  General: NAD, A/O x3  ENT: MMM  Neck: Supple, No JVD  Lungs: Clear to percussion bilaterally  Cardio: RRR, S1/S2, No murmurs  Abdomen: Soft, Nontender, Nondistended; Bowel sounds present  neuro: no new deficits  Extremities: No clubbing, cyanosis, or edema    LABS:                        11.2   5.3   )-----------( 301      ( 03 Dec 2018 05:52 )             32.4     12-03    134  |  101  |  15  ----------------------------<  136  3.7   |  22  |  0.70    Ca    9.4      03 Dec 2018 05:52          11-24 Chol 179 mg/dL  mg/dL HDL 39 mg/dL Trig 171 mg/dL        CAPILLARY BLOOD GLUCOSE      POCT Blood Glucose.: 233 mg/dL (04 Dec 2018 12:06)  POCT Blood Glucose.: 165 mg/dL (04 Dec 2018 08:06)  POCT Blood Glucose.: 181 mg/dL (03 Dec 2018 21:50)  POCT Blood Glucose.: 171 mg/dL (03 Dec 2018 16:38)    11-22 [id number] 9.2    RADIOLOGY & ADDITIONAL TESTS: < from: MR Angio Neck w/ IV Cont (11.23.18 @ 10:47) >  IMPRESSION:    MRA NECK:    Findings consistent with arterial dissection involving the origin of the   right internal carotid artery.    Decreased, but present flow related enhancement of the more distal right   ICA. Previously, no flow related enhancement was identified within the   right ICA on CTA from 11/21/2018.    MRA BRAIN:    Diminished,  but present flow related signal within the skull base and   supraclinoid right internal carotid artery.    Diminished flow related signal within the right MCA.    < end of copied text >      Care Discussed with Consultants/Other Providers: PM&R team

## 2018-12-04 NOTE — PROGRESS NOTE ADULT - PROBLEM SELECTOR PLAN 1
Continue PT/OT/SLP program. Fall precautions. ASA/Plavix/statin with Prozac per FLAME. Lidocaine patch to right low back daily added.

## 2018-12-04 NOTE — PROGRESS NOTE ADULT - ASSESSMENT
59 y/o female with PMH of prior CVAs, now s/p right MCA CVA with functional deficits.    1. Right MCA CVA 2/2 suspect large vessel disease   i.e. 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 underwent selective cerebral angiography on 11/24/18.  -Continue Aspirin, Plavix, Statin  -Continue Fluoxetine for motor recovery  -Continue close monitoring for neurologic deterioration,   -Continue PT/OT/ rehab.  Patient enrolled in Stroke AF trial and randomized to a LOOP        2. HTN - controlled on current regimen     3. T2DM - not controlled - HgA1C = 9.2%  Cont. Lantus  Diabetic education    4. DVT Prophylaxis: Lovenox

## 2018-12-05 LAB
ALBUMIN SERPL ELPH-MCNC: 3.3 G/DL — SIGNIFICANT CHANGE UP (ref 3.3–5)
ALP SERPL-CCNC: 115 U/L — SIGNIFICANT CHANGE UP (ref 40–120)
ALT FLD-CCNC: 40 U/L DA — SIGNIFICANT CHANGE UP (ref 10–45)
ANION GAP SERPL CALC-SCNC: 7 MMOL/L — SIGNIFICANT CHANGE UP (ref 5–17)
AST SERPL-CCNC: 23 U/L — SIGNIFICANT CHANGE UP (ref 10–40)
BILIRUB SERPL-MCNC: 0.4 MG/DL — SIGNIFICANT CHANGE UP (ref 0.2–1.2)
BUN SERPL-MCNC: 25 MG/DL — HIGH (ref 7–23)
CALCIUM SERPL-MCNC: 10.3 MG/DL — SIGNIFICANT CHANGE UP (ref 8.4–10.5)
CHLORIDE SERPL-SCNC: 101 MMOL/L — SIGNIFICANT CHANGE UP (ref 96–108)
CO2 SERPL-SCNC: 29 MMOL/L — SIGNIFICANT CHANGE UP (ref 22–31)
CREAT SERPL-MCNC: 0.96 MG/DL — SIGNIFICANT CHANGE UP (ref 0.5–1.3)
GLUCOSE BLDC GLUCOMTR-MCNC: 168 MG/DL — HIGH (ref 70–99)
GLUCOSE BLDC GLUCOMTR-MCNC: 180 MG/DL — HIGH (ref 70–99)
GLUCOSE BLDC GLUCOMTR-MCNC: 183 MG/DL — HIGH (ref 70–99)
GLUCOSE BLDC GLUCOMTR-MCNC: 270 MG/DL — HIGH (ref 70–99)
GLUCOSE SERPL-MCNC: 236 MG/DL — HIGH (ref 70–99)
HCT VFR BLD CALC: 35.8 % — SIGNIFICANT CHANGE UP (ref 34.5–45)
HGB BLD-MCNC: 12.2 G/DL — SIGNIFICANT CHANGE UP (ref 11.5–15.5)
MCHC RBC-ENTMCNC: 29.5 PG — SIGNIFICANT CHANGE UP (ref 27–34)
MCHC RBC-ENTMCNC: 34 GM/DL — SIGNIFICANT CHANGE UP (ref 32–36)
MCV RBC AUTO: 86.8 FL — SIGNIFICANT CHANGE UP (ref 80–100)
PLATELET # BLD AUTO: 474 K/UL — HIGH (ref 150–400)
POTASSIUM SERPL-MCNC: 4.3 MMOL/L — SIGNIFICANT CHANGE UP (ref 3.5–5.3)
POTASSIUM SERPL-SCNC: 4.3 MMOL/L — SIGNIFICANT CHANGE UP (ref 3.5–5.3)
PROT SERPL-MCNC: 7.9 G/DL — SIGNIFICANT CHANGE UP (ref 6–8.3)
RBC # BLD: 4.12 M/UL — SIGNIFICANT CHANGE UP (ref 3.8–5.2)
RBC # FLD: 11.5 % — SIGNIFICANT CHANGE UP (ref 10.3–14.5)
SODIUM SERPL-SCNC: 137 MMOL/L — SIGNIFICANT CHANGE UP (ref 135–145)
WBC # BLD: 6.8 K/UL — SIGNIFICANT CHANGE UP (ref 3.8–10.5)
WBC # FLD AUTO: 6.8 K/UL — SIGNIFICANT CHANGE UP (ref 3.8–10.5)

## 2018-12-05 PROCEDURE — 99232 SBSQ HOSP IP/OBS MODERATE 35: CPT

## 2018-12-05 PROCEDURE — 99233 SBSQ HOSP IP/OBS HIGH 50: CPT

## 2018-12-05 RX ORDER — INSULIN GLARGINE 100 [IU]/ML
12 INJECTION, SOLUTION SUBCUTANEOUS AT BEDTIME
Qty: 0 | Refills: 0 | Status: DISCONTINUED | OUTPATIENT
Start: 2018-12-05 | End: 2018-12-07

## 2018-12-05 RX ORDER — LIDOCAINE 4 G/100G
1 CREAM TOPICAL
Qty: 0 | Refills: 0 | Status: DISCONTINUED | OUTPATIENT
Start: 2018-12-05 | End: 2018-12-21

## 2018-12-05 RX ADMIN — ENOXAPARIN SODIUM 40 MILLIGRAM(S): 100 INJECTION SUBCUTANEOUS at 05:26

## 2018-12-05 RX ADMIN — LIDOCAINE 1 PATCH: 4 CREAM TOPICAL at 18:07

## 2018-12-05 RX ADMIN — Medication 1: at 16:42

## 2018-12-05 RX ADMIN — Medication 1: at 08:15

## 2018-12-05 RX ADMIN — CLOPIDOGREL BISULFATE 75 MILLIGRAM(S): 75 TABLET, FILM COATED ORAL at 12:04

## 2018-12-05 RX ADMIN — Medication 1 TABLET(S): at 16:40

## 2018-12-05 RX ADMIN — Medication 20 MILLIGRAM(S): at 12:04

## 2018-12-05 RX ADMIN — Medication 3: at 12:00

## 2018-12-05 RX ADMIN — LIDOCAINE 1 PATCH: 4 CREAM TOPICAL at 12:01

## 2018-12-05 RX ADMIN — INSULIN GLARGINE 12 UNIT(S): 100 INJECTION, SOLUTION SUBCUTANEOUS at 21:54

## 2018-12-05 RX ADMIN — Medication 81 MILLIGRAM(S): at 12:04

## 2018-12-05 RX ADMIN — ATORVASTATIN CALCIUM 80 MILLIGRAM(S): 80 TABLET, FILM COATED ORAL at 21:54

## 2018-12-05 RX ADMIN — LISINOPRIL 5 MILLIGRAM(S): 2.5 TABLET ORAL at 05:26

## 2018-12-05 NOTE — PROGRESS NOTE ADULT - ASSESSMENT
59 y/o female with PMH of prior CVAs, now s/p right MCA CVA with functional deficits.    #Right MCA CVA 2/2 suspect large vessel disease due to right ICA severe stenosis s/p right carotid stent  #Left arm paresis secondary to #1 above  -Continue Aspirin, Plavix, Statin  -Continue Fluoxetine for motor recovery  -Continue PT/OT/ rehab - actively treating the left arm paresis  -LOOP recorder in place    #HTN  -c/w current regimen     #T2DM, on insulin, with hyperglycemia  -HgA1C = 9.2%  -Increase Lantus to 12 U qHS  -Start pre-meal insulin 4U tid with meals    4. DVT Prophylaxis: Lovenox

## 2018-12-05 NOTE — PROGRESS NOTE ADULT - PROBLEM SELECTOR PLAN 1
Continue PT/OT/SLP program. Fall precautions. ASA/Plavix/statin with Prozac per FLAME. Lidocaine patch to right low back daily added-pain improved.

## 2018-12-05 NOTE — PROGRESS NOTE ADULT - SUBJECTIVE AND OBJECTIVE BOX
Patient is a 58y old  Female who presents with a chief complaint of s/p right MCA CVA c deficits (05 Dec 2018 08:52)    Patient seen and examined at bedside.    ALLERGIES:  No Known Drug Allergies  shellfish (Anaphylaxis)    MEDICATIONS  (STANDING):  aspirin  chewable 81 milliGRAM(s) Oral daily  atorvastatin 80 milliGRAM(s) Oral at bedtime  clopidogrel Tablet 75 milliGRAM(s) Oral daily  dextrose 5%. 1000 milliLiter(s) (50 mL/Hr) IV Continuous <Continuous>  dextrose 50% Injectable 12.5 Gram(s) IV Push once  dextrose 50% Injectable 25 Gram(s) IV Push once  dextrose 50% Injectable 25 Gram(s) IV Push once  docusate sodium 100 milliGRAM(s) Oral three times a day  enoxaparin Injectable 40 milliGRAM(s) SubCutaneous every 24 hours  FLUoxetine 20 milliGRAM(s) Oral daily  insulin glargine Injectable (LANTUS) 10 Unit(s) SubCutaneous at bedtime  insulin lispro (HumaLOG) corrective regimen sliding scale   SubCutaneous three times a day before meals  insulin lispro (HumaLOG) corrective regimen sliding scale   SubCutaneous at bedtime  lidocaine   Patch 1 Patch Transdermal <User Schedule>  lisinopril 5 milliGRAM(s) Oral daily  multivitamin 1 Tablet(s) Oral daily  senna 2 Tablet(s) Oral at bedtime    MEDICATIONS  (PRN):  acetaminophen   Tablet .. 650 milliGRAM(s) Oral every 6 hours PRN Moderate Pain (4 - 6)  dextrose 40% Gel 15 Gram(s) Oral once PRN Blood Glucose LESS THAN 70 milliGRAM(s)/deciliter  glucagon  Injectable 1 milliGRAM(s) IntraMuscular once PRN Glucose LESS THAN 70 milligrams/deciliter  ondansetron    Tablet 4 milliGRAM(s) Oral every 6 hours PRN Nausea and/or Vomiting  polyethylene glycol 3350 17 Gram(s) Oral daily PRN Constipation    Vital Signs Last 24 Hrs  T(F): 97.8 (05 Dec 2018 07:53), Max: 97.8 (04 Dec 2018 19:45)  HR: 83 (05 Dec 2018 07:53) (64 - 83)  BP: 107/67 (05 Dec 2018 07:53) (96/60 - 119/70)  RR: 14 (05 Dec 2018 07:53) (14 - 14)  SpO2: 98% (05 Dec 2018 07:53) (98% - 99%)  I&O's Summary    04 Dec 2018 07:01  -  05 Dec 2018 07:00  --------------------------------------------------------  IN: 600 mL / OUT: 400 mL / NET: 200 mL      PHYSICAL EXAM:  General: NAD, A/O x 3  ENT: MMM  Neck: Supple, No JVD  Lungs: Clear to auscultation bilaterally  Cardio: RRR, S1/S2, No murmurs  Abdomen: Soft, Nontender, Nondistended; Bowel sounds present  Extremities: No calf tenderness, No pitting edema  Neuro: Left arm paralysis left facial droop    LABS:                        12.2   6.8   )-----------( 474      ( 05 Dec 2018 10:20 )             35.8     12-05    137  |  101  |  25  ----------------------------<  236  4.3   |  29  |  0.96    Ca    10.3      05 Dec 2018 10:20    TPro  7.9  /  Alb  3.3  /  TBili  0.4  /  DBili  x   /  AST  23  /  ALT  40  /  AlkPhos  115  12-05    eGFR if : 77 mL/min/1.73M2 (12-05-18 @ 10:20)  eGFR if Non African American: 66 mL/min/1.73M2 (12-05-18 @ 10:20)    11-24 Chol 179 mg/dL  mg/dL HDL 39 mg/dL Trig 171 mg/dL        CAPILLARY BLOOD GLUCOSE      POCT Blood Glucose.: 270 mg/dL (05 Dec 2018 11:22)  POCT Blood Glucose.: 183 mg/dL (05 Dec 2018 07:49)  POCT Blood Glucose.: 192 mg/dL (04 Dec 2018 21:46)  POCT Blood Glucose.: 159 mg/dL (04 Dec 2018 16:41)  POCT Blood Glucose.: 233 mg/dL (04 Dec 2018 12:06)    11-22 [id number] 9.2        Care Discussed with Consultants/Other Providers: Dr. Fleming

## 2018-12-05 NOTE — PROGRESS NOTE ADULT - SUBJECTIVE AND OBJECTIVE BOX
CHIEF COMPLAINT: No acute events overnight, no new weakness. Hip pain improved c patch. Denies NVD.       HISTORY OF PRESENT ILLNESS  58-year-old right-handed female to [facility] on 11/21/18 with new onset left hemiparesis upon awaking. 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 CT head (11/21/18) showed a chronic right lentiform nucleus lacunar infarct. CTA neck (11/21/18) showed a probable right ICA occlusion with some calcified plaque. CTA head (11/21/18) showed that the right ICA reconstituted perhaps through the ACOM and possibly a large right PCOM. No intracranial occlusion, she was excluded from endovascular thrombectomy. MRI brain subsequently showed R MCA distribution infarct. MRA head and neck showed severe to critical stenosis of proximal  /extracranial right ICA. Conventional angiogram confirmed severe proximal/extracranial right ICA stenosis s/p carotid artery stenting for secondary stroke prevention in this patient with symptomatic right ICA severe stenosis.   Impression:   Cerebral embolism with cerebral infarction. Right MCA distribution stroke - likely etiology being large vessel disease i.e. symptomatic extracranial R ICA severe stenosis leading to artery to artery embolism. Selective cerebral angiography on 11/24/18. Patient enrolled in Stroke AF trial and randomized to a LOOP. ASA/Plavix/Lipitor. Dysphagia: passed, regular diet.  CAROTID DOPPLERS (11/2/7/18): The stent and stented cervical right common and internal carotid arteries are widely patent. There is a flow-limiting stenosis of the right external carotid artery. No hemodynamically significant is present on the left.  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 Decreased, but present flow related enhancement of the more distal right ICA. Previously, no flow related enhancement was identified within the right ICA on CTA from 11/21/2018.  MRA BRAIN: Diminished, but present flow related signal within the skull base and supraclinoid right internal carotid artery. Diminished flow related signal within the right MCA.  MRI Head (11/21/18): Acute right MCA infarct.  A1c was 9.2; TTE: unremarkable. (28 Nov 2018 13:35)      PAST MEDICAL & SURGICAL HISTORY:  Hypertension  CVA (cerebral vascular accident)       REVIEW OF SYMPTOMS  [X] Constitutional WNL     [X] Cardio WNL            [X] Resp WNL           [X] GI WNL                          [X]  WNL                   [X] Heme WNL                         [X] Skin WNL                 [X] MSK WNL                   [X] Cognitive WNL        [X] Psych WNL      VITALS  Vital Signs Last 24 Hrs  T(C): 36.6 (05 Dec 2018 07:53), Max: 36.6 (04 Dec 2018 19:45)  T(F): 97.8 (05 Dec 2018 07:53), Max: 97.8 (04 Dec 2018 19:45)  HR: 83 (05 Dec 2018 07:53) (64 - 83)  BP: 107/67 (05 Dec 2018 07:53) (96/60 - 119/70)  BP(mean): --  RR: 14 (05 Dec 2018 07:53) (14 - 14)  SpO2: 98% (05 Dec 2018 07:53) (98% - 99%)      PHYSICAL EXAM  Constitutional - NAD, Comfortable  HEENT - NCAT, EOMI  Neck - Supple, No limited ROM  Chest - CTA bilaterally, No wheeze, No rhonchi, No crackles  Cardiovascular - RRR, S1S2, No murmurs  Abdomen - BS+, Soft, NTND  Extremities - No C/C/E, No calf tenderness   Skin-no rash  Wounds-sacrum stage I      Neurologic Exam -  left sided strength and motor control improving.                   Balance - impaired     Psychiatric - Mood stable, Affect WNL     FUNCTIONAL PROGRESS  Gait - max/mod A   ADLs - mod/min A  Transfers -mod A  Functional transfer -mod A       RECENT LABS                Direct LDL: 106 mg/dL (11-24-18 @ 07:18)    Hemoglobin A1C, Whole Blood: 9.2 % (11-22-18 @ 08:24)              RADIOLOGY/OTHER RESULTS      CURRENT MEDICATIONS  MEDICATIONS  (STANDING):  aspirin  chewable 81 milliGRAM(s) Oral daily  atorvastatin 80 milliGRAM(s) Oral at bedtime  clopidogrel Tablet 75 milliGRAM(s) Oral daily  dextrose 5%. 1000 milliLiter(s) (50 mL/Hr) IV Continuous <Continuous>  dextrose 50% Injectable 12.5 Gram(s) IV Push once  dextrose 50% Injectable 25 Gram(s) IV Push once  dextrose 50% Injectable 25 Gram(s) IV Push once  docusate sodium 100 milliGRAM(s) Oral three times a day  enoxaparin Injectable 40 milliGRAM(s) SubCutaneous every 24 hours  FLUoxetine 20 milliGRAM(s) Oral daily  insulin glargine Injectable (LANTUS) 10 Unit(s) SubCutaneous at bedtime  insulin lispro (HumaLOG) corrective regimen sliding scale   SubCutaneous three times a day before meals  insulin lispro (HumaLOG) corrective regimen sliding scale   SubCutaneous at bedtime  lidocaine   Patch 1 Patch Transdermal <User Schedule>  lisinopril 5 milliGRAM(s) Oral daily  multivitamin 1 Tablet(s) Oral daily  senna 2 Tablet(s) Oral at bedtime    MEDICATIONS  (PRN):  acetaminophen   Tablet .. 650 milliGRAM(s) Oral every 6 hours PRN Moderate Pain (4 - 6)  dextrose 40% Gel 15 Gram(s) Oral once PRN Blood Glucose LESS THAN 70 milliGRAM(s)/deciliter  glucagon  Injectable 1 milliGRAM(s) IntraMuscular once PRN Glucose LESS THAN 70 milligrams/deciliter  ondansetron    Tablet 4 milliGRAM(s) Oral every 6 hours PRN Nausea and/or Vomiting  polyethylene glycol 3350 17 Gram(s) Oral daily PRN Constipation      ASSESSMENT & PLAN          GI/Bowel Management - Senna   Management - Toilet Q2  Skin - Turn Q2  Pain - Tylenol PRN  DVT PPX - Lovenox  Diet - diabetic, reg c thins    Continue comprehensive acute rehab program consisting of 3hrs/day of OT/PT and SLP.

## 2018-12-06 LAB
GLUCOSE BLDC GLUCOMTR-MCNC: 151 MG/DL — HIGH (ref 70–99)
GLUCOSE BLDC GLUCOMTR-MCNC: 181 MG/DL — HIGH (ref 70–99)
GLUCOSE BLDC GLUCOMTR-MCNC: 191 MG/DL — HIGH (ref 70–99)
GLUCOSE BLDC GLUCOMTR-MCNC: 287 MG/DL — HIGH (ref 70–99)
GLUCOSE BLDC GLUCOMTR-MCNC: 311 MG/DL — HIGH (ref 70–99)

## 2018-12-06 PROCEDURE — 99232 SBSQ HOSP IP/OBS MODERATE 35: CPT

## 2018-12-06 PROCEDURE — 99233 SBSQ HOSP IP/OBS HIGH 50: CPT

## 2018-12-06 RX ORDER — LISINOPRIL 2.5 MG/1
2.5 TABLET ORAL DAILY
Qty: 0 | Refills: 0 | Status: DISCONTINUED | OUTPATIENT
Start: 2018-12-06 | End: 2018-12-21

## 2018-12-06 RX ADMIN — ATORVASTATIN CALCIUM 80 MILLIGRAM(S): 80 TABLET, FILM COATED ORAL at 21:05

## 2018-12-06 RX ADMIN — LIDOCAINE 1 PATCH: 4 CREAM TOPICAL at 00:00

## 2018-12-06 RX ADMIN — LISINOPRIL 5 MILLIGRAM(S): 2.5 TABLET ORAL at 06:07

## 2018-12-06 RX ADMIN — Medication 100 MILLIGRAM(S): at 13:08

## 2018-12-06 RX ADMIN — CLOPIDOGREL BISULFATE 75 MILLIGRAM(S): 75 TABLET, FILM COATED ORAL at 12:12

## 2018-12-06 RX ADMIN — Medication 20 MILLIGRAM(S): at 12:12

## 2018-12-06 RX ADMIN — Medication 3: at 12:12

## 2018-12-06 RX ADMIN — Medication 81 MILLIGRAM(S): at 12:12

## 2018-12-06 RX ADMIN — LIDOCAINE 1 PATCH: 4 CREAM TOPICAL at 18:26

## 2018-12-06 RX ADMIN — LIDOCAINE 1 PATCH: 4 CREAM TOPICAL at 08:17

## 2018-12-06 RX ADMIN — INSULIN GLARGINE 12 UNIT(S): 100 INJECTION, SOLUTION SUBCUTANEOUS at 21:05

## 2018-12-06 RX ADMIN — Medication 1 TABLET(S): at 12:12

## 2018-12-06 RX ADMIN — LIDOCAINE 1 PATCH: 4 CREAM TOPICAL at 19:54

## 2018-12-06 RX ADMIN — ENOXAPARIN SODIUM 40 MILLIGRAM(S): 100 INJECTION SUBCUTANEOUS at 06:07

## 2018-12-06 RX ADMIN — Medication 1: at 16:50

## 2018-12-06 RX ADMIN — Medication 1: at 08:18

## 2018-12-06 NOTE — PROGRESS NOTE ADULT - ASSESSMENT
59 y/o female with PMH of prior CVAs, now s/p right MCA CVA with functional deficits.    #Right MCA CVA 2/2 suspect large vessel disease due to right ICA severe stenosis s/p right carotid stent  #Left arm paresis secondary to #1 above  -Continue Aspirin, Plavix, Statin  -Continue Fluoxetine for motor recovery  -Continue PT/OT/ rehab - actively treating the left arm paresis  -LOOP recorder in place    #HTN  -c/w current regimen, stable    #T2DM, on insulin, with hyperglycemia  -HgA1C = 9.2%  -Lantus increased to 12 U qHS as of yesterday  -Endocrine eval requested by primary team - follow-up endocrine recs    4. DVT Prophylaxis: Lovenox

## 2018-12-06 NOTE — PROGRESS NOTE ADULT - SUBJECTIVE AND OBJECTIVE BOX
Patient is a 58y old Female who presents with a chief complaint of s/p right MCA CVA with deficits (06 Dec 2018 09:17)    Patient seen and examined at bedside.    ALLERGIES:  No Known Drug Allergies  shellfish (Anaphylaxis)    MEDICATIONS  (STANDING):  aspirin  chewable 81 milliGRAM(s) Oral daily  atorvastatin 80 milliGRAM(s) Oral at bedtime  clopidogrel Tablet 75 milliGRAM(s) Oral daily  dextrose 5%. 1000 milliLiter(s) (50 mL/Hr) IV Continuous <Continuous>  dextrose 50% Injectable 12.5 Gram(s) IV Push once  dextrose 50% Injectable 25 Gram(s) IV Push once  dextrose 50% Injectable 25 Gram(s) IV Push once  docusate sodium 100 milliGRAM(s) Oral three times a day  enoxaparin Injectable 40 milliGRAM(s) SubCutaneous every 24 hours  FLUoxetine 20 milliGRAM(s) Oral daily  insulin glargine Injectable (LANTUS) 12 Unit(s) SubCutaneous at bedtime  insulin lispro (HumaLOG) corrective regimen sliding scale   SubCutaneous three times a day before meals  insulin lispro (HumaLOG) corrective regimen sliding scale   SubCutaneous at bedtime  lidocaine   Patch 1 Patch Transdermal <User Schedule>  lisinopril 5 milliGRAM(s) Oral daily  multivitamin 1 Tablet(s) Oral daily  senna 2 Tablet(s) Oral at bedtime    MEDICATIONS  (PRN):  acetaminophen   Tablet .. 650 milliGRAM(s) Oral every 6 hours PRN Moderate Pain (4 - 6)  dextrose 40% Gel 15 Gram(s) Oral once PRN Blood Glucose LESS THAN 70 milliGRAM(s)/deciliter  glucagon  Injectable 1 milliGRAM(s) IntraMuscular once PRN Glucose LESS THAN 70 milligrams/deciliter  ondansetron    Tablet 4 milliGRAM(s) Oral every 6 hours PRN Nausea and/or Vomiting  polyethylene glycol 3350 17 Gram(s) Oral daily PRN Constipation    Vital Signs Last 24 Hrs  T(F): 98.2 (06 Dec 2018 08:00), Max: 98.2 (06 Dec 2018 08:00)  HR: 67 (06 Dec 2018 08:00) (62 - 69)  BP: 111/67 (06 Dec 2018 08:00) (111/67 - 150/69)  RR: 12 (06 Dec 2018 08:00) (12 - 14)  SpO2: 100% (06 Dec 2018 08:00) (98% - 100%)  I&O's Summary    PHYSICAL EXAM:  General: NAD, A/O x 3  ENT: MMM  Neck: Supple, No JVD  Lungs: Clear to auscultation bilaterally  Cardio: RRR, S1/S2, No murmurs  Abdomen: Soft, Nontender, Nondistended; Bowel sounds present  Extremities: No calf tenderness, No pitting edema  Neuro: Left arm paralysis    LABS:                        12.2   6.8   )-----------( 474      ( 05 Dec 2018 10:20 )             35.8     12-05    137  |  101  |  25  ----------------------------<  236  4.3   |  29  |  0.96    Ca    10.3      05 Dec 2018 10:20    TPro  7.9  /  Alb  3.3  /  TBili  0.4  /  DBili  x   /  AST  23  /  ALT  40  /  AlkPhos  115  12-05    eGFR if : 77 mL/min/1.73M2 (12-05-18 @ 10:20)  eGFR if Non African American: 66 mL/min/1.73M2 (12-05-18 @ 10:20)    11-24 Chol 179 mg/dL  mg/dL HDL 39 mg/dL Trig 171 mg/dL    CAPILLARY BLOOD GLUCOSE      POCT Blood Glucose.: 181 mg/dL (06 Dec 2018 08:12)  POCT Blood Glucose.: 180 mg/dL (05 Dec 2018 21:54)  POCT Blood Glucose.: 168 mg/dL (05 Dec 2018 16:39)  POCT Blood Glucose.: 270 mg/dL (05 Dec 2018 11:22)    11-22 [id number] 9.2

## 2018-12-06 NOTE — PROGRESS NOTE ADULT - PROBLEM SELECTOR PLAN 2
Follow Medicine. Monitor symptoms. Monitor BP closely.  this am, SBP 99 in therapy. Monitor symptoms. Monitor BP closely. Lisinopril 2.5mg.

## 2018-12-06 NOTE — PROGRESS NOTE ADULT - ATTENDING COMMENTS
Patient medically stable. Making progress towards rehab goals.   Continue rehab program.   BP management discussed with Medicine, medication adjustments to be made.

## 2018-12-06 NOTE — CONSULT NOTE ADULT - SUBJECTIVE AND OBJECTIVE BOX
Consult dictated.  58 yrs. old female with acute CVA , L hemiparesis.   Uncontrolled T 2 DM with A1c 9.2  HTn before.   Now Pt. has L hemiparesis with difficulty in useing insulin pens.

## 2018-12-06 NOTE — CONSULT NOTE ADULT - ASSESSMENT
discussed with Pt.   suggest : once a day Basal insulin 16 units as vial so Pt. can have family fill up syringes with insulin for Pt. to use.  add Jardiance 10 mg. once a day AM po. to help in glycemic control.  advised to do SMBG on discharge and follow with her MD .

## 2018-12-06 NOTE — PROGRESS NOTE ADULT - SUBJECTIVE AND OBJECTIVE BOX
CHIEF COMPLAINT: Denies pain or new deficits.       HISTORY OF PRESENT ILLNESS  58-year-old right-handed female to [facility] on 11/21/18 with new onset left hemiparesis upon awaking. 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 CT head (11/21/18) showed a chronic right lentiform nucleus lacunar infarct. CTA neck (11/21/18) showed a probable right ICA occlusion with some calcified plaque. CTA head (11/21/18) showed that the right ICA reconstituted perhaps through the ACOM and possibly a large right PCOM. No intracranial occlusion, she was excluded from endovascular thrombectomy. MRI brain subsequently showed R MCA distribution infarct. MRA head and neck showed severe to critical stenosis of proximal  /extracranial right ICA. Conventional angiogram confirmed severe proximal/extracranial right ICA stenosis s/p carotid artery stenting for secondary stroke prevention in this patient with symptomatic right ICA severe stenosis.   Impression:   Cerebral embolism with cerebral infarction. Right MCA distribution stroke - likely etiology being large vessel disease i.e. symptomatic extracranial R ICA severe stenosis leading to artery to artery embolism. Selective cerebral angiography on 11/24/18. Patient enrolled in Stroke AF trial and randomized to a LOOP. ASA/Plavix/Lipitor. Dysphagia: passed, regular diet.  CAROTID DOPPLERS (11/2/7/18): The stent and stented cervical right common and internal carotid arteries are widely patent. There is a flow-limiting stenosis of the right external carotid artery. No hemodynamically significant is present on the left.  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 Decreased, but present flow related enhancement of the more distal right ICA. Previously, no flow related enhancement was identified within the right ICA on CTA from 11/21/2018.  MRA BRAIN: Diminished, but present flow related signal within the skull base and supraclinoid right internal carotid artery. Diminished flow related signal within the right MCA.  MRI Head (11/21/18): Acute right MCA infarct.  A1c was 9.2; TTE: unremarkable. (28 Nov 2018 13:35)      PAST MEDICAL & SURGICAL HISTORY:  Hypertension  CVA (cerebral vascular accident)       REVIEW OF SYMPTOMS  [X] Constitutional WNL     [X] Cardio WNL            [X] Resp WNL           [X] GI WNL                          [X]  WNL                   [X] Heme WNL                  [X] Skin WNL                 [X] MSK WNL            [X] Psych WNL      VITALS  Vital Signs Last 24 Hrs  T(C): 36.8 (06 Dec 2018 08:00), Max: 36.8 (06 Dec 2018 08:00)  T(F): 98.2 (06 Dec 2018 08:00), Max: 98.2 (06 Dec 2018 08:00)  HR: 67 (06 Dec 2018 08:00) (62 - 69)  BP: 111/67 (06 Dec 2018 08:00) (111/67 - 150/69)  BP(mean): --  RR: 12 (06 Dec 2018 08:00) (12 - 14)  SpO2: 100% (06 Dec 2018 08:00) (98% - 100%)      PHYSICAL EXAM  Constitutional - NAD  HEENT - NCAT, EOMI  Neck - Supple, No limited ROM  Chest - CTA bilaterally, No wheeze, No rhonchi, No crackles  Cardiovascular - RRR, S1S2, No murmurs  Abdomen - BS+, Soft, NTND  Extremities - No C/C/E, No calf tenderness   Skin-no rash  Wounds-sacrum stage I      Neurologic Exam -  left sided strength and motor control improving.                   Balance - impaired     Psychiatric - Mood stable, Affect WNL     FUNCTIONAL PROGRESS  Gait - max/mod A   ADLs - mod/min A  Transfers -mod A  Functional transfer -mod A     RECENT LABS                        12.2   6.8   )-----------( 474      ( 05 Dec 2018 10:20 )             35.8     12-05    137  |  101  |  25<H>  ----------------------------<  236<H>  4.3   |  29  |  0.96    Ca    10.3      05 Dec 2018 10:20    TPro  7.9  /  Alb  3.3  /  TBili  0.4  /  DBili  x   /  AST  23  /  ALT  40  /  AlkPhos  115  12-05      LIVER FUNCTIONS - ( 05 Dec 2018 10:20 )  Alb: 3.3 g/dL / Pro: 7.9 g/dL / ALK PHOS: 115 U/L / ALT: 40 U/L DA / AST: 23 U/L / GGT: x             Direct LDL: 106 mg/dL (11-24-18 @ 07:18)    Hemoglobin A1C, Whole Blood: 9.2 % (11-22-18 @ 08:24)              RADIOLOGY/OTHER RESULTS      CURRENT MEDICATIONS  MEDICATIONS  (STANDING):  aspirin  chewable 81 milliGRAM(s) Oral daily  atorvastatin 80 milliGRAM(s) Oral at bedtime  clopidogrel Tablet 75 milliGRAM(s) Oral daily  dextrose 5%. 1000 milliLiter(s) (50 mL/Hr) IV Continuous <Continuous>  dextrose 50% Injectable 12.5 Gram(s) IV Push once  dextrose 50% Injectable 25 Gram(s) IV Push once  dextrose 50% Injectable 25 Gram(s) IV Push once  docusate sodium 100 milliGRAM(s) Oral three times a day  enoxaparin Injectable 40 milliGRAM(s) SubCutaneous every 24 hours  FLUoxetine 20 milliGRAM(s) Oral daily  insulin glargine Injectable (LANTUS) 12 Unit(s) SubCutaneous at bedtime  insulin lispro (HumaLOG) corrective regimen sliding scale   SubCutaneous three times a day before meals  insulin lispro (HumaLOG) corrective regimen sliding scale   SubCutaneous at bedtime  lidocaine   Patch 1 Patch Transdermal <User Schedule>  lisinopril 5 milliGRAM(s) Oral daily  multivitamin 1 Tablet(s) Oral daily  senna 2 Tablet(s) Oral at bedtime    MEDICATIONS  (PRN):  acetaminophen   Tablet .. 650 milliGRAM(s) Oral every 6 hours PRN Moderate Pain (4 - 6)  dextrose 40% Gel 15 Gram(s) Oral once PRN Blood Glucose LESS THAN 70 milliGRAM(s)/deciliter  glucagon  Injectable 1 milliGRAM(s) IntraMuscular once PRN Glucose LESS THAN 70 milligrams/deciliter  ondansetron    Tablet 4 milliGRAM(s) Oral every 6 hours PRN Nausea and/or Vomiting  polyethylene glycol 3350 17 Gram(s) Oral daily PRN Constipation      ASSESSMENT & PLAN    GI/Bowel Management - Senna   Management - Toilet Q2  Skin - Turn Q2  Pain - Tylenol PRN  DVT PPX - Lovenox  Diet - diabetic, reg c thins      Continue comprehensive acute rehab program consisting of 3hrs/day of OT/PT and SLP. CHIEF COMPLAINT: Denies pain or new deficits. SBP 99 in therapy.      HISTORY OF PRESENT ILLNESS  58-year-old right-handed female to [facility] on 11/21/18 with new onset left hemiparesis upon awaking. 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 CT head (11/21/18) showed a chronic right lentiform nucleus lacunar infarct. CTA neck (11/21/18) showed a probable right ICA occlusion with some calcified plaque. CTA head (11/21/18) showed that the right ICA reconstituted perhaps through the ACOM and possibly a large right PCOM. No intracranial occlusion, she was excluded from endovascular thrombectomy. MRI brain subsequently showed R MCA distribution infarct. MRA head and neck showed severe to critical stenosis of proximal  /extracranial right ICA. Conventional angiogram confirmed severe proximal/extracranial right ICA stenosis s/p carotid artery stenting for secondary stroke prevention in this patient with symptomatic right ICA severe stenosis.   Impression:   Cerebral embolism with cerebral infarction. Right MCA distribution stroke - likely etiology being large vessel disease i.e. symptomatic extracranial R ICA severe stenosis leading to artery to artery embolism. Selective cerebral angiography on 11/24/18. Patient enrolled in Stroke AF trial and randomized to a LOOP. ASA/Plavix/Lipitor. Dysphagia: passed, regular diet.  CAROTID DOPPLERS (11/2/7/18): The stent and stented cervical right common and internal carotid arteries are widely patent. There is a flow-limiting stenosis of the right external carotid artery. No hemodynamically significant is present on the left.  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 Decreased, but present flow related enhancement of the more distal right ICA. Previously, no flow related enhancement was identified within the right ICA on CTA from 11/21/2018.  MRA BRAIN: Diminished, but present flow related signal within the skull base and supraclinoid right internal carotid artery. Diminished flow related signal within the right MCA.  MRI Head (11/21/18): Acute right MCA infarct.  A1c was 9.2; TTE: unremarkable. (28 Nov 2018 13:35)      PAST MEDICAL & SURGICAL HISTORY:  Hypertension  CVA (cerebral vascular accident)       REVIEW OF SYMPTOMS  [X] Constitutional WNL     [X] Cardio WNL            [X] Resp WNL           [X] GI WNL                          [X]  WNL                   [X] Heme WNL                  [X] Skin WNL                 [X] MSK WNL            [X] Psych WNL      VITALS  Vital Signs Last 24 Hrs  T(C): 36.8 (06 Dec 2018 08:00), Max: 36.8 (06 Dec 2018 08:00)  T(F): 98.2 (06 Dec 2018 08:00), Max: 98.2 (06 Dec 2018 08:00)  HR: 67 (06 Dec 2018 08:00) (62 - 69)  BP: 111/67 (06 Dec 2018 08:00) (111/67 - 150/69)  BP(mean): --  RR: 12 (06 Dec 2018 08:00) (12 - 14)  SpO2: 100% (06 Dec 2018 08:00) (98% - 100%)      PHYSICAL EXAM  Constitutional - NAD  HEENT - NCAT, EOMI  Neck - Supple, No limited ROM  Chest - CTA bilaterally, No wheeze, No rhonchi, No crackles  Cardiovascular - RRR, S1S2, No murmurs  Abdomen - BS+, Soft, NTND  Extremities - No C/C/E, No calf tenderness   Skin-no rash  Wounds-sacrum stage I      Neurologic Exam -  left sided strength and motor control improving.                   Balance - impaired     Psychiatric - Mood stable, Affect WNL     FUNCTIONAL PROGRESS  Gait - max/mod A   ADLs - mod/min A  Transfers -mod A  Functional transfer -mod A     RECENT LABS                        12.2   6.8   )-----------( 474      ( 05 Dec 2018 10:20 )             35.8     12-05    137  |  101  |  25<H>  ----------------------------<  236<H>  4.3   |  29  |  0.96    Ca    10.3      05 Dec 2018 10:20    TPro  7.9  /  Alb  3.3  /  TBili  0.4  /  DBili  x   /  AST  23  /  ALT  40  /  AlkPhos  115  12-05      LIVER FUNCTIONS - ( 05 Dec 2018 10:20 )  Alb: 3.3 g/dL / Pro: 7.9 g/dL / ALK PHOS: 115 U/L / ALT: 40 U/L DA / AST: 23 U/L / GGT: x             Direct LDL: 106 mg/dL (11-24-18 @ 07:18)    Hemoglobin A1C, Whole Blood: 9.2 % (11-22-18 @ 08:24)              RADIOLOGY/OTHER RESULTS      CURRENT MEDICATIONS  MEDICATIONS  (STANDING):  aspirin  chewable 81 milliGRAM(s) Oral daily  atorvastatin 80 milliGRAM(s) Oral at bedtime  clopidogrel Tablet 75 milliGRAM(s) Oral daily  dextrose 5%. 1000 milliLiter(s) (50 mL/Hr) IV Continuous <Continuous>  dextrose 50% Injectable 12.5 Gram(s) IV Push once  dextrose 50% Injectable 25 Gram(s) IV Push once  dextrose 50% Injectable 25 Gram(s) IV Push once  docusate sodium 100 milliGRAM(s) Oral three times a day  enoxaparin Injectable 40 milliGRAM(s) SubCutaneous every 24 hours  FLUoxetine 20 milliGRAM(s) Oral daily  insulin glargine Injectable (LANTUS) 12 Unit(s) SubCutaneous at bedtime  insulin lispro (HumaLOG) corrective regimen sliding scale   SubCutaneous three times a day before meals  insulin lispro (HumaLOG) corrective regimen sliding scale   SubCutaneous at bedtime  lidocaine   Patch 1 Patch Transdermal <User Schedule>  lisinopril 5 milliGRAM(s) Oral daily  multivitamin 1 Tablet(s) Oral daily  senna 2 Tablet(s) Oral at bedtime    MEDICATIONS  (PRN):  acetaminophen   Tablet .. 650 milliGRAM(s) Oral every 6 hours PRN Moderate Pain (4 - 6)  dextrose 40% Gel 15 Gram(s) Oral once PRN Blood Glucose LESS THAN 70 milliGRAM(s)/deciliter  glucagon  Injectable 1 milliGRAM(s) IntraMuscular once PRN Glucose LESS THAN 70 milligrams/deciliter  ondansetron    Tablet 4 milliGRAM(s) Oral every 6 hours PRN Nausea and/or Vomiting  polyethylene glycol 3350 17 Gram(s) Oral daily PRN Constipation      ASSESSMENT & PLAN    GI/Bowel Management - Senna   Management - Toilet Q2  Skin - Turn Q2  Pain - Tylenol PRN  DVT PPX - Lovenox  Diet - diabetic, reg c thins      Continue comprehensive acute rehab program consisting of 3hrs/day of OT/PT and SLP.

## 2018-12-07 DIAGNOSIS — F41.9 ANXIETY DISORDER, UNSPECIFIED: ICD-10-CM

## 2018-12-07 LAB
ALBUMIN SERPL ELPH-MCNC: 2.8 G/DL — LOW (ref 3.3–5)
ALP SERPL-CCNC: 90 U/L — SIGNIFICANT CHANGE UP (ref 40–120)
ALT FLD-CCNC: 36 U/L DA — SIGNIFICANT CHANGE UP (ref 10–45)
ANION GAP SERPL CALC-SCNC: 8 MMOL/L — SIGNIFICANT CHANGE UP (ref 5–17)
AST SERPL-CCNC: 23 U/L — SIGNIFICANT CHANGE UP (ref 10–40)
BILIRUB SERPL-MCNC: 0.3 MG/DL — SIGNIFICANT CHANGE UP (ref 0.2–1.2)
BUN SERPL-MCNC: 23 MG/DL — SIGNIFICANT CHANGE UP (ref 7–23)
CALCIUM SERPL-MCNC: 9.5 MG/DL — SIGNIFICANT CHANGE UP (ref 8.4–10.5)
CHLORIDE SERPL-SCNC: 105 MMOL/L — SIGNIFICANT CHANGE UP (ref 96–108)
CO2 SERPL-SCNC: 26 MMOL/L — SIGNIFICANT CHANGE UP (ref 22–31)
CREAT SERPL-MCNC: 0.76 MG/DL — SIGNIFICANT CHANGE UP (ref 0.5–1.3)
GLUCOSE BLDC GLUCOMTR-MCNC: 183 MG/DL — HIGH (ref 70–99)
GLUCOSE BLDC GLUCOMTR-MCNC: 212 MG/DL — HIGH (ref 70–99)
GLUCOSE BLDC GLUCOMTR-MCNC: 256 MG/DL — HIGH (ref 70–99)
GLUCOSE BLDC GLUCOMTR-MCNC: 271 MG/DL — HIGH (ref 70–99)
GLUCOSE BLDC GLUCOMTR-MCNC: 278 MG/DL — HIGH (ref 70–99)
GLUCOSE SERPL-MCNC: 175 MG/DL — HIGH (ref 70–99)
HCT VFR BLD CALC: 30.5 % — LOW (ref 34.5–45)
HGB BLD-MCNC: 10.4 G/DL — LOW (ref 11.5–15.5)
MCHC RBC-ENTMCNC: 29.3 PG — SIGNIFICANT CHANGE UP (ref 27–34)
MCHC RBC-ENTMCNC: 33.9 GM/DL — SIGNIFICANT CHANGE UP (ref 32–36)
MCV RBC AUTO: 86.4 FL — SIGNIFICANT CHANGE UP (ref 80–100)
PLATELET # BLD AUTO: 354 K/UL — SIGNIFICANT CHANGE UP (ref 150–400)
POTASSIUM SERPL-MCNC: 4.1 MMOL/L — SIGNIFICANT CHANGE UP (ref 3.5–5.3)
POTASSIUM SERPL-SCNC: 4.1 MMOL/L — SIGNIFICANT CHANGE UP (ref 3.5–5.3)
PROT SERPL-MCNC: 6.8 G/DL — SIGNIFICANT CHANGE UP (ref 6–8.3)
RBC # BLD: 3.53 M/UL — LOW (ref 3.8–5.2)
RBC # FLD: 11.5 % — SIGNIFICANT CHANGE UP (ref 10.3–14.5)
SODIUM SERPL-SCNC: 139 MMOL/L — SIGNIFICANT CHANGE UP (ref 135–145)
WBC # BLD: 5.2 K/UL — SIGNIFICANT CHANGE UP (ref 3.8–10.5)
WBC # FLD AUTO: 5.2 K/UL — SIGNIFICANT CHANGE UP (ref 3.8–10.5)

## 2018-12-07 PROCEDURE — 90832 PSYTX W PT 30 MINUTES: CPT

## 2018-12-07 PROCEDURE — 99232 SBSQ HOSP IP/OBS MODERATE 35: CPT

## 2018-12-07 PROCEDURE — 99233 SBSQ HOSP IP/OBS HIGH 50: CPT

## 2018-12-07 RX ORDER — INSULIN GLARGINE 100 [IU]/ML
14 INJECTION, SOLUTION SUBCUTANEOUS AT BEDTIME
Qty: 0 | Refills: 0 | Status: DISCONTINUED | OUTPATIENT
Start: 2018-12-07 | End: 2018-12-10

## 2018-12-07 RX ADMIN — Medication 81 MILLIGRAM(S): at 12:15

## 2018-12-07 RX ADMIN — INSULIN GLARGINE 14 UNIT(S): 100 INJECTION, SOLUTION SUBCUTANEOUS at 21:32

## 2018-12-07 RX ADMIN — ATORVASTATIN CALCIUM 80 MILLIGRAM(S): 80 TABLET, FILM COATED ORAL at 21:32

## 2018-12-07 RX ADMIN — Medication 3: at 12:15

## 2018-12-07 RX ADMIN — Medication 650 MILLIGRAM(S): at 10:09

## 2018-12-07 RX ADMIN — SENNA PLUS 2 TABLET(S): 8.6 TABLET ORAL at 21:32

## 2018-12-07 RX ADMIN — LISINOPRIL 2.5 MILLIGRAM(S): 2.5 TABLET ORAL at 05:36

## 2018-12-07 RX ADMIN — Medication 1 TABLET(S): at 12:15

## 2018-12-07 RX ADMIN — Medication 1: at 07:31

## 2018-12-07 RX ADMIN — Medication 650 MILLIGRAM(S): at 12:14

## 2018-12-07 RX ADMIN — Medication 1: at 21:33

## 2018-12-07 RX ADMIN — Medication 20 MILLIGRAM(S): at 12:15

## 2018-12-07 RX ADMIN — Medication 100 MILLIGRAM(S): at 21:32

## 2018-12-07 RX ADMIN — CLOPIDOGREL BISULFATE 75 MILLIGRAM(S): 75 TABLET, FILM COATED ORAL at 12:15

## 2018-12-07 RX ADMIN — ENOXAPARIN SODIUM 40 MILLIGRAM(S): 100 INJECTION SUBCUTANEOUS at 05:38

## 2018-12-07 RX ADMIN — LIDOCAINE 1 PATCH: 4 CREAM TOPICAL at 21:27

## 2018-12-07 RX ADMIN — Medication 2: at 17:19

## 2018-12-07 RX ADMIN — LIDOCAINE 1 PATCH: 4 CREAM TOPICAL at 19:00

## 2018-12-07 RX ADMIN — LIDOCAINE 1 PATCH: 4 CREAM TOPICAL at 07:30

## 2018-12-07 NOTE — PROGRESS NOTE ADULT - SUBJECTIVE AND OBJECTIVE BOX
CHIEF COMPLAINT: No events overnight, no new deficits, left hemiparesis.       HISTORY OF PRESENT ILLNESS  58-year-old right-handed female to [facility] on 11/21/18 with new onset left hemiparesis upon awaking. 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 CT head (11/21/18) showed a chronic right lentiform nucleus lacunar infarct. CTA neck (11/21/18) showed a probable right ICA occlusion with some calcified plaque. CTA head (11/21/18) showed that the right ICA reconstituted perhaps through the ACOM and possibly a large right PCOM. No intracranial occlusion, she was excluded from endovascular thrombectomy. MRI brain subsequently showed R MCA distribution infarct. MRA head and neck showed severe to critical stenosis of proximal  /extracranial right ICA. Conventional angiogram confirmed severe proximal/extracranial right ICA stenosis s/p carotid artery stenting for secondary stroke prevention in this patient with symptomatic right ICA severe stenosis.   Impression:   Cerebral embolism with cerebral infarction. Right MCA distribution stroke - likely etiology being large vessel disease i.e. symptomatic extracranial R ICA severe stenosis leading to artery to artery embolism. Selective cerebral angiography on 11/24/18. Patient enrolled in Stroke AF trial and randomized to a LOOP. ASA/Plavix/Lipitor. Dysphagia: passed, regular diet.  CAROTID DOPPLERS (11/2/7/18): The stent and stented cervical right common and internal carotid arteries are widely patent. There is a flow-limiting stenosis of the right external carotid artery. No hemodynamically significant is present on the left.  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 Decreased, but present flow related enhancement of the more distal right ICA. Previously, no flow related enhancement was identified within the right ICA on CTA from 11/21/2018.  MRA BRAIN: Diminished, but present flow related signal within the skull base and supraclinoid right internal carotid artery. Diminished flow related signal within the right MCA.  MRI Head (11/21/18): Acute right MCA infarct.  A1c was 9.2; TTE: unremarkable. (28 Nov 2018 13:35)      PAST MEDICAL & SURGICAL HISTORY:  Hypertension  CVA (cerebral vascular accident)       REVIEW OF SYMPTOMS  [X] Constitutional WNL     [X] Cardio WNL            [X] Resp WNL           [X] GI WNL                          [X]  WNL                   [X] Heme WNL                [X] Skin WNL                 [X] MSK WNL             [X] Cognitive WNL        [X] Psych WNL      VITALS  Vital Signs Last 24 Hrs  T(C): 37.1 (07 Dec 2018 07:35), Max: 37.1 (06 Dec 2018 19:56)  T(F): 98.7 (07 Dec 2018 07:35), Max: 98.8 (06 Dec 2018 19:56)  HR: 70 (07 Dec 2018 07:35) (68 - 71)  BP: 118/76 (07 Dec 2018 07:35) (118/76 - 142/81)  BP(mean): --  RR: 14 (07 Dec 2018 07:35) (14 - 14)  SpO2: 100% (07 Dec 2018 07:35) (98% - 100%)       PHYSICAL EXAM  Constitutional - NAD  HEENT - NCAT, EOMI  Neck - Supple, No limited ROM  Chest - CTA bilaterally, No wheeze, No rhonchi, No crackles  Cardiovascular - RRR, S1S2, No murmurs  Abdomen - BS+, Soft, NTND  Extremities - No C/C/E, No calf tenderness   Skin-no rash  Wounds-sacrum stage I      Neurologic Exam -  left sided strength and motor control improving.                   Balance - impaired     Psychiatric - Mood stable, Affect WNL     FUNCTIONAL PROGRESS  Gait - max/mod A   ADLs - mod/min A  Transfers -mod A  Functional transfer -mod A     RECENT LABS                        10.4   5.2   )-----------( 354      ( 07 Dec 2018 05:50 )             30.5     12-07    139  |  105  |  23  ----------------------------<  175<H>  4.1   |  26  |  0.76    Ca    9.5      07 Dec 2018 05:50    TPro  6.8  /  Alb  2.8<L>  /  TBili  0.3  /  DBili  x   /  AST  23  /  ALT  36  /  AlkPhos  90  12-07      LIVER FUNCTIONS - ( 07 Dec 2018 05:50 )  Alb: 2.8 g/dL / Pro: 6.8 g/dL / ALK PHOS: 90 U/L / ALT: 36 U/L DA / AST: 23 U/L / GGT: x             Direct LDL: 106 mg/dL (11-24-18 @ 07:18)    Hemoglobin A1C, Whole Blood: 9.2 % (11-22-18 @ 08:24)              RADIOLOGY/OTHER RESULTS      CURRENT MEDICATIONS  MEDICATIONS  (STANDING):  aspirin  chewable 81 milliGRAM(s) Oral daily  atorvastatin 80 milliGRAM(s) Oral at bedtime  clopidogrel Tablet 75 milliGRAM(s) Oral daily  dextrose 5%. 1000 milliLiter(s) (50 mL/Hr) IV Continuous <Continuous>  dextrose 50% Injectable 12.5 Gram(s) IV Push once  dextrose 50% Injectable 25 Gram(s) IV Push once  dextrose 50% Injectable 25 Gram(s) IV Push once  docusate sodium 100 milliGRAM(s) Oral three times a day  enoxaparin Injectable 40 milliGRAM(s) SubCutaneous every 24 hours  FLUoxetine 20 milliGRAM(s) Oral daily  insulin glargine Injectable (LANTUS) 14 Unit(s) SubCutaneous at bedtime  insulin lispro (HumaLOG) corrective regimen sliding scale   SubCutaneous three times a day before meals  insulin lispro (HumaLOG) corrective regimen sliding scale   SubCutaneous at bedtime  lidocaine   Patch 1 Patch Transdermal <User Schedule>  lisinopril 2.5 milliGRAM(s) Oral daily  multivitamin 1 Tablet(s) Oral daily  senna 2 Tablet(s) Oral at bedtime    MEDICATIONS  (PRN):  acetaminophen   Tablet .. 650 milliGRAM(s) Oral every 6 hours PRN Moderate Pain (4 - 6)  dextrose 40% Gel 15 Gram(s) Oral once PRN Blood Glucose LESS THAN 70 milliGRAM(s)/deciliter  glucagon  Injectable 1 milliGRAM(s) IntraMuscular once PRN Glucose LESS THAN 70 milligrams/deciliter  ondansetron    Tablet 4 milliGRAM(s) Oral every 6 hours PRN Nausea and/or Vomiting  polyethylene glycol 3350 17 Gram(s) Oral daily PRN Constipation      ASSESSMENT & PLAN    GI/Bowel Management - Senna   Management - Toilet Q2  Skin - Turn Q2  Pain - Tylenol PRN  DVT PPX - Lovenox  Diet - diabetic, reg c thins    Continue comprehensive acute rehab program consisting of 3hrs/day of OT/PT and SLP. CHIEF COMPLAINT: No events overnight, no new deficits, left hemiparesis.       HISTORY OF PRESENT ILLNESS  58-year-old right-handed female to [facility] on 11/21/18 with new onset left hemiparesis upon awaking. 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 CT head (11/21/18) showed a chronic right lentiform nucleus lacunar infarct. CTA neck (11/21/18) showed a probable right ICA occlusion with some calcified plaque. CTA head (11/21/18) showed that the right ICA reconstituted perhaps through the ACOM and possibly a large right PCOM. No intracranial occlusion, she was excluded from endovascular thrombectomy. MRI brain subsequently showed R MCA distribution infarct. MRA head and neck showed severe to critical stenosis of proximal  /extracranial right ICA. Conventional angiogram confirmed severe proximal/extracranial right ICA stenosis s/p carotid artery stenting for secondary stroke prevention in this patient with symptomatic right ICA severe stenosis.   Impression:   Cerebral embolism with cerebral infarction. Right MCA distribution stroke - likely etiology being large vessel disease i.e. symptomatic extracranial R ICA severe stenosis leading to artery to artery embolism. Selective cerebral angiography on 11/24/18. Patient enrolled in Stroke AF trial and randomized to a LOOP. ASA/Plavix/Lipitor. Dysphagia: passed, regular diet.  CAROTID DOPPLERS (11/2/7/18): The stent and stented cervical right common and internal carotid arteries are widely patent. There is a flow-limiting stenosis of the right external carotid artery. No hemodynamically significant is present on the left.  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 Decreased, but present flow related enhancement of the more distal right ICA. Previously, no flow related enhancement was identified within the right ICA on CTA from 11/21/2018.  MRA BRAIN: Diminished, but present flow related signal within the skull base and supraclinoid right internal carotid artery. Diminished flow related signal within the right MCA.  MRI Head (11/21/18): Acute right MCA infarct.  A1c was 9.2; TTE: unremarkable. (28 Nov 2018 13:35)      PAST MEDICAL & SURGICAL HISTORY:  Hypertension  CVA (cerebral vascular accident)       REVIEW OF SYMPTOMS  [X] Constitutional WNL     [X] Cardio WNL            [X] Resp WNL           [X] GI WNL                          [X]  WNL                   [X] Heme WNL                [X] Skin WNL                 [X] MSK WNL             [X] Cognitive WNL        [X] Psych WNL      VITALS  Vital Signs Last 24 Hrs  T(C): 37.1 (07 Dec 2018 07:35), Max: 37.1 (06 Dec 2018 19:56)  T(F): 98.7 (07 Dec 2018 07:35), Max: 98.8 (06 Dec 2018 19:56)  HR: 70 (07 Dec 2018 07:35) (68 - 71)  BP: 118/76 (07 Dec 2018 07:35) (118/76 - 142/81)  BP(mean): --  RR: 14 (07 Dec 2018 07:35) (14 - 14)  SpO2: 100% (07 Dec 2018 07:35) (98% - 100%)       PHYSICAL EXAM  Constitutional - NAD  HEENT - NCAT, EOMI  Neck - Supple, No limited ROM  Chest - CTA bilaterally, No wheeze, No rhonchi, No crackles  Cardiovascular - RRR, S1S2, No murmurs  Abdomen - BS+, Soft, NTND  Extremities - No C/C/E, No calf tenderness   Skin-no rash  Wounds-sacrum stage I      Neurologic Exam -  left sided strength and motor control improving.                   Balance - impaired     Psychiatric - Mood stable, Affect WNL     FUNCTIONAL PROGRESS  Gait - mod A 75ft  ADLs - mod/min A  Transfers -mod/min A  Functional transfer -mod A     RECENT LABS                        10.4   5.2   )-----------( 354      ( 07 Dec 2018 05:50 )             30.5     12-07    139  |  105  |  23  ----------------------------<  175<H>  4.1   |  26  |  0.76    Ca    9.5      07 Dec 2018 05:50    TPro  6.8  /  Alb  2.8<L>  /  TBili  0.3  /  DBili  x   /  AST  23  /  ALT  36  /  AlkPhos  90  12-07      LIVER FUNCTIONS - ( 07 Dec 2018 05:50 )  Alb: 2.8 g/dL / Pro: 6.8 g/dL / ALK PHOS: 90 U/L / ALT: 36 U/L DA / AST: 23 U/L / GGT: x             Direct LDL: 106 mg/dL (11-24-18 @ 07:18)    Hemoglobin A1C, Whole Blood: 9.2 % (11-22-18 @ 08:24)              RADIOLOGY/OTHER RESULTS      CURRENT MEDICATIONS  MEDICATIONS  (STANDING):  aspirin  chewable 81 milliGRAM(s) Oral daily  atorvastatin 80 milliGRAM(s) Oral at bedtime  clopidogrel Tablet 75 milliGRAM(s) Oral daily  dextrose 5%. 1000 milliLiter(s) (50 mL/Hr) IV Continuous <Continuous>  dextrose 50% Injectable 12.5 Gram(s) IV Push once  dextrose 50% Injectable 25 Gram(s) IV Push once  dextrose 50% Injectable 25 Gram(s) IV Push once  docusate sodium 100 milliGRAM(s) Oral three times a day  enoxaparin Injectable 40 milliGRAM(s) SubCutaneous every 24 hours  FLUoxetine 20 milliGRAM(s) Oral daily  insulin glargine Injectable (LANTUS) 14 Unit(s) SubCutaneous at bedtime  insulin lispro (HumaLOG) corrective regimen sliding scale   SubCutaneous three times a day before meals  insulin lispro (HumaLOG) corrective regimen sliding scale   SubCutaneous at bedtime  lidocaine   Patch 1 Patch Transdermal <User Schedule>  lisinopril 2.5 milliGRAM(s) Oral daily  multivitamin 1 Tablet(s) Oral daily  senna 2 Tablet(s) Oral at bedtime    MEDICATIONS  (PRN):  acetaminophen   Tablet .. 650 milliGRAM(s) Oral every 6 hours PRN Moderate Pain (4 - 6)  dextrose 40% Gel 15 Gram(s) Oral once PRN Blood Glucose LESS THAN 70 milliGRAM(s)/deciliter  glucagon  Injectable 1 milliGRAM(s) IntraMuscular once PRN Glucose LESS THAN 70 milligrams/deciliter  ondansetron    Tablet 4 milliGRAM(s) Oral every 6 hours PRN Nausea and/or Vomiting  polyethylene glycol 3350 17 Gram(s) Oral daily PRN Constipation      ASSESSMENT & PLAN    GI/Bowel Management - Senna   Management - Toilet Q2  Skin - Turn Q2  Pain - Tylenol PRN  DVT PPX - Lovenox  Diet - diabetic, reg c thins    Continue comprehensive acute rehab program consisting of 3hrs/day of OT/PT and SLP.

## 2018-12-07 NOTE — PROGRESS NOTE ADULT - SUBJECTIVE AND OBJECTIVE BOX
cc: 58y old Female who presents with a chief complaint of s/p right MCA CVA with deficits,   no sob, no n/v    Vital Signs Last 24 Hrs  T(C): 37.1 (07 Dec 2018 07:35), Max: 37.1 (06 Dec 2018 19:56)  T(F): 98.7 (07 Dec 2018 07:35), Max: 98.8 (06 Dec 2018 19:56)  HR: 70 (07 Dec 2018 07:35) (68 - 71)  BP: 118/76 (07 Dec 2018 07:35) (118/76 - 142/81)  BP(mean): --  RR: 14 (07 Dec 2018 07:35) (14 - 14)  SpO2: 100% (07 Dec 2018 07:35) (98% - 100%)    nad, sincere,mmm,ncat  supple  clear  s1s2  soft nt bs present  aao x 3  no pedal edema  neuro- left sided weakness                10.4                 139  | 26   | 23           5.2   >-----------< 354     ------------------------< 175                   30.5                 4.1  | 105  | 0.76                                         Ca 9.5   Mg x     Ph x        CAPILLARY BLOOD GLUCOSE      POCT Blood Glucose.: 183 mg/dL (07 Dec 2018 07:28)  POCT Blood Glucose.: 191 mg/dL (06 Dec 2018 21:04)  POCT Blood Glucose.: 151 mg/dL (06 Dec 2018 16:48)  POCT Blood Glucose.: 287 mg/dL (06 Dec 2018 11:56)  POCT Blood Glucose.: 311 mg/dL (06 Dec 2018 11:55)

## 2018-12-07 NOTE — PROGRESS NOTE ADULT - ASSESSMENT
57 y/o female with PMH of prior CVAs, now s/p right MCA CVA with functional deficits,2/2 suspect large vessel disease due to right ICA severe stenosis s/p right carotid stent , left sided weakness- pt/ot/dvt ppx, Aspirin, Plavix, Statin  -Continue Fluoxetine for motor recovery  -LOOP recorder in place    HTN- lisinopril    T2DM, on insulin, with hyperglycemia  -HgA1C = 9.2%  -Lantus increased to 12 U qHS as of yesterday  -Endocrine eval requested by primary team - follow-up endocrine recs    DVT Prophylaxis: Lovenox

## 2018-12-07 NOTE — PROGRESS NOTE ADULT - PROBLEM SELECTOR PLAN 3
Lantus nightly increased, prn scale Humalog. Monitor FS. Diabetic education. Endocrine in. Plan home c Lantus and Jardiance 10mg daily.

## 2018-12-07 NOTE — PROGRESS NOTE ADULT - PROBLEM SELECTOR PLAN 4
Expresses feelings of being overwhelmed/hopelessness. Tearful. Psychological/Psychiatry evaluation requested. On Prozac.

## 2018-12-07 NOTE — PROGRESS NOTE ADULT - ATTENDING COMMENTS
Depressed, tearful, anxious. Will ask Psychiatry to evaluate, continue SSRI.  BP is better controlled, case discussed with Medicine.

## 2018-12-08 LAB
GLUCOSE BLDC GLUCOMTR-MCNC: 155 MG/DL — HIGH (ref 70–99)
GLUCOSE BLDC GLUCOMTR-MCNC: 163 MG/DL — HIGH (ref 70–99)
GLUCOSE BLDC GLUCOMTR-MCNC: 198 MG/DL — HIGH (ref 70–99)
GLUCOSE BLDC GLUCOMTR-MCNC: 294 MG/DL — HIGH (ref 70–99)

## 2018-12-08 PROCEDURE — 99232 SBSQ HOSP IP/OBS MODERATE 35: CPT

## 2018-12-08 RX ADMIN — ENOXAPARIN SODIUM 40 MILLIGRAM(S): 100 INJECTION SUBCUTANEOUS at 05:35

## 2018-12-08 RX ADMIN — ATORVASTATIN CALCIUM 80 MILLIGRAM(S): 80 TABLET, FILM COATED ORAL at 21:51

## 2018-12-08 RX ADMIN — LIDOCAINE 1 PATCH: 4 CREAM TOPICAL at 19:00

## 2018-12-08 RX ADMIN — LISINOPRIL 2.5 MILLIGRAM(S): 2.5 TABLET ORAL at 05:35

## 2018-12-08 RX ADMIN — CLOPIDOGREL BISULFATE 75 MILLIGRAM(S): 75 TABLET, FILM COATED ORAL at 12:31

## 2018-12-08 RX ADMIN — Medication 100 MILLIGRAM(S): at 21:51

## 2018-12-08 RX ADMIN — LIDOCAINE 1 PATCH: 4 CREAM TOPICAL at 08:19

## 2018-12-08 RX ADMIN — Medication 20 MILLIGRAM(S): at 12:31

## 2018-12-08 RX ADMIN — LIDOCAINE 1 PATCH: 4 CREAM TOPICAL at 20:37

## 2018-12-08 RX ADMIN — Medication 81 MILLIGRAM(S): at 12:31

## 2018-12-08 RX ADMIN — INSULIN GLARGINE 14 UNIT(S): 100 INJECTION, SOLUTION SUBCUTANEOUS at 21:50

## 2018-12-08 RX ADMIN — Medication 100 MILLIGRAM(S): at 05:35

## 2018-12-08 RX ADMIN — SENNA PLUS 2 TABLET(S): 8.6 TABLET ORAL at 21:51

## 2018-12-08 RX ADMIN — Medication 1: at 17:38

## 2018-12-08 RX ADMIN — Medication 1: at 08:20

## 2018-12-08 RX ADMIN — Medication 3: at 12:31

## 2018-12-08 RX ADMIN — Medication 1 TABLET(S): at 12:31

## 2018-12-08 NOTE — PROGRESS NOTE ADULT - SUBJECTIVE AND OBJECTIVE BOX
Patient is a 58y old  Female who presents with a chief complaint of s/p right MCA CVA c deficits (08 Dec 2018 11:43)      Patient seen and examined at bedside, no events overnight, in no acute distress.     ALLERGIES:  No Known Drug Allergies  shellfish (Anaphylaxis)    MEDICATIONS:  acetaminophen   Tablet .. 650 milliGRAM(s) Oral every 6 hours PRN  docusate sodium 100 milliGRAM(s) Oral three times a day  insulin glargine Injectable (LANTUS) 14 Unit(s) SubCutaneous at bedtime  lidocaine   Patch 1 Patch Transdermal <User Schedule>  lisinopril 2.5 milliGRAM(s) Oral daily  multivitamin 1 Tablet(s) Oral daily  ondansetron    Tablet 4 milliGRAM(s) Oral every 6 hours PRN  polyethylene glycol 3350 17 Gram(s) Oral daily PRN  senna 2 Tablet(s) Oral at bedtime    Vital Signs Last 24 Hrs  T(C): 36.9 (08 Dec 2018 08:17), Max: 37.1 (07 Dec 2018 21:25)  T(F): 98.4 (08 Dec 2018 08:17), Max: 98.8 (07 Dec 2018 21:25)  HR: 80 (08 Dec 2018 08:17) (64 - 80)  BP: 120/80 (08 Dec 2018 11:24) (96/60 - 131/68)  BP(mean): --  RR: 14 (08 Dec 2018 08:17) (14 - 14)  SpO2: 100% (08 Dec 2018 08:17) (98% - 100%)  I&O's Summary        PAST MEDICAL & SURGICAL HISTORY:  Hypertension  CVA (cerebral vascular accident)      Home Medications:      PHYSICAL EXAM:  General: NAD, A/O x3  ENT: MMM  Neck: Supple, No JVD  Lungs: Clear to percussion bilaterally  Cardio: RRR, S1/S2, No murmurs  Abdomen: Soft, Nontender, Nondistended; Bowel sounds present  Neuro: no new deficits   Extremities: No clubbing, cyanosis, or edema    LABS:                        10.4   5.2   )-----------( 354      ( 07 Dec 2018 05:50 )             30.5     12-07    139  |  105  |  23  ----------------------------<  175  4.1   |  26  |  0.76    Ca    9.5      07 Dec 2018 05:50    TPro  6.8  /  Alb  2.8  /  TBili  0.3  /  DBili  x   /  AST  23  /  ALT  36  /  AlkPhos  90  12-07    11-24 Chol 179 mg/dL  mg/dL HDL 39 mg/dL Trig 171 mg/dL    CAPILLARY BLOOD GLUCOSE    POCT Blood Glucose.: 294 mg/dL (08 Dec 2018 11:53)  POCT Blood Glucose.: 198 mg/dL (08 Dec 2018 08:10)  POCT Blood Glucose.: 271 mg/dL (07 Dec 2018 21:31)  POCT Blood Glucose.: 212 mg/dL (07 Dec 2018 17:13)  POCT Blood Glucose.: 256 mg/dL (07 Dec 2018 13:21)    11-22 [id number] 9.2    RADIOLOGY & ADDITIONAL TESTS: no new tests     Care Discussed with Consultants/Other Providers: PM&R team

## 2018-12-08 NOTE — PROGRESS NOTE ADULT - SUBJECTIVE AND OBJECTIVE BOX
Chief complaint: no new complaints, but remains anxious about her recovery, tearful and sad at times    Patient is a 58y old  Female who presents with a chief complaint of s/p right MCA CVA c deficits (07 Dec 2018 11:37)      HISTORY OF PRESENT ILLNESS  HEALTH ISSUES - PROBLEM Dx:  Anxiety: Anxiety  Constipation, unspecified constipation type: Constipation, unspecified constipation type  Diabetes mellitus: Diabetes mellitus  Hypertension: Hypertension  CVA (cerebral vascular accident): CVA (cerebral vascular accident)            PMHx -   PAST MEDICAL & SURGICAL HISTORY:  Hypertension  CVA (cerebral vascular accident)       VITALS  Vital Signs Last 24 Hrs  T(C): 36.9 (08 Dec 2018 08:17), Max: 37.1 (07 Dec 2018 21:25)  T(F): 98.4 (08 Dec 2018 08:17), Max: 98.8 (07 Dec 2018 21:25)  HR: 80 (08 Dec 2018 08:17) (64 - 80)  BP: 120/80 (08 Dec 2018 11:24) (96/60 - 131/68)  BP(mean): --  RR: 14 (08 Dec 2018 08:17) (14 - 14)  SpO2: 100% (08 Dec 2018 08:17) (98% - 100%)      PHYSICAL EXAM  Constitutional - NAD, Comfortable  HEENT - NCAT, EOMI  Neck - Supple, No limited ROM  Chest - CTA bilaterally  Cardiovascular - RRR, S1S2,  Abdomen - BS+, Soft, NTND  Extremities - No C/C/E, No calf tenderness   Neurologic Exam -                    Cognitive - Awake, Alert, AAO X3     No new focal deficits                  RECENT LABS                        10.4   5.2   )-----------( 354      ( 07 Dec 2018 05:50 )             30.5     12-07    139  |  105  |  23  ----------------------------<  175<H>  4.1   |  26  |  0.76    Ca    9.5      07 Dec 2018 05:50    TPro  6.8  /  Alb  2.8<L>  /  TBili  0.3  /  DBili  x   /  AST  23  /  ALT  36  /  AlkPhos  90  12-07            RADIOLOGY/OTHER RESULTS      CURRENT MEDICATIONS    MEDICATIONS  (STANDING):  aspirin  chewable 81 milliGRAM(s) Oral daily  atorvastatin 80 milliGRAM(s) Oral at bedtime  clopidogrel Tablet 75 milliGRAM(s) Oral daily  dextrose 5%. 1000 milliLiter(s) (50 mL/Hr) IV Continuous <Continuous>  dextrose 50% Injectable 12.5 Gram(s) IV Push once  dextrose 50% Injectable 25 Gram(s) IV Push once  dextrose 50% Injectable 25 Gram(s) IV Push once  docusate sodium 100 milliGRAM(s) Oral three times a day  enoxaparin Injectable 40 milliGRAM(s) SubCutaneous every 24 hours  FLUoxetine 20 milliGRAM(s) Oral daily  insulin glargine Injectable (LANTUS) 14 Unit(s) SubCutaneous at bedtime  insulin lispro (HumaLOG) corrective regimen sliding scale   SubCutaneous three times a day before meals  insulin lispro (HumaLOG) corrective regimen sliding scale   SubCutaneous at bedtime  lidocaine   Patch 1 Patch Transdermal <User Schedule>  lisinopril 2.5 milliGRAM(s) Oral daily  multivitamin 1 Tablet(s) Oral daily  senna 2 Tablet(s) Oral at bedtime    MEDICATIONS  (PRN):  acetaminophen   Tablet .. 650 milliGRAM(s) Oral every 6 hours PRN Moderate Pain (4 - 6)  dextrose 40% Gel 15 Gram(s) Oral once PRN Blood Glucose LESS THAN 70 milliGRAM(s)/deciliter  glucagon  Injectable 1 milliGRAM(s) IntraMuscular once PRN Glucose LESS THAN 70 milligrams/deciliter  ondansetron    Tablet 4 milliGRAM(s) Oral every 6 hours PRN Nausea and/or Vomiting  polyethylene glycol 3350 17 Gram(s) Oral daily PRN Constipation    ASSESSMENT & PLAN          GI/Bowel Management - Dulcolax PRN, Fleet PRN   Management - Toilet Q2  Skin - Turn Q2  Pain - Tylenol PRN  DVT PPX - Lovenox    Awaiting psychiatry evaluation  Continue comprehensive acute rehab program consisting of 3hrs/day of OT/PT and SLP.

## 2018-12-08 NOTE — PROGRESS NOTE ADULT - ASSESSMENT
59 y/o female with PMH of prior CVAs, now s/p right MCA CVA with functional deficits.    1. Right MCA CVA 2/2 suspect large vessel disease   i.e. 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 underwent selective cerebral angiography on 11/24/18.  -Continue Aspirin, Plavix, Statin  -Continue Fluoxetine for motor recovery  -Continue close monitoring for neurologic deterioration,   -Continue PT/OT/ rehab.  Patient enrolled in Stroke AF trial and randomized to a LOOP        2. HTN - controlled on current regimen     3. T2DM - not controlled - HgA1C = 9.2%  Cont. Lantus  Diabetic education    4. Anxiety/depression  Better today; Psych consult pending     4. DVT Prophylaxis: Lovenox

## 2018-12-09 LAB
GLUCOSE BLDC GLUCOMTR-MCNC: 122 MG/DL — HIGH (ref 70–99)
GLUCOSE BLDC GLUCOMTR-MCNC: 145 MG/DL — HIGH (ref 70–99)
GLUCOSE BLDC GLUCOMTR-MCNC: 196 MG/DL — HIGH (ref 70–99)
GLUCOSE BLDC GLUCOMTR-MCNC: 210 MG/DL — HIGH (ref 70–99)

## 2018-12-09 PROCEDURE — 90792 PSYCH DIAG EVAL W/MED SRVCS: CPT

## 2018-12-09 PROCEDURE — 99232 SBSQ HOSP IP/OBS MODERATE 35: CPT

## 2018-12-09 RX ADMIN — ATORVASTATIN CALCIUM 80 MILLIGRAM(S): 80 TABLET, FILM COATED ORAL at 21:37

## 2018-12-09 RX ADMIN — Medication 100 MILLIGRAM(S): at 05:13

## 2018-12-09 RX ADMIN — Medication 1: at 11:48

## 2018-12-09 RX ADMIN — Medication 1 TABLET(S): at 11:48

## 2018-12-09 RX ADMIN — LIDOCAINE 1 PATCH: 4 CREAM TOPICAL at 18:46

## 2018-12-09 RX ADMIN — LISINOPRIL 2.5 MILLIGRAM(S): 2.5 TABLET ORAL at 05:14

## 2018-12-09 RX ADMIN — ENOXAPARIN SODIUM 40 MILLIGRAM(S): 100 INJECTION SUBCUTANEOUS at 05:15

## 2018-12-09 RX ADMIN — CLOPIDOGREL BISULFATE 75 MILLIGRAM(S): 75 TABLET, FILM COATED ORAL at 11:48

## 2018-12-09 RX ADMIN — LIDOCAINE 1 PATCH: 4 CREAM TOPICAL at 07:33

## 2018-12-09 RX ADMIN — LIDOCAINE 1 PATCH: 4 CREAM TOPICAL at 18:45

## 2018-12-09 RX ADMIN — Medication 20 MILLIGRAM(S): at 11:48

## 2018-12-09 RX ADMIN — INSULIN GLARGINE 14 UNIT(S): 100 INJECTION, SOLUTION SUBCUTANEOUS at 21:37

## 2018-12-09 RX ADMIN — Medication 81 MILLIGRAM(S): at 11:48

## 2018-12-09 NOTE — PROGRESS NOTE ADULT - SUBJECTIVE AND OBJECTIVE BOX
Chief complaint: no new complaints    Patient is a 58y old  Female who presents with a chief complaint of s/p right MCA CVA c deficits (08 Dec 2018 12:49)    HEALTH ISSUES - PROBLEM Dx:  Anxiety: Anxiety  Constipation, unspecified constipation type: Constipation, unspecified constipation type  Diabetes mellitus: Diabetes mellitus  Hypertension: Hypertension  CVA (cerebral vascular accident): CVA (cerebral vascular accident)            PMHx -   PAST MEDICAL & SURGICAL HISTORY:  Hypertension  CVA (cerebral vascular accident)         VITALS  Vital Signs Last 24 Hrs  T(C): 36.8 (09 Dec 2018 07:56), Max: 36.8 (09 Dec 2018 07:56)  T(F): 98.3 (09 Dec 2018 07:56), Max: 98.3 (09 Dec 2018 07:56)  HR: 65 (09 Dec 2018 07:56) (65 - 70)  BP: 144/71 (09 Dec 2018 07:56) (144/71 - 154/76)  BP(mean): --  RR: 14 (09 Dec 2018 07:56) (14 - 14)  SpO2: 98% (09 Dec 2018 07:56) (98% - 100%)      PHYSICAL EXAM  Constitutional - NAD, Comfortable  HEENT - NCAT, EOMI  Neck - Supple, No limited ROM  Chest - CTA bilaterally  Cardiovascular - RRR, S1S2,   Abdomen - BS+, Soft, NTND  Extremities - No C/C/E, No calf tenderness   Neurologic Exam -                    Cognitive - Awake, Alert, AAO X3     No new focal deficits                CURRENT MEDICATIONS    MEDICATIONS  (STANDING):  aspirin  chewable 81 milliGRAM(s) Oral daily  atorvastatin 80 milliGRAM(s) Oral at bedtime  clopidogrel Tablet 75 milliGRAM(s) Oral daily  dextrose 5%. 1000 milliLiter(s) (50 mL/Hr) IV Continuous <Continuous>  dextrose 50% Injectable 12.5 Gram(s) IV Push once  dextrose 50% Injectable 25 Gram(s) IV Push once  dextrose 50% Injectable 25 Gram(s) IV Push once  docusate sodium 100 milliGRAM(s) Oral three times a day  enoxaparin Injectable 40 milliGRAM(s) SubCutaneous every 24 hours  FLUoxetine 20 milliGRAM(s) Oral daily  insulin glargine Injectable (LANTUS) 14 Unit(s) SubCutaneous at bedtime  insulin lispro (HumaLOG) corrective regimen sliding scale   SubCutaneous three times a day before meals  insulin lispro (HumaLOG) corrective regimen sliding scale   SubCutaneous at bedtime  lidocaine   Patch 1 Patch Transdermal <User Schedule>  lisinopril 2.5 milliGRAM(s) Oral daily  multivitamin 1 Tablet(s) Oral daily  senna 2 Tablet(s) Oral at bedtime    MEDICATIONS  (PRN):  acetaminophen   Tablet .. 650 milliGRAM(s) Oral every 6 hours PRN Moderate Pain (4 - 6)  dextrose 40% Gel 15 Gram(s) Oral once PRN Blood Glucose LESS THAN 70 milliGRAM(s)/deciliter  glucagon  Injectable 1 milliGRAM(s) IntraMuscular once PRN Glucose LESS THAN 70 milligrams/deciliter  ondansetron    Tablet 4 milliGRAM(s) Oral every 6 hours PRN Nausea and/or Vomiting  polyethylene glycol 3350 17 Gram(s) Oral daily PRN Constipation    ASSESSMENT & PLAN          GI/Bowel Management - Dulcolax PRN, Fleet PRN   Management - Toilet Q2  Skin - Turn Q2  Pain - Tylenol PRN  DVT PPX - Lovenox      Continue comprehensive acute rehab program consisting of 3hrs/day of OT/PT and SLP.

## 2018-12-09 NOTE — CONSULT NOTE ADULT - SUBJECTIVE AND OBJECTIVE BOX
Pt sleeping peacefully, writer attempted to see, several times today.  Pt chart reviewed, case discussed with Angi Mcgovern regarding rationale for consult.  Case also discussed with Dr. Vazquez Casals from neuropsychology. Full consult to follow  in the meantime continue Prozac 20 mg for brain recovery, mood symptoms s/p CVA.

## 2018-12-10 LAB
ALBUMIN SERPL ELPH-MCNC: 3.1 G/DL — LOW (ref 3.3–5)
ALP SERPL-CCNC: 96 U/L — SIGNIFICANT CHANGE UP (ref 40–120)
ALT FLD-CCNC: 39 U/L DA — SIGNIFICANT CHANGE UP (ref 10–45)
ANION GAP SERPL CALC-SCNC: 6 MMOL/L — SIGNIFICANT CHANGE UP (ref 5–17)
AST SERPL-CCNC: 24 U/L — SIGNIFICANT CHANGE UP (ref 10–40)
BILIRUB SERPL-MCNC: 0.4 MG/DL — SIGNIFICANT CHANGE UP (ref 0.2–1.2)
BUN SERPL-MCNC: 24 MG/DL — HIGH (ref 7–23)
CALCIUM SERPL-MCNC: 10.1 MG/DL — SIGNIFICANT CHANGE UP (ref 8.4–10.5)
CHLORIDE SERPL-SCNC: 105 MMOL/L — SIGNIFICANT CHANGE UP (ref 96–108)
CO2 SERPL-SCNC: 28 MMOL/L — SIGNIFICANT CHANGE UP (ref 22–31)
CREAT SERPL-MCNC: 0.92 MG/DL — SIGNIFICANT CHANGE UP (ref 0.5–1.3)
GLUCOSE BLDC GLUCOMTR-MCNC: 170 MG/DL — HIGH (ref 70–99)
GLUCOSE BLDC GLUCOMTR-MCNC: 178 MG/DL — HIGH (ref 70–99)
GLUCOSE BLDC GLUCOMTR-MCNC: 178 MG/DL — HIGH (ref 70–99)
GLUCOSE BLDC GLUCOMTR-MCNC: 218 MG/DL — HIGH (ref 70–99)
GLUCOSE SERPL-MCNC: 169 MG/DL — HIGH (ref 70–99)
HCT VFR BLD CALC: 34.4 % — LOW (ref 34.5–45)
HGB BLD-MCNC: 11.6 G/DL — SIGNIFICANT CHANGE UP (ref 11.5–15.5)
MCHC RBC-ENTMCNC: 29.2 PG — SIGNIFICANT CHANGE UP (ref 27–34)
MCHC RBC-ENTMCNC: 33.8 GM/DL — SIGNIFICANT CHANGE UP (ref 32–36)
MCV RBC AUTO: 86.4 FL — SIGNIFICANT CHANGE UP (ref 80–100)
PLATELET # BLD AUTO: 359 K/UL — SIGNIFICANT CHANGE UP (ref 150–400)
POTASSIUM SERPL-MCNC: 4.6 MMOL/L — SIGNIFICANT CHANGE UP (ref 3.5–5.3)
POTASSIUM SERPL-SCNC: 4.6 MMOL/L — SIGNIFICANT CHANGE UP (ref 3.5–5.3)
PROT SERPL-MCNC: 7.4 G/DL — SIGNIFICANT CHANGE UP (ref 6–8.3)
RBC # BLD: 3.99 M/UL — SIGNIFICANT CHANGE UP (ref 3.8–5.2)
RBC # FLD: 11.3 % — SIGNIFICANT CHANGE UP (ref 10.3–14.5)
SODIUM SERPL-SCNC: 139 MMOL/L — SIGNIFICANT CHANGE UP (ref 135–145)
WBC # BLD: 4.6 K/UL — SIGNIFICANT CHANGE UP (ref 3.8–10.5)
WBC # FLD AUTO: 4.6 K/UL — SIGNIFICANT CHANGE UP (ref 3.8–10.5)

## 2018-12-10 PROCEDURE — 96116 NUBHVL XM PHYS/QHP 1ST HR: CPT

## 2018-12-10 PROCEDURE — 99232 SBSQ HOSP IP/OBS MODERATE 35: CPT

## 2018-12-10 RX ORDER — INSULIN GLARGINE 100 [IU]/ML
16 INJECTION, SOLUTION SUBCUTANEOUS AT BEDTIME
Qty: 0 | Refills: 0 | Status: DISCONTINUED | OUTPATIENT
Start: 2018-12-10 | End: 2018-12-12

## 2018-12-10 RX ADMIN — Medication 1: at 16:44

## 2018-12-10 RX ADMIN — INSULIN GLARGINE 16 UNIT(S): 100 INJECTION, SOLUTION SUBCUTANEOUS at 21:25

## 2018-12-10 RX ADMIN — LIDOCAINE 1 PATCH: 4 CREAM TOPICAL at 18:39

## 2018-12-10 RX ADMIN — LISINOPRIL 2.5 MILLIGRAM(S): 2.5 TABLET ORAL at 06:23

## 2018-12-10 RX ADMIN — CLOPIDOGREL BISULFATE 75 MILLIGRAM(S): 75 TABLET, FILM COATED ORAL at 12:03

## 2018-12-10 RX ADMIN — Medication 1: at 07:37

## 2018-12-10 RX ADMIN — Medication 20 MILLIGRAM(S): at 12:03

## 2018-12-10 RX ADMIN — LIDOCAINE 1 PATCH: 4 CREAM TOPICAL at 07:37

## 2018-12-10 RX ADMIN — Medication 81 MILLIGRAM(S): at 12:03

## 2018-12-10 RX ADMIN — ENOXAPARIN SODIUM 40 MILLIGRAM(S): 100 INJECTION SUBCUTANEOUS at 06:23

## 2018-12-10 RX ADMIN — Medication 1 TABLET(S): at 12:03

## 2018-12-10 RX ADMIN — ATORVASTATIN CALCIUM 80 MILLIGRAM(S): 80 TABLET, FILM COATED ORAL at 21:25

## 2018-12-10 RX ADMIN — Medication 1: at 12:05

## 2018-12-10 NOTE — PROGRESS NOTE ADULT - ATTENDING COMMENTS
No new complaints, tolerates  therapy well.  Multidisciplinary team meeting today:  patient's functional goals and needs, functional and clinical  progress were discussed, barriers to discharge were identified. Anticipate discharge home with home care, 24/7 supervision for safety.  EDOD 12/24/18

## 2018-12-10 NOTE — CHART NOTE - NSCHARTNOTEFT_GEN_A_CORE
Nutrition Follow Up   Hospital Course (Per Electronic Medical Record):   Source: Medical Record [X] Patient [X] Family [ ]         Diet: Consistent Carbohydrate Diet w/ Thin Liquids   Tolerates Diet Well   No Chewing/Swallowing Difficulties   No Recent Nausea, Vomiting, Diarrhea or Constipation   Consumes 100% of Meals Usually (as Per Documentation)   on Ensure Enlive 8oz PO TID (Per Request)    Enteral/Parenteral Nutrition: N/A    Current Weight: 178.4lb on 12/3  % Weight Change: N/A  Weight Stable Since Admission  Obtain New Weight   Obtain Weights Weekly     Pertinent Medications: MEDICATIONS  (STANDING):  aspirin  chewable 81 milliGRAM(s) Oral daily  atorvastatin 80 milliGRAM(s) Oral at bedtime  clopidogrel Tablet 75 milliGRAM(s) Oral daily  dextrose 5%. 1000 milliLiter(s) (50 mL/Hr) IV Continuous <Continuous>  dextrose 50% Injectable 12.5 Gram(s) IV Push once  dextrose 50% Injectable 25 Gram(s) IV Push once  dextrose 50% Injectable 25 Gram(s) IV Push once  docusate sodium 100 milliGRAM(s) Oral three times a day  enoxaparin Injectable 40 milliGRAM(s) SubCutaneous every 24 hours  FLUoxetine 20 milliGRAM(s) Oral daily  insulin glargine Injectable (LANTUS) 16 Unit(s) SubCutaneous at bedtime  insulin lispro (HumaLOG) corrective regimen sliding scale   SubCutaneous three times a day before meals  insulin lispro (HumaLOG) corrective regimen sliding scale   SubCutaneous at bedtime  lidocaine   Patch 1 Patch Transdermal <User Schedule>  lisinopril 2.5 milliGRAM(s) Oral daily  multivitamin 1 Tablet(s) Oral daily  senna 2 Tablet(s) Oral at bedtime    MEDICATIONS  (PRN):  acetaminophen   Tablet .. 650 milliGRAM(s) Oral every 6 hours PRN Moderate Pain (4 - 6)  dextrose 40% Gel 15 Gram(s) Oral once PRN Blood Glucose LESS THAN 70 milliGRAM(s)/deciliter  glucagon  Injectable 1 milliGRAM(s) IntraMuscular once PRN Glucose LESS THAN 70 milligrams/deciliter  ondansetron    Tablet 4 milliGRAM(s) Oral every 6 hours PRN Nausea and/or Vomiting  polyethylene glycol 3350 17 Gram(s) Oral daily PRN Constipation    Pertinent Labs:  12-10 Na139 mmol/L Glu 169 mg/dL<H> K+ 4.6 mmol/L Cr  0.92 mg/dL BUN 24 mg/dL<H> 12-10 Alb 3.1 g/dL<L> 11-22 [id number] 9.2 %<H> 11-24 Chol 179 mg/dL  mg/dL HDL 39 mg/dL<L> Trig 171 mg/dL<H>    Skin: No Pressure Ulcers - Previously Ulcer Healed    Edema: None Noted    Last BM: 12/9    Estimated Needs:   [X ] No Change since Previous Assessment  [ ] Recalculated:     Previous Nutrition Diagnosis:   No Active Nutrition Dx @ This Present Time    Nutrition Diagnosis is [ ] Ongoing    [ ] Resolved   [X] Not Applicable      New Nutrition Diagnosis: [X] Not Applicable  [ ] Inadequate Protein Energy Intake   [ ] Inadequate Oral Intake   [ ] Excessive Energy Intake   [ ] Increased Nutrient Needs   [ ] Obesity   [ ] Altered GI Function   [ ] Unintended Weight Loss   [ ] Food & Nutrition Related Knowledge Deficit  [ ] Limited Adherence to nutrition related recommendations   [ ] Malnutrition      Interventions:   1. Recommend Continue Nutrition Plan of Care     Monitoring & Evaluation:   [X] PO Intake   [X] Tolerance to Diet Prescription   [X] Weights   [X] Follow Up (Per Protocol)  [X] Other: Labs & PCOT    RD Remains Available.  Arvin Baron RD

## 2018-12-10 NOTE — PROGRESS NOTE ADULT - SUBJECTIVE AND OBJECTIVE BOX
CHIEF COMPLAINT: No new deficits, no new weakness, denies any pain. Hemiparesis.       HISTORY OF PRESENT ILLNESS  58-year-old right-handed female to [facility] on 11/21/18 with new onset left hemiparesis upon awaking. 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 CT head (11/21/18) showed a chronic right lentiform nucleus lacunar infarct. CTA neck (11/21/18) showed a probable right ICA occlusion with some calcified plaque. CTA head (11/21/18) showed that the right ICA reconstituted perhaps through the ACOM and possibly a large right PCOM. No intracranial occlusion, she was excluded from endovascular thrombectomy. MRI brain subsequently showed R MCA distribution infarct. MRA head and neck showed severe to critical stenosis of proximal  /extracranial right ICA. Conventional angiogram confirmed severe proximal/extracranial right ICA stenosis s/p carotid artery stenting for secondary stroke prevention in this patient with symptomatic right ICA severe stenosis.   Impression:   Cerebral embolism with cerebral infarction. Right MCA distribution stroke - likely etiology being large vessel disease i.e. symptomatic extracranial R ICA severe stenosis leading to artery to artery embolism. Selective cerebral angiography on 11/24/18. Patient enrolled in Stroke AF trial and randomized to a LOOP. ASA/Plavix/Lipitor. Dysphagia: passed, regular diet.  CAROTID DOPPLERS (11/2/7/18): The stent and stented cervical right common and internal carotid arteries are widely patent. There is a flow-limiting stenosis of the right external carotid artery. No hemodynamically significant is present on the left.  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 Decreased, but present flow related enhancement of the more distal right ICA. Previously, no flow related enhancement was identified within the right ICA on CTA from 11/21/2018.  MRA BRAIN: Diminished, but present flow related signal within the skull base and supraclinoid right internal carotid artery. Diminished flow related signal within the right MCA.  MRI Head (11/21/18): Acute right MCA infarct.  A1c was 9.2; TTE: unremarkable. (28 Nov 2018 13:35)      PAST MEDICAL & SURGICAL HISTORY:  Hypertension  CVA (cerebral vascular accident)        REVIEW OF SYMPTOMS  [X] Constitutional WNL     [X] Cardio WNL            [X] Resp WNL           [X] GI WNL                          [X]  WNL                   [X] Heme WNL                          [X] Skin WNL                 [X] MSK WNL                VITALS  Vital Signs Last 24 Hrs  T(C): 36.4 (10 Dec 2018 07:49), Max: 37 (09 Dec 2018 21:04)  T(F): 97.5 (10 Dec 2018 07:49), Max: 98.6 (09 Dec 2018 21:04)  HR: 59 (10 Dec 2018 07:49) (59 - 79)  BP: 126/67 (10 Dec 2018 07:49) (108/65 - 143/74)  BP(mean): --  RR: 14 (10 Dec 2018 07:49) (14 - 14)  SpO2: 100% (10 Dec 2018 07:49) (99% - 100%)       PHYSICAL EXAM  Constitutional - NAD  HEENT - NCAT, EOMI  Neck - Supple, No limited ROM  Chest - CTA bilaterally, No wheeze, No rhonchi, No crackles  Cardiovascular - RRR, S1S2, No murmurs  Abdomen - BS+, Soft, NTND  Extremities - No C/C/E, No calf tenderness   Skin-no rash  Wounds-sacrum stage I      Neurologic Exam -  left sided strength and motor control improving.                   Balance - impaired     Psychiatric - Mood stable, Affect WNL     FUNCTIONAL PROGRESS  Gait - mod A 75ft  ADLs - mod/min A  Transfers -mod/min A  Functional transfer -mod A       RECENT LABS                        11.6   4.6   )-----------( 359      ( 10 Dec 2018 05:30 )             34.4     12-10    139  |  105  |  24<H>  ----------------------------<  169<H>  4.6   |  28  |  0.92    Ca    10.1      10 Dec 2018 05:30    TPro  7.4  /  Alb  3.1<L>  /  TBili  0.4  /  DBili  x   /  AST  24  /  ALT  39  /  AlkPhos  96  12-10      LIVER FUNCTIONS - ( 10 Dec 2018 05:30 )  Alb: 3.1 g/dL / Pro: 7.4 g/dL / ALK PHOS: 96 U/L / ALT: 39 U/L DA / AST: 24 U/L / GGT: x             Direct LDL: 106 mg/dL (11-24-18 @ 07:18)    Hemoglobin A1C, Whole Blood: 9.2 % (11-22-18 @ 08:24)              RADIOLOGY/OTHER RESULTS      CURRENT MEDICATIONS  MEDICATIONS  (STANDING):  aspirin  chewable 81 milliGRAM(s) Oral daily  atorvastatin 80 milliGRAM(s) Oral at bedtime  clopidogrel Tablet 75 milliGRAM(s) Oral daily  dextrose 5%. 1000 milliLiter(s) (50 mL/Hr) IV Continuous <Continuous>  dextrose 50% Injectable 12.5 Gram(s) IV Push once  dextrose 50% Injectable 25 Gram(s) IV Push once  dextrose 50% Injectable 25 Gram(s) IV Push once  docusate sodium 100 milliGRAM(s) Oral three times a day  enoxaparin Injectable 40 milliGRAM(s) SubCutaneous every 24 hours  FLUoxetine 20 milliGRAM(s) Oral daily  insulin glargine Injectable (LANTUS) 16 Unit(s) SubCutaneous at bedtime  insulin lispro (HumaLOG) corrective regimen sliding scale   SubCutaneous three times a day before meals  insulin lispro (HumaLOG) corrective regimen sliding scale   SubCutaneous at bedtime  lidocaine   Patch 1 Patch Transdermal <User Schedule>  lisinopril 2.5 milliGRAM(s) Oral daily  multivitamin 1 Tablet(s) Oral daily  senna 2 Tablet(s) Oral at bedtime    MEDICATIONS  (PRN):  acetaminophen   Tablet .. 650 milliGRAM(s) Oral every 6 hours PRN Moderate Pain (4 - 6)  dextrose 40% Gel 15 Gram(s) Oral once PRN Blood Glucose LESS THAN 70 milliGRAM(s)/deciliter  glucagon  Injectable 1 milliGRAM(s) IntraMuscular once PRN Glucose LESS THAN 70 milligrams/deciliter  ondansetron    Tablet 4 milliGRAM(s) Oral every 6 hours PRN Nausea and/or Vomiting  polyethylene glycol 3350 17 Gram(s) Oral daily PRN Constipation      ASSESSMENT & PLAN      GI/Bowel Management - Senna   Management - Toilet Q2  Skin - Turn Q2  Pain - Tylenol PRN  DVT PPX - Lovenox  Diet - diabetic, reg c thins    Continue comprehensive acute rehab program consisting of 3hrs/day of OT/PT and SLP. CHIEF COMPLAINT: No new deficits, no new weakness, denies any pain. Hemiparesis improving.      HISTORY OF PRESENT ILLNESS  58-year-old right-handed female to [facility] on 11/21/18 with new onset left hemiparesis upon awaking. 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 CT head (11/21/18) showed a chronic right lentiform nucleus lacunar infarct. CTA neck (11/21/18) showed a probable right ICA occlusion with some calcified plaque. CTA head (11/21/18) showed that the right ICA reconstituted perhaps through the ACOM and possibly a large right PCOM. No intracranial occlusion, she was excluded from endovascular thrombectomy. MRI brain subsequently showed R MCA distribution infarct. MRA head and neck showed severe to critical stenosis of proximal  /extracranial right ICA. Conventional angiogram confirmed severe proximal/extracranial right ICA stenosis s/p carotid artery stenting for secondary stroke prevention in this patient with symptomatic right ICA severe stenosis.   Impression:   Cerebral embolism with cerebral infarction. Right MCA distribution stroke - likely etiology being large vessel disease i.e. symptomatic extracranial R ICA severe stenosis leading to artery to artery embolism. Selective cerebral angiography on 11/24/18. Patient enrolled in Stroke AF trial and randomized to a LOOP. ASA/Plavix/Lipitor. Dysphagia: passed, regular diet.  CAROTID DOPPLERS (11/2/7/18): The stent and stented cervical right common and internal carotid arteries are widely patent. There is a flow-limiting stenosis of the right external carotid artery. No hemodynamically significant is present on the left.  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 Decreased, but present flow related enhancement of the more distal right ICA. Previously, no flow related enhancement was identified within the right ICA on CTA from 11/21/2018.  MRA BRAIN: Diminished, but present flow related signal within the skull base and supraclinoid right internal carotid artery. Diminished flow related signal within the right MCA.  MRI Head (11/21/18): Acute right MCA infarct.  A1c was 9.2; TTE: unremarkable. (28 Nov 2018 13:35)      PAST MEDICAL & SURGICAL HISTORY:  Hypertension  CVA (cerebral vascular accident)        REVIEW OF SYMPTOMS  [X] Constitutional WNL     [X] Cardio WNL            [X] Resp WNL           [X] GI WNL                          [X]  WNL                   [X] Heme WNL                          [X] Skin WNL                 [X] MSK WNL                VITALS  Vital Signs Last 24 Hrs  T(C): 36.4 (10 Dec 2018 07:49), Max: 37 (09 Dec 2018 21:04)  T(F): 97.5 (10 Dec 2018 07:49), Max: 98.6 (09 Dec 2018 21:04)  HR: 59 (10 Dec 2018 07:49) (59 - 79)  BP: 126/67 (10 Dec 2018 07:49) (108/65 - 143/74)  BP(mean): --  RR: 14 (10 Dec 2018 07:49) (14 - 14)  SpO2: 100% (10 Dec 2018 07:49) (99% - 100%)       PHYSICAL EXAM  Constitutional - NAD  HEENT - NCAT, EOMI  Neck - Supple, No limited ROM  Chest - CTA bilaterally, No wheeze, No rhonchi, No crackles  Cardiovascular - RRR, S1S2, No murmurs  Abdomen - BS+, Soft, NTND  Extremities - No C/C/E, No calf tenderness   Skin-no rash  Wounds-sacrum stage I      Neurologic Exam -  left sided strength and motor control improving.                   Balance - impaired     Psychiatric - Mood stable, Affect WNL     FUNCTIONAL PROGRESS  Gait - mod A 75ft  ADLs - mod/min A  Transfers -mod/min A  Functional transfer -mod A       RECENT LABS                        11.6   4.6   )-----------( 359      ( 10 Dec 2018 05:30 )             34.4     12-10    139  |  105  |  24<H>  ----------------------------<  169<H>  4.6   |  28  |  0.92    Ca    10.1      10 Dec 2018 05:30    TPro  7.4  /  Alb  3.1<L>  /  TBili  0.4  /  DBili  x   /  AST  24  /  ALT  39  /  AlkPhos  96  12-10      LIVER FUNCTIONS - ( 10 Dec 2018 05:30 )  Alb: 3.1 g/dL / Pro: 7.4 g/dL / ALK PHOS: 96 U/L / ALT: 39 U/L DA / AST: 24 U/L / GGT: x             Direct LDL: 106 mg/dL (11-24-18 @ 07:18)    Hemoglobin A1C, Whole Blood: 9.2 % (11-22-18 @ 08:24)              RADIOLOGY/OTHER RESULTS      CURRENT MEDICATIONS  MEDICATIONS  (STANDING):  aspirin  chewable 81 milliGRAM(s) Oral daily  atorvastatin 80 milliGRAM(s) Oral at bedtime  clopidogrel Tablet 75 milliGRAM(s) Oral daily  dextrose 5%. 1000 milliLiter(s) (50 mL/Hr) IV Continuous <Continuous>  dextrose 50% Injectable 12.5 Gram(s) IV Push once  dextrose 50% Injectable 25 Gram(s) IV Push once  dextrose 50% Injectable 25 Gram(s) IV Push once  docusate sodium 100 milliGRAM(s) Oral three times a day  enoxaparin Injectable 40 milliGRAM(s) SubCutaneous every 24 hours  FLUoxetine 20 milliGRAM(s) Oral daily  insulin glargine Injectable (LANTUS) 16 Unit(s) SubCutaneous at bedtime  insulin lispro (HumaLOG) corrective regimen sliding scale   SubCutaneous three times a day before meals  insulin lispro (HumaLOG) corrective regimen sliding scale   SubCutaneous at bedtime  lidocaine   Patch 1 Patch Transdermal <User Schedule>  lisinopril 2.5 milliGRAM(s) Oral daily  multivitamin 1 Tablet(s) Oral daily  senna 2 Tablet(s) Oral at bedtime    MEDICATIONS  (PRN):  acetaminophen   Tablet .. 650 milliGRAM(s) Oral every 6 hours PRN Moderate Pain (4 - 6)  dextrose 40% Gel 15 Gram(s) Oral once PRN Blood Glucose LESS THAN 70 milliGRAM(s)/deciliter  glucagon  Injectable 1 milliGRAM(s) IntraMuscular once PRN Glucose LESS THAN 70 milligrams/deciliter  ondansetron    Tablet 4 milliGRAM(s) Oral every 6 hours PRN Nausea and/or Vomiting  polyethylene glycol 3350 17 Gram(s) Oral daily PRN Constipation      ASSESSMENT & PLAN      GI/Bowel Management - Senna   Management - Toilet Q2  Skin - Turn Q2  Pain - Tylenol PRN  DVT PPX - Lovenox  Diet - diabetic, reg c thins    Continue comprehensive acute rehab program consisting of 3hrs/day of OT/PT and SLP.

## 2018-12-10 NOTE — PROGRESS NOTE ADULT - SUBJECTIVE AND OBJECTIVE BOX
Patient is a 58y old  Female who presents with a chief complaint of s/p right MCA CVA c deficits (10 Dec 2018 09:58)      Patient seen and examined at bedside, no events overnight, in no acute distress.     ALLERGIES:  No Known Drug Allergies  shellfish (Anaphylaxis)    MEDICATIONS:  acetaminophen   Tablet .. 650 milliGRAM(s) Oral every 6 hours PRN  docusate sodium 100 milliGRAM(s) Oral three times a day  insulin glargine Injectable (LANTUS) 16 Unit(s) SubCutaneous at bedtime  lidocaine   Patch 1 Patch Transdermal <User Schedule>  lisinopril 2.5 milliGRAM(s) Oral daily  multivitamin 1 Tablet(s) Oral daily  ondansetron    Tablet 4 milliGRAM(s) Oral every 6 hours PRN  polyethylene glycol 3350 17 Gram(s) Oral daily PRN  senna 2 Tablet(s) Oral at bedtime    Vital Signs Last 24 Hrs  T(C): 36.4 (10 Dec 2018 07:49), Max: 37 (09 Dec 2018 21:04)  T(F): 97.5 (10 Dec 2018 07:49), Max: 98.6 (09 Dec 2018 21:04)  HR: 59 (10 Dec 2018 07:49) (59 - 79)  BP: 126/67 (10 Dec 2018 07:49) (108/65 - 143/74)  BP(mean): --  RR: 14 (10 Dec 2018 07:49) (14 - 14)  SpO2: 100% (10 Dec 2018 07:49) (99% - 100%)  I&O's Summary        PAST MEDICAL & SURGICAL HISTORY:  Hypertension  CVA (cerebral vascular accident)      Home Medications:      PHYSICAL EXAM:  General: NAD, A/O x3  ENT: MMM  Neck: Supple, No JVD  Lungs: Clear to percussion bilaterally  Cardio: RRR, S1/S2, No murmurs  Abdomen: Soft, Nontender, Nondistended; Bowel sounds present  Neuro: No new deficits   Extremities: No clubbing, cyanosis, or edema    LABS:                        11.6   4.6   )-----------( 359      ( 10 Dec 2018 05:30 )             34.4     12-10    139  |  105  |  24  ----------------------------<  169  4.6   |  28  |  0.92    Ca    10.1      10 Dec 2018 05:30    TPro  7.4  /  Alb  3.1  /  TBili  0.4  /  DBili  x   /  AST  24  /  ALT  39  /  AlkPhos  96  12-10    11-24 Chol 179 mg/dL  mg/dL HDL 39 mg/dL Trig 171 mg/dL    CAPILLARY BLOOD GLUCOSE    POCT Blood Glucose.: 178 mg/dL (10 Dec 2018 12:00)  POCT Blood Glucose.: 178 mg/dL (10 Dec 2018 07:35)  POCT Blood Glucose.: 210 mg/dL (09 Dec 2018 21:34)  POCT Blood Glucose.: 122 mg/dL (09 Dec 2018 16:33)    11-22 [id number] 9.2    RADIOLOGY & ADDITIONAL TESTS: no new tests     Care Discussed with Consultants/Other Providers: PM&R team

## 2018-12-10 NOTE — PROGRESS NOTE ADULT - ASSESSMENT
57 y/o female with PMH of prior CVAs, now s/p right MCA CVA with functional deficits.    1. Right MCA CVA 2/2 suspect large vessel disease   i.e. 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 underwent selective cerebral angiography on 11/24/18.  -Continue Aspirin, Plavix, Statin  -Continue Fluoxetine for motor recovery  -Continue close monitoring for neurologic deterioration,   -Continue PT/OT/ rehab.  Patient enrolled in Stroke AF trial and randomized to a LOOP      2. HTN - controlled on current regimen     3. T2DM - not controlled - HgA1C = 9.2%  Cont. Lantus  Diabetic education    4. Anxiety/depression  Better controlled; cont. prozac  Psych following     5. DVT Prophylaxis: Lovenox

## 2018-12-11 LAB
GLUCOSE BLDC GLUCOMTR-MCNC: 159 MG/DL — HIGH (ref 70–99)
GLUCOSE BLDC GLUCOMTR-MCNC: 183 MG/DL — HIGH (ref 70–99)
GLUCOSE BLDC GLUCOMTR-MCNC: 209 MG/DL — HIGH (ref 70–99)
GLUCOSE BLDC GLUCOMTR-MCNC: 258 MG/DL — HIGH (ref 70–99)

## 2018-12-11 PROCEDURE — 99232 SBSQ HOSP IP/OBS MODERATE 35: CPT

## 2018-12-11 RX ADMIN — Medication 81 MILLIGRAM(S): at 11:35

## 2018-12-11 RX ADMIN — ENOXAPARIN SODIUM 40 MILLIGRAM(S): 100 INJECTION SUBCUTANEOUS at 06:04

## 2018-12-11 RX ADMIN — Medication 2: at 11:33

## 2018-12-11 RX ADMIN — LIDOCAINE 1 PATCH: 4 CREAM TOPICAL at 21:13

## 2018-12-11 RX ADMIN — LISINOPRIL 2.5 MILLIGRAM(S): 2.5 TABLET ORAL at 06:04

## 2018-12-11 RX ADMIN — ATORVASTATIN CALCIUM 80 MILLIGRAM(S): 80 TABLET, FILM COATED ORAL at 21:14

## 2018-12-11 RX ADMIN — Medication 3: at 16:00

## 2018-12-11 RX ADMIN — LIDOCAINE 1 PATCH: 4 CREAM TOPICAL at 07:47

## 2018-12-11 RX ADMIN — Medication 1: at 07:47

## 2018-12-11 RX ADMIN — CLOPIDOGREL BISULFATE 75 MILLIGRAM(S): 75 TABLET, FILM COATED ORAL at 11:35

## 2018-12-11 RX ADMIN — INSULIN GLARGINE 16 UNIT(S): 100 INJECTION, SOLUTION SUBCUTANEOUS at 21:14

## 2018-12-11 RX ADMIN — Medication 20 MILLIGRAM(S): at 11:34

## 2018-12-11 RX ADMIN — Medication 1 TABLET(S): at 11:34

## 2018-12-11 NOTE — CONSULT NOTE ADULT - SUBJECTIVE AND OBJECTIVE BOX
Source: Patient  Reliability: Reliable    Identifying Data: 59yo Female with HEALTH ISSUES - PROBLEM Dx:  Anxiety, Constipation, unspecified constipation type, Diabetes mellitus, Hypertension, CVA (cerebral vascular accident)    Chief Complaint: " I was just having a bad day."    History of Present Illness:  58-year-old right-handed female to [facility] on 11/21/18 with new onset left hemiparesis upon awaking. 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 CT head (11/21/18) showed a chronic right lentiform nucleus lacunar infarct. CTA neck (11/21/18) showed a probable right ICA occlusion with some calcified plaque. CTA head (11/21/18) showed that the right ICA reconstituted perhaps through the ACOM and possibly a large right PCOM. No intracranial occlusion, she was excluded from endovascular thrombectomy. MRI brain subsequently showed R MCA distribution infarct. MRA head and neck showed severe to critical stenosis of proximal  /extracranial right ICA. Conventional angiogram confirmed severe proximal/extracranial right ICA stenosis s/p carotid artery stenting for secondary stroke prevention in this patient with symptomatic right ICA severe stenosis.   Impression:   Cerebral embolism with cerebral infarction. Right MCA distribution stroke - likely etiology being large vessel disease i.e. symptomatic extracranial R ICA severe stenosis leading to artery to artery embolism. Selective cerebral angiography on 11/24/18. Patient enrolled in Stroke AF trial and randomized to a LOOP. ASA/Plavix/Lipitor. Dysphagia: passed, regular diet.  CAROTID DOPPLERS (11/2/7/18): The stent and stented cervical right common and internal carotid arteries are widely patent. There is a flow-limiting stenosis of the right external carotid artery. No hemodynamically significant is present on the left.  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 Decreased, but present flow related enhancement of the more distal right ICA. Previously, no flow related enhancement was identified within the right ICA on CTA from 11/21/2018.  MRA BRAIN: Diminished, but present flow related signal within the skull base and supraclinoid right internal carotid artery. Diminished flow related signal within the right MCA.  MRI Head (11/21/18): Acute right MCA infarct.  A1c was 9.2; TTE: unremarkable. (28 Nov 2018 13:35)    Symptoms and concerns-Psychiatry asked to consult with patient for statements made by patient that she was feeling depressed and hopeless. Met with patient who report her spirits are good, reporting "I was just having a bad day the other day."  Reports she is the support person in her family & after having been told her brother had gone to the hospital for medical issues, she felt hopeless.  Reports she is keeping her spirits up by talking with her family and friends over the telephone and by their visits with her in the hospital.  States she is eating and sleeping well, denies SI, HI & rest of psychiatric ROS negative.      Psychiatric Review of Systems:  Reports euthymic mood with good appetite, sleep.  Denies hopelessness, reports intermitent feelings of helplessness when need of assistance with ADLs since in the hospital.    MEDICATIONS  (STANDING):  aspirin  chewable 81 milliGRAM(s) Oral daily  atorvastatin 80 milliGRAM(s) Oral at bedtime  clopidogrel Tablet 75 milliGRAM(s) Oral daily  dextrose 5%. 1000 milliLiter(s) (50 mL/Hr) IV Continuous <Continuous>  dextrose 50% Injectable 12.5 Gram(s) IV Push once  dextrose 50% Injectable 25 Gram(s) IV Push once  dextrose 50% Injectable 25 Gram(s) IV Push once  docusate sodium 100 milliGRAM(s) Oral three times a day  enoxaparin Injectable 40 milliGRAM(s) SubCutaneous every 24 hours  FLUoxetine 20 milliGRAM(s) Oral daily  insulin glargine Injectable (LANTUS) 16 Unit(s) SubCutaneous at bedtime  insulin lispro (HumaLOG) corrective regimen sliding scale   SubCutaneous three times a day before meals  insulin lispro (HumaLOG) corrective regimen sliding scale   SubCutaneous at bedtime  lidocaine   Patch 1 Patch Transdermal <User Schedule>  lisinopril 2.5 milliGRAM(s) Oral daily  multivitamin 1 Tablet(s) Oral daily  senna 2 Tablet(s) Oral at bedtime    MEDICATIONS  (PRN):  acetaminophen   Tablet .. 650 milliGRAM(s) Oral every 6 hours PRN Moderate Pain (4 - 6)  dextrose 40% Gel 15 Gram(s) Oral once PRN Blood Glucose LESS THAN 70 milliGRAM(s)/deciliter  glucagon  Injectable 1 milliGRAM(s) IntraMuscular once PRN Glucose LESS THAN 70 milligrams/deciliter  ondansetron    Tablet 4 milliGRAM(s) Oral every 6 hours PRN Nausea and/or Vomiting  polyethylene glycol 3350 17 Gram(s) Oral daily PRN Constipation    Allergies  No Known Drug Allergies  shellfish (Anaphylaxis)    PAST MEDICAL & SURGICAL HISTORY:  Hypertension  CVA (cerebral vascular accident)    Past Psychiatric History:  patient denies past psychiatric history  Substance Use History:denies    T(C): 36.7 (12-11-18 @ 07:27), Max: 37 (12-10-18 @ 20:46)  HR: 68 (12-11-18 @ 07:27) (68 - 70)  BP: 146/76 (12-11-18 @ 07:27) (130/74 - 160/72)  RR: 15 (12-11-18 @ 07:27) (14 - 15)  SpO2: 100% (12-11-18 @ 07:27) (98% - 100%)  Wt(kg): --    Mental Status Examination:  General Appearance-Dark haired female with hair pulled back, sitting up in wheelchair.  -hygiene/grooming-good  Behavior-calm, pleasant  Attitude/ Veamimmglvrpw-ms-hujotfozk  -eye contact-good  Speech-normal rate, volume  Thought Process-logical, linear  Thought Content-unremarkable  Mood-euthymic  Affect-appropriate  Cognition-alert, oriented  Impulse Control-good  Insight/Judgment-good    Studies:    Laboratory testing-                        11.6   4.6   )-----------( 359      ( 10 Dec 2018 05:30 )             34.4     12-10    139  |  105  |  24<H>  ----------------------------<  169<H>  4.6   |  28  |  0.92    Ca    10.1      10 Dec 2018 05:30    TPro  7.4  /  Alb  3.1<L>  /  TBili  0.4  /  DBili  x   /  AST  24  /  ALT  39  /  AlkPhos  96  12-10    CAPILLARY BLOOD GLUCOSE      POCT Blood Glucose.: 258 mg/dL (11 Dec 2018 15:57)    LIVER FUNCTIONS - ( 10 Dec 2018 05:30 )  Alb: 3.1 g/dL / Pro: 7.4 g/dL / ALK PHOS: 96 U/L / ALT: 39 U/L DA / AST: 24 U/L / GGT: x           Assessment: R/O adjustment disorder     Medication- Continue Prozac 20 mg po daily.    Hospital course Follow-up  Will sign off, please reconsult as needed.

## 2018-12-11 NOTE — PROGRESS NOTE ADULT - SUBJECTIVE AND OBJECTIVE BOX
CHIEF COMPLAINT: No acute events overnight, no new deficits, hemiparesis improving, NAD.       HISTORY OF PRESENT ILLNESS  58-year-old right-handed female to [facility] on 11/21/18 with new onset left hemiparesis upon awaking. 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 CT head (11/21/18) showed a chronic right lentiform nucleus lacunar infarct. CTA neck (11/21/18) showed a probable right ICA occlusion with some calcified plaque. CTA head (11/21/18) showed that the right ICA reconstituted perhaps through the ACOM and possibly a large right PCOM. No intracranial occlusion, she was excluded from endovascular thrombectomy. MRI brain subsequently showed R MCA distribution infarct. MRA head and neck showed severe to critical stenosis of proximal  /extracranial right ICA. Conventional angiogram confirmed severe proximal/extracranial right ICA stenosis s/p carotid artery stenting for secondary stroke prevention in this patient with symptomatic right ICA severe stenosis.   Impression:   Cerebral embolism with cerebral infarction. Right MCA distribution stroke - likely etiology being large vessel disease i.e. symptomatic extracranial R ICA severe stenosis leading to artery to artery embolism. Selective cerebral angiography on 11/24/18. Patient enrolled in Stroke AF trial and randomized to a LOOP. ASA/Plavix/Lipitor. Dysphagia: passed, regular diet.  CAROTID DOPPLERS (11/2/7/18): The stent and stented cervical right common and internal carotid arteries are widely patent. There is a flow-limiting stenosis of the right external carotid artery. No hemodynamically significant is present on the left.  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 Decreased, but present flow related enhancement of the more distal right ICA. Previously, no flow related enhancement was identified within the right ICA on CTA from 11/21/2018.  MRA BRAIN: Diminished, but present flow related signal within the skull base and supraclinoid right internal carotid artery. Diminished flow related signal within the right MCA.  MRI Head (11/21/18): Acute right MCA infarct.  A1c was 9.2; TTE: unremarkable. (28 Nov 2018 13:35)      PAST MEDICAL & SURGICAL HISTORY:  Hypertension  CVA (cerebral vascular accident)       REVIEW OF SYMPTOMS  [X] Constitutional WNL     [X] Cardio WNL            [X] Resp WNL           [X] GI WNL                          [X]  WNL                   [X] Heme WNL                                [X] Skin WNL                 [X] MSK WNL                               [X] Cognitive WNL           VITALS  Vital Signs Last 24 Hrs  T(C): 36.7 (11 Dec 2018 07:27), Max: 37 (10 Dec 2018 20:46)  T(F): 98 (11 Dec 2018 07:27), Max: 98.6 (10 Dec 2018 20:46)  HR: 68 (11 Dec 2018 07:27) (68 - 70)  BP: 146/76 (11 Dec 2018 07:27) (130/74 - 160/72)  BP(mean): --  RR: 15 (11 Dec 2018 07:27) (14 - 15)  SpO2: 100% (11 Dec 2018 07:27) (98% - 100%)      PHYSICAL EXAM  Constitutional - NAD, Comfortable  HEENT - NCAT, EOMI  Neck - Supple, No limited ROM  Chest - CTA bilaterally, No wheeze, No rhonchi, No crackles  Cardiovascular - RRR, S1S2, No murmurs  Abdomen - BS+, Soft, NTND  Extremities - No C/C/E, No calf tenderness   Skin-no rash  Wounds-ILR CDI      Neurologic Exam - left hemiparesis improving c therapy. No new deficits.                    Psychiatric - Mood stable, Affect WNL     FUNCTIONAL PROGRESS  Gait - mod A 75ft  ADLs - mod/min A  Transfers -mod/min A  Functional transfer -mod A       RECENT LABS                        11.6   4.6   )-----------( 359      ( 10 Dec 2018 05:30 )             34.4     12-10    139  |  105  |  24<H>  ----------------------------<  169<H>  4.6   |  28  |  0.92    Ca    10.1      10 Dec 2018 05:30    TPro  7.4  /  Alb  3.1<L>  /  TBili  0.4  /  DBili  x   /  AST  24  /  ALT  39  /  AlkPhos  96  12-10      LIVER FUNCTIONS - ( 10 Dec 2018 05:30 )  Alb: 3.1 g/dL / Pro: 7.4 g/dL / ALK PHOS: 96 U/L / ALT: 39 U/L DA / AST: 24 U/L / GGT: x             Direct LDL: 106 mg/dL (11-24-18 @ 07:18)    Hemoglobin A1C, Whole Blood: 9.2 % (11-22-18 @ 08:24)              RADIOLOGY/OTHER RESULTS      CURRENT MEDICATIONS  MEDICATIONS  (STANDING):  aspirin  chewable 81 milliGRAM(s) Oral daily  atorvastatin 80 milliGRAM(s) Oral at bedtime  clopidogrel Tablet 75 milliGRAM(s) Oral daily  dextrose 5%. 1000 milliLiter(s) (50 mL/Hr) IV Continuous <Continuous>  dextrose 50% Injectable 12.5 Gram(s) IV Push once  dextrose 50% Injectable 25 Gram(s) IV Push once  dextrose 50% Injectable 25 Gram(s) IV Push once  docusate sodium 100 milliGRAM(s) Oral three times a day  enoxaparin Injectable 40 milliGRAM(s) SubCutaneous every 24 hours  FLUoxetine 20 milliGRAM(s) Oral daily  insulin glargine Injectable (LANTUS) 16 Unit(s) SubCutaneous at bedtime  insulin lispro (HumaLOG) corrective regimen sliding scale   SubCutaneous three times a day before meals  insulin lispro (HumaLOG) corrective regimen sliding scale   SubCutaneous at bedtime  lidocaine   Patch 1 Patch Transdermal <User Schedule>  lisinopril 2.5 milliGRAM(s) Oral daily  multivitamin 1 Tablet(s) Oral daily  senna 2 Tablet(s) Oral at bedtime    MEDICATIONS  (PRN):  acetaminophen   Tablet .. 650 milliGRAM(s) Oral every 6 hours PRN Moderate Pain (4 - 6)  dextrose 40% Gel 15 Gram(s) Oral once PRN Blood Glucose LESS THAN 70 milliGRAM(s)/deciliter  glucagon  Injectable 1 milliGRAM(s) IntraMuscular once PRN Glucose LESS THAN 70 milligrams/deciliter  ondansetron    Tablet 4 milliGRAM(s) Oral every 6 hours PRN Nausea and/or Vomiting  polyethylene glycol 3350 17 Gram(s) Oral daily PRN Constipation      ASSESSMENT & PLAN      GI/Bowel Management - Senna   Management - Toilet Q2  Skin - Turn Q2  Pain - Tylenol PRN  DVT PPX - Lovenox  Diet - diabetic, reg c thins    Continue comprehensive acute rehab program consisting of 3hrs/day of OT/PT and SLP.

## 2018-12-11 NOTE — PROGRESS NOTE ADULT - ASSESSMENT
59 y/o female with PMH of prior CVAs, now s/p right MCA CVA with functional deficits.    1. Right MCA CVA 2/2 suspect large vessel disease   i.e. 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 underwent selective cerebral angiography on 11/24/18.  -Continue Aspirin, Plavix, Statin  -Continue Fluoxetine for motor recovery  -Continue close monitoring for neurologic deterioration,   -Continue PT/OT/ rehab.  Patient enrolled in Stroke AF trial and randomized to a LOOP      2. HTN - controlled on current regimen     3. T2DM - not controlled - HgA1C = 9.2%  Cont. Lantus  Diabetic education    4. Anxiety/depression  Better controlled; cont. prozac  Psych following     5. DVT Prophylaxis: Lovenox

## 2018-12-11 NOTE — PROGRESS NOTE ADULT - PROBLEM SELECTOR PLAN 3
Lantus nightly, prn scale Humalog. Monitor FS. Diabetic education. Endocrine in. Plan home c Lantus and Jardiance 10mg daily.

## 2018-12-11 NOTE — PROGRESS NOTE ADULT - SUBJECTIVE AND OBJECTIVE BOX
Patient is a 58y old  Female who presents with a chief complaint of s/p right MCA CVA c deficits (11 Dec 2018 09:01)      Patient seen and examined at bedside, no events overnight, left hemiparesis improving.     ALLERGIES:  No Known Drug Allergies  shellfish (Anaphylaxis)    MEDICATIONS:  acetaminophen   Tablet .. 650 milliGRAM(s) Oral every 6 hours PRN  docusate sodium 100 milliGRAM(s) Oral three times a day  insulin glargine Injectable (LANTUS) 16 Unit(s) SubCutaneous at bedtime  lidocaine   Patch 1 Patch Transdermal <User Schedule>  lisinopril 2.5 milliGRAM(s) Oral daily  multivitamin 1 Tablet(s) Oral daily  ondansetron    Tablet 4 milliGRAM(s) Oral every 6 hours PRN  polyethylene glycol 3350 17 Gram(s) Oral daily PRN  senna 2 Tablet(s) Oral at bedtime    Vital Signs Last 24 Hrs  T(C): 36.7 (11 Dec 2018 07:27), Max: 37 (10 Dec 2018 20:46)  T(F): 98 (11 Dec 2018 07:27), Max: 98.6 (10 Dec 2018 20:46)  HR: 68 (11 Dec 2018 07:27) (68 - 70)  BP: 146/76 (11 Dec 2018 07:27) (130/74 - 160/72)  BP(mean): --  RR: 15 (11 Dec 2018 07:27) (14 - 15)  SpO2: 100% (11 Dec 2018 07:27) (98% - 100%)  I&O's Summary    PAST MEDICAL & SURGICAL HISTORY:  Hypertension  CVA (cerebral vascular accident)    Home Medications:  Aspirin 81 mg po daily  Humalog 3 units SQ TID with meals  Lantus 5 units SQ HS  Lipitor 80 mg po HS  Plavix 75 mg po daily   Lisinopril 5 mg po daily  Prozac 20 mg po daily         PHYSICAL EXAM:  General: NAD, A/O x3  ENT: MMM  Neck: Supple, No JVD  Lungs: Clear to percussion bilaterally  Cardio: RRR, S1/S2, No murmurs  Abdomen: Soft, Nontender, Nondistended; Bowel sounds present  Neuro: No new deficits   Extremities: No clubbing, cyanosis, or edema    LABS:                        11.6   4.6   )-----------( 359      ( 10 Dec 2018 05:30 )             34.4     12-10    139  |  105  |  24  ----------------------------<  169  4.6   |  28  |  0.92    Ca    10.1      10 Dec 2018 05:30    TPro  7.4  /  Alb  3.1  /  TBili  0.4  /  DBili  x   /  AST  24  /  ALT  39  /  AlkPhos  96  12-10      11-24 Chol 179 mg/dL  mg/dL HDL 39 mg/dL Trig 171 mg/dL    CAPILLARY BLOOD GLUCOSE    POCT Blood Glucose.: 183 mg/dL (11 Dec 2018 07:05)  POCT Blood Glucose.: 218 mg/dL (10 Dec 2018 21:22)  POCT Blood Glucose.: 170 mg/dL (10 Dec 2018 16:42)  POCT Blood Glucose.: 178 mg/dL (10 Dec 2018 12:00)    11-22 [id number] 9.2    RADIOLOGY & ADDITIONAL TESTS: no new tests     Care Discussed with Consultants/Other Providers: PM&R team

## 2018-12-12 LAB
ALBUMIN SERPL ELPH-MCNC: 3.5 G/DL — SIGNIFICANT CHANGE UP (ref 3.3–5)
ALP SERPL-CCNC: 98 U/L — SIGNIFICANT CHANGE UP (ref 40–120)
ALT FLD-CCNC: 48 U/L DA — HIGH (ref 10–45)
ANION GAP SERPL CALC-SCNC: 11 MMOL/L — SIGNIFICANT CHANGE UP (ref 5–17)
AST SERPL-CCNC: 34 U/L — SIGNIFICANT CHANGE UP (ref 10–40)
BILIRUB SERPL-MCNC: 0.4 MG/DL — SIGNIFICANT CHANGE UP (ref 0.2–1.2)
BUN SERPL-MCNC: 23 MG/DL — SIGNIFICANT CHANGE UP (ref 7–23)
CALCIUM SERPL-MCNC: 10.2 MG/DL — SIGNIFICANT CHANGE UP (ref 8.4–10.5)
CHLORIDE SERPL-SCNC: 104 MMOL/L — SIGNIFICANT CHANGE UP (ref 96–108)
CO2 SERPL-SCNC: 27 MMOL/L — SIGNIFICANT CHANGE UP (ref 22–31)
CREAT SERPL-MCNC: 0.88 MG/DL — SIGNIFICANT CHANGE UP (ref 0.5–1.3)
GLUCOSE BLDC GLUCOMTR-MCNC: 158 MG/DL — HIGH (ref 70–99)
GLUCOSE BLDC GLUCOMTR-MCNC: 193 MG/DL — HIGH (ref 70–99)
GLUCOSE BLDC GLUCOMTR-MCNC: 219 MG/DL — HIGH (ref 70–99)
GLUCOSE BLDC GLUCOMTR-MCNC: 287 MG/DL — HIGH (ref 70–99)
GLUCOSE SERPL-MCNC: 162 MG/DL — HIGH (ref 70–99)
HCT VFR BLD CALC: 36.2 % — SIGNIFICANT CHANGE UP (ref 34.5–45)
HGB BLD-MCNC: 11.9 G/DL — SIGNIFICANT CHANGE UP (ref 11.5–15.5)
MCHC RBC-ENTMCNC: 28.9 PG — SIGNIFICANT CHANGE UP (ref 27–34)
MCHC RBC-ENTMCNC: 32.8 GM/DL — SIGNIFICANT CHANGE UP (ref 32–36)
MCV RBC AUTO: 88 FL — SIGNIFICANT CHANGE UP (ref 80–100)
PLATELET # BLD AUTO: 382 K/UL — SIGNIFICANT CHANGE UP (ref 150–400)
POTASSIUM SERPL-MCNC: 4.4 MMOL/L — SIGNIFICANT CHANGE UP (ref 3.5–5.3)
POTASSIUM SERPL-SCNC: 4.4 MMOL/L — SIGNIFICANT CHANGE UP (ref 3.5–5.3)
PROT SERPL-MCNC: 7.9 G/DL — SIGNIFICANT CHANGE UP (ref 6–8.3)
RBC # BLD: 4.11 M/UL — SIGNIFICANT CHANGE UP (ref 3.8–5.2)
RBC # FLD: 11.7 % — SIGNIFICANT CHANGE UP (ref 10.3–14.5)
SODIUM SERPL-SCNC: 142 MMOL/L — SIGNIFICANT CHANGE UP (ref 135–145)
WBC # BLD: 4.8 K/UL — SIGNIFICANT CHANGE UP (ref 3.8–10.5)
WBC # FLD AUTO: 4.8 K/UL — SIGNIFICANT CHANGE UP (ref 3.8–10.5)

## 2018-12-12 PROCEDURE — 99233 SBSQ HOSP IP/OBS HIGH 50: CPT

## 2018-12-12 PROCEDURE — 99232 SBSQ HOSP IP/OBS MODERATE 35: CPT

## 2018-12-12 RX ORDER — INSULIN GLARGINE 100 [IU]/ML
18 INJECTION, SOLUTION SUBCUTANEOUS AT BEDTIME
Qty: 0 | Refills: 0 | Status: DISCONTINUED | OUTPATIENT
Start: 2018-12-12 | End: 2018-12-18

## 2018-12-12 RX ADMIN — Medication 81 MILLIGRAM(S): at 11:23

## 2018-12-12 RX ADMIN — LISINOPRIL 2.5 MILLIGRAM(S): 2.5 TABLET ORAL at 05:01

## 2018-12-12 RX ADMIN — Medication 3: at 16:24

## 2018-12-12 RX ADMIN — LIDOCAINE 1 PATCH: 4 CREAM TOPICAL at 18:45

## 2018-12-12 RX ADMIN — Medication 2: at 11:24

## 2018-12-12 RX ADMIN — Medication 0: at 22:32

## 2018-12-12 RX ADMIN — INSULIN GLARGINE 18 UNIT(S): 100 INJECTION, SOLUTION SUBCUTANEOUS at 22:33

## 2018-12-12 RX ADMIN — SENNA PLUS 2 TABLET(S): 8.6 TABLET ORAL at 22:33

## 2018-12-12 RX ADMIN — LIDOCAINE 1 PATCH: 4 CREAM TOPICAL at 07:34

## 2018-12-12 RX ADMIN — Medication 20 MILLIGRAM(S): at 11:23

## 2018-12-12 RX ADMIN — ATORVASTATIN CALCIUM 80 MILLIGRAM(S): 80 TABLET, FILM COATED ORAL at 22:33

## 2018-12-12 RX ADMIN — LIDOCAINE 1 PATCH: 4 CREAM TOPICAL at 20:27

## 2018-12-12 RX ADMIN — Medication 1 TABLET(S): at 11:23

## 2018-12-12 RX ADMIN — Medication 1: at 07:34

## 2018-12-12 RX ADMIN — CLOPIDOGREL BISULFATE 75 MILLIGRAM(S): 75 TABLET, FILM COATED ORAL at 11:23

## 2018-12-12 RX ADMIN — ENOXAPARIN SODIUM 40 MILLIGRAM(S): 100 INJECTION SUBCUTANEOUS at 05:01

## 2018-12-12 NOTE — PROGRESS NOTE ADULT - PROBLEM SELECTOR PLAN 4
Expresses feelings of being overwhelmed/hopelessness. Psychological/Psychiatry in. On Prozac continues per rec's.

## 2018-12-12 NOTE — PROGRESS NOTE ADULT - SUBJECTIVE AND OBJECTIVE BOX
CHIEF COMPLAINT: No new deficits. Denies pain. Slept well. Hemiparesis improving.       HISTORY OF PRESENT ILLNESS  58-year-old right-handed female to [facility] on 11/21/18 with new onset left hemiparesis upon awaking. 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 CT head (11/21/18) showed a chronic right lentiform nucleus lacunar infarct. CTA neck (11/21/18) showed a probable right ICA occlusion with some calcified plaque. CTA head (11/21/18) showed that the right ICA reconstituted perhaps through the ACOM and possibly a large right PCOM. No intracranial occlusion, she was excluded from endovascular thrombectomy. MRI brain subsequently showed R MCA distribution infarct. MRA head and neck showed severe to critical stenosis of proximal  /extracranial right ICA. Conventional angiogram confirmed severe proximal/extracranial right ICA stenosis s/p carotid artery stenting for secondary stroke prevention in this patient with symptomatic right ICA severe stenosis.   Impression:   Cerebral embolism with cerebral infarction. Right MCA distribution stroke - likely etiology being large vessel disease i.e. symptomatic extracranial R ICA severe stenosis leading to artery to artery embolism. Selective cerebral angiography on 11/24/18. Patient enrolled in Stroke AF trial and randomized to a LOOP. ASA/Plavix/Lipitor. Dysphagia: passed, regular diet.  CAROTID DOPPLERS (11/2/7/18): The stent and stented cervical right common and internal carotid arteries are widely patent. There is a flow-limiting stenosis of the right external carotid artery. No hemodynamically significant is present on the left.  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 Decreased, but present flow related enhancement of the more distal right ICA. Previously, no flow related enhancement was identified within the right ICA on CTA from 11/21/2018.  MRA BRAIN: Diminished, but present flow related signal within the skull base and supraclinoid right internal carotid artery. Diminished flow related signal within the right MCA.  MRI Head (11/21/18): Acute right MCA infarct.  A1c was 9.2; TTE: unremarkable. (28 Nov 2018 13:35)      PAST MEDICAL & SURGICAL HISTORY:  Hypertension  CVA (cerebral vascular accident)       REVIEW OF SYMPTOMS  [X] Constitutional WNL     [X] Cardio WNL            [X] Resp WNL           [X] GI WNL                          [X]  WNL                   [X] Heme WNL                                  [X] Skin WNL                 [X] MSK WNL                              [X] Cognitive WNL       VITALS  Vital Signs Last 24 Hrs  T(C): 36.4 (12 Dec 2018 08:16), Max: 36.6 (11 Dec 2018 20:36)  T(F): 97.6 (12 Dec 2018 08:16), Max: 97.8 (11 Dec 2018 20:36)  HR: 74 (12 Dec 2018 04:58) (74 - 74)  BP: 125/68 (12 Dec 2018 08:16) (122/58 - 151/94)  BP(mean): --  RR: 15 (12 Dec 2018 08:16) (15 - 15)  SpO2: 99% (12 Dec 2018 08:16) (99% - 100%)      PHYSICAL EXAM  Constitutional - NAD, Comfortable  HEENT - NCAT, EOMI  Neck - Supple, No limited ROM  Chest - CTA bilaterally, No wheeze, No rhonchi, No crackles  Cardiovascular - RRR, S1S2, No murmurs  Abdomen - BS+, Soft, NTND  Extremities - No C/C/E, No calf tenderness   Skin-no rash  Wounds-ILR CDI      Neurologic Exam - left hemiparesis improving c therapy. No new deficits.                    Psychiatric - Mood stable, Affect WNL     FUNCTIONAL PROGRESS  Gait - mod/min A c cane, climbs 8steps  ADLs - mod/min A  Transfers -mod/min A  Functional transfer -mod A       RECENT LABS                        11.9   4.8   )-----------( 382      ( 12 Dec 2018 06:38 )             36.2     12-12    142  |  104  |  23  ----------------------------<  162<H>  4.4   |  27  |  0.88    Ca    10.2      12 Dec 2018 06:38    TPro  7.9  /  Alb  3.5  /  TBili  0.4  /  DBili  x   /  AST  34  /  ALT  48<H>  /  AlkPhos  98  12-12      LIVER FUNCTIONS - ( 12 Dec 2018 06:38 )  Alb: 3.5 g/dL / Pro: 7.9 g/dL / ALK PHOS: 98 U/L / ALT: 48 U/L DA / AST: 34 U/L / GGT: x             Direct LDL: 106 mg/dL (11-24-18 @ 07:18)    Hemoglobin A1C, Whole Blood: 9.2 % (11-22-18 @ 08:24)              RADIOLOGY/OTHER RESULTS      CURRENT MEDICATIONS  MEDICATIONS  (STANDING):  aspirin  chewable 81 milliGRAM(s) Oral daily  atorvastatin 80 milliGRAM(s) Oral at bedtime  clopidogrel Tablet 75 milliGRAM(s) Oral daily  dextrose 5%. 1000 milliLiter(s) (50 mL/Hr) IV Continuous <Continuous>  dextrose 50% Injectable 12.5 Gram(s) IV Push once  dextrose 50% Injectable 25 Gram(s) IV Push once  dextrose 50% Injectable 25 Gram(s) IV Push once  docusate sodium 100 milliGRAM(s) Oral three times a day  enoxaparin Injectable 40 milliGRAM(s) SubCutaneous every 24 hours  FLUoxetine 20 milliGRAM(s) Oral daily  insulin glargine Injectable (LANTUS) 16 Unit(s) SubCutaneous at bedtime  insulin lispro (HumaLOG) corrective regimen sliding scale   SubCutaneous three times a day before meals  insulin lispro (HumaLOG) corrective regimen sliding scale   SubCutaneous at bedtime  lidocaine   Patch 1 Patch Transdermal <User Schedule>  lisinopril 2.5 milliGRAM(s) Oral daily  multivitamin 1 Tablet(s) Oral daily  senna 2 Tablet(s) Oral at bedtime    MEDICATIONS  (PRN):  acetaminophen   Tablet .. 650 milliGRAM(s) Oral every 6 hours PRN Moderate Pain (4 - 6)  dextrose 40% Gel 15 Gram(s) Oral once PRN Blood Glucose LESS THAN 70 milliGRAM(s)/deciliter  glucagon  Injectable 1 milliGRAM(s) IntraMuscular once PRN Glucose LESS THAN 70 milligrams/deciliter  ondansetron    Tablet 4 milliGRAM(s) Oral every 6 hours PRN Nausea and/or Vomiting  polyethylene glycol 3350 17 Gram(s) Oral daily PRN Constipation      ASSESSMENT & PLAN    GI/Bowel Management - Senna   Management - Toilet Q2  Skin - Turn Q2  Pain - Tylenol PRN  DVT PPX - Lovenox  Diet - diabetic, reg c thins      Continue comprehensive acute rehab program consisting of 3hrs/day of OT/PT and SLP.

## 2018-12-12 NOTE — PROGRESS NOTE ADULT - ASSESSMENT
59 y/o female with PMH of prior CVAs, now s/p right MCA CVA with functional deficits.    1. Right MCA CVA 2/2 suspect large vessel disease   i.e. 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 underwent selective cerebral angiography on 11/24/18.  -Continue Aspirin, Plavix, Statin  -Continue Fluoxetine for motor recovery  -Continue close monitoring for neurologic deterioration,   -Continue PT/OT/ rehab.  Patient enrolled in Stroke AF trial and randomized to a LOOP      2. HTN - controlled on current regimen     3. T2DM - not controlled - HgA1C = 9.2%  Cont. Lantus--> increased to 18 iu q HS  Cont ISS  Diabetic education    4. Anxiety/depression  Better controlled; cont. prozac  Psych following     5. DVT Prophylaxis: Lovenox

## 2018-12-12 NOTE — PROGRESS NOTE ADULT - SUBJECTIVE AND OBJECTIVE BOX
Patient is a 58y old  Female who presents with a chief complaint of s/p right MCA CVA c deficits (12 Dec 2018 08:43). Hemiparesis improving      Patient seen and examined at bedside.    ALLERGIES:  No Known Drug Allergies  shellfish (Anaphylaxis)    MEDICATIONS  (STANDING):  aspirin  chewable 81 milliGRAM(s) Oral daily  atorvastatin 80 milliGRAM(s) Oral at bedtime  clopidogrel Tablet 75 milliGRAM(s) Oral daily  dextrose 5%. 1000 milliLiter(s) (50 mL/Hr) IV Continuous <Continuous>  dextrose 50% Injectable 12.5 Gram(s) IV Push once  dextrose 50% Injectable 25 Gram(s) IV Push once  dextrose 50% Injectable 25 Gram(s) IV Push once  docusate sodium 100 milliGRAM(s) Oral three times a day  enoxaparin Injectable 40 milliGRAM(s) SubCutaneous every 24 hours  FLUoxetine 20 milliGRAM(s) Oral daily  insulin glargine Injectable (LANTUS) 18 Unit(s) SubCutaneous at bedtime  insulin lispro (HumaLOG) corrective regimen sliding scale   SubCutaneous three times a day before meals  insulin lispro (HumaLOG) corrective regimen sliding scale   SubCutaneous at bedtime  lidocaine   Patch 1 Patch Transdermal <User Schedule>  lisinopril 2.5 milliGRAM(s) Oral daily  multivitamin 1 Tablet(s) Oral daily  senna 2 Tablet(s) Oral at bedtime    MEDICATIONS  (PRN):  acetaminophen   Tablet .. 650 milliGRAM(s) Oral every 6 hours PRN Moderate Pain (4 - 6)  dextrose 40% Gel 15 Gram(s) Oral once PRN Blood Glucose LESS THAN 70 milliGRAM(s)/deciliter  glucagon  Injectable 1 milliGRAM(s) IntraMuscular once PRN Glucose LESS THAN 70 milligrams/deciliter  ondansetron    Tablet 4 milliGRAM(s) Oral every 6 hours PRN Nausea and/or Vomiting  polyethylene glycol 3350 17 Gram(s) Oral daily PRN Constipation    Vital Signs Last 24 Hrs  T(F): 97.6 (12 Dec 2018 08:16), Max: 97.8 (11 Dec 2018 20:36)  HR: 74 (12 Dec 2018 04:58) (74 - 74)  BP: 125/68 (12 Dec 2018 08:16) (122/58 - 151/94)  RR: 15 (12 Dec 2018 08:16) (15 - 15)  SpO2: 99% (12 Dec 2018 08:16) (99% - 100%)  I&O's Summary      PHYSICAL EXAM:  General: NAD, A/O x 3  ENT: MMM  Neck: Supple, No JVD  Lungs: Clear to auscultation bilaterally  Cardio: RRR, S1/S2, No murmurs  Abdomen: Soft, Nontender, Nondistended; Bowel sounds present  Extremities: No calf tenderness, No pitting edema  Neuro: no new deficits    LABS:                        11.9   4.8   )-----------( 382      ( 12 Dec 2018 06:38 )             36.2       12-12    142  |  104  |  23  ----------------------------<  162  4.4   |  27  |  0.88    Ca    10.2      12 Dec 2018 06:38    TPro  7.9  /  Alb  3.5  /  TBili  0.4  /  DBili  x   /  AST  34  /  ALT  48  /  AlkPhos  98  12-12     eGFR if Non African American: 74 mL/min/1.73M2 (12-12-18 @ 06:38)  eGFR if African American: 85 mL/min/1.73M2 (12-12-18 @ 06:38)             11-24 Chol 179 mg/dL  mg/dL HDL 39 mg/dL Trig 171 mg/dL        CAPILLARY BLOOD GLUCOSE      POCT Blood Glucose.: 158 mg/dL (12 Dec 2018 07:32)  POCT Blood Glucose.: 159 mg/dL (11 Dec 2018 21:11)  POCT Blood Glucose.: 258 mg/dL (11 Dec 2018 15:57)  POCT Blood Glucose.: 209 mg/dL (11 Dec 2018 11:30)    11-22 [id number] 9.2          RADIOLOGY & ADDITIONAL TESTS:    Care Discussed with Consultants/Other Providers:

## 2018-12-13 LAB
GLUCOSE BLDC GLUCOMTR-MCNC: 178 MG/DL — HIGH (ref 70–99)
GLUCOSE BLDC GLUCOMTR-MCNC: 180 MG/DL — HIGH (ref 70–99)
GLUCOSE BLDC GLUCOMTR-MCNC: 180 MG/DL — HIGH (ref 70–99)
GLUCOSE BLDC GLUCOMTR-MCNC: 236 MG/DL — HIGH (ref 70–99)

## 2018-12-13 PROCEDURE — 99232 SBSQ HOSP IP/OBS MODERATE 35: CPT

## 2018-12-13 RX ADMIN — ENOXAPARIN SODIUM 40 MILLIGRAM(S): 100 INJECTION SUBCUTANEOUS at 06:54

## 2018-12-13 RX ADMIN — ATORVASTATIN CALCIUM 80 MILLIGRAM(S): 80 TABLET, FILM COATED ORAL at 22:13

## 2018-12-13 RX ADMIN — CLOPIDOGREL BISULFATE 75 MILLIGRAM(S): 75 TABLET, FILM COATED ORAL at 11:27

## 2018-12-13 RX ADMIN — LIDOCAINE 1 PATCH: 4 CREAM TOPICAL at 07:57

## 2018-12-13 RX ADMIN — INSULIN GLARGINE 18 UNIT(S): 100 INJECTION, SOLUTION SUBCUTANEOUS at 22:13

## 2018-12-13 RX ADMIN — Medication 20 MILLIGRAM(S): at 11:23

## 2018-12-13 RX ADMIN — Medication 2: at 11:23

## 2018-12-13 RX ADMIN — Medication 0: at 22:13

## 2018-12-13 RX ADMIN — LIDOCAINE 1 PATCH: 4 CREAM TOPICAL at 19:59

## 2018-12-13 RX ADMIN — Medication 81 MILLIGRAM(S): at 11:23

## 2018-12-13 RX ADMIN — LISINOPRIL 2.5 MILLIGRAM(S): 2.5 TABLET ORAL at 06:54

## 2018-12-13 RX ADMIN — Medication 1: at 16:17

## 2018-12-13 RX ADMIN — SENNA PLUS 2 TABLET(S): 8.6 TABLET ORAL at 22:13

## 2018-12-13 RX ADMIN — Medication 1: at 07:58

## 2018-12-13 RX ADMIN — Medication 1 TABLET(S): at 11:23

## 2018-12-13 NOTE — PROGRESS NOTE ADULT - PROBLEM SELECTOR PLAN 1
Continue PT/OT/SLP program. Fall precautions. ASA/Plavix/statin with Prozac per FLAME. Lidocaine patch to right low back daily added-pain improved. Continue PT/OT/SLP program. Fall precautions. ASA/Plavix/statin with Prozac per FLAME. Lidocaine patch to right low back daily-pain improved.

## 2018-12-13 NOTE — PROGRESS NOTE ADULT - PROBLEM SELECTOR PLAN 4
Expresses feelings of being overwhelmed/hopelessness. Psychological/Psychiatry in. On Prozac continues per rec's. Expresses feelings of being overwhelmed/hopelessness. Psychological/Psychiatry following and will continue Prozac per rec's.

## 2018-12-13 NOTE — PROGRESS NOTE ADULT - SUBJECTIVE AND OBJECTIVE BOX
CHIEF COMPLAINT:       HISTORY OF PRESENT ILLNESS  58-year-old right-handed female to [facility] on 11/21/18 with new onset left hemiparesis upon awaking. 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 CT head (11/21/18) showed a chronic right lentiform nucleus lacunar infarct. CTA neck (11/21/18) showed a probable right ICA occlusion with some calcified plaque. CTA head (11/21/18) showed that the right ICA reconstituted perhaps through the ACOM and possibly a large right PCOM. No intracranial occlusion, she was excluded from endovascular thrombectomy. MRI brain subsequently showed R MCA distribution infarct. MRA head and neck showed severe to critical stenosis of proximal  /extracranial right ICA. Conventional angiogram confirmed severe proximal/extracranial right ICA stenosis s/p carotid artery stenting for secondary stroke prevention in this patient with symptomatic right ICA severe stenosis.   Impression:   Cerebral embolism with cerebral infarction. Right MCA distribution stroke - likely etiology being large vessel disease i.e. symptomatic extracranial R ICA severe stenosis leading to artery to artery embolism. Selective cerebral angiography on 11/24/18. Patient enrolled in Stroke AF trial and randomized to a LOOP. ASA/Plavix/Lipitor. Dysphagia: passed, regular diet.  CAROTID DOPPLERS (11/2/7/18): The stent and stented cervical right common and internal carotid arteries are widely patent. There is a flow-limiting stenosis of the right external carotid artery. No hemodynamically significant is present on the left.  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 Decreased, but present flow related enhancement of the more distal right ICA. Previously, no flow related enhancement was identified within the right ICA on CTA from 11/21/2018.  MRA BRAIN: Diminished, but present flow related signal within the skull base and supraclinoid right internal carotid artery. Diminished flow related signal within the right MCA.  MRI Head (11/21/18): Acute right MCA infarct.  A1c was 9.2; TTE: unremarkable. (28 Nov 2018 13:35)      PAST MEDICAL & SURGICAL HISTORY:  Hypertension  CVA (cerebral vascular accident)       REVIEW OF SYMPTOMS  [X] Constitutional WNL     [X] Cardio WNL            [X] Resp WNL           [X] GI WNL                          [X]  WNL                   [X] Heme WNL                                  [X] Skin WNL                 [X] MSK WNL                              [X] Cognitive WNL       VITALS  Vital Signs Last 24 Hrs  T(C): 36.7 (13 Dec 2018 07:23), Max: 36.8 (12 Dec 2018 21:08)  T(F): 98 (13 Dec 2018 07:23), Max: 98.2 (12 Dec 2018 21:08)  HR: 68 (13 Dec 2018 07:23) (68 - 75)  BP: 130/74 (13 Dec 2018 07:23) (130/74 - 143/81)  RR: 15 (13 Dec 2018 07:23) (15 - 16)  SpO2: 99% (13 Dec 2018 07:23) (98% - 99%)    PHYSICAL EXAM  Constitutional - NAD, Comfortable  HEENT - NCAT, EOMI  Neck - Supple, No limited ROM  Chest - CTA bilaterally, No wheeze, No rhonchi, No crackles  Cardiovascular - RRR, S1S2, No murmurs  Abdomen - BS+, Soft, NTND  Extremities - No C/C/E, No calf tenderness   Skin-no rash  Wounds-ILR CDI      Neurologic Exam - left hemiparesis improving c therapy. No new deficits.                    Psychiatric - Mood stable, Affect WNL     FUNCTIONAL PROGRESS  Gait - mod/min A c cane, climbs 8steps  ADLs - mod/min A  Transfers -mod/min A  Functional transfer -mod A       RECENT LABS                        11.9   4.8   )-----------( 382      ( 12 Dec 2018 06:38 )             36.2     12-12    142  |  104  |  23  ----------------------------<  162<H>  4.4   |  27  |  0.88    Ca    10.2      12 Dec 2018 06:38    TPro  7.9  /  Alb  3.5  /  TBili  0.4  /  DBili  x   /  AST  34  /  ALT  48<H>  /  AlkPhos  98  12-12      LIVER FUNCTIONS - ( 12 Dec 2018 06:38 )  Alb: 3.5 g/dL / Pro: 7.9 g/dL / ALK PHOS: 98 U/L / ALT: 48 U/L DA / AST: 34 U/L / GGT: x             Direct LDL: 106 mg/dL (11-24-18 @ 07:18)    Hemoglobin A1C, Whole Blood: 9.2 % (11-22-18 @ 08:24)              RADIOLOGY/OTHER RESULTS        MEDICATIONS  (STANDING):  aspirin  chewable 81 milliGRAM(s) Oral daily  atorvastatin 80 milliGRAM(s) Oral at bedtime  clopidogrel Tablet 75 milliGRAM(s) Oral daily  dextrose 5%. 1000 milliLiter(s) (50 mL/Hr) IV Continuous <Continuous>  dextrose 50% Injectable 12.5 Gram(s) IV Push once  dextrose 50% Injectable 25 Gram(s) IV Push once  dextrose 50% Injectable 25 Gram(s) IV Push once  docusate sodium 100 milliGRAM(s) Oral three times a day  enoxaparin Injectable 40 milliGRAM(s) SubCutaneous every 24 hours  FLUoxetine 20 milliGRAM(s) Oral daily  insulin glargine Injectable (LANTUS) 18 Unit(s) SubCutaneous at bedtime  insulin lispro (HumaLOG) corrective regimen sliding scale   SubCutaneous three times a day before meals  insulin lispro (HumaLOG) corrective regimen sliding scale   SubCutaneous at bedtime  lidocaine   Patch 1 Patch Transdermal <User Schedule>  lisinopril 2.5 milliGRAM(s) Oral daily  multivitamin 1 Tablet(s) Oral daily  senna 2 Tablet(s) Oral at bedtime    MEDICATIONS  (PRN):  acetaminophen   Tablet .. 650 milliGRAM(s) Oral every 6 hours PRN Moderate Pain (4 - 6)  dextrose 40% Gel 15 Gram(s) Oral once PRN Blood Glucose LESS THAN 70 milliGRAM(s)/deciliter  glucagon  Injectable 1 milliGRAM(s) IntraMuscular once PRN Glucose LESS THAN 70 milligrams/deciliter  ondansetron    Tablet 4 milliGRAM(s) Oral every 6 hours PRN Nausea and/or Vomiting  polyethylene glycol 3350 17 Gram(s) Oral daily PRN Constipation        ASSESSMENT & PLAN    GI/Bowel Management - colace, Senna   Management - Toilet Q2  Skin - Turn Q2  Pain - Tylenol PRN  DVT PPX - Lovenox  Diet - diabetic, reg c thins      Continue comprehensive acute rehab program consisting of 3hrs/day of OT/PT and SLP. CHIEF COMPLAINT: No overnight events. Feeling well. Eating and sleeping good. No pain, CP, HA, dizziness. Moving bowels regularly. No urinary complaints.       HISTORY OF PRESENT ILLNESS  58-year-old right-handed female to [facility] on 11/21/18 with new onset left hemiparesis upon awaking. 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 CT head (11/21/18) showed a chronic right lentiform nucleus lacunar infarct. CTA neck (11/21/18) showed a probable right ICA occlusion with some calcified plaque. CTA head (11/21/18) showed that the right ICA reconstituted perhaps through the ACOM and possibly a large right PCOM. No intracranial occlusion, she was excluded from endovascular thrombectomy. MRI brain subsequently showed R MCA distribution infarct. MRA head and neck showed severe to critical stenosis of proximal  /extracranial right ICA. Conventional angiogram confirmed severe proximal/extracranial right ICA stenosis s/p carotid artery stenting for secondary stroke prevention in this patient with symptomatic right ICA severe stenosis.   Impression:   Cerebral embolism with cerebral infarction. Right MCA distribution stroke - likely etiology being large vessel disease i.e. symptomatic extracranial R ICA severe stenosis leading to artery to artery embolism. Selective cerebral angiography on 11/24/18. Patient enrolled in Stroke AF trial and randomized to a LOOP. ASA/Plavix/Lipitor. Dysphagia: passed, regular diet.  CAROTID DOPPLERS (11/2/7/18): The stent and stented cervical right common and internal carotid arteries are widely patent. There is a flow-limiting stenosis of the right external carotid artery. No hemodynamically significant is present on the left.  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 Decreased, but present flow related enhancement of the more distal right ICA. Previously, no flow related enhancement was identified within the right ICA on CTA from 11/21/2018.  MRA BRAIN: Diminished, but present flow related signal within the skull base and supraclinoid right internal carotid artery. Diminished flow related signal within the right MCA.  MRI Head (11/21/18): Acute right MCA infarct.  A1c was 9.2; TTE: unremarkable. (28 Nov 2018 13:35)      PAST MEDICAL & SURGICAL HISTORY:  Hypertension  CVA (cerebral vascular accident)       REVIEW OF SYMPTOMS  [X] Constitutional WNL     [X] Cardio WNL            [X] Resp WNL           [X] GI WNL                          [X]  WNL                   [X] Heme WNL                                  [] Skin WNL                 [] MSK WNL                              [X] Cognitive WNL       VITALS  Vital Signs Last 24 Hrs  T(C): 36.7 (13 Dec 2018 07:23), Max: 36.8 (12 Dec 2018 21:08)  T(F): 98 (13 Dec 2018 07:23), Max: 98.2 (12 Dec 2018 21:08)  HR: 68 (13 Dec 2018 07:23) (68 - 75)  BP: 130/74 (13 Dec 2018 07:23) (130/74 - 143/81)  RR: 15 (13 Dec 2018 07:23) (15 - 16)  SpO2: 99% (13 Dec 2018 07:23) (98% - 99%)    PHYSICAL EXAM  Constitutional - NAD, Comfortable  HEENT - NCAT, EOMI  Neck - Supple, No limited ROM  Chest - CTA bilaterally, No wheeze, No rhonchi, No crackles  Cardiovascular - RRR, S1S2, No murmurs  Abdomen - BS+, Soft, NTND  Extremities - No C/C/E, No calf tenderness   Skin-no rash  Wounds-ILR CDI      Neurologic Exam - left hemiparesis improving c therapy. No new deficits.                    Psychiatric - Mood stable, Affect WNL     FUNCTIONAL PROGRESS  Gait - mod/min A c cane, climbs 8steps  ADLs - mod/min A  Transfers -mod/min A  Functional transfer -mod A       RECENT LABS                        11.9   4.8   )-----------( 382      ( 12 Dec 2018 06:38 )             36.2     12-12    142  |  104  |  23  ----------------------------<  162<H>  4.4   |  27  |  0.88    Ca    10.2      12 Dec 2018 06:38    TPro  7.9  /  Alb  3.5  /  TBili  0.4  /  DBili  x   /  AST  34  /  ALT  48<H>  /  AlkPhos  98  12-12      LIVER FUNCTIONS - ( 12 Dec 2018 06:38 )  Alb: 3.5 g/dL / Pro: 7.9 g/dL / ALK PHOS: 98 U/L / ALT: 48 U/L DA / AST: 34 U/L / GGT: x             Direct LDL: 106 mg/dL (11-24-18 @ 07:18)    Hemoglobin A1C, Whole Blood: 9.2 % (11-22-18 @ 08:24)          MEDICATIONS  (STANDING):  aspirin  chewable 81 milliGRAM(s) Oral daily  atorvastatin 80 milliGRAM(s) Oral at bedtime  clopidogrel Tablet 75 milliGRAM(s) Oral daily  dextrose 5%. 1000 milliLiter(s) (50 mL/Hr) IV Continuous <Continuous>  dextrose 50% Injectable 12.5 Gram(s) IV Push once  dextrose 50% Injectable 25 Gram(s) IV Push once  dextrose 50% Injectable 25 Gram(s) IV Push once  docusate sodium 100 milliGRAM(s) Oral three times a day  enoxaparin Injectable 40 milliGRAM(s) SubCutaneous every 24 hours  FLUoxetine 20 milliGRAM(s) Oral daily  insulin glargine Injectable (LANTUS) 18 Unit(s) SubCutaneous at bedtime  insulin lispro (HumaLOG) corrective regimen sliding scale   SubCutaneous three times a day before meals  insulin lispro (HumaLOG) corrective regimen sliding scale   SubCutaneous at bedtime  lidocaine   Patch 1 Patch Transdermal <User Schedule>  lisinopril 2.5 milliGRAM(s) Oral daily  multivitamin 1 Tablet(s) Oral daily  senna 2 Tablet(s) Oral at bedtime    MEDICATIONS  (PRN):  acetaminophen   Tablet .. 650 milliGRAM(s) Oral every 6 hours PRN Moderate Pain (4 - 6)  dextrose 40% Gel 15 Gram(s) Oral once PRN Blood Glucose LESS THAN 70 milliGRAM(s)/deciliter  glucagon  Injectable 1 milliGRAM(s) IntraMuscular once PRN Glucose LESS THAN 70 milligrams/deciliter  ondansetron    Tablet 4 milliGRAM(s) Oral every 6 hours PRN Nausea and/or Vomiting  polyethylene glycol 3350 17 Gram(s) Oral daily PRN Constipation            ASSESSMENT & PLAN    GI/Bowel Management - colace, Senna   Management - Toilet Q2  Skin - Turn Q2  Pain - Tylenol PRN  DVT PPX - Lovenox  Diet - diabetic, reg c thins      Continue comprehensive acute rehab program consisting of 3hrs/day of OT/PT and SLP.

## 2018-12-13 NOTE — PROGRESS NOTE ADULT - PROBLEM SELECTOR PLAN 2
Monitor BP closely. Monitor symptoms. Lisinopril 2.5mg. No hypotension. Monitor BP closely. Monitor symptoms. Continue Lisinopril 2.5mg.

## 2018-12-13 NOTE — PROGRESS NOTE ADULT - ATTENDING COMMENTS
Patient medically stable. Making progress towards rehab goals.   Continue rehab program.   Discharge plan discussed with patient and SW. Will contact patient's brother to confirm arrangements

## 2018-12-13 NOTE — PROGRESS NOTE ADULT - PROBLEM SELECTOR PLAN 3
Lantus nightly, prn scale Humalog. Monitor FS. Diabetic education. Endocrine in. Plan home c Lantus and Jardiance 10mg daily. Lantus nightly, prn scale Humalog. Monitor FS. Diabetic education. Endocrine following. Plan home c Lantus and Jardiance 10mg daily.

## 2018-12-14 DIAGNOSIS — E11.8 TYPE 2 DIABETES MELLITUS WITH UNSPECIFIED COMPLICATIONS: ICD-10-CM

## 2018-12-14 DIAGNOSIS — I63.9 CEREBRAL INFARCTION, UNSPECIFIED: ICD-10-CM

## 2018-12-14 DIAGNOSIS — I10 ESSENTIAL (PRIMARY) HYPERTENSION: ICD-10-CM

## 2018-12-14 LAB
ALBUMIN SERPL ELPH-MCNC: 3.2 G/DL — LOW (ref 3.3–5)
ALP SERPL-CCNC: 86 U/L — SIGNIFICANT CHANGE UP (ref 40–120)
ALT FLD-CCNC: 41 U/L DA — SIGNIFICANT CHANGE UP (ref 10–45)
ANION GAP SERPL CALC-SCNC: 7 MMOL/L — SIGNIFICANT CHANGE UP (ref 5–17)
AST SERPL-CCNC: 22 U/L — SIGNIFICANT CHANGE UP (ref 10–40)
BILIRUB SERPL-MCNC: 0.4 MG/DL — SIGNIFICANT CHANGE UP (ref 0.2–1.2)
BUN SERPL-MCNC: 26 MG/DL — HIGH (ref 7–23)
CALCIUM SERPL-MCNC: 10 MG/DL — SIGNIFICANT CHANGE UP (ref 8.4–10.5)
CHLORIDE SERPL-SCNC: 105 MMOL/L — SIGNIFICANT CHANGE UP (ref 96–108)
CO2 SERPL-SCNC: 28 MMOL/L — SIGNIFICANT CHANGE UP (ref 22–31)
CREAT SERPL-MCNC: 0.88 MG/DL — SIGNIFICANT CHANGE UP (ref 0.5–1.3)
GLUCOSE BLDC GLUCOMTR-MCNC: 142 MG/DL — HIGH (ref 70–99)
GLUCOSE BLDC GLUCOMTR-MCNC: 163 MG/DL — HIGH (ref 70–99)
GLUCOSE BLDC GLUCOMTR-MCNC: 228 MG/DL — HIGH (ref 70–99)
GLUCOSE BLDC GLUCOMTR-MCNC: 246 MG/DL — HIGH (ref 70–99)
GLUCOSE SERPL-MCNC: 137 MG/DL — HIGH (ref 70–99)
HCT VFR BLD CALC: 34.6 % — SIGNIFICANT CHANGE UP (ref 34.5–45)
HGB BLD-MCNC: 11.4 G/DL — LOW (ref 11.5–15.5)
MCHC RBC-ENTMCNC: 28.7 PG — SIGNIFICANT CHANGE UP (ref 27–34)
MCHC RBC-ENTMCNC: 33 GM/DL — SIGNIFICANT CHANGE UP (ref 32–36)
MCV RBC AUTO: 87.1 FL — SIGNIFICANT CHANGE UP (ref 80–100)
PLATELET # BLD AUTO: 302 K/UL — SIGNIFICANT CHANGE UP (ref 150–400)
POTASSIUM SERPL-MCNC: 4.6 MMOL/L — SIGNIFICANT CHANGE UP (ref 3.5–5.3)
POTASSIUM SERPL-SCNC: 4.6 MMOL/L — SIGNIFICANT CHANGE UP (ref 3.5–5.3)
PROT SERPL-MCNC: 7.4 G/DL — SIGNIFICANT CHANGE UP (ref 6–8.3)
RBC # BLD: 3.97 M/UL — SIGNIFICANT CHANGE UP (ref 3.8–5.2)
RBC # FLD: 11.6 % — SIGNIFICANT CHANGE UP (ref 10.3–14.5)
SODIUM SERPL-SCNC: 140 MMOL/L — SIGNIFICANT CHANGE UP (ref 135–145)
WBC # BLD: 4.4 K/UL — SIGNIFICANT CHANGE UP (ref 3.8–10.5)
WBC # FLD AUTO: 4.4 K/UL — SIGNIFICANT CHANGE UP (ref 3.8–10.5)

## 2018-12-14 PROCEDURE — 99232 SBSQ HOSP IP/OBS MODERATE 35: CPT

## 2018-12-14 PROCEDURE — 99233 SBSQ HOSP IP/OBS HIGH 50: CPT

## 2018-12-14 RX ADMIN — LIDOCAINE 1 PATCH: 4 CREAM TOPICAL at 18:54

## 2018-12-14 RX ADMIN — ENOXAPARIN SODIUM 40 MILLIGRAM(S): 100 INJECTION SUBCUTANEOUS at 06:31

## 2018-12-14 RX ADMIN — CLOPIDOGREL BISULFATE 75 MILLIGRAM(S): 75 TABLET, FILM COATED ORAL at 11:48

## 2018-12-14 RX ADMIN — LIDOCAINE 1 PATCH: 4 CREAM TOPICAL at 08:05

## 2018-12-14 RX ADMIN — Medication 1 TABLET(S): at 11:49

## 2018-12-14 RX ADMIN — Medication 81 MILLIGRAM(S): at 11:48

## 2018-12-14 RX ADMIN — Medication 2: at 11:49

## 2018-12-14 RX ADMIN — INSULIN GLARGINE 18 UNIT(S): 100 INJECTION, SOLUTION SUBCUTANEOUS at 21:19

## 2018-12-14 RX ADMIN — LIDOCAINE 1 PATCH: 4 CREAM TOPICAL at 21:27

## 2018-12-14 RX ADMIN — Medication 20 MILLIGRAM(S): at 11:48

## 2018-12-14 RX ADMIN — Medication 1: at 08:05

## 2018-12-14 RX ADMIN — LISINOPRIL 2.5 MILLIGRAM(S): 2.5 TABLET ORAL at 06:31

## 2018-12-14 RX ADMIN — ATORVASTATIN CALCIUM 80 MILLIGRAM(S): 80 TABLET, FILM COATED ORAL at 21:26

## 2018-12-14 NOTE — PROGRESS NOTE ADULT - PROBLEM SELECTOR PLAN 3
Lantus nightly, prn scale Humalog. Monitor FS. Diabetic education. Endocrine following. Plan home c Lantus and Jardiance 10mg daily.

## 2018-12-14 NOTE — PROGRESS NOTE ADULT - SUBJECTIVE AND OBJECTIVE BOX
Patient is a 58y old  Female who presents with a chief complaint of s/p right MCA CVA c deficits (13 Dec 2018 10:06)          Patient seen and examined at bedside.    ALLERGIES:  No Known Drug Allergies  shellfish (Anaphylaxis)        Vital Signs Last 24 Hrs  T(F): 98 (14 Dec 2018 10:54), Max: 98.2 (13 Dec 2018 19:56)  HR: 80 (14 Dec 2018 10:54) (64 - 80)  BP: 102/61 (14 Dec 2018 10:54) (102/61 - 144/75)  RR: 13 (14 Dec 2018 10:54) (13 - 14)  SpO2: 100% (14 Dec 2018 10:54) (99% - 100%)  I&O's Summary    MEDICATIONS:  acetaminophen   Tablet .. 650 milliGRAM(s) Oral every 6 hours PRN  aspirin  chewable 81 milliGRAM(s) Oral daily  atorvastatin 80 milliGRAM(s) Oral at bedtime  clopidogrel Tablet 75 milliGRAM(s) Oral daily  dextrose 40% Gel 15 Gram(s) Oral once PRN  dextrose 5%. 1000 milliLiter(s) IV Continuous <Continuous>  dextrose 50% Injectable 12.5 Gram(s) IV Push once  dextrose 50% Injectable 25 Gram(s) IV Push once  dextrose 50% Injectable 25 Gram(s) IV Push once  docusate sodium 100 milliGRAM(s) Oral three times a day  enoxaparin Injectable 40 milliGRAM(s) SubCutaneous every 24 hours  FLUoxetine 20 milliGRAM(s) Oral daily  glucagon  Injectable 1 milliGRAM(s) IntraMuscular once PRN  insulin glargine Injectable (LANTUS) 18 Unit(s) SubCutaneous at bedtime  insulin lispro (HumaLOG) corrective regimen sliding scale   SubCutaneous three times a day before meals  insulin lispro (HumaLOG) corrective regimen sliding scale   SubCutaneous at bedtime  lidocaine   Patch 1 Patch Transdermal <User Schedule>  lisinopril 2.5 milliGRAM(s) Oral daily  multivitamin 1 Tablet(s) Oral daily  ondansetron    Tablet 4 milliGRAM(s) Oral every 6 hours PRN  polyethylene glycol 3350 17 Gram(s) Oral daily PRN  senna 2 Tablet(s) Oral at bedtime      PHYSICAL EXAM:  General: NAD, Alert  ENT: MMM  Neck: Supple, No JVD  Lungs: Clear to auscultation bilaterally  Cardio: RRR, S1/S2, No murmurs  Abdomen: Soft, Nontender, Nondistended; Bowel sounds present  Extremities: No cyanosis, No edema    LABS:                        11.4   4.4   )-----------( 302      ( 14 Dec 2018 05:45 )             34.6     12-14    140  |  105  |  26  ----------------------------<  137  4.6   |  28  |  0.88    Ca    10.0      14 Dec 2018 05:45    TPro  7.4  /  Alb  3.2  /  TBili  0.4  /  DBili  x   /  AST  22  /  ALT  41  /  AlkPhos  86  12-14    eGFR if Non African American: 73 mL/min/1.73M2 (12-14-18 @ 05:45)  eGFR if African American: 85 mL/min/1.73M2 (12-14-18 @ 05:45)            11-24 Chol 179 mg/dL  mg/dL HDL 39 mg/dL Trig 171 mg/dL        CAPILLARY BLOOD GLUCOSE      POCT Blood Glucose.: 163 mg/dL (14 Dec 2018 07:53)  POCT Blood Glucose.: 180 mg/dL (13 Dec 2018 22:08)  POCT Blood Glucose.: 178 mg/dL (13 Dec 2018 16:13)  POCT Blood Glucose.: 236 mg/dL (13 Dec 2018 11:21)    11-22 [id number] 9.2          RADIOLOGY & ADDITIONAL TESTS:    Care Discussed with Consultants/Other Providers:

## 2018-12-14 NOTE — CHART NOTE - NSCHARTNOTEFT_GEN_A_CORE
Nutrition Follow Up   Hospital Course (Per Electronic Medical Record):   Source: Medical Record [X] Patient [X] Family [ ]         Diet: Consistent Carbohydrate Diet w/ Thin Liquids   Tolerates Diet Well   No Chewing/Swallowing Difficulties   No Recent Nausea, Vomiting, Diarrhea or Constipation   Consumes 100% of Meals (as Per Documentation)   Educated on Consistent Carbohydrate Diet    Enteral/Parenteral Nutrition: N/A    Current Weight: 174.1lb on 12/13  % Weight Change: N/A  Weight Stable  Obtain Weights Weekly     Pertinent Medications: MEDICATIONS  (STANDING):  aspirin  chewable 81 milliGRAM(s) Oral daily  atorvastatin 80 milliGRAM(s) Oral at bedtime  clopidogrel Tablet 75 milliGRAM(s) Oral daily  dextrose 5%. 1000 milliLiter(s) (50 mL/Hr) IV Continuous <Continuous>  dextrose 50% Injectable 12.5 Gram(s) IV Push once  dextrose 50% Injectable 25 Gram(s) IV Push once  dextrose 50% Injectable 25 Gram(s) IV Push once  docusate sodium 100 milliGRAM(s) Oral three times a day  enoxaparin Injectable 40 milliGRAM(s) SubCutaneous every 24 hours  FLUoxetine 20 milliGRAM(s) Oral daily  insulin glargine Injectable (LANTUS) 18 Unit(s) SubCutaneous at bedtime  insulin lispro (HumaLOG) corrective regimen sliding scale   SubCutaneous three times a day before meals  insulin lispro (HumaLOG) corrective regimen sliding scale   SubCutaneous at bedtime  lidocaine   Patch 1 Patch Transdermal <User Schedule>  lisinopril 2.5 milliGRAM(s) Oral daily  multivitamin 1 Tablet(s) Oral daily  senna 2 Tablet(s) Oral at bedtime    MEDICATIONS  (PRN):  acetaminophen   Tablet .. 650 milliGRAM(s) Oral every 6 hours PRN Moderate Pain (4 - 6)  dextrose 40% Gel 15 Gram(s) Oral once PRN Blood Glucose LESS THAN 70 milliGRAM(s)/deciliter  glucagon  Injectable 1 milliGRAM(s) IntraMuscular once PRN Glucose LESS THAN 70 milligrams/deciliter  ondansetron    Tablet 4 milliGRAM(s) Oral every 6 hours PRN Nausea and/or Vomiting  polyethylene glycol 3350 17 Gram(s) Oral daily PRN Constipation    Pertinent Labs:  12-14 Na140 mmol/L Glu 137 mg/dL<H> K+ 4.6 mmol/L Cr  0.88 mg/dL BUN 26 mg/dL<H> 12-14 Alb 3.2 g/dL<L> 11-22 [id number] 9.2 %<H> 11-24 Chol 179 mg/dL  mg/dL HDL 39 mg/dL<L> Trig 171 mg/dL<H>    Skin: No Pressure Ulcers     Edema: None Noted    Last BM:12/13    Estimated Needs:   [X] No Change since Previous Assessment    Previous Nutrition Diagnosis:   No Active Nutrition Dx @ This Present Time    Nutrition Diagnosis is [X] Ongoing       New Nutrition Diagnosis: [X] Not Applicable    Interventions:   1. Recommend Continue Nutrition Plan of Care   2. Educated on Consistent Carbohydrate Diet    Monitoring & Evaluation:   [X] PO Intake   [X] Tolerance to Diet Prescription   [X] Weights   [X] Follow Up (Per Protocol)  [X] Other: Labs & PCOT    RD Remains Available.  Arvin Baron RD

## 2018-12-14 NOTE — PROGRESS NOTE ADULT - PROBLEM SELECTOR PLAN 4
Had expressed feelings of being overwhelmed/hopelessness. Psychological/Psychiatry following and will continue Prozac per rec's.  Mood is stable.

## 2018-12-14 NOTE — PROGRESS NOTE ADULT - SUBJECTIVE AND OBJECTIVE BOX
CHIEF COMPLAINT: No overnight events. Feeling well.  No pain, CP, HA, dizziness. Moving bowels regularly. No urinary complaints.       HISTORY OF PRESENT ILLNESS  58-year-old right-handed female to [facility] on 11/21/18 with new onset left hemiparesis upon awaking. 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 CT head (11/21/18) showed a chronic right lentiform nucleus lacunar infarct. CTA neck (11/21/18) showed a probable right ICA occlusion with some calcified plaque. CTA head (11/21/18) showed that the right ICA reconstituted perhaps through the ACOM and possibly a large right PCOM. No intracranial occlusion, she was excluded from endovascular thrombectomy. MRI brain subsequently showed R MCA distribution infarct. MRA head and neck showed severe to critical stenosis of proximal  /extracranial right ICA. Conventional angiogram confirmed severe proximal/extracranial right ICA stenosis s/p carotid artery stenting for secondary stroke prevention in this patient with symptomatic right ICA severe stenosis.   Impression:   Cerebral embolism with cerebral infarction. Right MCA distribution stroke - likely etiology being large vessel disease i.e. symptomatic extracranial R ICA severe stenosis leading to artery to artery embolism. Selective cerebral angiography on 11/24/18. Patient enrolled in Stroke AF trial and randomized to a LOOP. ASA/Plavix/Lipitor. Dysphagia: passed, regular diet.  CAROTID DOPPLERS (11/2/7/18): The stent and stented cervical right common and internal carotid arteries are widely patent. There is a flow-limiting stenosis of the right external carotid artery. No hemodynamically significant is present on the left.  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 Decreased, but present flow related enhancement of the more distal right ICA. Previously, no flow related enhancement was identified within the right ICA on CTA from 11/21/2018.  MRA BRAIN: Diminished, but present flow related signal within the skull base and supraclinoid right internal carotid artery. Diminished flow related signal within the right MCA.  MRI Head (11/21/18): Acute right MCA infarct.  A1c was 9.2; TTE: unremarkable. (28 Nov 2018 13:35)      PAST MEDICAL & SURGICAL HISTORY:  Hypertension  CVA (cerebral vascular accident)       REVIEW OF SYMPTOMS  [X] Constitutional WNL     [X] Cardio WNL            [X] Resp WNL           [X] GI WNL                          [X]  WNL                   [X] Heme WNL                                  [] Skin WNL                 [] MSK WNL                              [X] Cognitive WNL       VITALS  Vital Signs Last 24 Hrs  T(C): 36.7 (14 Dec 2018 10:54), Max: 36.8 (13 Dec 2018 19:56)  T(F): 98 (14 Dec 2018 10:54), Max: 98.2 (13 Dec 2018 19:56)  HR: 80 (14 Dec 2018 10:54) (64 - 80)  BP: 102/61 (14 Dec 2018 10:54) (102/61 - 144/75)  RR: 13 (14 Dec 2018 10:54) (13 - 14)  SpO2: 100% (14 Dec 2018 10:54) (99% - 100%)    PHYSICAL EXAM  Constitutional - NAD, Comfortable  HEENT - NCAT, EOMI  Neck - Supple, No limited ROM  Chest - CTA bilaterally, No wheeze, No rhonchi, No crackles  Cardiovascular - RRR, S1S2, No murmurs  Abdomen - BS+, Soft, NTND  Extremities - No C/C/E, No calf tenderness   Skin-no rash  Wounds-ILR CDI      Neurologic Exam - left hemiparesis improving c therapy. No new deficits.                    Psychiatric - Mood stable, Affect WNL     FUNCTIONAL PROGRESS  Gait - mod/min A c cane, climbs 8steps  ADLs - mod/min A  Transfers -mod/min A  Functional transfer -mod A       RECENT LABS           12-14    140  |  105  |  26<H>  ----------------------------<  137<H>  4.6   |  28  |  0.88    Ca    10.0      14 Dec 2018 05:45    TPro  7.4  /  Alb  3.2<L>  /  TBili  0.4  /  DBili  x   /  AST  22  /  ALT  41  /  AlkPhos  86  12-14                        11.4   4.4   )-----------( 302      ( 14 Dec 2018 05:45 )             34.6   LIVER FUNCTIONS - ( 12 Dec 2018 06:38 )  Alb: 3.5 g/dL / Pro: 7.9 g/dL / ALK PHOS: 98 U/L / ALT: 48 U/L DA / AST: 34 U/L / GGT: x             Direct LDL: 106 mg/dL (11-24-18 @ 07:18)    Hemoglobin A1C, Whole Blood: 9.2 % (11-22-18 @ 08:24)          MEDICATIONS  (STANDING):  aspirin  chewable 81 milliGRAM(s) Oral daily  atorvastatin 80 milliGRAM(s) Oral at bedtime  clopidogrel Tablet 75 milliGRAM(s) Oral daily  dextrose 5%. 1000 milliLiter(s) (50 mL/Hr) IV Continuous <Continuous>  dextrose 50% Injectable 12.5 Gram(s) IV Push once  dextrose 50% Injectable 25 Gram(s) IV Push once  dextrose 50% Injectable 25 Gram(s) IV Push once  docusate sodium 100 milliGRAM(s) Oral three times a day  enoxaparin Injectable 40 milliGRAM(s) SubCutaneous every 24 hours  FLUoxetine 20 milliGRAM(s) Oral daily  insulin glargine Injectable (LANTUS) 18 Unit(s) SubCutaneous at bedtime  insulin lispro (HumaLOG) corrective regimen sliding scale   SubCutaneous three times a day before meals  insulin lispro (HumaLOG) corrective regimen sliding scale   SubCutaneous at bedtime  lidocaine   Patch 1 Patch Transdermal <User Schedule>  lisinopril 2.5 milliGRAM(s) Oral daily  multivitamin 1 Tablet(s) Oral daily  senna 2 Tablet(s) Oral at bedtime    MEDICATIONS  (PRN):  acetaminophen   Tablet .. 650 milliGRAM(s) Oral every 6 hours PRN Moderate Pain (4 - 6)  dextrose 40% Gel 15 Gram(s) Oral once PRN Blood Glucose LESS THAN 70 milliGRAM(s)/deciliter  glucagon  Injectable 1 milliGRAM(s) IntraMuscular once PRN Glucose LESS THAN 70 milligrams/deciliter  ondansetron    Tablet 4 milliGRAM(s) Oral every 6 hours PRN Nausea and/or Vomiting  polyethylene glycol 3350 17 Gram(s) Oral daily PRN Constipation              ASSESSMENT & PLAN    GI/Bowel Management - colace, Senna   Management - Toilet Q2  Skin - Turn Q2  Pain - Tylenol PRN  DVT PPX - Lovenox  Diet - diabetic, reg c thins      Continue comprehensive acute rehab program consisting of 3hrs/day of OT/PT and SLP.

## 2018-12-14 NOTE — PROGRESS NOTE ADULT - PROBLEM SELECTOR PLAN 1
Continue PT/OT/SLP program. Fall precautions. ASA/Plavix/statin with Prozac per FLAME. Lidocaine patch to right low back daily-pain improved.

## 2018-12-15 LAB
GLUCOSE BLDC GLUCOMTR-MCNC: 153 MG/DL — HIGH (ref 70–99)
GLUCOSE BLDC GLUCOMTR-MCNC: 170 MG/DL — HIGH (ref 70–99)
GLUCOSE BLDC GLUCOMTR-MCNC: 235 MG/DL — HIGH (ref 70–99)
GLUCOSE BLDC GLUCOMTR-MCNC: 251 MG/DL — HIGH (ref 70–99)

## 2018-12-15 PROCEDURE — 99232 SBSQ HOSP IP/OBS MODERATE 35: CPT

## 2018-12-15 RX ADMIN — CLOPIDOGREL BISULFATE 75 MILLIGRAM(S): 75 TABLET, FILM COATED ORAL at 11:37

## 2018-12-15 RX ADMIN — Medication 100 MILLIGRAM(S): at 06:22

## 2018-12-15 RX ADMIN — Medication 1: at 16:45

## 2018-12-15 RX ADMIN — LIDOCAINE 1 PATCH: 4 CREAM TOPICAL at 21:43

## 2018-12-15 RX ADMIN — LISINOPRIL 2.5 MILLIGRAM(S): 2.5 TABLET ORAL at 06:22

## 2018-12-15 RX ADMIN — LIDOCAINE 1 PATCH: 4 CREAM TOPICAL at 07:50

## 2018-12-15 RX ADMIN — Medication 20 MILLIGRAM(S): at 11:37

## 2018-12-15 RX ADMIN — ATORVASTATIN CALCIUM 80 MILLIGRAM(S): 80 TABLET, FILM COATED ORAL at 21:50

## 2018-12-15 RX ADMIN — ENOXAPARIN SODIUM 40 MILLIGRAM(S): 100 INJECTION SUBCUTANEOUS at 06:22

## 2018-12-15 RX ADMIN — Medication 3: at 11:43

## 2018-12-15 RX ADMIN — INSULIN GLARGINE 18 UNIT(S): 100 INJECTION, SOLUTION SUBCUTANEOUS at 21:50

## 2018-12-15 RX ADMIN — Medication 81 MILLIGRAM(S): at 11:37

## 2018-12-15 RX ADMIN — Medication 1: at 07:56

## 2018-12-15 RX ADMIN — Medication 1 TABLET(S): at 11:37

## 2018-12-15 NOTE — PROGRESS NOTE ADULT - ASSESSMENT
57 y/o female with PMH of prior CVAs, now s/p right MCA CVA with functional deficits.    1. Right MCA CVA 2/2 suspect large vessel disease   i.e. 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 underwent selective cerebral angiography on 11/24/18.  -Continue Aspirin, Plavix, Statin  -Continue Fluoxetine for motor recovery  -Continue close monitoring for neurologic deterioration,   -Continue PT/OT/ rehab.  Patient enrolled in Stroke AF trial and randomized to a LOOP      2. HTN - controlled on current regimen     3. T2DM - not controlled - HgA1C = 9.2%  Cont. Lantus--> increased to 18 iu q HS  Cont ISS  Diabetic education    4. Anxiety/depression  Better controlled; cont. prozac  Psych following     5. DVT Prophylaxis: Lovenox

## 2018-12-15 NOTE — PROGRESS NOTE ADULT - SUBJECTIVE AND OBJECTIVE BOX
58  year old female with right MCA infarct   ICA stent      INTERVAL SUBJECTIVE & REVIEW OF SYMPTOMS:  Patient seen at bedside. Feels well, slept good. Denies any pain     REVIEW OF SYSTEMS  [  x ] Constitutional WNL  [ x  ] Cardio WNL  [x   ] Resp WNL  [x   ] GI WNL    VITAL SIGNS  Vital Signs Last 24 Hrs  T(C): 36.7 (15 Dec 2018 09:58), Max: 36.9 (14 Dec 2018 21:27)  T(F): 98.1 (15 Dec 2018 09:58), Max: 98.5 (14 Dec 2018 21:27)  HR: 78 (15 Dec 2018 09:58) (66 - 84)  BP: 138/72 (15 Dec 2018 09:58) (134/82 - 162/88)  BP(mean): --  RR: 14 (15 Dec 2018 09:58) (14 - 16)  SpO2: 100% (15 Dec 2018 09:58) (100% - 100%)    PHYSICAL EXAM  Constitutional - NAD, Comfortable  Chest - CTA bilaterally,   Cardiovascular - S1S2,   Abdomen - BS+, Soft, NTND  Extremities - No C/C/E, No calf tenderness   wounds-ILR CDI  Neurologic Exam - left hemiparesis     RECENT LABS:                        11.4   4.4   )-----------( 302      ( 14 Dec 2018 05:45 )             34.6     12-14    140  |  105  |  26<H>  ----------------------------<  137<H>  4.6   |  28  |  0.88    Ca    10.0      14 Dec 2018 05:45    TPro  7.4  /  Alb  3.2<L>  /  TBili  0.4  /  DBili  x   /  AST  22  /  ALT  41  /  AlkPhos  86  12-14  RADIOLOGY/OTHER RESULTS:    MEDICATIONS  (STANDING):  aspirin  chewable 81 milliGRAM(s) Oral daily  atorvastatin 80 milliGRAM(s) Oral at bedtime  clopidogrel Tablet 75 milliGRAM(s) Oral daily  dextrose 5%. 1000 milliLiter(s) (50 mL/Hr) IV Continuous <Continuous>  dextrose 50% Injectable 12.5 Gram(s) IV Push once  dextrose 50% Injectable 25 Gram(s) IV Push once  dextrose 50% Injectable 25 Gram(s) IV Push once  docusate sodium 100 milliGRAM(s) Oral three times a day  enoxaparin Injectable 40 milliGRAM(s) SubCutaneous every 24 hours  FLUoxetine 20 milliGRAM(s) Oral daily  insulin glargine Injectable (LANTUS) 18 Unit(s) SubCutaneous at bedtime  insulin lispro (HumaLOG) corrective regimen sliding scale   SubCutaneous three times a day before meals  insulin lispro (HumaLOG) corrective regimen sliding scale   SubCutaneous at bedtime  lidocaine   Patch 1 Patch Transdermal <User Schedule>  lisinopril 2.5 milliGRAM(s) Oral daily  multivitamin 1 Tablet(s) Oral daily  senna 2 Tablet(s) Oral at bedtime    MEDICATIONS  (PRN):  acetaminophen   Tablet .. 650 milliGRAM(s) Oral every 6 hours PRN Moderate Pain (4 - 6)  dextrose 40% Gel 15 Gram(s) Oral once PRN Blood Glucose LESS THAN 70 milliGRAM(s)/deciliter  glucagon  Injectable 1 milliGRAM(s) IntraMuscular once PRN Glucose LESS THAN 70 milligrams/deciliter  ondansetron    Tablet 4 milliGRAM(s) Oral every 6 hours PRN Nausea and/or Vomiting  polyethylene glycol 3350 17 Gram(s) Oral daily PRN Constipation      CAPILLARY BLOOD GLUCOSE      POCT Blood Glucose.: 251 mg/dL (15 Dec 2018 11:40)  POCT Blood Glucose.: 153 mg/dL (15 Dec 2018 07:54)  POCT Blood Glucose.: 246 mg/dL (14 Dec 2018 21:17)  POCT Blood Glucose.: 142 mg/dL (14 Dec 2018 16:27)      A/P:    59 yo F s/p R MCA CVA c hemiparesis and functional deficits.     Stroke: Continue PT/OT/SLP program.  ASA/Plavix/statin   Prozac per FLAME.     Hypertension:Lisinopril    Diabetes mellitus: Lantus nightly, prn Humalog.    Mood: Prozac     DVT prophylaxis:on lovenox    Pain: tylenol prn

## 2018-12-15 NOTE — PROGRESS NOTE ADULT - SUBJECTIVE AND OBJECTIVE BOX
Patient is a 58y old  Female who presents with a chief complaint of s/p right MCA CVA c deficits (15 Dec 2018 14:27)      Patient seen and examined at bedside, no events overnight, left hemiparesis improved.       ALLERGIES:  No Known Drug Allergies  shellfish (Anaphylaxis)    MEDICATIONS:  acetaminophen   Tablet .. 650 milliGRAM(s) Oral every 6 hours PRN  docusate sodium 100 milliGRAM(s) Oral three times a day  insulin glargine Injectable (LANTUS) 18 Unit(s) SubCutaneous at bedtime  lidocaine   Patch 1 Patch Transdermal <User Schedule>  lisinopril 2.5 milliGRAM(s) Oral daily  multivitamin 1 Tablet(s) Oral daily  ondansetron    Tablet 4 milliGRAM(s) Oral every 6 hours PRN  polyethylene glycol 3350 17 Gram(s) Oral daily PRN  senna 2 Tablet(s) Oral at bedtime    Vital Signs Last 24 Hrs  T(C): 36.7 (15 Dec 2018 09:58), Max: 36.9 (14 Dec 2018 21:27)  T(F): 98.1 (15 Dec 2018 09:58), Max: 98.5 (14 Dec 2018 21:27)  HR: 78 (15 Dec 2018 09:58) (66 - 84)  BP: 138/72 (15 Dec 2018 09:58) (134/82 - 162/88)  BP(mean): --  RR: 14 (15 Dec 2018 09:58) (14 - 16)  SpO2: 100% (15 Dec 2018 09:58) (100% - 100%)  I&O's Summary    14 Dec 2018 07:01  -  15 Dec 2018 07:00  --------------------------------------------------------  IN: 600 mL / OUT: 601 mL / NET: -1 mL    15 Dec 2018 07:01  -  15 Dec 2018 19:05  --------------------------------------------------------  IN: 600 mL / OUT: 600 mL / NET: 0 mL          PAST MEDICAL & SURGICAL HISTORY:  Hypertension  CVA (cerebral vascular accident)      Home Medications:  Aspirin 81 mg po daily  Humalog 3 units SQ TID with meals  Lantus 5 units SQ HS  Lipitor 80 mg po HS  Plavix 75 mg po daily   Lisinopril 5 mg po daily  Prozac 20 mg po daily       PHYSICAL EXAM:  General: NAD, A/O x3  ENT: MMM  Neck: Supple, No JVD  Lungs: Clear to percussion bilaterally  Cardio: RRR, S1/S2, No murmurs  Abdomen: Soft, Nontender, Nondistended; Bowel sounds present  Neuro: no new deficits   Extremities: No clubbing, cyanosis, or edema    LABS:                        11.4   4.4   )-----------( 302      ( 14 Dec 2018 05:45 )             34.6     12-14    140  |  105  |  26  ----------------------------<  137  4.6   |  28  |  0.88    Ca    10.0      14 Dec 2018 05:45    TPro  7.4  /  Alb  3.2  /  TBili  0.4  /  DBili  x   /  AST  22  /  ALT  41  /  AlkPhos  86  12-14        11-24 Chol 179 mg/dL  mg/dL HDL 39 mg/dL Trig 171 mg/dL    CAPILLARY BLOOD GLUCOSE      POCT Blood Glucose.: 170 mg/dL (15 Dec 2018 16:41)  POCT Blood Glucose.: 251 mg/dL (15 Dec 2018 11:40)  POCT Blood Glucose.: 153 mg/dL (15 Dec 2018 07:54)  POCT Blood Glucose.: 246 mg/dL (14 Dec 2018 21:17)    11-22 [id number] 9.2      RADIOLOGY & ADDITIONAL TESTS: no new tests     Care Discussed with Consultants/Other Providers: PM&R team

## 2018-12-16 LAB
GLUCOSE BLDC GLUCOMTR-MCNC: 163 MG/DL — HIGH (ref 70–99)
GLUCOSE BLDC GLUCOMTR-MCNC: 170 MG/DL — HIGH (ref 70–99)
GLUCOSE BLDC GLUCOMTR-MCNC: 187 MG/DL — HIGH (ref 70–99)
GLUCOSE BLDC GLUCOMTR-MCNC: 192 MG/DL — HIGH (ref 70–99)

## 2018-12-16 RX ADMIN — ENOXAPARIN SODIUM 40 MILLIGRAM(S): 100 INJECTION SUBCUTANEOUS at 05:26

## 2018-12-16 RX ADMIN — Medication 81 MILLIGRAM(S): at 11:17

## 2018-12-16 RX ADMIN — LIDOCAINE 1 PATCH: 4 CREAM TOPICAL at 19:36

## 2018-12-16 RX ADMIN — Medication 1: at 16:48

## 2018-12-16 RX ADMIN — INSULIN GLARGINE 18 UNIT(S): 100 INJECTION, SOLUTION SUBCUTANEOUS at 21:15

## 2018-12-16 RX ADMIN — Medication 1: at 12:32

## 2018-12-16 RX ADMIN — CLOPIDOGREL BISULFATE 75 MILLIGRAM(S): 75 TABLET, FILM COATED ORAL at 11:17

## 2018-12-16 RX ADMIN — LIDOCAINE 1 PATCH: 4 CREAM TOPICAL at 08:22

## 2018-12-16 RX ADMIN — Medication 20 MILLIGRAM(S): at 11:17

## 2018-12-16 RX ADMIN — ATORVASTATIN CALCIUM 80 MILLIGRAM(S): 80 TABLET, FILM COATED ORAL at 21:06

## 2018-12-16 RX ADMIN — LISINOPRIL 2.5 MILLIGRAM(S): 2.5 TABLET ORAL at 05:26

## 2018-12-16 RX ADMIN — Medication 1 TABLET(S): at 11:17

## 2018-12-16 RX ADMIN — LIDOCAINE 1 PATCH: 4 CREAM TOPICAL at 19:32

## 2018-12-16 RX ADMIN — Medication 1: at 08:21

## 2018-12-16 RX ADMIN — SENNA PLUS 2 TABLET(S): 8.6 TABLET ORAL at 21:06

## 2018-12-16 NOTE — PROGRESS NOTE ADULT - SUBJECTIVE AND OBJECTIVE BOX
CHIEF COMPLAINT: No events overnight, no new deficits, no pain.       HISTORY OF PRESENT ILLNESS  58-year-old right-handed female to [facility] on 11/21/18 with new onset left hemiparesis upon awaking. 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 CT head (11/21/18) showed a chronic right lentiform nucleus lacunar infarct. CTA neck (11/21/18) showed a probable right ICA occlusion with some calcified plaque. CTA head (11/21/18) showed that the right ICA reconstituted perhaps through the ACOM and possibly a large right PCOM. No intracranial occlusion, she was excluded from endovascular thrombectomy. MRI brain subsequently showed R MCA distribution infarct. MRA head and neck showed severe to critical stenosis of proximal  /extracranial right ICA. Conventional angiogram confirmed severe proximal/extracranial right ICA stenosis s/p carotid artery stenting for secondary stroke prevention in this patient with symptomatic right ICA severe stenosis.   Impression:   Cerebral embolism with cerebral infarction. Right MCA distribution stroke - likely etiology being large vessel disease i.e. symptomatic extracranial R ICA severe stenosis leading to artery to artery embolism. Selective cerebral angiography on 11/24/18. Patient enrolled in Stroke AF trial and randomized to a LOOP. ASA/Plavix/Lipitor. Dysphagia: passed, regular diet.  CAROTID DOPPLERS (11/2/7/18): The stent and stented cervical right common and internal carotid arteries are widely patent. There is a flow-limiting stenosis of the right external carotid artery. No hemodynamically significant is present on the left.  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 Decreased, but present flow related enhancement of the more distal right ICA. Previously, no flow related enhancement was identified within the right ICA on CTA from 11/21/2018.  MRA BRAIN: Diminished, but present flow related signal within the skull base and supraclinoid right internal carotid artery. Diminished flow related signal within the right MCA.  MRI Head (11/21/18): Acute right MCA infarct.  A1c was 9.2; TTE: unremarkable. (28 Nov 2018 13:35)      PMH  PAST MEDICAL & SURGICAL HISTORY:  Hypertension  CVA (cerebral vascular accident)  x -        REVIEW OF SYMPTOMS  [X] Constitutional WNL     [X] Cardio WNL            [X] Resp WNL           [X] GI WNL                          [X]  WNL                   [X] Heme WNL              [X] Endo WNL                     [X] Skin WNL                 [X] MSK WNL            [X] Neuro WNL                   [X] Cognitive WNL        [X] Psych WNL      VITALS  Vital Signs Last 24 Hrs  T(C): 36.7 (16 Dec 2018 08:53), Max: 36.7 (15 Dec 2018 09:58)  T(F): 98.1 (16 Dec 2018 08:53), Max: 98.1 (15 Dec 2018 09:58)  HR: 74 (16 Dec 2018 08:53) (74 - 82)  BP: 117/75 (16 Dec 2018 08:53) (117/75 - 145/85)  BP(mean): --  RR: 14 (16 Dec 2018 08:53) (14 - 14)  SpO2: 97% (16 Dec 2018 08:53) (97% - 100%)      PHYSICAL EXAM  Constitutional - NAD, Comfortable  HEENT - NCAT, EOMI  Neck - Supple, No limited ROM  Chest - CTA bilaterally, No wheeze, No rhonchi, No crackles  Cardiovascular - RRR, S1S2, No murmurs  Abdomen - BS+, Soft, NTND  Extremities - No C/C/E, No calf tenderness   Skin-no rash  Woumds-      Neurologic Exam -                   HIF  - Awake, Alert, AAO to self, place, date, year, situation      No aphasia, normal attention, normal concentration, no apraxia, agnosia     Cranial Nerves - CN 2-12 intact     Motor - No focal deficits                            Sensory - Intact to LT,PP,Temprature,JPS, vibration                      Cortical sensory modalities intact     Reflexes - DTR Intact     Toes are flexor     Coordination/dysmetria - FTN intact             Balance - WNL Static and dynamic     Psychiatric - Mood stable, Affect WNL         FUNCTIONAL PROGRESS  Gait -   ADLs -   Transfers -  Functional transfer -     RECENT LABS                Direct LDL: 106 mg/dL (11-24-18 @ 07:18)    Hemoglobin A1C, Whole Blood: 9.2 % (11-22-18 @ 08:24)              RADIOLOGY/OTHER RESULTS      CURRENT MEDICATIONS  MEDICATIONS  (STANDING):  aspirin  chewable 81 milliGRAM(s) Oral daily  atorvastatin 80 milliGRAM(s) Oral at bedtime  clopidogrel Tablet 75 milliGRAM(s) Oral daily  dextrose 5%. 1000 milliLiter(s) (50 mL/Hr) IV Continuous <Continuous>  dextrose 50% Injectable 12.5 Gram(s) IV Push once  dextrose 50% Injectable 25 Gram(s) IV Push once  dextrose 50% Injectable 25 Gram(s) IV Push once  docusate sodium 100 milliGRAM(s) Oral three times a day  enoxaparin Injectable 40 milliGRAM(s) SubCutaneous every 24 hours  FLUoxetine 20 milliGRAM(s) Oral daily  insulin glargine Injectable (LANTUS) 18 Unit(s) SubCutaneous at bedtime  insulin lispro (HumaLOG) corrective regimen sliding scale   SubCutaneous three times a day before meals  insulin lispro (HumaLOG) corrective regimen sliding scale   SubCutaneous at bedtime  lidocaine   Patch 1 Patch Transdermal <User Schedule>  lisinopril 2.5 milliGRAM(s) Oral daily  multivitamin 1 Tablet(s) Oral daily  senna 2 Tablet(s) Oral at bedtime    MEDICATIONS  (PRN):  acetaminophen   Tablet .. 650 milliGRAM(s) Oral every 6 hours PRN Moderate Pain (4 - 6)  dextrose 40% Gel 15 Gram(s) Oral once PRN Blood Glucose LESS THAN 70 milliGRAM(s)/deciliter  glucagon  Injectable 1 milliGRAM(s) IntraMuscular once PRN Glucose LESS THAN 70 milligrams/deciliter  ondansetron    Tablet 4 milliGRAM(s) Oral every 6 hours PRN Nausea and/or Vomiting  polyethylene glycol 3350 17 Gram(s) Oral daily PRN Constipation      ASSESSMENT & PLAN          GI/Bowel Management - Dulcolax PRN, Fleet PRN   Management - Toilet Q2  Skin - Turn Q2  Pain - Tylenol PRN  DVT PPX - TEDs, SCDs  Diet -     Continue comprehensive acute rehab program consisting of 3hrs/day of OT/PT and SLP. CHIEF COMPLAINT: No events overnight, no new deficits, no pain.       HISTORY OF PRESENT ILLNESS  58-year-old right-handed female to [facility] on 11/21/18 with new onset left hemiparesis upon awaking. 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 CT head (11/21/18) showed a chronic right lentiform nucleus lacunar infarct. CTA neck (11/21/18) showed a probable right ICA occlusion with some calcified plaque. CTA head (11/21/18) showed that the right ICA reconstituted perhaps through the ACOM and possibly a large right PCOM. No intracranial occlusion, she was excluded from endovascular thrombectomy. MRI brain subsequently showed R MCA distribution infarct. MRA head and neck showed severe to critical stenosis of proximal  /extracranial right ICA. Conventional angiogram confirmed severe proximal/extracranial right ICA stenosis s/p carotid artery stenting for secondary stroke prevention in this patient with symptomatic right ICA severe stenosis.   Impression:   Cerebral embolism with cerebral infarction. Right MCA distribution stroke - likely etiology being large vessel disease i.e. symptomatic extracranial R ICA severe stenosis leading to artery to artery embolism. Selective cerebral angiography on 11/24/18. Patient enrolled in Stroke AF trial and randomized to a LOOP. ASA/Plavix/Lipitor. Dysphagia: passed, regular diet.  CAROTID DOPPLERS (11/2/7/18): The stent and stented cervical right common and internal carotid arteries are widely patent. There is a flow-limiting stenosis of the right external carotid artery. No hemodynamically significant is present on the left.  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 Decreased, but present flow related enhancement of the more distal right ICA. Previously, no flow related enhancement was identified within the right ICA on CTA from 11/21/2018.  MRA BRAIN: Diminished, but present flow related signal within the skull base and supraclinoid right internal carotid artery. Diminished flow related signal within the right MCA.  MRI Head (11/21/18): Acute right MCA infarct.  A1c was 9.2; TTE: unremarkable. (28 Nov 2018 13:35)      PMH  PAST MEDICAL & SURGICAL HISTORY:  Hypertension  CVA (cerebral vascular accident)  x -        REVIEW OF SYMPTOMS  [X] Constitutional WNL     [X] Cardio WNL            [X] Resp WNL           [X] GI WNL                          [X]  WNL                   [X] Heme WNL              [X] Endo WNL                     [X] Skin WNL                 [X] MSK WNL            [X] Neuro WNL                   [X] Cognitive WNL        [X] Psych WNL      VITALS  Vital Signs Last 24 Hrs  T(C): 36.7 (16 Dec 2018 08:53), Max: 36.7 (15 Dec 2018 09:58)  T(F): 98.1 (16 Dec 2018 08:53), Max: 98.1 (15 Dec 2018 09:58)  HR: 74 (16 Dec 2018 08:53) (74 - 82)  BP: 117/75 (16 Dec 2018 08:53) (117/75 - 145/85)  BP(mean): --  RR: 14 (16 Dec 2018 08:53) (14 - 14)  SpO2: 97% (16 Dec 2018 08:53) (97% - 100%)    PHYSICAL EXAM  Constitutional - NAD, Comfortable  HEENT - NCAT, EOMI  Neck - Supple, No limited ROM  Chest - CTA bilaterally, No wheeze, No rhonchi, No crackles  Cardiovascular - RRR, S1S2, No murmurs  Abdomen - BS+, Soft, NTND  Extremities - No C/C/E, No calf tenderness   Skin-no rash  Wounds-ILR CDI      Neurologic Exam - left hemiparesis improving c therapy. No new deficits.                    Psychiatric - Mood stable, Affect WNL     FUNCTIONAL PROGRESS  Gait - mod/min A c cane, climbs 8steps  ADLs - mod/min A  Transfers -mod/min A  Functional transfer -mod A     RECENT LABS                Direct LDL: 106 mg/dL (11-24-18 @ 07:18)    Hemoglobin A1C, Whole Blood: 9.2 % (11-22-18 @ 08:24)              RADIOLOGY/OTHER RESULTS      CURRENT MEDICATIONS  MEDICATIONS  (STANDING):  aspirin  chewable 81 milliGRAM(s) Oral daily  atorvastatin 80 milliGRAM(s) Oral at bedtime  clopidogrel Tablet 75 milliGRAM(s) Oral daily  dextrose 5%. 1000 milliLiter(s) (50 mL/Hr) IV Continuous <Continuous>  dextrose 50% Injectable 12.5 Gram(s) IV Push once  dextrose 50% Injectable 25 Gram(s) IV Push once  dextrose 50% Injectable 25 Gram(s) IV Push once  docusate sodium 100 milliGRAM(s) Oral three times a day  enoxaparin Injectable 40 milliGRAM(s) SubCutaneous every 24 hours  FLUoxetine 20 milliGRAM(s) Oral daily  insulin glargine Injectable (LANTUS) 18 Unit(s) SubCutaneous at bedtime  insulin lispro (HumaLOG) corrective regimen sliding scale   SubCutaneous three times a day before meals  insulin lispro (HumaLOG) corrective regimen sliding scale   SubCutaneous at bedtime  lidocaine   Patch 1 Patch Transdermal <User Schedule>  lisinopril 2.5 milliGRAM(s) Oral daily  multivitamin 1 Tablet(s) Oral daily  senna 2 Tablet(s) Oral at bedtime    MEDICATIONS  (PRN):  acetaminophen   Tablet .. 650 milliGRAM(s) Oral every 6 hours PRN Moderate Pain (4 - 6)  dextrose 40% Gel 15 Gram(s) Oral once PRN Blood Glucose LESS THAN 70 milliGRAM(s)/deciliter  glucagon  Injectable 1 milliGRAM(s) IntraMuscular once PRN Glucose LESS THAN 70 milligrams/deciliter  ondansetron    Tablet 4 milliGRAM(s) Oral every 6 hours PRN Nausea and/or Vomiting  polyethylene glycol 3350 17 Gram(s) Oral daily PRN Constipation      ASSESSMENT & PLAN    GI/Bowel Management - colace, Senna   Management - Toilet Q2  Skin - Turn Q2  Pain - Tylenol PRN  DVT PPX - Lovenox  Diet - diabetic, reg c thins      Continue comprehensive acute rehab program consisting of 3hrs/day of OT/PT and SLP.

## 2018-12-16 NOTE — PROGRESS NOTE ADULT - ASSESSMENT
No events overnight, no new weakness, VSS, denies any pain or dizziness. Continue rehab course and all precautions.

## 2018-12-17 LAB
ALBUMIN SERPL ELPH-MCNC: 3.3 G/DL — SIGNIFICANT CHANGE UP (ref 3.3–5)
ALP SERPL-CCNC: 87 U/L — SIGNIFICANT CHANGE UP (ref 40–120)
ALT FLD-CCNC: 48 U/L DA — HIGH (ref 10–45)
ANION GAP SERPL CALC-SCNC: 9 MMOL/L — SIGNIFICANT CHANGE UP (ref 5–17)
AST SERPL-CCNC: 27 U/L — SIGNIFICANT CHANGE UP (ref 10–40)
BILIRUB SERPL-MCNC: 0.3 MG/DL — SIGNIFICANT CHANGE UP (ref 0.2–1.2)
BUN SERPL-MCNC: 27 MG/DL — HIGH (ref 7–23)
CALCIUM SERPL-MCNC: 10.3 MG/DL — SIGNIFICANT CHANGE UP (ref 8.4–10.5)
CHLORIDE SERPL-SCNC: 108 MMOL/L — SIGNIFICANT CHANGE UP (ref 96–108)
CO2 SERPL-SCNC: 26 MMOL/L — SIGNIFICANT CHANGE UP (ref 22–31)
CREAT SERPL-MCNC: 0.82 MG/DL — SIGNIFICANT CHANGE UP (ref 0.5–1.3)
GLUCOSE BLDC GLUCOMTR-MCNC: 160 MG/DL — HIGH (ref 70–99)
GLUCOSE BLDC GLUCOMTR-MCNC: 172 MG/DL — HIGH (ref 70–99)
GLUCOSE BLDC GLUCOMTR-MCNC: 186 MG/DL — HIGH (ref 70–99)
GLUCOSE BLDC GLUCOMTR-MCNC: 265 MG/DL — HIGH (ref 70–99)
GLUCOSE SERPL-MCNC: 184 MG/DL — HIGH (ref 70–99)
HCT VFR BLD CALC: 35.4 % — SIGNIFICANT CHANGE UP (ref 34.5–45)
HGB BLD-MCNC: 11.9 G/DL — SIGNIFICANT CHANGE UP (ref 11.5–15.5)
MCHC RBC-ENTMCNC: 29.5 PG — SIGNIFICANT CHANGE UP (ref 27–34)
MCHC RBC-ENTMCNC: 33.6 GM/DL — SIGNIFICANT CHANGE UP (ref 32–36)
MCV RBC AUTO: 87.9 FL — SIGNIFICANT CHANGE UP (ref 80–100)
PLATELET # BLD AUTO: 249 K/UL — SIGNIFICANT CHANGE UP (ref 150–400)
POTASSIUM SERPL-MCNC: 4.5 MMOL/L — SIGNIFICANT CHANGE UP (ref 3.5–5.3)
POTASSIUM SERPL-SCNC: 4.5 MMOL/L — SIGNIFICANT CHANGE UP (ref 3.5–5.3)
PROT SERPL-MCNC: 7.5 G/DL — SIGNIFICANT CHANGE UP (ref 6–8.3)
RBC # BLD: 4.02 M/UL — SIGNIFICANT CHANGE UP (ref 3.8–5.2)
RBC # FLD: 11.8 % — SIGNIFICANT CHANGE UP (ref 10.3–14.5)
SODIUM SERPL-SCNC: 143 MMOL/L — SIGNIFICANT CHANGE UP (ref 135–145)
WBC # BLD: 4 K/UL — SIGNIFICANT CHANGE UP (ref 3.8–10.5)
WBC # FLD AUTO: 4 K/UL — SIGNIFICANT CHANGE UP (ref 3.8–10.5)

## 2018-12-17 PROCEDURE — 99233 SBSQ HOSP IP/OBS HIGH 50: CPT

## 2018-12-17 RX ADMIN — LIDOCAINE 1 PATCH: 4 CREAM TOPICAL at 07:23

## 2018-12-17 RX ADMIN — INSULIN GLARGINE 18 UNIT(S): 100 INJECTION, SOLUTION SUBCUTANEOUS at 21:07

## 2018-12-17 RX ADMIN — Medication 3: at 12:03

## 2018-12-17 RX ADMIN — LIDOCAINE 1 PATCH: 4 CREAM TOPICAL at 21:10

## 2018-12-17 RX ADMIN — Medication 1: at 07:22

## 2018-12-17 RX ADMIN — Medication 1: at 16:22

## 2018-12-17 RX ADMIN — CLOPIDOGREL BISULFATE 75 MILLIGRAM(S): 75 TABLET, FILM COATED ORAL at 12:02

## 2018-12-17 RX ADMIN — LISINOPRIL 2.5 MILLIGRAM(S): 2.5 TABLET ORAL at 05:32

## 2018-12-17 RX ADMIN — Medication 20 MILLIGRAM(S): at 12:03

## 2018-12-17 RX ADMIN — Medication 81 MILLIGRAM(S): at 12:04

## 2018-12-17 RX ADMIN — LIDOCAINE 1 PATCH: 4 CREAM TOPICAL at 21:11

## 2018-12-17 RX ADMIN — ENOXAPARIN SODIUM 40 MILLIGRAM(S): 100 INJECTION SUBCUTANEOUS at 05:33

## 2018-12-17 RX ADMIN — ATORVASTATIN CALCIUM 80 MILLIGRAM(S): 80 TABLET, FILM COATED ORAL at 21:07

## 2018-12-17 NOTE — PROGRESS NOTE ADULT - SUBJECTIVE AND OBJECTIVE BOX
CHIEF COMPLAINT: No events overnight, denies pain, left hemiparesis improving.       HISTORY OF PRESENT ILLNESS  58-year-old right-handed female to [facility] on 11/21/18 with new onset left hemiparesis upon awaking. 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 CT head (11/21/18) showed a chronic right lentiform nucleus lacunar infarct. CTA neck (11/21/18) showed a probable right ICA occlusion with some calcified plaque. CTA head (11/21/18) showed that the right ICA reconstituted perhaps through the ACOM and possibly a large right PCOM. No intracranial occlusion, she was excluded from endovascular thrombectomy. MRI brain subsequently showed R MCA distribution infarct. MRA head and neck showed severe to critical stenosis of proximal  /extracranial right ICA. Conventional angiogram confirmed severe proximal/extracranial right ICA stenosis s/p carotid artery stenting for secondary stroke prevention in this patient with symptomatic right ICA severe stenosis.   Impression:   Cerebral embolism with cerebral infarction. Right MCA distribution stroke - likely etiology being large vessel disease i.e. symptomatic extracranial R ICA severe stenosis leading to artery to artery embolism. Selective cerebral angiography on 11/24/18. Patient enrolled in Stroke AF trial and randomized to a LOOP. ASA/Plavix/Lipitor. Dysphagia: passed, regular diet.  CAROTID DOPPLERS (11/2/7/18): The stent and stented cervical right common and internal carotid arteries are widely patent. There is a flow-limiting stenosis of the right external carotid artery. No hemodynamically significant is present on the left.  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 Decreased, but present flow related enhancement of the more distal right ICA. Previously, no flow related enhancement was identified within the right ICA on CTA from 11/21/2018.  MRA BRAIN: Diminished, but present flow related signal within the skull base and supraclinoid right internal carotid artery. Diminished flow related signal within the right MCA.  MRI Head (11/21/18): Acute right MCA infarct.  A1c was 9.2; TTE: unremarkable. (28 Nov 2018 13:35)      PAST MEDICAL & SURGICAL HISTORY:  Hypertension  CVA (cerebral vascular accident)       REVIEW OF SYMPTOMS  [X] Constitutional WNL     [X] Cardio WNL            [X] Resp WNL           [X] GI WNL                          [X]  WNL                   [X] Heme WNL                                  [X] Skin WNL                 [X] MSK WNL                            [X] Cognitive WNL             VITALS  Vital Signs Last 24 Hrs  T(C): 36.6 (17 Dec 2018 07:18), Max: 36.6 (17 Dec 2018 07:18)  T(F): 97.9 (17 Dec 2018 07:18), Max: 97.9 (17 Dec 2018 07:18)  HR: 68 (17 Dec 2018 07:18) (68 - 96)  BP: 162/88 (17 Dec 2018 07:18) (109/74 - 162/88)  BP(mean): --  RR: 14 (17 Dec 2018 07:18) (14 - 14)  SpO2: 100% (17 Dec 2018 07:18) (99% - 100%)      PHYSICAL EXAM  Constitutional - NAD, Comfortable  HEENT - NCAT, EOMI  Neck - Supple, No limited ROM  Chest - CTA bilaterally, No wheeze, No rhonchi, No crackles  Cardiovascular - RRR, S1S2, No murmurs  Abdomen - BS+, Soft, NTND  Extremities - No C/C/E, No calf tenderness   Skin-no rash  Wounds-healed well.      Neurologic Exam - no new deficits, hemiparesis improving                  Psychiatric - Emotional/depressed at times, Affect WNL     FUNCTIONAL PROGRESS  Gait - min A c cane  ADLs - min A  Transfers -min A  Functional transfer -min A     RECENT LABS                        11.9   4.0   )-----------( 249      ( 17 Dec 2018 05:50 )             35.4     12-17    143  |  108  |  27<H>  ----------------------------<  184<H>  4.5   |  26  |  0.82    Ca    10.3      17 Dec 2018 05:50    TPro  7.5  /  Alb  3.3  /  TBili  0.3  /  DBili  x   /  AST  27  /  ALT  48<H>  /  AlkPhos  87  12-17      LIVER FUNCTIONS - ( 17 Dec 2018 05:50 )  Alb: 3.3 g/dL / Pro: 7.5 g/dL / ALK PHOS: 87 U/L / ALT: 48 U/L DA / AST: 27 U/L / GGT: x             Direct LDL: 106 mg/dL (11-24-18 @ 07:18)    Hemoglobin A1C, Whole Blood: 9.2 % (11-22-18 @ 08:24)              RADIOLOGY/OTHER RESULTS      CURRENT MEDICATIONS  MEDICATIONS  (STANDING):  aspirin  chewable 81 milliGRAM(s) Oral daily  atorvastatin 80 milliGRAM(s) Oral at bedtime  clopidogrel Tablet 75 milliGRAM(s) Oral daily  dextrose 5%. 1000 milliLiter(s) (50 mL/Hr) IV Continuous <Continuous>  dextrose 50% Injectable 12.5 Gram(s) IV Push once  dextrose 50% Injectable 25 Gram(s) IV Push once  dextrose 50% Injectable 25 Gram(s) IV Push once  docusate sodium 100 milliGRAM(s) Oral three times a day  enoxaparin Injectable 40 milliGRAM(s) SubCutaneous every 24 hours  FLUoxetine 20 milliGRAM(s) Oral daily  insulin glargine Injectable (LANTUS) 18 Unit(s) SubCutaneous at bedtime  insulin lispro (HumaLOG) corrective regimen sliding scale   SubCutaneous three times a day before meals  insulin lispro (HumaLOG) corrective regimen sliding scale   SubCutaneous at bedtime  lidocaine   Patch 1 Patch Transdermal <User Schedule>  lisinopril 2.5 milliGRAM(s) Oral daily  multivitamin 1 Tablet(s) Oral daily  senna 2 Tablet(s) Oral at bedtime    MEDICATIONS  (PRN):  acetaminophen   Tablet .. 650 milliGRAM(s) Oral every 6 hours PRN Moderate Pain (4 - 6)  dextrose 40% Gel 15 Gram(s) Oral once PRN Blood Glucose LESS THAN 70 milliGRAM(s)/deciliter  glucagon  Injectable 1 milliGRAM(s) IntraMuscular once PRN Glucose LESS THAN 70 milligrams/deciliter  ondansetron    Tablet 4 milliGRAM(s) Oral every 6 hours PRN Nausea and/or Vomiting  polyethylene glycol 3350 17 Gram(s) Oral daily PRN Constipation      ASSESSMENT & PLAN      GI/Bowel Management - colace, Senna   Management - Toilet Q2  Skin - Turn Q2  Pain - Tylenol PRN  DVT PPX - Lovenox  Diet - diabetic, reg c thins      Continue comprehensive acute rehab program consisting of 3hrs/day of OT/PT and SLP.

## 2018-12-17 NOTE — PROGRESS NOTE ADULT - NEUROLOGICAL DETAILS
alert and oriented x 3/responds to verbal commands/deep reflexes intact/responds to pain/sensation intact

## 2018-12-17 NOTE — PROGRESS NOTE ADULT - SUBJECTIVE AND OBJECTIVE BOX
seen in the acute rehab setting     seen in the ot setting     no co     ros all are negative     no chest pain no sob     Vital Signs Last 24 Hrs  T(C): 36.6 (17 Dec 2018 07:18), Max: 36.6 (17 Dec 2018 07:18)  T(F): 97.9 (17 Dec 2018 07:18), Max: 97.9 (17 Dec 2018 07:18)  HR: 68 (17 Dec 2018 07:18) (68 - 96)  BP: 162/88 (17 Dec 2018 07:18) (109/74 - 162/88)  BP(mean): --  RR: 14 (17 Dec 2018 07:18) (14 - 14)  SpO2: 100% (17 Dec 2018 07:18) (99% - 100%)    ALLERGIES:  No Known Drug Allergies  shellfish (Anaphylaxis)    MEDICATIONS:  acetaminophen   Tablet .. 650 milliGRAM(s) Oral every 6 hours PRN  docusate sodium 100 milliGRAM(s) Oral three times a day  insulin glargine Injectable (LANTUS) 18 Unit(s) SubCutaneous at bedtime  lidocaine   Patch 1 Patch Transdermal <User Schedule>  lisinopril 2.5 milliGRAM(s) Oral daily  multivitamin 1 Tablet(s) Oral daily  ondansetron    Tablet 4 milliGRAM(s) Oral every 6 hours PRN  polyethylene glycol 3350 17 Gram(s) Oral daily PRN  senna 2 Tablet(s) Oral at bedtime      MEDICATIONS  (STANDING):    aspirin  chewable 81 milliGRAM(s) Oral daily  atorvastatin 80 milliGRAM(s) Oral at bedtime  clopidogrel Tablet 75 milliGRAM(s) Oral daily  dextrose 5%. 1000 milliLiter(s) (50 mL/Hr) IV Continuous <Continuous>  dextrose 50% Injectable 12.5 Gram(s) IV Push once  dextrose 50% Injectable 25 Gram(s) IV Push once  dextrose 50% Injectable 25 Gram(s) IV Push once  docusate sodium 100 milliGRAM(s) Oral three times a day  enoxaparin Injectable 40 milliGRAM(s) SubCutaneous every 24 hours  FLUoxetine 20 milliGRAM(s) Oral daily  insulin glargine Injectable (LANTUS) 18 Unit(s) SubCutaneous at bedtime  insulin lispro (HumaLOG) corrective regimen sliding scale   SubCutaneous three times a day before meals  insulin lispro (HumaLOG) corrective regimen sliding scale   SubCutaneous at bedtime  lidocaine   Patch 1 Patch Transdermal <User Schedule>  lisinopril 2.5 milliGRAM(s) Oral daily  multivitamin 1 Tablet(s) Oral daily  senna 2 Tablet(s) Oral at bedtime    MEDICATIONS  (PRN):  acetaminophen   Tablet .. 650 milliGRAM(s) Oral every 6 hours PRN Moderate Pain (4 - 6)  dextrose 40% Gel 15 Gram(s) Oral once PRN Blood Glucose LESS THAN 70 milliGRAM(s)/deciliter  glucagon  Injectable 1 milliGRAM(s) IntraMuscular once PRN Glucose LESS THAN 70 milligrams/deciliter  ondansetron    Tablet 4 milliGRAM(s) Oral every 6 hours PRN Nausea and/or Vomiting  polyethylene glycol 3350 17 Gram(s) Oral daily PRN Constipation      PAST MEDICAL & SURGICAL HISTORY:  Hypertension  CVA (cerebral vascular accident)      Home Medications:  Aspirin 81 mg po daily  Humalog 3 units SQ TID with meals  Lantus 5 units SQ HS  Lipitor 80 mg po HS  Plavix 75 mg po daily   Lisinopril 5 mg po daily  Prozac 20 mg po daily       CBC Full  -  ( 17 Dec 2018 05:50 )  WBC Count : 4.0 K/uL  Hemoglobin : 11.9 g/dL  Hematocrit : 35.4 %  Platelet Count - Automated : 249 K/uL  Mean Cell Volume : 87.9 fl  Mean Cell Hemoglobin : 29.5 pg  Mean Cell Hemoglobin Concentration : 33.6 gm/dL  Auto Neutrophil # : x  Auto Lymphocyte # : x  Auto Monocyte # : x  Auto Eosinophil # : x  Auto Basophil # : x  Auto Neutrophil % : x  Auto Lymphocyte % : x  Auto Monocyte % : x  Auto Eosinophil % : x  Auto Basophil % : x    12-17    143  |  108  |  27<H>  ----------------------------<  184<H>  4.5   |  26  |  0.82    Ca    10.3      17 Dec 2018 05:50    TPro  7.5  /  Alb  3.3  /  TBili  0.3  /  DBili  x   /  AST  27  /  ALT  48<H>  /  AlkPhos  87  12-17                          11.4   4.4   )-----------( 302      ( 14 Dec 2018 05:45 )             34.6     12-14    140  |  105  |  26  ----------------------------<  137  4.6   |  28  |  0.88    Ca    10.0      14 Dec 2018 05:45    TPro  7.4  /  Alb  3.2  /  TBili  0.4  /  DBili  x   /  AST  22  /  ALT  41  /  AlkPhos  86  12-14        11-24 Chol 179 mg/dL  mg/dL HDL 39 mg/dL Trig 171 mg/dL    CAPILLARY BLOOD GLUCOSE      POCT Blood Glucose.: 170 mg/dL (15 Dec 2018 16:41)  POCT Blood Glucose.: 251 mg/dL (15 Dec 2018 11:40)  POCT Blood Glucose.: 153 mg/dL (15 Dec 2018 07:54)  POCT Blood Glucose.: 246 mg/dL (14 Dec 2018 21:17)    11-22 [id number] 9.2      RADIOLOGY & ADDITIONAL TESTS: no new tests     Care Discussed with Consultants/Other Providers: PM&R team

## 2018-12-17 NOTE — PROGRESS NOTE ADULT - ATTENDING COMMENTS
Patient medically stable. Making progress towards rehab goals.   Continue rehab program.       Multidisciplinary team meeting today:  patient's functional goals and needs, functional and clinical  progress were discussed, barriers to discharge were identified. Anticipate discharge home with home care, 24/7 supervision for safety.    EDOD 12/21/18

## 2018-12-17 NOTE — PROGRESS NOTE ADULT - ASSESSMENT
59 y/o female with PMH of prior CVAs, now s/p right MCA CVA with functional deficits.    1. Right MCA CVA 2/2 suspect large vessel disease   i.e. 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 underwent selective cerebral angiography on 11/24/18.  -Continue Aspirin, Plavix, Statin  -Continue Fluoxetine for motor recovery  -Continue close monitoring for neurologic deterioration,   -Continue PT/OT/ rehab.  Patient enrolled in Stroke AF trial and randomized to a LOOP    fall and aspiration risk precautions     2. Essential HTN - controlled on current regimen   low sodium diet   bp long term goal 130/80     3. T2DM - not controlled - HgA1C = 9.2%  needs pcp long term management    ok glucose for now in the acute rehab setting   Cont ISS  Diabetic education    4. Anxiety/depression no si controlled  continue the current regimen   Psych following     5. DVT Prophylaxis: Lovenox       high risk for morbidity, mortality secondary to the above mentioned medical conditions   reviewed with rehab team plan of care   reviewed with patient plan of care

## 2018-12-18 ENCOUNTER — TRANSCRIPTION ENCOUNTER (OUTPATIENT)
Age: 58
End: 2018-12-18

## 2018-12-18 LAB
GLUCOSE BLDC GLUCOMTR-MCNC: 157 MG/DL — HIGH (ref 70–99)
GLUCOSE BLDC GLUCOMTR-MCNC: 185 MG/DL — HIGH (ref 70–99)
GLUCOSE BLDC GLUCOMTR-MCNC: 227 MG/DL — HIGH (ref 70–99)
GLUCOSE BLDC GLUCOMTR-MCNC: 240 MG/DL — HIGH (ref 70–99)

## 2018-12-18 PROCEDURE — 99233 SBSQ HOSP IP/OBS HIGH 50: CPT

## 2018-12-18 PROCEDURE — 99232 SBSQ HOSP IP/OBS MODERATE 35: CPT

## 2018-12-18 RX ORDER — LISINOPRIL 2.5 MG/1
1 TABLET ORAL
Qty: 0 | Refills: 0 | COMMUNITY
Start: 2018-12-18

## 2018-12-18 RX ORDER — ATORVASTATIN CALCIUM 80 MG/1
1 TABLET, FILM COATED ORAL
Qty: 0 | Refills: 0 | COMMUNITY
Start: 2018-12-18

## 2018-12-18 RX ORDER — INSULIN GLARGINE 100 [IU]/ML
20 INJECTION, SOLUTION SUBCUTANEOUS AT BEDTIME
Qty: 0 | Refills: 0 | Status: DISCONTINUED | OUTPATIENT
Start: 2018-12-18 | End: 2018-12-19

## 2018-12-18 RX ORDER — FLUOXETINE HCL 10 MG
1 CAPSULE ORAL
Qty: 0 | Refills: 0 | COMMUNITY
Start: 2018-12-18

## 2018-12-18 RX ORDER — ASPIRIN/CALCIUM CARB/MAGNESIUM 324 MG
1 TABLET ORAL
Qty: 0 | Refills: 0 | COMMUNITY
Start: 2018-12-18

## 2018-12-18 RX ADMIN — ATORVASTATIN CALCIUM 80 MILLIGRAM(S): 80 TABLET, FILM COATED ORAL at 21:05

## 2018-12-18 RX ADMIN — Medication 20 MILLIGRAM(S): at 11:12

## 2018-12-18 RX ADMIN — LIDOCAINE 1 PATCH: 4 CREAM TOPICAL at 21:08

## 2018-12-18 RX ADMIN — INSULIN GLARGINE 20 UNIT(S): 100 INJECTION, SOLUTION SUBCUTANEOUS at 21:05

## 2018-12-18 RX ADMIN — CLOPIDOGREL BISULFATE 75 MILLIGRAM(S): 75 TABLET, FILM COATED ORAL at 11:12

## 2018-12-18 RX ADMIN — LIDOCAINE 1 PATCH: 4 CREAM TOPICAL at 07:37

## 2018-12-18 RX ADMIN — ENOXAPARIN SODIUM 40 MILLIGRAM(S): 100 INJECTION SUBCUTANEOUS at 05:46

## 2018-12-18 RX ADMIN — Medication 2: at 16:14

## 2018-12-18 RX ADMIN — Medication 2: at 11:12

## 2018-12-18 RX ADMIN — Medication 1: at 07:36

## 2018-12-18 RX ADMIN — Medication 81 MILLIGRAM(S): at 11:12

## 2018-12-18 RX ADMIN — LISINOPRIL 2.5 MILLIGRAM(S): 2.5 TABLET ORAL at 05:46

## 2018-12-18 RX ADMIN — Medication 1 TABLET(S): at 11:12

## 2018-12-18 NOTE — PROGRESS NOTE ADULT - ASSESSMENT
57 y/o female with PMH of prior CVAs, now s/p right MCA CVA with functional deficits.    1. Right MCA CVA 2/2 suspect large vessel disease   i.e. 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 underwent selective cerebral angiography on 11/24/18.  -Continue Aspirin, Plavix, Statin  -Continue Fluoxetine for motor recovery  -Continue close monitoring for neurologic deterioration,   -Continue PT/OT/ rehab.  Patient enrolled in Stroke AF trial and randomized to a LOOP    fall and aspiration risk precautions     2. Essential HTN - controlled on current regimen   low sodium diet   bp long term goal 130/80     3. T2DM - not controlled - HgA1C = 9.2%  needs pcp long term management    ok glucose for now in the acute rehab setting   Cont ISS  Diabetic education    4. Anxiety/depression no si controlled  continue the current regimen   Psych following     5. DVT Prophylaxis: Lovenox       high risk for morbidity, mortality secondary to the above mentioned medical conditions   reviewed with rehab team plan of care   reviewed with patient plan of care

## 2018-12-18 NOTE — DISCHARGE NOTE ADULT - CARE PROVIDERS DIRECT ADDRESSES
[email or web address]

## 2018-12-18 NOTE — DISCHARGE NOTE ADULT - [id number]_GEN_ALL_CORE
medication teaching and assessment/observation and assessment/rehabilitation services/teaching and training

## 2018-12-18 NOTE — DISCHARGE NOTE ADULT - CARE PROVIDER_API CALL
Libman, Richard B (MD), Neurology; Vascular Neurology  [address]  [address]  [place] [address]  Phone: [phone]  Fax: [phone]    Aly Wheeler (), Cardiology; Internal Medicine  [address]  [address]  [place] [address]  Phone: [phone]  Fax: [phone]    Shannon Gaspar), EndocrinologyMetabDiabetes; Internal Medicine  [address] [place] [address]  Phone: [phone]  Fax: [phone]    Tj Arenas), Internal Medicine  [address]  [address]  [place] [address]  Phone: [phone]  Fax: [phone]

## 2018-12-18 NOTE — DISCHARGE NOTE ADULT - HOSPITAL COURSE
58-year-old right-handed female to [facility] on 11/21/18 with new onset left hemiparesis upon awaking. 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 CT head (11/21/18) showed a chronic right lentiform nucleus lacunar infarct. CTA neck (11/21/18) showed a probable right ICA occlusion with some calcified plaque. CTA head (11/21/18) showed that the right ICA reconstituted perhaps through the ACOM and possibly a large right PCOM. No intracranial occlusion, she was excluded from endovascular thrombectomy. MRI brain subsequently showed R MCA distribution infarct. MRA head and neck showed severe to critical stenosis of proximal  /extracranial right ICA. Conventional angiogram confirmed severe proximal/extracranial right ICA stenosis s/p carotid artery stenting for secondary stroke prevention in this patient with symptomatic right ICA severe stenosis.   Impression:   Cerebral embolism with cerebral infarction. Right MCA distribution stroke - likely etiology being large vessel disease i.e. symptomatic extracranial R ICA severe stenosis leading to artery to artery embolism. Selective cerebral angiography on 11/24/18. Patient enrolled in Stroke AF trial and randomized to a LOOP. ASA/Plavix/Lipitor. Dysphagia: passed, regular diet.  CAROTID DOPPLERS (11/2/7/18): The stent and stented cervical right common and internal carotid arteries are widely patent. There is a flow-limiting stenosis of the right external carotid artery. No hemodynamically significant is present on the left.  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 Decreased, but present flow related enhancement of the more distal right ICA. Previously, no flow related enhancement was identified within the right ICA on CTA from 58-year-old right-handed female to [facility] on 11/21/18 with new onset left hemiparesis upon awaking. 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 CT head (11/21/18) showed a chronic right lentiform nucleus lacunar infarct. CTA neck (11/21/18) showed a probable right ICA occlusion with some calcified plaque. CTA head (11/21/18) showed that the right ICA reconstituted perhaps through the ACOM and possibly a large right PCOM. No intracranial occlusion, she was excluded from endovascular thrombectomy. MRI brain subsequently showed R MCA distribution infarct. MRA head and neck showed severe to critical stenosis of proximal  /extracranial right ICA. Conventional angiogram confirmed severe proximal/extracranial right ICA stenosis s/p carotid artery stenting for secondary stroke prevention in this patient with symptomatic right ICA severe stenosis.   Cerebral embolism with cerebral infarction. Right MCA distribution stroke - likely etiology being large vessel disease i.e. symptomatic extracranial R ICA severe stenosis leading to artery to artery embolism. Selective cerebral angiography on 11/24/18. Patient enrolled in Stroke AF trial and randomized to a LOOP. ASA/Plavix/Lipitor. Dysphagia: regular diet.  CAROTID DOPPLERS (11/2/7/18): The stent and stented cervical right common and internal carotid arteries are widely patent. There is a flow-limiting stenosis of the right external carotid artery. No hemodynamically significant is present on the left.  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 Decreased, but present flow related enhancement of the more distal right ICA. Previously, no flow related enhancement was identified within the right ICA on CTA from.  At [facility] patient completed comprehensive PT/OT/SLP program. Seen by endocrine, insulin adjusted. Evaluated by psychiatry for depression. Neuropsychology following. BP regimen adjusted by medicine. Patient stable for d/c home c home care on 58-year-old right-handed female to [facility] on 11/21/18 with new onset left hemiparesis upon awaking. 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 CT head (11/21/18) showed a chronic right lentiform nucleus lacunar infarct. CTA neck (11/21/18) showed a probable right ICA occlusion with some calcified plaque. CTA head (11/21/18) showed that the right ICA reconstituted perhaps through the ACOM and possibly a large right PCOM. No intracranial occlusion, she was excluded from endovascular thrombectomy. MRI brain subsequently showed R MCA distribution infarct. MRA head and neck showed severe to critical stenosis of proximal  /extracranial right ICA. Conventional angiogram confirmed severe proximal/extracranial right ICA stenosis s/p carotid artery stenting for secondary stroke prevention in this patient with symptomatic right ICA severe stenosis.   Cerebral embolism with cerebral infarction. Right MCA distribution stroke - likely etiology being large vessel disease i.e. symptomatic extracranial R ICA severe stenosis leading to artery to artery embolism. Selective cerebral angiography on 11/24/18. Patient enrolled in Stroke AF trial and randomized to a LOOP. ASA/Plavix/Lipitor. Dysphagia: regular diet.  CAROTID DOPPLERS (11/2/7/18): The stent and stented cervical right common and internal carotid arteries are widely patent. There is a flow-limiting stenosis of the right external carotid artery. No hemodynamically significant is present on the left.  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 Decreased, but present flow related enhancement of the more distal right ICA. Previously, no flow related enhancement was identified within the right ICA on CTA from.  At [facility] patient completed comprehensive PT/OT/SLP program. Seen by endocrine, insulin adjusted. Evaluated by psychiatry for depression. Neuropsychology following. BP regimen adjusted by medicine. Patient stable for d/c home c home care on 12/21/18.

## 2018-12-18 NOTE — DISCHARGE NOTE ADULT - CARE PLAN
Principal Discharge DX:	Cerebrovascular accident (CVA), unspecified mechanism  Secondary Diagnosis:	Diabetes mellitus  Secondary Diagnosis:	Hypertension Principal Discharge DX:	Cerebrovascular accident (CVA), unspecified mechanism  Goal:	no new strokes  Assessment and plan of treatment:	Continue ASA/Plavix as prescribed, monitor BP and glucose checks, follow up with neurology, cardiology and PCP.  Secondary Diagnosis:	Diabetes mellitus  Goal:	A1C 7  Assessment and plan of treatment:	Continue Lantus and add Jardiance as endocrinologist recommendations. Check blood glucose daily.  Secondary Diagnosis:	Hypertension  Goal:	-140  Assessment and plan of treatment:	Check BP daily, take BP medications, follow up with PCP and Cardiology.

## 2018-12-18 NOTE — DISCHARGE NOTE ADULT - DURABLE MEDICAL EQUIPMENT AGENCY
[facility]: cane, commode delivered to hospital; call # [phone] for delivery of tub bench and two grab bars

## 2018-12-18 NOTE — DISCHARGE NOTE ADULT - PATIENT PORTAL LINK FT
You can access the [facility] Patient Portal, offered by [facility], by registering with the following website: [email or web address]

## 2018-12-18 NOTE — DISCHARGE NOTE ADULT - NS AS ACTIVITY OBS
Walking-Outdoors allowed/No Heavy lifting/straining/Showering allowed/Do not make important decisions/Do not drive or operate machinery/Walking-Indoors allowed/Stairs allowed/Sex allowed

## 2018-12-18 NOTE — DISCHARGE NOTE ADULT - MEDICATION SUMMARY - MEDICATIONS TO TAKE
I will START or STAY ON the medications listed below when I get home from the hospital:    BD insulin needle as per insurance  -- 1 unit(s) subcutaneous once a day (at bedtime)   -- Indication: For Diabetes mellitus    blood glucose test strips as per insurance  -- 1 unit(s) subcutaneous once a day (at bedtime)   -- Indication: For Diabetes mellitus    lancets diabetic as per insurance  -- 1 unit(s) subcutaneous once a day (at bedtime)   -- Indication: For Diabetes mellitus    insulin syringes as per insurance  -- 1 unit(s) subcutaneous once a day (at bedtime)   -- Indication: For Diabetes mellitus    aspirin 81 mg oral tablet, chewable  -- 1 tab(s) by mouth once a day  -- Indication: For CVA (cerebral vascular accident)    lisinopril 2.5 mg oral tablet  -- 1 tab(s) by mouth once a day  -- Indication: For Essential hypertension    FLUoxetine 20 mg oral capsule  -- 1 cap(s) by mouth once a day  -- Indication: For CVA (cerebral vascular accident)    insulin glargine 100 units/mL subcutaneous solution  -- 16 unit(s) subcutaneous once a day (at bedtime)   -- Do not drink alcoholic beverages when taking this medication.  It is very important that you take or use this exactly as directed.  Do not skip doses or discontinue unless directed by your doctor.  Keep in refrigerator.  Do not freeze.    -- Indication: For Diabetes mellitus    Jardiance 10 mg oral tablet  -- 1 tab(s) by mouth once a day   -- Check with your doctor before becoming pregnant.    -- Indication: For Diabetes mellitus    atorvastatin 80 mg oral tablet  -- 1 tab(s) by mouth once a day (at bedtime)  -- Indication: For CVA (cerebral vascular accident)    clopidogrel 75 mg oral tablet  -- 1 tab(s) by mouth once a day  -- Indication: For CVA (cerebral vascular accident)

## 2018-12-18 NOTE — DISCHARGE NOTE ADULT - OTHER SIGNIFICANT FINDINGS
EXAM:  CT BRAIN                            PROCEDURE DATE:  11/22/2018            INTERPRETATION:    CLINICAL INDICATION: Right middle cerebral artery infarct    5mm axial sections of the brain were obtained from base to vertex,   without the intravenous administration of contrast material. Coronal and   sagittal computer generated reconstructed views are available.    Comparison is made with the prior CT and MRI of 11/21/2018.    There has been normal evolution of the acute right middle cerebral artery   infarct in the right frontal temporal cortex and a small portion of the   posterior distribution of the right middle cerebral artery territory.   There is no hemorrhage. The ventricles are not enlarged. There is no   midline shift.        IMPRESSION: Normal evolution of an acute infarct in the posterior portion   of the right middle cerebral artery territory compared with 11/21/2018.   No hemorrhage                      MILA TSAFFORD M.D., ATTENDING RADIOLOGIST  This document has been electronically signed. Nov 23 2018  8:59AM

## 2018-12-18 NOTE — PROGRESS NOTE ADULT - SUBJECTIVE AND OBJECTIVE BOX
Patient is a 58y old  Female who presents with a chief complaint of s/p right MCA CVA c deficits (18 Dec 2018 09:32)          Patient seen and examined at bedside.    ALLERGIES:  No Known Drug Allergies  shellfish (Anaphylaxis)        Vital Signs Last 24 Hrs  T(F): 97.8 (18 Dec 2018 08:25), Max: 97.8 (18 Dec 2018 08:25)  HR: 72 (18 Dec 2018 08:25) (67 - 72)  BP: 150/79 (18 Dec 2018 08:25) (109/67 - 150/79)  RR: 14 (18 Dec 2018 08:25) (14 - 14)  SpO2: 97% (18 Dec 2018 08:25) (97% - 98%)  I&O's Summary    MEDICATIONS:  acetaminophen   Tablet .. 650 milliGRAM(s) Oral every 6 hours PRN  aspirin  chewable 81 milliGRAM(s) Oral daily  atorvastatin 80 milliGRAM(s) Oral at bedtime  clopidogrel Tablet 75 milliGRAM(s) Oral daily  dextrose 40% Gel 15 Gram(s) Oral once PRN  dextrose 5%. 1000 milliLiter(s) IV Continuous <Continuous>  dextrose 50% Injectable 12.5 Gram(s) IV Push once  dextrose 50% Injectable 25 Gram(s) IV Push once  dextrose 50% Injectable 25 Gram(s) IV Push once  docusate sodium 100 milliGRAM(s) Oral three times a day  enoxaparin Injectable 40 milliGRAM(s) SubCutaneous every 24 hours  FLUoxetine 20 milliGRAM(s) Oral daily  glucagon  Injectable 1 milliGRAM(s) IntraMuscular once PRN  insulin glargine Injectable (LANTUS) 20 Unit(s) SubCutaneous at bedtime  insulin lispro (HumaLOG) corrective regimen sliding scale   SubCutaneous three times a day before meals  insulin lispro (HumaLOG) corrective regimen sliding scale   SubCutaneous at bedtime  lidocaine   Patch 1 Patch Transdermal <User Schedule>  lisinopril 2.5 milliGRAM(s) Oral daily  multivitamin 1 Tablet(s) Oral daily  ondansetron    Tablet 4 milliGRAM(s) Oral every 6 hours PRN  polyethylene glycol 3350 17 Gram(s) Oral daily PRN  senna 2 Tablet(s) Oral at bedtime      PHYSICAL EXAM:  General: NAD, A/O x 3  ENT: MMM  Neck: Supple, No JVD  Lungs: Clear to auscultation bilaterally  Cardio: RRR, S1/S2, No murmurs  Abdomen: Soft, Nontender, Nondistended; Bowel sounds present  Extremities: No cyanosis, No edema    LABS:                        11.9   4.0   )-----------( 249      ( 17 Dec 2018 05:50 )             35.4     12-17    143  |  108  |  27  ----------------------------<  184  4.5   |  26  |  0.82    Ca    10.3      17 Dec 2018 05:50    TPro  7.5  /  Alb  3.3  /  TBili  0.3  /  DBili  x   /  AST  27  /  ALT  48  /  AlkPhos  87  12-17    eGFR if Non African American: 80 mL/min/1.73M2 (12-17-18 @ 05:50)  eGFR if African American: 92 mL/min/1.73M2 (12-17-18 @ 05:50)            11-24 Chol 179 mg/dL  mg/dL HDL 39 mg/dL Trig 171 mg/dL        CAPILLARY BLOOD GLUCOSE      POCT Blood Glucose.: 227 mg/dL (18 Dec 2018 11:10)  POCT Blood Glucose.: 157 mg/dL (18 Dec 2018 07:29)  POCT Blood Glucose.: 186 mg/dL (17 Dec 2018 21:03)  POCT Blood Glucose.: 160 mg/dL (17 Dec 2018 16:19)    11-22 [id number] 9.2          RADIOLOGY & ADDITIONAL TESTS:    Care Discussed with Consultants/Other Providers:

## 2018-12-18 NOTE — PROGRESS NOTE ADULT - SUBJECTIVE AND OBJECTIVE BOX
CHIEF COMPLAINT: No events overnight, no new weakness. Denies pain.       HISTORY OF PRESENT ILLNESS  58-year-old right-handed female to [facility] on 11/21/18 with new onset left hemiparesis upon awaking. 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 CT head (11/21/18) showed a chronic right lentiform nucleus lacunar infarct. CTA neck (11/21/18) showed a probable right ICA occlusion with some calcified plaque. CTA head (11/21/18) showed that the right ICA reconstituted perhaps through the ACOM and possibly a large right PCOM. No intracranial occlusion, she was excluded from endovascular thrombectomy. MRI brain subsequently showed R MCA distribution infarct. MRA head and neck showed severe to critical stenosis of proximal  /extracranial right ICA. Conventional angiogram confirmed severe proximal/extracranial right ICA stenosis s/p carotid artery stenting for secondary stroke prevention in this patient with symptomatic right ICA severe stenosis.   Impression:   Cerebral embolism with cerebral infarction. Right MCA distribution stroke - likely etiology being large vessel disease i.e. symptomatic extracranial R ICA severe stenosis leading to artery to artery embolism. Selective cerebral angiography on 11/24/18. Patient enrolled in Stroke AF trial and randomized to a LOOP. ASA/Plavix/Lipitor. Dysphagia: passed, regular diet.  CAROTID DOPPLERS (11/2/7/18): The stent and stented cervical right common and internal carotid arteries are widely patent. There is a flow-limiting stenosis of the right external carotid artery. No hemodynamically significant is present on the left.  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 Decreased, but present flow related enhancement of the more distal right ICA. Previously, no flow related enhancement was identified within the right ICA on CTA from 11/21/2018.  MRA BRAIN: Diminished, but present flow related signal within the skull base and supraclinoid right internal carotid artery. Diminished flow related signal within the right MCA.  MRI Head (11/21/18): Acute right MCA infarct.  A1c was 9.2; TTE: unremarkable. (28 Nov 2018 13:35)      PAST MEDICAL & SURGICAL HISTORY:  Hypertension  CVA (cerebral vascular accident)       REVIEW OF SYMPTOMS  [X] Constitutional WNL     [X] Cardio WNL            [X] Resp WNL           [X] GI WNL                          [X]  WNL                   [X] Heme WNL                      [X] Skin WNL                 [X] MSK WNL                            [X] Cognitive WNL        [X] Psych WNL      VITALS  Vital Signs Last 24 Hrs  T(C): 36.6 (18 Dec 2018 08:25), Max: 36.6 (18 Dec 2018 08:25)  T(F): 97.8 (18 Dec 2018 08:25), Max: 97.8 (18 Dec 2018 08:25)  HR: 72 (18 Dec 2018 08:25) (67 - 72)  BP: 150/79 (18 Dec 2018 08:25) (109/67 - 150/79)  BP(mean): --  RR: 14 (18 Dec 2018 08:25) (14 - 14)  SpO2: 97% (18 Dec 2018 08:25) (97% - 98%)      PHYSICAL EXAM  Constitutional - NAD, Comfortable  HEENT - NCAT, EOMI  Neck - Supple, No limited ROM  Chest - CTA bilaterally, No wheeze, No rhonchi, No crackles  Cardiovascular - RRR, S1S2, No murmurs  Abdomen - BS+, Soft, NTND  Extremities - No C/C/E, No calf tenderness   Skin-no rash  Wounds-na      Neurologic Exam - no new deficits, hemiparesis improving                  Psychiatric - Emotional/depressed at times, Affect WNL     FUNCTIONAL PROGRESS  Gait - min A c cane  ADLs - min A  Transfers -min A  Functional transfer -min A     RECENT LABS                        11.9   4.0   )-----------( 249      ( 17 Dec 2018 05:50 )             35.4     12-17    143  |  108  |  27<H>  ----------------------------<  184<H>  4.5   |  26  |  0.82    Ca    10.3      17 Dec 2018 05:50    TPro  7.5  /  Alb  3.3  /  TBili  0.3  /  DBili  x   /  AST  27  /  ALT  48<H>  /  AlkPhos  87  12-17      LIVER FUNCTIONS - ( 17 Dec 2018 05:50 )  Alb: 3.3 g/dL / Pro: 7.5 g/dL / ALK PHOS: 87 U/L / ALT: 48 U/L DA / AST: 27 U/L / GGT: x             Direct LDL: 106 mg/dL (11-24-18 @ 07:18)    Hemoglobin A1C, Whole Blood: 9.2 % (11-22-18 @ 08:24)              RADIOLOGY/OTHER RESULTS      CURRENT MEDICATIONS  MEDICATIONS  (STANDING):  aspirin  chewable 81 milliGRAM(s) Oral daily  atorvastatin 80 milliGRAM(s) Oral at bedtime  clopidogrel Tablet 75 milliGRAM(s) Oral daily  dextrose 5%. 1000 milliLiter(s) (50 mL/Hr) IV Continuous <Continuous>  dextrose 50% Injectable 12.5 Gram(s) IV Push once  dextrose 50% Injectable 25 Gram(s) IV Push once  dextrose 50% Injectable 25 Gram(s) IV Push once  docusate sodium 100 milliGRAM(s) Oral three times a day  enoxaparin Injectable 40 milliGRAM(s) SubCutaneous every 24 hours  FLUoxetine 20 milliGRAM(s) Oral daily  insulin glargine Injectable (LANTUS) 18 Unit(s) SubCutaneous at bedtime  insulin lispro (HumaLOG) corrective regimen sliding scale   SubCutaneous three times a day before meals  insulin lispro (HumaLOG) corrective regimen sliding scale   SubCutaneous at bedtime  lidocaine   Patch 1 Patch Transdermal <User Schedule>  lisinopril 2.5 milliGRAM(s) Oral daily  multivitamin 1 Tablet(s) Oral daily  senna 2 Tablet(s) Oral at bedtime    MEDICATIONS  (PRN):  acetaminophen   Tablet .. 650 milliGRAM(s) Oral every 6 hours PRN Moderate Pain (4 - 6)  dextrose 40% Gel 15 Gram(s) Oral once PRN Blood Glucose LESS THAN 70 milliGRAM(s)/deciliter  glucagon  Injectable 1 milliGRAM(s) IntraMuscular once PRN Glucose LESS THAN 70 milligrams/deciliter  ondansetron    Tablet 4 milliGRAM(s) Oral every 6 hours PRN Nausea and/or Vomiting  polyethylene glycol 3350 17 Gram(s) Oral daily PRN Constipation      ASSESSMENT & PLAN      GI/Bowel Management - colace, Senna   Management - Toilet Q2  Skin - Turn Q2  Pain - Tylenol PRN  DVT PPX - Lovenox  Diet - diabetic, reg c thins      Continue comprehensive acute rehab program consisting of 3hrs/day of OT/PT and SLP.

## 2018-12-18 NOTE — DISCHARGE NOTE ADULT - ADDITIONAL INSTRUCTIONS
Follow up with  Neurology in 1 week, Cardiology Dr in 1 week. PCP/Endocrinology Chasity in 1 week. Follow up with  Neurology in 1 week, Cardiology Dr in 1 week. Endocrinology Dr Gaspar in 1 week. Follow up with PCP Dr Arenas on 12/26/18 at 9:30am. Follow up with Dr Libman Neurology in 1 week, Cardiology Dr Wheeler in 1 week. Endocrinology Dr Gaspar in 1 week. Follow up with PCP Dr Arenas on 12/26/18 at 9:30am.

## 2018-12-18 NOTE — DISCHARGE NOTE ADULT - MEDICATION SUMMARY - MEDICATIONS TO STOP TAKING
I will STOP taking the medications listed below when I get home from the hospital:    Prinivil 5 mg oral tablet  -- 1 tab(s) by mouth once a day    HumaLOG 100 units/mL injectable solution  -- 3 milliliter(s) injectable 3 times a day    Lantus 100 units/mL subcutaneous solution  -- 5 unit(s) subcutaneous once a day (at bedtime)   -- Do not drink alcoholic beverages when taking this medication.  It is very important that you take or use this exactly as directed.  Do not skip doses or discontinue unless directed by your doctor.  Keep in refrigerator.  Do not freeze.

## 2018-12-18 NOTE — DISCHARGE NOTE ADULT - PLAN OF CARE
no new strokes Continue ASA/Plavix as prescribed, monitor BP and glucose checks, follow up with neurology, cardiology and PCP. A1C 7 Continue Lantus and add Jardiance as endocrinologist recommendations. Check blood glucose daily. -140 Check BP daily, take BP medications, follow up with PCP and Cardiology.

## 2018-12-19 LAB
ALBUMIN SERPL ELPH-MCNC: 3.2 G/DL — LOW (ref 3.3–5)
ALP SERPL-CCNC: 85 U/L — SIGNIFICANT CHANGE UP (ref 40–120)
ALT FLD-CCNC: 49 U/L DA — HIGH (ref 10–45)
ANION GAP SERPL CALC-SCNC: 6 MMOL/L — SIGNIFICANT CHANGE UP (ref 5–17)
AST SERPL-CCNC: 28 U/L — SIGNIFICANT CHANGE UP (ref 10–40)
BILIRUB SERPL-MCNC: 0.3 MG/DL — SIGNIFICANT CHANGE UP (ref 0.2–1.2)
BUN SERPL-MCNC: 26 MG/DL — HIGH (ref 7–23)
CALCIUM SERPL-MCNC: 10.3 MG/DL — SIGNIFICANT CHANGE UP (ref 8.4–10.5)
CHLORIDE SERPL-SCNC: 109 MMOL/L — HIGH (ref 96–108)
CO2 SERPL-SCNC: 29 MMOL/L — SIGNIFICANT CHANGE UP (ref 22–31)
CREAT SERPL-MCNC: 0.93 MG/DL — SIGNIFICANT CHANGE UP (ref 0.5–1.3)
GLUCOSE BLDC GLUCOMTR-MCNC: 139 MG/DL — HIGH (ref 70–99)
GLUCOSE BLDC GLUCOMTR-MCNC: 171 MG/DL — HIGH (ref 70–99)
GLUCOSE BLDC GLUCOMTR-MCNC: 191 MG/DL — HIGH (ref 70–99)
GLUCOSE BLDC GLUCOMTR-MCNC: 237 MG/DL — HIGH (ref 70–99)
GLUCOSE SERPL-MCNC: 160 MG/DL — HIGH (ref 70–99)
HCT VFR BLD CALC: 33 % — LOW (ref 34.5–45)
HGB BLD-MCNC: 11.2 G/DL — LOW (ref 11.5–15.5)
MCHC RBC-ENTMCNC: 29.6 PG — SIGNIFICANT CHANGE UP (ref 27–34)
MCHC RBC-ENTMCNC: 34 GM/DL — SIGNIFICANT CHANGE UP (ref 32–36)
MCV RBC AUTO: 87.2 FL — SIGNIFICANT CHANGE UP (ref 80–100)
PLATELET # BLD AUTO: 225 K/UL — SIGNIFICANT CHANGE UP (ref 150–400)
POTASSIUM SERPL-MCNC: 4.4 MMOL/L — SIGNIFICANT CHANGE UP (ref 3.5–5.3)
POTASSIUM SERPL-SCNC: 4.4 MMOL/L — SIGNIFICANT CHANGE UP (ref 3.5–5.3)
PROT SERPL-MCNC: 7.3 G/DL — SIGNIFICANT CHANGE UP (ref 6–8.3)
RBC # BLD: 3.78 M/UL — LOW (ref 3.8–5.2)
RBC # FLD: 11.9 % — SIGNIFICANT CHANGE UP (ref 10.3–14.5)
SODIUM SERPL-SCNC: 144 MMOL/L — SIGNIFICANT CHANGE UP (ref 135–145)
WBC # BLD: 4.5 K/UL — SIGNIFICANT CHANGE UP (ref 3.8–10.5)
WBC # FLD AUTO: 4.5 K/UL — SIGNIFICANT CHANGE UP (ref 3.8–10.5)

## 2018-12-19 PROCEDURE — 99232 SBSQ HOSP IP/OBS MODERATE 35: CPT | Mod: GC

## 2018-12-19 PROCEDURE — 99233 SBSQ HOSP IP/OBS HIGH 50: CPT

## 2018-12-19 RX ORDER — INSULIN GLARGINE 100 [IU]/ML
22 INJECTION, SOLUTION SUBCUTANEOUS AT BEDTIME
Qty: 0 | Refills: 0 | Status: DISCONTINUED | OUTPATIENT
Start: 2018-12-19 | End: 2018-12-21

## 2018-12-19 RX ADMIN — INSULIN GLARGINE 22 UNIT(S): 100 INJECTION, SOLUTION SUBCUTANEOUS at 21:31

## 2018-12-19 RX ADMIN — ATORVASTATIN CALCIUM 80 MILLIGRAM(S): 80 TABLET, FILM COATED ORAL at 21:13

## 2018-12-19 RX ADMIN — Medication 20 MILLIGRAM(S): at 11:51

## 2018-12-19 RX ADMIN — Medication 81 MILLIGRAM(S): at 11:51

## 2018-12-19 RX ADMIN — SENNA PLUS 2 TABLET(S): 8.6 TABLET ORAL at 21:13

## 2018-12-19 RX ADMIN — Medication 0: at 21:31

## 2018-12-19 RX ADMIN — LIDOCAINE 1 PATCH: 4 CREAM TOPICAL at 18:35

## 2018-12-19 RX ADMIN — LIDOCAINE 1 PATCH: 4 CREAM TOPICAL at 20:33

## 2018-12-19 RX ADMIN — Medication 2: at 11:52

## 2018-12-19 RX ADMIN — Medication 1 TABLET(S): at 11:51

## 2018-12-19 RX ADMIN — LIDOCAINE 1 PATCH: 4 CREAM TOPICAL at 08:45

## 2018-12-19 RX ADMIN — Medication 1: at 08:04

## 2018-12-19 RX ADMIN — LISINOPRIL 2.5 MILLIGRAM(S): 2.5 TABLET ORAL at 06:21

## 2018-12-19 RX ADMIN — ENOXAPARIN SODIUM 40 MILLIGRAM(S): 100 INJECTION SUBCUTANEOUS at 06:21

## 2018-12-19 RX ADMIN — CLOPIDOGREL BISULFATE 75 MILLIGRAM(S): 75 TABLET, FILM COATED ORAL at 11:51

## 2018-12-19 NOTE — PROGRESS NOTE ADULT - PROBLEM SELECTOR PLAN 3
Lantus nightly increased. Monitor FS. Diabetic education. Endocrine following. Plan home c Lantus and Jardiance 10mg daily.

## 2018-12-19 NOTE — PROGRESS NOTE ADULT - SUBJECTIVE AND OBJECTIVE BOX
CHIEF COMPLAINT: No events overnight. NAD.       HISTORY OF PRESENT ILLNESS  58-year-old right-handed female to [facility] on 11/21/18 with new onset left hemiparesis upon awaking. 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 CT head (11/21/18) showed a chronic right lentiform nucleus lacunar infarct. CTA neck (11/21/18) showed a probable right ICA occlusion with some calcified plaque. CTA head (11/21/18) showed that the right ICA reconstituted perhaps through the ACOM and possibly a large right PCOM. No intracranial occlusion, she was excluded from endovascular thrombectomy. MRI brain subsequently showed R MCA distribution infarct. MRA head and neck showed severe to critical stenosis of proximal  /extracranial right ICA. Conventional angiogram confirmed severe proximal/extracranial right ICA stenosis s/p carotid artery stenting for secondary stroke prevention in this patient with symptomatic right ICA severe stenosis.   Impression:   Cerebral embolism with cerebral infarction. Right MCA distribution stroke - likely etiology being large vessel disease i.e. symptomatic extracranial R ICA severe stenosis leading to artery to artery embolism. Selective cerebral angiography on 11/24/18. Patient enrolled in Stroke AF trial and randomized to a LOOP. ASA/Plavix/Lipitor. Dysphagia: passed, regular diet.  CAROTID DOPPLERS (11/2/7/18): The stent and stented cervical right common and internal carotid arteries are widely patent. There is a flow-limiting stenosis of the right external carotid artery. No hemodynamically significant is present on the left.  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 Decreased, but present flow related enhancement of the more distal right ICA. Previously, no flow related enhancement was identified within the right ICA on CTA from 11/21/2018.  MRA BRAIN: Diminished, but present flow related signal within the skull base and supraclinoid right internal carotid artery. Diminished flow related signal within the right MCA.  MRI Head (11/21/18): Acute right MCA infarct.  A1c was 9.2; TTE: unremarkable. (28 Nov 2018 13:35)      PAST MEDICAL & SURGICAL HISTORY:  Hypertension  CVA (cerebral vascular accident)        REVIEW OF SYMPTOMS  [X] Constitutional WNL     [X] Cardio WNL            [X] Resp WNL           [X] GI WNL                          [X]  WNL                   [X] Heme WNL                 [X] Skin WNL                 [X] MSK WNL                              [X] Cognitive WNL         VITALS  Vital Signs Last 24 Hrs  T(C): 36.8 (19 Dec 2018 08:08), Max: 36.9 (18 Dec 2018 20:20)  T(F): 98.2 (19 Dec 2018 08:08), Max: 98.5 (18 Dec 2018 20:20)  HR: 90 (19 Dec 2018 08:08) (69 - 90)  BP: 152/79 (19 Dec 2018 08:08) (110/68 - 152/79)  BP(mean): --  RR: 15 (19 Dec 2018 08:08) (14 - 15)  SpO2: 99% (19 Dec 2018 08:08) (98% - 99%)      PHYSICAL EXAM  Constitutional - NAD  HEENT - NCAT, EOMI  Neck - Supple, No limited ROM  Chest - CTA bilaterally, No wheeze, No rhonchi, No crackles  Cardiovascular - RRR, S1S2, No murmurs  Abdomen - BS+, Soft, NTND  Extremities - No C/C/E, No calf tenderness   Skin-no rash  Wounds-ILR    Neurologic Exam - no new deficits, hemiparesis improving                  Psychiatric - Emotional/depressed at times, Affect WNL     FUNCTIONAL PROGRESS  Gait - min A c cane  ADLs - min A  Transfers -min A  Functional transfer -min A       RECENT LABS                        11.2   4.5   )-----------( 225      ( 19 Dec 2018 06:40 )             33.0     12-19    144  |  109<H>  |  26<H>  ----------------------------<  160<H>  4.4   |  29  |  0.93    Ca    10.3      19 Dec 2018 06:40    TPro  7.3  /  Alb  3.2<L>  /  TBili  0.3  /  DBili  x   /  AST  28  /  ALT  49<H>  /  AlkPhos  85  12-19      LIVER FUNCTIONS - ( 19 Dec 2018 06:40 )  Alb: 3.2 g/dL / Pro: 7.3 g/dL / ALK PHOS: 85 U/L / ALT: 49 U/L DA / AST: 28 U/L / GGT: x             Direct LDL: 106 mg/dL (11-24-18 @ 07:18)    Hemoglobin A1C, Whole Blood: 9.2 % (11-22-18 @ 08:24)              RADIOLOGY/OTHER RESULTS      CURRENT MEDICATIONS  MEDICATIONS  (STANDING):  aspirin  chewable 81 milliGRAM(s) Oral daily  atorvastatin 80 milliGRAM(s) Oral at bedtime  clopidogrel Tablet 75 milliGRAM(s) Oral daily  dextrose 5%. 1000 milliLiter(s) (50 mL/Hr) IV Continuous <Continuous>  dextrose 50% Injectable 12.5 Gram(s) IV Push once  dextrose 50% Injectable 25 Gram(s) IV Push once  dextrose 50% Injectable 25 Gram(s) IV Push once  docusate sodium 100 milliGRAM(s) Oral three times a day  enoxaparin Injectable 40 milliGRAM(s) SubCutaneous every 24 hours  FLUoxetine 20 milliGRAM(s) Oral daily  insulin glargine Injectable (LANTUS) 22 Unit(s) SubCutaneous at bedtime  insulin lispro (HumaLOG) corrective regimen sliding scale   SubCutaneous three times a day before meals  insulin lispro (HumaLOG) corrective regimen sliding scale   SubCutaneous at bedtime  lidocaine   Patch 1 Patch Transdermal <User Schedule>  lisinopril 2.5 milliGRAM(s) Oral daily  multivitamin 1 Tablet(s) Oral daily  senna 2 Tablet(s) Oral at bedtime    MEDICATIONS  (PRN):  acetaminophen   Tablet .. 650 milliGRAM(s) Oral every 6 hours PRN Moderate Pain (4 - 6)  dextrose 40% Gel 15 Gram(s) Oral once PRN Blood Glucose LESS THAN 70 milliGRAM(s)/deciliter  glucagon  Injectable 1 milliGRAM(s) IntraMuscular once PRN Glucose LESS THAN 70 milligrams/deciliter  ondansetron    Tablet 4 milliGRAM(s) Oral every 6 hours PRN Nausea and/or Vomiting  polyethylene glycol 3350 17 Gram(s) Oral daily PRN Constipation      ASSESSMENT & PLAN        GI/Bowel Management - colace, Senna   Management - Toilet Q2  Skin - Turn Q2  Pain - Tylenol PRN  DVT PPX - Lovenox  Diet - diabetic, reg c thins    Continue comprehensive acute rehab program consisting of 3hrs/day of OT/PT and SLP. CHIEF COMPLAINT: No events overnight. NAD.       HISTORY OF PRESENT ILLNESS  58-year-old right-handed female to [facility] on 11/21/18 with new onset left hemiparesis upon awaking. 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 CT head (11/21/18) showed a chronic right lentiform nucleus lacunar infarct. CTA neck (11/21/18) showed a probable right ICA occlusion with some calcified plaque. CTA head (11/21/18) showed that the right ICA reconstituted perhaps through the ACOM and possibly a large right PCOM. No intracranial occlusion, she was excluded from endovascular thrombectomy. MRI brain subsequently showed R MCA distribution infarct. MRA head and neck showed severe to critical stenosis of proximal  /extracranial right ICA. Conventional angiogram confirmed severe proximal/extracranial right ICA stenosis s/p carotid artery stenting for secondary stroke prevention in this patient with symptomatic right ICA severe stenosis.   Impression:   Cerebral embolism with cerebral infarction. Right MCA distribution stroke - likely etiology being large vessel disease i.e. symptomatic extracranial R ICA severe stenosis leading to artery to artery embolism. Selective cerebral angiography on 11/24/18. Patient enrolled in Stroke AF trial and randomized to a LOOP. ASA/Plavix/Lipitor. Dysphagia: passed, regular diet.  CAROTID DOPPLERS (11/2/7/18): The stent and stented cervical right common and internal carotid arteries are widely patent. There is a flow-limiting stenosis of the right external carotid artery. No hemodynamically significant is present on the left.  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 Decreased, but present flow related enhancement of the more distal right ICA. Previously, no flow related enhancement was identified within the right ICA on CTA from 11/21/2018.  MRA BRAIN: Diminished, but present flow related signal within the skull base and supraclinoid right internal carotid artery. Diminished flow related signal within the right MCA.  MRI Head (11/21/18): Acute right MCA infarct.  A1c was 9.2; TTE: unremarkable. (28 Nov 2018 13:35)      PAST MEDICAL & SURGICAL HISTORY:  Hypertension  CVA (cerebral vascular accident)        REVIEW OF SYMPTOMS  [X] Constitutional WNL     [X] Cardio WNL            [X] Resp WNL           [X] GI WNL                          [X]  WNL                   [X] Heme WNL                 [X] Skin WNL                 [X] MSK WNL                              [X] Cognitive WNL         VITALS  Vital Signs Last 24 Hrs  T(C): 36.8 (19 Dec 2018 08:08), Max: 36.9 (18 Dec 2018 20:20)  T(F): 98.2 (19 Dec 2018 08:08), Max: 98.5 (18 Dec 2018 20:20)  HR: 90 (19 Dec 2018 08:08) (69 - 90)  BP: 152/79 (19 Dec 2018 08:08) (110/68 - 152/79)  BP(mean): --  RR: 15 (19 Dec 2018 08:08) (14 - 15)  SpO2: 99% (19 Dec 2018 08:08) (98% - 99%)      PHYSICAL EXAM  Constitutional - NAD  HEENT - NCAT, EOMI  Neck - Supple, No limited ROM  Chest - CTA bilaterally, No wheeze, No rhonchi, No crackles  Cardiovascular - RRR, S1S2, No murmurs  Abdomen - BS+, Soft, NTND  Extremities - No C/C/E, No calf tenderness   Skin-no rash  Wounds-ILR    Neurologic Exam - no new deficits, hemiparesis improving                  Psychiatric - Emotional/depressed at times, Affect WNL     FUNCTIONAL PROGRESS  Gait - min A c cane  ADLs - min A/close supervision  Transfers -min A  Functional transfer -min A       RECENT LABS                        11.2   4.5   )-----------( 225      ( 19 Dec 2018 06:40 )             33.0     12-19    144  |  109<H>  |  26<H>  ----------------------------<  160<H>  4.4   |  29  |  0.93    Ca    10.3      19 Dec 2018 06:40    TPro  7.3  /  Alb  3.2<L>  /  TBili  0.3  /  DBili  x   /  AST  28  /  ALT  49<H>  /  AlkPhos  85  12-19      LIVER FUNCTIONS - ( 19 Dec 2018 06:40 )  Alb: 3.2 g/dL / Pro: 7.3 g/dL / ALK PHOS: 85 U/L / ALT: 49 U/L DA / AST: 28 U/L / GGT: x             Direct LDL: 106 mg/dL (11-24-18 @ 07:18)    Hemoglobin A1C, Whole Blood: 9.2 % (11-22-18 @ 08:24)      RADIOLOGY/OTHER RESULTS      CURRENT MEDICATIONS  MEDICATIONS  (STANDING):  aspirin  chewable 81 milliGRAM(s) Oral daily  atorvastatin 80 milliGRAM(s) Oral at bedtime  clopidogrel Tablet 75 milliGRAM(s) Oral daily  dextrose 5%. 1000 milliLiter(s) (50 mL/Hr) IV Continuous <Continuous>  dextrose 50% Injectable 12.5 Gram(s) IV Push once  dextrose 50% Injectable 25 Gram(s) IV Push once  dextrose 50% Injectable 25 Gram(s) IV Push once  docusate sodium 100 milliGRAM(s) Oral three times a day  enoxaparin Injectable 40 milliGRAM(s) SubCutaneous every 24 hours  FLUoxetine 20 milliGRAM(s) Oral daily  insulin glargine Injectable (LANTUS) 22 Unit(s) SubCutaneous at bedtime  insulin lispro (HumaLOG) corrective regimen sliding scale   SubCutaneous three times a day before meals  insulin lispro (HumaLOG) corrective regimen sliding scale   SubCutaneous at bedtime  lidocaine   Patch 1 Patch Transdermal <User Schedule>  lisinopril 2.5 milliGRAM(s) Oral daily  multivitamin 1 Tablet(s) Oral daily  senna 2 Tablet(s) Oral at bedtime    MEDICATIONS  (PRN):  acetaminophen   Tablet .. 650 milliGRAM(s) Oral every 6 hours PRN Moderate Pain (4 - 6)  dextrose 40% Gel 15 Gram(s) Oral once PRN Blood Glucose LESS THAN 70 milliGRAM(s)/deciliter  glucagon  Injectable 1 milliGRAM(s) IntraMuscular once PRN Glucose LESS THAN 70 milligrams/deciliter  ondansetron    Tablet 4 milliGRAM(s) Oral every 6 hours PRN Nausea and/or Vomiting  polyethylene glycol 3350 17 Gram(s) Oral daily PRN Constipation      ASSESSMENT & PLAN        GI/Bowel Management - colace, Senna   Management - Toilet Q2  Skin - Turn Q2  Pain - Tylenol PRN  DVT PPX - Lovenox  Diet - diabetic, reg c thins    Continue comprehensive acute rehab program consisting of 3hrs/day of OT/PT and SLP.

## 2018-12-19 NOTE — PROGRESS NOTE ADULT - ATTENDING COMMENTS
Chart reviewed. Patient seen at bedside. in OT session  No new issues overnight. Slept well. Denies any anxiety  Left spastic hemiplegia  Progressing well in therapy and d/c planning home on friday.   Patient states that her brother with assist at home as needed including Lantus administration

## 2018-12-19 NOTE — PROGRESS NOTE ADULT - SUBJECTIVE AND OBJECTIVE BOX
Patient is a 58y old  Female who presents with a chief complaint of s/p right MCA CVA c deficits (19 Dec 2018 10:04)          Patient seen and examined at bedside.    ALLERGIES:  No Known Drug Allergies  shellfish (Anaphylaxis)        Vital Signs Last 24 Hrs  T(F): 98.2 (19 Dec 2018 08:08), Max: 98.5 (18 Dec 2018 20:20)  HR: 90 (19 Dec 2018 08:08) (69 - 90)  BP: 152/79 (19 Dec 2018 08:08) (110/68 - 152/79)  RR: 15 (19 Dec 2018 08:08) (14 - 15)  SpO2: 99% (19 Dec 2018 08:08) (98% - 99%)  I&O's Summary    MEDICATIONS:  acetaminophen   Tablet .. 650 milliGRAM(s) Oral every 6 hours PRN  aspirin  chewable 81 milliGRAM(s) Oral daily  atorvastatin 80 milliGRAM(s) Oral at bedtime  clopidogrel Tablet 75 milliGRAM(s) Oral daily  dextrose 40% Gel 15 Gram(s) Oral once PRN  dextrose 5%. 1000 milliLiter(s) IV Continuous <Continuous>  dextrose 50% Injectable 12.5 Gram(s) IV Push once  dextrose 50% Injectable 25 Gram(s) IV Push once  dextrose 50% Injectable 25 Gram(s) IV Push once  docusate sodium 100 milliGRAM(s) Oral three times a day  enoxaparin Injectable 40 milliGRAM(s) SubCutaneous every 24 hours  FLUoxetine 20 milliGRAM(s) Oral daily  glucagon  Injectable 1 milliGRAM(s) IntraMuscular once PRN  insulin glargine Injectable (LANTUS) 22 Unit(s) SubCutaneous at bedtime  insulin lispro (HumaLOG) corrective regimen sliding scale   SubCutaneous three times a day before meals  insulin lispro (HumaLOG) corrective regimen sliding scale   SubCutaneous at bedtime  lidocaine   Patch 1 Patch Transdermal <User Schedule>  lisinopril 2.5 milliGRAM(s) Oral daily  multivitamin 1 Tablet(s) Oral daily  ondansetron    Tablet 4 milliGRAM(s) Oral every 6 hours PRN  polyethylene glycol 3350 17 Gram(s) Oral daily PRN  senna 2 Tablet(s) Oral at bedtime      PHYSICAL EXAM:  General: NAD, A/O x 3  ENT: MMM  Neck: Supple, No JVD  Lungs: Clear to auscultation bilaterally  Cardio: RRR, S1/S2, No murmurs  Abdomen: Soft, Nontender, Nondistended; Bowel sounds present  Extremities: No cyanosis, No edema    LABS:                        11.2   4.5   )-----------( 225      ( 19 Dec 2018 06:40 )             33.0     12-19    144  |  109  |  26  ----------------------------<  160  4.4   |  29  |  0.93    Ca    10.3      19 Dec 2018 06:40    TPro  7.3  /  Alb  3.2  /  TBili  0.3  /  DBili  x   /  AST  28  /  ALT  49  /  AlkPhos  85  12-19    eGFR if Non : 69 mL/min/1.73M2 (12-19-18 @ 06:40)  eGFR if African American: 80 mL/min/1.73M2 (12-19-18 @ 06:40)            11-24 Chol 179 mg/dL  mg/dL HDL 39 mg/dL Trig 171 mg/dL        CAPILLARY BLOOD GLUCOSE      POCT Blood Glucose.: 171 mg/dL (19 Dec 2018 07:58)  POCT Blood Glucose.: 185 mg/dL (18 Dec 2018 21:05)  POCT Blood Glucose.: 240 mg/dL (18 Dec 2018 16:11)    11-22 [id number] 9.2          RADIOLOGY & ADDITIONAL TESTS:    Care Discussed with Consultants/Other Providers:

## 2018-12-19 NOTE — CHART NOTE - NSCHARTNOTEFT_GEN_A_CORE
Nutrition Follow Up Note  Hospital Course (Per Electronic Medical Record):   Source: Medical Record [X] Patient [ ] Family [ ]         Diet:  Consistent Carbohydrate Diet w/ Thin Liquids   Tolerates Diet Well   Consumes % of Meals (as Per Documentation)   On Ensure Enlive 8oz PO TID (per Request)    Enteral/Parenteral Nutrition: N/A    Current Weight: 173lb on 12/17  Weight Stable  Obtain Weights Weekly     Pertinent Medications: MEDICATIONS  (STANDING):  aspirin  chewable 81 milliGRAM(s) Oral daily  atorvastatin 80 milliGRAM(s) Oral at bedtime  clopidogrel Tablet 75 milliGRAM(s) Oral daily  dextrose 5%. 1000 milliLiter(s) (50 mL/Hr) IV Continuous <Continuous>  dextrose 50% Injectable 12.5 Gram(s) IV Push once  dextrose 50% Injectable 25 Gram(s) IV Push once  dextrose 50% Injectable 25 Gram(s) IV Push once  docusate sodium 100 milliGRAM(s) Oral three times a day  enoxaparin Injectable 40 milliGRAM(s) SubCutaneous every 24 hours  FLUoxetine 20 milliGRAM(s) Oral daily  insulin glargine Injectable (LANTUS) 22 Unit(s) SubCutaneous at bedtime  insulin lispro (HumaLOG) corrective regimen sliding scale   SubCutaneous three times a day before meals  insulin lispro (HumaLOG) corrective regimen sliding scale   SubCutaneous at bedtime  lidocaine   Patch 1 Patch Transdermal <User Schedule>  lisinopril 2.5 milliGRAM(s) Oral daily  multivitamin 1 Tablet(s) Oral daily  senna 2 Tablet(s) Oral at bedtime    MEDICATIONS  (PRN):  acetaminophen   Tablet .. 650 milliGRAM(s) Oral every 6 hours PRN Moderate Pain (4 - 6)  dextrose 40% Gel 15 Gram(s) Oral once PRN Blood Glucose LESS THAN 70 milliGRAM(s)/deciliter  glucagon  Injectable 1 milliGRAM(s) IntraMuscular once PRN Glucose LESS THAN 70 milligrams/deciliter  ondansetron    Tablet 4 milliGRAM(s) Oral every 6 hours PRN Nausea and/or Vomiting  polyethylene glycol 3350 17 Gram(s) Oral daily PRN Constipation      Pertinent Labs:  12-19 Na144 mmol/L Glu 160 mg/dL<H> K+ 4.4 mmol/L Cr  0.93 mg/dL BUN 26 mg/dL<H> 12-19 Alb 3.2 g/dL<L> 11-22 [id number] 9.2 %<H> 11-24 Chol 179 mg/dL  mg/dL HDL 39 mg/dL<L> Trig 171 mg/dL<H>    Skin: No Pressure Ulcers     Edema: None Noted     Last BM: on 12/16    Estimated Needs:   [X] No Change since Previous Assessment    Previous Nutrition Diagnosis:   No Active Nutrition Dx @ This Present Time    Nutrition Diagnosis is [X] Not Applicable      New Nutrition Diagnosis: [X] Not Applicable    Interventions:   1. Recommend Continue Nutrition Plan of Care     Monitoring & Evaluation:   [X] Weights   [X] PO Intake   [X] Follow Up (Per Protocol)  [X] Tolerance to Diet Prescription   [X] Other: Labs & PCOT    RD Remains Available.  Arvin Baron RD

## 2018-12-20 LAB
GLUCOSE BLDC GLUCOMTR-MCNC: 132 MG/DL — HIGH (ref 70–99)
GLUCOSE BLDC GLUCOMTR-MCNC: 167 MG/DL — HIGH (ref 70–99)
GLUCOSE BLDC GLUCOMTR-MCNC: 225 MG/DL — HIGH (ref 70–99)
GLUCOSE BLDC GLUCOMTR-MCNC: 237 MG/DL — HIGH (ref 70–99)

## 2018-12-20 PROCEDURE — 99233 SBSQ HOSP IP/OBS HIGH 50: CPT

## 2018-12-20 PROCEDURE — 99232 SBSQ HOSP IP/OBS MODERATE 35: CPT | Mod: GC

## 2018-12-20 PROCEDURE — 90832 PSYTX W PT 30 MINUTES: CPT

## 2018-12-20 RX ORDER — CLOPIDOGREL BISULFATE 75 MG/1
1 TABLET, FILM COATED ORAL
Qty: 0 | Refills: 0 | COMMUNITY
Start: 2018-12-20

## 2018-12-20 RX ADMIN — Medication 1: at 16:15

## 2018-12-20 RX ADMIN — Medication 2: at 12:05

## 2018-12-20 RX ADMIN — ENOXAPARIN SODIUM 40 MILLIGRAM(S): 100 INJECTION SUBCUTANEOUS at 06:20

## 2018-12-20 RX ADMIN — LIDOCAINE 1 PATCH: 4 CREAM TOPICAL at 20:01

## 2018-12-20 RX ADMIN — LIDOCAINE 1 PATCH: 4 CREAM TOPICAL at 07:20

## 2018-12-20 RX ADMIN — SENNA PLUS 2 TABLET(S): 8.6 TABLET ORAL at 21:11

## 2018-12-20 RX ADMIN — CLOPIDOGREL BISULFATE 75 MILLIGRAM(S): 75 TABLET, FILM COATED ORAL at 12:06

## 2018-12-20 RX ADMIN — Medication 81 MILLIGRAM(S): at 12:05

## 2018-12-20 RX ADMIN — ATORVASTATIN CALCIUM 80 MILLIGRAM(S): 80 TABLET, FILM COATED ORAL at 21:11

## 2018-12-20 RX ADMIN — ONDANSETRON 4 MILLIGRAM(S): 8 TABLET, FILM COATED ORAL at 07:20

## 2018-12-20 RX ADMIN — LISINOPRIL 2.5 MILLIGRAM(S): 2.5 TABLET ORAL at 06:20

## 2018-12-20 RX ADMIN — LIDOCAINE 1 PATCH: 4 CREAM TOPICAL at 20:00

## 2018-12-20 RX ADMIN — INSULIN GLARGINE 22 UNIT(S): 100 INJECTION, SOLUTION SUBCUTANEOUS at 21:11

## 2018-12-20 RX ADMIN — Medication 0: at 21:11

## 2018-12-20 RX ADMIN — Medication 20 MILLIGRAM(S): at 12:06

## 2018-12-20 NOTE — PROGRESS NOTE ADULT - PROBLEM SELECTOR PLAN 4
Had expressed feelings of being overwhelmed/hopelessness. Psychological/Psychiatry following and will continue Prozac per rec's.  Mood is stable. Had expressed feelings of being overwhelmed/hopelessness. Psychological/Psychiatry following and will continue Prozac per rec's.  Depression.

## 2018-12-20 NOTE — PROGRESS NOTE ADULT - SUBJECTIVE AND OBJECTIVE BOX
seen in the acute rehab setting     seen in the pt setting     working out in the PT gym     ros all others are negative     Vital Signs Last 24 Hrs  T(C): 36.7 (20 Dec 2018 07:45), Max: 37.1 (19 Dec 2018 20:14)  T(F): 98 (20 Dec 2018 07:45), Max: 98.7 (19 Dec 2018 20:14)  HR: 74 (20 Dec 2018 07:45) (74 - 77)  BP: 145/80 (20 Dec 2018 07:45) (136/79 - 152/80)  BP(mean): --  RR: 15 (20 Dec 2018 07:45) (15 - 15)  SpO2: 99% (20 Dec 2018 07:45) (98% - 99%)    MEDICATIONS  (STANDING):  aspirin  chewable 81 milliGRAM(s) Oral daily  atorvastatin 80 milliGRAM(s) Oral at bedtime  clopidogrel Tablet 75 milliGRAM(s) Oral daily  dextrose 5%. 1000 milliLiter(s) (50 mL/Hr) IV Continuous <Continuous>  dextrose 50% Injectable 12.5 Gram(s) IV Push once  dextrose 50% Injectable 25 Gram(s) IV Push once  dextrose 50% Injectable 25 Gram(s) IV Push once  docusate sodium 100 milliGRAM(s) Oral three times a day  enoxaparin Injectable 40 milliGRAM(s) SubCutaneous every 24 hours  FLUoxetine 20 milliGRAM(s) Oral daily  insulin glargine Injectable (LANTUS) 22 Unit(s) SubCutaneous at bedtime  insulin lispro (HumaLOG) corrective regimen sliding scale   SubCutaneous three times a day before meals  insulin lispro (HumaLOG) corrective regimen sliding scale   SubCutaneous at bedtime  lidocaine   Patch 1 Patch Transdermal <User Schedule>  lisinopril 2.5 milliGRAM(s) Oral daily  multivitamin 1 Tablet(s) Oral daily  senna 2 Tablet(s) Oral at bedtime    MEDICATIONS  (PRN):  acetaminophen   Tablet .. 650 milliGRAM(s) Oral every 6 hours PRN Moderate Pain (4 - 6)  dextrose 40% Gel 15 Gram(s) Oral once PRN Blood Glucose LESS THAN 70 milliGRAM(s)/deciliter  glucagon  Injectable 1 milliGRAM(s) IntraMuscular once PRN Glucose LESS THAN 70 milligrams/deciliter  ondansetron    Tablet 4 milliGRAM(s) Oral every 6 hours PRN Nausea and/or Vomiting  polyethylene glycol 3350 17 Gram(s) Oral daily PRN Constipation      CBC Full  -  ( 19 Dec 2018 06:40 )  WBC Count : 4.5 K/uL  Hemoglobin : 11.2 g/dL  Hematocrit : 33.0 %  Platelet Count - Automated : 225 K/uL  Mean Cell Volume : 87.2 fl  Mean Cell Hemoglobin : 29.6 pg  Mean Cell Hemoglobin Concentration : 34.0 gm/dL  Auto Neutrophil # : x  Auto Lymphocyte # : x  Auto Monocyte # : x  Auto Eosinophil # : x  Auto Basophil # : x  Auto Neutrophil % : x  Auto Lymphocyte % : x  Auto Monocyte % : x  Auto Eosinophil % : x  Auto Basophil % : x    12-19    144  |  109<H>  |  26<H>  ----------------------------<  160<H>  4.4   |  29  |  0.93    Ca    10.3      19 Dec 2018 06:40    TPro  7.3  /  Alb  3.2<L>  /  TBili  0.3  /  DBili  x   /  AST  28  /  ALT  49<H>  /  AlkPhos  85  12-19

## 2018-12-20 NOTE — PROGRESS NOTE ADULT - ASSESSMENT
59 y/o female with PMH of prior CVAs, now s/p right MCA CVA with functional deficits.    1. Right MCA CVA 2/2 suspect large vessel disease   i.e. 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 underwent selective cerebral angiography on 11/24/18.  -Continue Aspirin, Plavix, Statin  -Continue Fluoxetine for motor recovery  -Continue close monitoring for neurologic deterioration  -Continue PT/OT/ rehab.    fall and aspiration risk precautions   VTE proph     2. Essential HTN - controlled on current regimen   low sodium diet   bp long term goal 130/80     3. T2DM - not controlled - HgA1C = 9.2%  needs pcp long term management    ok glucose for now in the acute rehab setting   Cont ISS  Diabetic education    4. Anxiety/depression no si controlled  continue the current regimen   Psych following     5. DVT Prophylaxis: Lovenox       high risk for morbidity, mortality secondary to the above mentioned medical conditions   reviewed with rehab team plan of care   reviewed with patient plan of care

## 2018-12-20 NOTE — PROGRESS NOTE ADULT - ATTENDING COMMENTS
Chart reviewed. Patient seen at bedside.   No new issues overnight  Neuro exam stable  Has met rehab goals and to be discharged home in am with home care.   Patient is looking for a new PCP   Aware of follow up with neurology and cardio and with PMR

## 2018-12-20 NOTE — PROGRESS NOTE ADULT - SUBJECTIVE AND OBJECTIVE BOX
CHIEF COMPLAINT: no new deficits overnight, NAD. hemiparesis.       HISTORY OF PRESENT ILLNESS  58-year-old right-handed female to [facility] on 11/21/18 with new onset left hemiparesis upon awaking. 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 CT head (11/21/18) showed a chronic right lentiform nucleus lacunar infarct. CTA neck (11/21/18) showed a probable right ICA occlusion with some calcified plaque. CTA head (11/21/18) showed that the right ICA reconstituted perhaps through the ACOM and possibly a large right PCOM. No intracranial occlusion, she was excluded from endovascular thrombectomy. MRI brain subsequently showed R MCA distribution infarct. MRA head and neck showed severe to critical stenosis of proximal  /extracranial right ICA. Conventional angiogram confirmed severe proximal/extracranial right ICA stenosis s/p carotid artery stenting for secondary stroke prevention in this patient with symptomatic right ICA severe stenosis.   Impression:   Cerebral embolism with cerebral infarction. Right MCA distribution stroke - likely etiology being large vessel disease i.e. symptomatic extracranial R ICA severe stenosis leading to artery to artery embolism. Selective cerebral angiography on 11/24/18. Patient enrolled in Stroke AF trial and randomized to a LOOP. ASA/Plavix/Lipitor. Dysphagia: passed, regular diet.  CAROTID DOPPLERS (11/2/7/18): The stent and stented cervical right common and internal carotid arteries are widely patent. There is a flow-limiting stenosis of the right external carotid artery. No hemodynamically significant is present on the left.  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 Decreased, but present flow related enhancement of the more distal right ICA. Previously, no flow related enhancement was identified within the right ICA on CTA from 11/21/2018.  MRA BRAIN: Diminished, but present flow related signal within the skull base and supraclinoid right internal carotid artery. Diminished flow related signal within the right MCA.  MRI Head (11/21/18): Acute right MCA infarct.  A1c was 9.2; TTE: unremarkable. (28 Nov 2018 13:35)      PAST MEDICAL & SURGICAL HISTORY:  Hypertension  CVA (cerebral vascular accident)       REVIEW OF SYMPTOMS  [X] Constitutional WNL     [X] Cardio WNL            [X] Resp WNL           [X] GI WNL                          [X]  WNL                   [X] Heme WNL                           [X] Skin WNL                 [X] MSK WNL                 [X] Psych WNL      VITALS  Vital Signs Last 24 Hrs  T(C): 36.7 (20 Dec 2018 07:45), Max: 37.1 (19 Dec 2018 20:14)  T(F): 98 (20 Dec 2018 07:45), Max: 98.7 (19 Dec 2018 20:14)  HR: 74 (20 Dec 2018 07:45) (74 - 77)  BP: 145/80 (20 Dec 2018 07:45) (136/79 - 152/80)  BP(mean): --  RR: 15 (20 Dec 2018 07:45) (15 - 15)  SpO2: 99% (20 Dec 2018 07:45) (98% - 99%)      PHYSICAL EXAM  Constitutional - NAD  HEENT - NCAT, EOMI  Neck - Supple, No limited ROM  Chest - CTA bilaterally, No wheeze, No rhonchi, No crackles  Cardiovascular - RRR, S1S2, No murmurs  Abdomen - BS+, Soft, NTND  Extremities - No C/C/E, No calf tenderness   Skin-no rash  Wounds-ilr CDI      Neurologic Exam -no new deficits, hemiparesis improving                     Psychiatric - Mood stable, Affect WNL       FUNCTIONAL PROGRESS  Gait - min A c cane  ADLs - min A/close supervision  Transfers -min A  Functional transfer -min A       RECENT LABS                        11.2   4.5   )-----------( 225      ( 19 Dec 2018 06:40 )             33.0     12-19    144  |  109<H>  |  26<H>  ----------------------------<  160<H>  4.4   |  29  |  0.93    Ca    10.3      19 Dec 2018 06:40    TPro  7.3  /  Alb  3.2<L>  /  TBili  0.3  /  DBili  x   /  AST  28  /  ALT  49<H>  /  AlkPhos  85  12-19      LIVER FUNCTIONS - ( 19 Dec 2018 06:40 )  Alb: 3.2 g/dL / Pro: 7.3 g/dL / ALK PHOS: 85 U/L / ALT: 49 U/L DA / AST: 28 U/L / GGT: x             Direct LDL: 106 mg/dL (11-24-18 @ 07:18)    Hemoglobin A1C, Whole Blood: 9.2 % (11-22-18 @ 08:24)              RADIOLOGY/OTHER RESULTS      CURRENT MEDICATIONS  MEDICATIONS  (STANDING):  aspirin  chewable 81 milliGRAM(s) Oral daily  atorvastatin 80 milliGRAM(s) Oral at bedtime  clopidogrel Tablet 75 milliGRAM(s) Oral daily  dextrose 5%. 1000 milliLiter(s) (50 mL/Hr) IV Continuous <Continuous>  dextrose 50% Injectable 12.5 Gram(s) IV Push once  dextrose 50% Injectable 25 Gram(s) IV Push once  dextrose 50% Injectable 25 Gram(s) IV Push once  docusate sodium 100 milliGRAM(s) Oral three times a day  enoxaparin Injectable 40 milliGRAM(s) SubCutaneous every 24 hours  FLUoxetine 20 milliGRAM(s) Oral daily  insulin glargine Injectable (LANTUS) 22 Unit(s) SubCutaneous at bedtime  insulin lispro (HumaLOG) corrective regimen sliding scale   SubCutaneous three times a day before meals  insulin lispro (HumaLOG) corrective regimen sliding scale   SubCutaneous at bedtime  lidocaine   Patch 1 Patch Transdermal <User Schedule>  lisinopril 2.5 milliGRAM(s) Oral daily  multivitamin 1 Tablet(s) Oral daily  senna 2 Tablet(s) Oral at bedtime    MEDICATIONS  (PRN):  acetaminophen   Tablet .. 650 milliGRAM(s) Oral every 6 hours PRN Moderate Pain (4 - 6)  dextrose 40% Gel 15 Gram(s) Oral once PRN Blood Glucose LESS THAN 70 milliGRAM(s)/deciliter  glucagon  Injectable 1 milliGRAM(s) IntraMuscular once PRN Glucose LESS THAN 70 milligrams/deciliter  ondansetron    Tablet 4 milliGRAM(s) Oral every 6 hours PRN Nausea and/or Vomiting  polyethylene glycol 3350 17 Gram(s) Oral daily PRN Constipation      ASSESSMENT & PLAN    GI/Bowel Management - colace, Senna   Management - Toilet Q2  Skin - Turn Q2  Pain - Tylenol PRN  DVT PPX - Lovenox  Diet - diabetic, reg c thins    Continue comprehensive acute rehab program consisting of 3hrs/day of OT/PT and SLP. CHIEF COMPLAINT: no new deficits overnight, crying/emotional. hemiparesis.       HISTORY OF PRESENT ILLNESS  58-year-old right-handed female to [facility] on 11/21/18 with new onset left hemiparesis upon awaking. 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 CT head (11/21/18) showed a chronic right lentiform nucleus lacunar infarct. CTA neck (11/21/18) showed a probable right ICA occlusion with some calcified plaque. CTA head (11/21/18) showed that the right ICA reconstituted perhaps through the ACOM and possibly a large right PCOM. No intracranial occlusion, she was excluded from endovascular thrombectomy. MRI brain subsequently showed R MCA distribution infarct. MRA head and neck showed severe to critical stenosis of proximal  /extracranial right ICA. Conventional angiogram confirmed severe proximal/extracranial right ICA stenosis s/p carotid artery stenting for secondary stroke prevention in this patient with symptomatic right ICA severe stenosis.   Impression:   Cerebral embolism with cerebral infarction. Right MCA distribution stroke - likely etiology being large vessel disease i.e. symptomatic extracranial R ICA severe stenosis leading to artery to artery embolism. Selective cerebral angiography on 11/24/18. Patient enrolled in Stroke AF trial and randomized to a LOOP. ASA/Plavix/Lipitor. Dysphagia: passed, regular diet.  CAROTID DOPPLERS (11/2/7/18): The stent and stented cervical right common and internal carotid arteries are widely patent. There is a flow-limiting stenosis of the right external carotid artery. No hemodynamically significant is present on the left.  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 Decreased, but present flow related enhancement of the more distal right ICA. Previously, no flow related enhancement was identified within the right ICA on CTA from 11/21/2018.  MRA BRAIN: Diminished, but present flow related signal within the skull base and supraclinoid right internal carotid artery. Diminished flow related signal within the right MCA.  MRI Head (11/21/18): Acute right MCA infarct.  A1c was 9.2; TTE: unremarkable. (28 Nov 2018 13:35)      PAST MEDICAL & SURGICAL HISTORY:  Hypertension  CVA (cerebral vascular accident)       REVIEW OF SYMPTOMS  [X] Constitutional WNL     [X] Cardio WNL            [X] Resp WNL           [X] GI WNL                          [X]  WNL                   [X] Heme WNL                           [X] Skin WNL                 [X] MSK WNL                 [X] Psych WNL      VITALS  Vital Signs Last 24 Hrs  T(C): 36.7 (20 Dec 2018 07:45), Max: 37.1 (19 Dec 2018 20:14)  T(F): 98 (20 Dec 2018 07:45), Max: 98.7 (19 Dec 2018 20:14)  HR: 74 (20 Dec 2018 07:45) (74 - 77)  BP: 145/80 (20 Dec 2018 07:45) (136/79 - 152/80)  BP(mean): --  RR: 15 (20 Dec 2018 07:45) (15 - 15)  SpO2: 99% (20 Dec 2018 07:45) (98% - 99%)      PHYSICAL EXAM  Constitutional - NAD  HEENT - NCAT, EOMI  Neck - Supple, No limited ROM  Chest - CTA bilaterally, No wheeze, No rhonchi, No crackles  Cardiovascular - RRR, S1S2, No murmurs  Abdomen - BS+, Soft, NTND  Extremities - No C/C/E, No calf tenderness   Skin-no rash  Wounds-ilr CDI      Neurologic Exam -no new deficits, hemiparesis improving                     Psychiatric - Mood stable, Affect WNL       FUNCTIONAL PROGRESS  Gait - min A c cane  ADLs - min A/close supervision  Transfers -min A  Functional transfer -min A       RECENT LABS                        11.2   4.5   )-----------( 225      ( 19 Dec 2018 06:40 )             33.0     12-19    144  |  109<H>  |  26<H>  ----------------------------<  160<H>  4.4   |  29  |  0.93    Ca    10.3      19 Dec 2018 06:40    TPro  7.3  /  Alb  3.2<L>  /  TBili  0.3  /  DBili  x   /  AST  28  /  ALT  49<H>  /  AlkPhos  85  12-19      LIVER FUNCTIONS - ( 19 Dec 2018 06:40 )  Alb: 3.2 g/dL / Pro: 7.3 g/dL / ALK PHOS: 85 U/L / ALT: 49 U/L DA / AST: 28 U/L / GGT: x             Direct LDL: 106 mg/dL (11-24-18 @ 07:18)    Hemoglobin A1C, Whole Blood: 9.2 % (11-22-18 @ 08:24)              RADIOLOGY/OTHER RESULTS      CURRENT MEDICATIONS  MEDICATIONS  (STANDING):  aspirin  chewable 81 milliGRAM(s) Oral daily  atorvastatin 80 milliGRAM(s) Oral at bedtime  clopidogrel Tablet 75 milliGRAM(s) Oral daily  dextrose 5%. 1000 milliLiter(s) (50 mL/Hr) IV Continuous <Continuous>  dextrose 50% Injectable 12.5 Gram(s) IV Push once  dextrose 50% Injectable 25 Gram(s) IV Push once  dextrose 50% Injectable 25 Gram(s) IV Push once  docusate sodium 100 milliGRAM(s) Oral three times a day  enoxaparin Injectable 40 milliGRAM(s) SubCutaneous every 24 hours  FLUoxetine 20 milliGRAM(s) Oral daily  insulin glargine Injectable (LANTUS) 22 Unit(s) SubCutaneous at bedtime  insulin lispro (HumaLOG) corrective regimen sliding scale   SubCutaneous three times a day before meals  insulin lispro (HumaLOG) corrective regimen sliding scale   SubCutaneous at bedtime  lidocaine   Patch 1 Patch Transdermal <User Schedule>  lisinopril 2.5 milliGRAM(s) Oral daily  multivitamin 1 Tablet(s) Oral daily  senna 2 Tablet(s) Oral at bedtime    MEDICATIONS  (PRN):  acetaminophen   Tablet .. 650 milliGRAM(s) Oral every 6 hours PRN Moderate Pain (4 - 6)  dextrose 40% Gel 15 Gram(s) Oral once PRN Blood Glucose LESS THAN 70 milliGRAM(s)/deciliter  glucagon  Injectable 1 milliGRAM(s) IntraMuscular once PRN Glucose LESS THAN 70 milligrams/deciliter  ondansetron    Tablet 4 milliGRAM(s) Oral every 6 hours PRN Nausea and/or Vomiting  polyethylene glycol 3350 17 Gram(s) Oral daily PRN Constipation      ASSESSMENT & PLAN    GI/Bowel Management - colace, Senna   Management - Toilet Q2  Skin - Turn Q2  Pain - Tylenol PRN  DVT PPX - Lovenox  Diet - diabetic, reg c thins    Continue comprehensive acute rehab program consisting of 3hrs/day of OT/PT and SLP.

## 2018-12-21 VITALS
HEART RATE: 90 BPM | DIASTOLIC BLOOD PRESSURE: 71 MMHG | SYSTOLIC BLOOD PRESSURE: 107 MMHG | RESPIRATION RATE: 15 BRPM | OXYGEN SATURATION: 99 % | TEMPERATURE: 99 F

## 2018-12-21 LAB
ALBUMIN SERPL ELPH-MCNC: 3.1 G/DL — LOW (ref 3.3–5)
ALP SERPL-CCNC: 78 U/L — SIGNIFICANT CHANGE UP (ref 40–120)
ALT FLD-CCNC: 46 U/L DA — HIGH (ref 10–45)
ANION GAP SERPL CALC-SCNC: 8 MMOL/L — SIGNIFICANT CHANGE UP (ref 5–17)
AST SERPL-CCNC: 23 U/L — SIGNIFICANT CHANGE UP (ref 10–40)
BILIRUB SERPL-MCNC: 0.4 MG/DL — SIGNIFICANT CHANGE UP (ref 0.2–1.2)
BUN SERPL-MCNC: 26 MG/DL — HIGH (ref 7–23)
CALCIUM SERPL-MCNC: 10.1 MG/DL — SIGNIFICANT CHANGE UP (ref 8.4–10.5)
CHLORIDE SERPL-SCNC: 110 MMOL/L — HIGH (ref 96–108)
CO2 SERPL-SCNC: 27 MMOL/L — SIGNIFICANT CHANGE UP (ref 22–31)
CREAT SERPL-MCNC: 0.85 MG/DL — SIGNIFICANT CHANGE UP (ref 0.5–1.3)
GLUCOSE BLDC GLUCOMTR-MCNC: 168 MG/DL — HIGH (ref 70–99)
GLUCOSE BLDC GLUCOMTR-MCNC: 216 MG/DL — HIGH (ref 70–99)
GLUCOSE SERPL-MCNC: 160 MG/DL — HIGH (ref 70–99)
HCT VFR BLD CALC: 31.7 % — LOW (ref 34.5–45)
HGB BLD-MCNC: 10.4 G/DL — LOW (ref 11.5–15.5)
MCHC RBC-ENTMCNC: 28.7 PG — SIGNIFICANT CHANGE UP (ref 27–34)
MCHC RBC-ENTMCNC: 32.9 GM/DL — SIGNIFICANT CHANGE UP (ref 32–36)
MCV RBC AUTO: 87.2 FL — SIGNIFICANT CHANGE UP (ref 80–100)
PLATELET # BLD AUTO: 217 K/UL — SIGNIFICANT CHANGE UP (ref 150–400)
POTASSIUM SERPL-MCNC: 4.6 MMOL/L — SIGNIFICANT CHANGE UP (ref 3.5–5.3)
POTASSIUM SERPL-SCNC: 4.6 MMOL/L — SIGNIFICANT CHANGE UP (ref 3.5–5.3)
PROT SERPL-MCNC: 7 G/DL — SIGNIFICANT CHANGE UP (ref 6–8.3)
RBC # BLD: 3.64 M/UL — LOW (ref 3.8–5.2)
RBC # FLD: 11.5 % — SIGNIFICANT CHANGE UP (ref 10.3–14.5)
SODIUM SERPL-SCNC: 145 MMOL/L — SIGNIFICANT CHANGE UP (ref 135–145)
WBC # BLD: 4.7 K/UL — SIGNIFICANT CHANGE UP (ref 3.8–10.5)
WBC # FLD AUTO: 4.7 K/UL — SIGNIFICANT CHANGE UP (ref 3.8–10.5)

## 2018-12-21 PROCEDURE — 80053 COMPREHEN METABOLIC PANEL: CPT

## 2018-12-21 PROCEDURE — 99238 HOSP IP/OBS DSCHRG MGMT 30/<: CPT

## 2018-12-21 PROCEDURE — 97110 THERAPEUTIC EXERCISES: CPT

## 2018-12-21 PROCEDURE — 82962 GLUCOSE BLOOD TEST: CPT

## 2018-12-21 PROCEDURE — 97163 PT EVAL HIGH COMPLEX 45 MIN: CPT

## 2018-12-21 PROCEDURE — 97530 THERAPEUTIC ACTIVITIES: CPT

## 2018-12-21 PROCEDURE — 97760 ORTHOTIC MGMT&TRAING 1ST ENC: CPT

## 2018-12-21 PROCEDURE — 97112 NEUROMUSCULAR REEDUCATION: CPT

## 2018-12-21 PROCEDURE — 80048 BASIC METABOLIC PNL TOTAL CA: CPT

## 2018-12-21 PROCEDURE — 97167 OT EVAL HIGH COMPLEX 60 MIN: CPT

## 2018-12-21 PROCEDURE — 97116 GAIT TRAINING THERAPY: CPT

## 2018-12-21 PROCEDURE — 92507 TX SP LANG VOICE COMM INDIV: CPT

## 2018-12-21 PROCEDURE — 99233 SBSQ HOSP IP/OBS HIGH 50: CPT

## 2018-12-21 PROCEDURE — 92523 SPEECH SOUND LANG COMPREHEN: CPT

## 2018-12-21 PROCEDURE — 85027 COMPLETE CBC AUTOMATED: CPT

## 2018-12-21 PROCEDURE — 97535 SELF CARE MNGMENT TRAINING: CPT

## 2018-12-21 PROCEDURE — 92610 EVALUATE SWALLOWING FUNCTION: CPT

## 2018-12-21 RX ORDER — INSULIN GLARGINE 100 [IU]/ML
16 INJECTION, SOLUTION SUBCUTANEOUS
Qty: 1 | Refills: 0
Start: 2018-12-21 | End: 2019-01-19

## 2018-12-21 RX ORDER — CLOPIDOGREL BISULFATE 75 MG/1
1 TABLET, FILM COATED ORAL
Qty: 30 | Refills: 0
Start: 2018-12-21 | End: 2019-01-19

## 2018-12-21 RX ORDER — ASPIRIN/CALCIUM CARB/MAGNESIUM 324 MG
1 TABLET ORAL
Qty: 30 | Refills: 0
Start: 2018-12-21 | End: 2019-01-19

## 2018-12-21 RX ORDER — LISINOPRIL 2.5 MG/1
1 TABLET ORAL
Qty: 30 | Refills: 0 | OUTPATIENT
Start: 2018-12-21 | End: 2019-01-19

## 2018-12-21 RX ORDER — LISINOPRIL 2.5 MG/1
1 TABLET ORAL
Qty: 30 | Refills: 0
Start: 2018-12-21 | End: 2019-01-19

## 2018-12-21 RX ORDER — FLUOXETINE HCL 10 MG
1 CAPSULE ORAL
Qty: 30 | Refills: 0
Start: 2018-12-21 | End: 2019-01-19

## 2018-12-21 RX ORDER — INSULIN GLARGINE 100 [IU]/ML
16 INJECTION, SOLUTION SUBCUTANEOUS AT BEDTIME
Qty: 0 | Refills: 0 | Status: DISCONTINUED | OUTPATIENT
Start: 2018-12-21 | End: 2018-12-21

## 2018-12-21 RX ORDER — EMPAGLIFLOZIN 10 MG/1
1 TABLET, FILM COATED ORAL
Qty: 30 | Refills: 0
Start: 2018-12-21 | End: 2019-01-19

## 2018-12-21 RX ORDER — ATORVASTATIN CALCIUM 80 MG/1
1 TABLET, FILM COATED ORAL
Qty: 30 | Refills: 0
Start: 2018-12-21 | End: 2019-01-19

## 2018-12-21 RX ADMIN — LIDOCAINE 1 PATCH: 4 CREAM TOPICAL at 08:17

## 2018-12-21 RX ADMIN — ENOXAPARIN SODIUM 40 MILLIGRAM(S): 100 INJECTION SUBCUTANEOUS at 05:26

## 2018-12-21 RX ADMIN — Medication 1 TABLET(S): at 11:41

## 2018-12-21 RX ADMIN — ONDANSETRON 4 MILLIGRAM(S): 8 TABLET, FILM COATED ORAL at 11:41

## 2018-12-21 RX ADMIN — Medication 2: at 11:41

## 2018-12-21 RX ADMIN — Medication 1: at 08:17

## 2018-12-21 RX ADMIN — LISINOPRIL 2.5 MILLIGRAM(S): 2.5 TABLET ORAL at 05:26

## 2018-12-21 RX ADMIN — Medication 81 MILLIGRAM(S): at 11:41

## 2018-12-21 RX ADMIN — CLOPIDOGREL BISULFATE 75 MILLIGRAM(S): 75 TABLET, FILM COATED ORAL at 11:41

## 2018-12-21 RX ADMIN — Medication 20 MILLIGRAM(S): at 11:41

## 2018-12-21 NOTE — PROGRESS NOTE ADULT - REASON FOR ADMISSION
s/p right MCA CVA c deficits
s/p right MCA CVA with deficits
s/p right MCA CVA c deficits
s/p right MCA CVA with deficits
s/p right MCA CVA c deficits
s/p right MCA CVA c deficits fu

## 2018-12-21 NOTE — PROGRESS NOTE ADULT - PROBLEM SELECTOR PLAN 4
Had expressed feelings of being overwhelmed/hopelessness. Psychological/Psychiatry following and will continue Prozac per rec's.  Depression.

## 2018-12-21 NOTE — PROGRESS NOTE ADULT - PROBLEM SELECTOR PROBLEM 4
Anxiety
Constipation, unspecified constipation type

## 2018-12-21 NOTE — PROGRESS NOTE ADULT - PROBLEM SELECTOR PROBLEM 2
Hypertension
Essential hypertension
Hypertension
Essential hypertension
Essential hypertension
Hypertension

## 2018-12-21 NOTE — PROGRESS NOTE ADULT - NEUROLOGICAL
Alert & oriented; no sensory, motor or coordination deficits, normal reflexes
detailed exam
Alert & oriented; no sensory, motor or coordination deficits, normal reflexes

## 2018-12-21 NOTE — PROGRESS NOTE ADULT - PROBLEM SELECTOR PROBLEM 3
Diabetes mellitus
Type 2 diabetes mellitus with complication, unspecified whether long term insulin use
Diabetes mellitus
Type 2 diabetes mellitus with complication, unspecified whether long term insulin use
Type 2 diabetes mellitus with complication, unspecified whether long term insulin use
Diabetes mellitus

## 2018-12-21 NOTE — PROGRESS NOTE ADULT - ASSESSMENT
57 y/o female with PMH of prior CVAs, now s/p right MCA CVA with functional deficits.    1. Right MCA CVA 2/2 suspect large vessel disease   i.e. 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 underwent selective cerebral angiography on 11/24/18.  -Continue Aspirin, Plavix, Statin  -Continue Fluoxetine for motor recovery  -Continue close monitoring for neurologic deterioration  -Continue PT/OT/ rehab.    fall and aspiration risk precautions   VTE proph     2. Essential HTN - controlled on current regimen   low sodium diet   bp long term goal 130/80   will need pcp fu in the outpatient setting     3. T2DM - not controlled - HgA1C = 9.2%  needs pcp long term management    ok glucose for now in the acute rehab setting   Cont ISS  Diabetic education    4. Anxiety/depression no si controlled  continue the current regimen   Psych following     5. DVT Prophylaxis: Lovenox       high risk for morbidity, mortality secondary to the above mentioned medical conditions   reviewed with rehab team plan of care   reviewed with patient plan of care

## 2018-12-21 NOTE — PROGRESS NOTE ADULT - PROVIDER SPECIALTY LIST ADULT
Hospitalist
Neurology
Physiatry
Physiatry
Rehab Medicine
Rehab Medicine
Neurology
Neurology
Physiatry
Hospitalist
Neurology
Physiatry
Hospitalist
Hospitalist

## 2018-12-21 NOTE — PROGRESS NOTE ADULT - ATTENDING COMMENTS
Patient seen at bedside. No issues overnight  Neuro exam stable  d/c home today with homecare  follow up with PCP, neurology and with PMR discussed

## 2018-12-21 NOTE — PROGRESS NOTE ADULT - SUBJECTIVE AND OBJECTIVE BOX
CHIEF COMPLAINT: No events overnight, no new deficits. NAD.       HISTORY OF PRESENT ILLNESS  58-year-old right-handed female to [facility] on 11/21/18 with new onset left hemiparesis upon awaking. 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 CT head (11/21/18) showed a chronic right lentiform nucleus lacunar infarct. CTA neck (11/21/18) showed a probable right ICA occlusion with some calcified plaque. CTA head (11/21/18) showed that the right ICA reconstituted perhaps through the ACOM and possibly a large right PCOM. No intracranial occlusion, she was excluded from endovascular thrombectomy. MRI brain subsequently showed R MCA distribution infarct. MRA head and neck showed severe to critical stenosis of proximal  /extracranial right ICA. Conventional angiogram confirmed severe proximal/extracranial right ICA stenosis s/p carotid artery stenting for secondary stroke prevention in this patient with symptomatic right ICA severe stenosis.   Impression:   Cerebral embolism with cerebral infarction. Right MCA distribution stroke - likely etiology being large vessel disease i.e. symptomatic extracranial R ICA severe stenosis leading to artery to artery embolism. Selective cerebral angiography on 11/24/18. Patient enrolled in Stroke AF trial and randomized to a LOOP. ASA/Plavix/Lipitor. Dysphagia: passed, regular diet.  CAROTID DOPPLERS (11/2/7/18): The stent and stented cervical right common and internal carotid arteries are widely patent. There is a flow-limiting stenosis of the right external carotid artery. No hemodynamically significant is present on the left.  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 Decreased, but present flow related enhancement of the more distal right ICA. Previously, no flow related enhancement was identified within the right ICA on CTA from 11/21/2018.  MRA BRAIN: Diminished, but present flow related signal within the skull base and supraclinoid right internal carotid artery. Diminished flow related signal within the right MCA.  MRI Head (11/21/18): Acute right MCA infarct.  A1c was 9.2; TTE: unremarkable. (28 Nov 2018 13:35)      PAST MEDICAL & SURGICAL HISTORY:  Hypertension  CVA (cerebral vascular accident)-        REVIEW OF SYMPTOMS  [X] Constitutional WNL     [X] Cardio WNL            [X] Resp WNL           [X] GI WNL                          [X]  WNL                   [X] Heme WNL                           [X] Skin WNL                 [X] MSK WNL                              [X] Cognitive WNL       VITALS  Vital Signs Last 24 Hrs  T(C): 37.1 (21 Dec 2018 08:04), Max: 37.1 (20 Dec 2018 20:18)  T(F): 98.8 (21 Dec 2018 08:04), Max: 98.8 (21 Dec 2018 08:04)  HR: 90 (21 Dec 2018 08:04) (73 - 90)  BP: 107/71 (21 Dec 2018 08:04) (107/71 - 151/74)  BP(mean): --  RR: 15 (21 Dec 2018 08:04) (15 - 16)  SpO2: 99% (21 Dec 2018 08:04) (98% - 99%)      PHYSICAL EXAM  Constitutional - NAD, Comfortable  HEENT - NCAT, EOMI  Neck - Supple, No limited ROM  Chest - CTA bilaterally, No wheeze, No rhonchi, No crackles  Cardiovascular - RRR, S1S2, No murmurs  Abdomen - BS+, Soft, NTND  Extremities - No C/C/E, No calf tenderness   Skin-no rash  Wounds-ilr CDI      Neurologic Exam - hemiparesis left/improved c therapy.                     Psychiatric - Mood stable, Affect WNL         FUNCTIONAL PROGRESS  Gait - 150ft c Cane CG A/ 12steps min A  ADLs - supervision/min  Transfers - CG  Functional transfer - min/CG A    RECENT LABS                        10.4   4.7   )-----------( 217      ( 21 Dec 2018 05:30 )             31.7     12-21    145  |  110<H>  |  26<H>  ----------------------------<  160<H>  4.6   |  27  |  0.85    Ca    10.1      21 Dec 2018 05:30    TPro  7.0  /  Alb  3.1<L>  /  TBili  0.4  /  DBili  x   /  AST  23  /  ALT  46<H>  /  AlkPhos  78  12-21      LIVER FUNCTIONS - ( 21 Dec 2018 05:30 )  Alb: 3.1 g/dL / Pro: 7.0 g/dL / ALK PHOS: 78 U/L / ALT: 46 U/L DA / AST: 23 U/L / GGT: x             Direct LDL: 106 mg/dL (11-24-18 @ 07:18)    Hemoglobin A1C, Whole Blood: 9.2 % (11-22-18 @ 08:24)              RADIOLOGY/OTHER RESULTS      CURRENT MEDICATIONS  MEDICATIONS  (STANDING):  aspirin  chewable 81 milliGRAM(s) Oral daily  atorvastatin 80 milliGRAM(s) Oral at bedtime  clopidogrel Tablet 75 milliGRAM(s) Oral daily  dextrose 5%. 1000 milliLiter(s) (50 mL/Hr) IV Continuous <Continuous>  dextrose 50% Injectable 12.5 Gram(s) IV Push once  dextrose 50% Injectable 25 Gram(s) IV Push once  dextrose 50% Injectable 25 Gram(s) IV Push once  docusate sodium 100 milliGRAM(s) Oral three times a day  enoxaparin Injectable 40 milliGRAM(s) SubCutaneous every 24 hours  FLUoxetine 20 milliGRAM(s) Oral daily  insulin glargine Injectable (LANTUS) 16 Unit(s) SubCutaneous at bedtime  insulin lispro (HumaLOG) corrective regimen sliding scale   SubCutaneous three times a day before meals  insulin lispro (HumaLOG) corrective regimen sliding scale   SubCutaneous at bedtime  lidocaine   Patch 1 Patch Transdermal <User Schedule>  lisinopril 2.5 milliGRAM(s) Oral daily  multivitamin 1 Tablet(s) Oral daily  senna 2 Tablet(s) Oral at bedtime    MEDICATIONS  (PRN):  acetaminophen   Tablet .. 650 milliGRAM(s) Oral every 6 hours PRN Moderate Pain (4 - 6)  dextrose 40% Gel 15 Gram(s) Oral once PRN Blood Glucose LESS THAN 70 milliGRAM(s)/deciliter  glucagon  Injectable 1 milliGRAM(s) IntraMuscular once PRN Glucose LESS THAN 70 milligrams/deciliter  ondansetron    Tablet 4 milliGRAM(s) Oral every 6 hours PRN Nausea and/or Vomiting  polyethylene glycol 3350 17 Gram(s) Oral daily PRN Constipation      ASSESSMENT & PLAN      GI/Bowel Management - colace, Senna   Management - Toilet Q2  Skin - Turn Q2  Pain - Tylenol PRN  DVT PPX - Lovenox  Diet - diabetic, reg c thins    Completed comprehensive acute rehab program consisting of 3hrs/day of OT/PT and SLP.

## 2018-12-21 NOTE — PROGRESS NOTE ADULT - PROBLEM SELECTOR PROBLEM 1
CVA (cerebral vascular accident)
Cerebrovascular accident (CVA), unspecified mechanism
CVA (cerebral vascular accident)
Cerebrovascular accident (CVA), unspecified mechanism
Cerebrovascular accident (CVA), unspecified mechanism
CVA (cerebral vascular accident)

## 2018-12-21 NOTE — PROGRESS NOTE ADULT - SUBJECTIVE AND OBJECTIVE BOX
seen in the acute rehab setting     no chest pain no sob     no fc     Vital Signs Last 24 Hrs  T(C): 37.1 (21 Dec 2018 08:04), Max: 37.1 (20 Dec 2018 20:18)  T(F): 98.8 (21 Dec 2018 08:04), Max: 98.8 (21 Dec 2018 08:04)  HR: 90 (21 Dec 2018 08:04) (73 - 90)  BP: 107/71 (21 Dec 2018 08:04) (107/71 - 151/74)  BP(mean): --  RR: 15 (21 Dec 2018 08:04) (15 - 16)  SpO2: 99% (21 Dec 2018 08:04) (98% - 99%)    MEDICATIONS  (STANDING):  aspirin  chewable 81 milliGRAM(s) Oral daily  atorvastatin 80 milliGRAM(s) Oral at bedtime  clopidogrel Tablet 75 milliGRAM(s) Oral daily  dextrose 5%. 1000 milliLiter(s) (50 mL/Hr) IV Continuous <Continuous>  dextrose 50% Injectable 12.5 Gram(s) IV Push once  dextrose 50% Injectable 25 Gram(s) IV Push once  dextrose 50% Injectable 25 Gram(s) IV Push once  docusate sodium 100 milliGRAM(s) Oral three times a day  enoxaparin Injectable 40 milliGRAM(s) SubCutaneous every 24 hours  FLUoxetine 20 milliGRAM(s) Oral daily  insulin glargine Injectable (LANTUS) 16 Unit(s) SubCutaneous at bedtime  insulin lispro (HumaLOG) corrective regimen sliding scale   SubCutaneous three times a day before meals  insulin lispro (HumaLOG) corrective regimen sliding scale   SubCutaneous at bedtime  lidocaine   Patch 1 Patch Transdermal <User Schedule>  lisinopril 2.5 milliGRAM(s) Oral daily  multivitamin 1 Tablet(s) Oral daily  senna 2 Tablet(s) Oral at bedtime    MEDICATIONS  (PRN):  acetaminophen   Tablet .. 650 milliGRAM(s) Oral every 6 hours PRN Moderate Pain (4 - 6)  dextrose 40% Gel 15 Gram(s) Oral once PRN Blood Glucose LESS THAN 70 milliGRAM(s)/deciliter  glucagon  Injectable 1 milliGRAM(s) IntraMuscular once PRN Glucose LESS THAN 70 milligrams/deciliter  ondansetron    Tablet 4 milliGRAM(s) Oral every 6 hours PRN Nausea and/or Vomiting  polyethylene glycol 3350 17 Gram(s) Oral daily PRN Constipation    CBC Full  -  ( 21 Dec 2018 05:30 )  WBC Count : 4.7 K/uL  Hemoglobin : 10.4 g/dL  Hematocrit : 31.7 %  Platelet Count - Automated : 217 K/uL  Mean Cell Volume : 87.2 fl  Mean Cell Hemoglobin : 28.7 pg  Mean Cell Hemoglobin Concentration : 32.9 gm/dL  Auto Neutrophil # : x  Auto Lymphocyte # : x  Auto Monocyte # : x  Auto Eosinophil # : x  Auto Basophil # : x  Auto Neutrophil % : x  Auto Lymphocyte % : x  Auto Monocyte % : x  Auto Eosinophil % : x  Auto Basophil % : x    12-21    145  |  110<H>  |  26<H>  ----------------------------<  160<H>  4.6   |  27  |  0.85    Ca    10.1      21 Dec 2018 05:30    TPro  7.0  /  Alb  3.1<L>  /  TBili  0.4  /  DBili  x   /  AST  23  /  ALT  46<H>  /  AlkPhos  78  12-21

## 2018-12-24 ENCOUNTER — INBOUND DOCUMENT (OUTPATIENT)
Age: 58
End: 2018-12-24

## 2018-12-26 ENCOUNTER — APPOINTMENT (OUTPATIENT)
Dept: INTERNAL MEDICINE | Facility: CLINIC | Age: 58
End: 2018-12-26
Payer: COMMERCIAL

## 2018-12-26 VITALS
WEIGHT: 177 LBS | TEMPERATURE: 98.2 F | DIASTOLIC BLOOD PRESSURE: 60 MMHG | BODY MASS INDEX: 26.83 KG/M2 | HEART RATE: 99 BPM | SYSTOLIC BLOOD PRESSURE: 120 MMHG | OXYGEN SATURATION: 99 % | HEIGHT: 68 IN

## 2018-12-26 DIAGNOSIS — Z87.891 PERSONAL HISTORY OF NICOTINE DEPENDENCE: ICD-10-CM

## 2018-12-26 DIAGNOSIS — R05 COUGH: ICD-10-CM

## 2018-12-26 DIAGNOSIS — Z83.3 FAMILY HISTORY OF DIABETES MELLITUS: ICD-10-CM

## 2018-12-26 DIAGNOSIS — Z86.39 PERSONAL HISTORY OF OTHER ENDOCRINE, NUTRITIONAL AND METABOLIC DISEASE: ICD-10-CM

## 2018-12-26 PROCEDURE — 99205 OFFICE O/P NEW HI 60 MIN: CPT

## 2019-01-02 PROBLEM — R05 COUGH: Status: ACTIVE | Noted: 2019-01-02

## 2019-01-02 PROBLEM — Z83.3 FAMILY HISTORY OF DIABETES MELLITUS: Status: ACTIVE | Noted: 2019-01-02

## 2019-01-02 PROBLEM — Z87.891 FORMER SMOKER: Status: ACTIVE | Noted: 2019-01-02

## 2019-01-02 PROBLEM — Z86.39 HISTORY OF DIABETES MELLITUS: Status: RESOLVED | Noted: 2019-01-02 | Resolved: 2019-01-02

## 2019-01-08 NOTE — DIETITIAN INITIAL EVALUATION ADULT. - OTHER INFO
Plan OD 2nd; SYM Goal: -0.50 to -0.75 VS CV Goal: -2.00 VS CV/B+ Goal : JENN OD. 58yr Old Female - Dx: CVA - Initial Assessment - Consistent Carbohydrate Diet w/ Thin Liquids,(Trial Glucerna 8oz Daily) Shellfish Food Allergy, Tolerates Diet, No Chewing/Swallowing Complications (Per Pt), Consumes 50-75% of Meals, Stage 1 Pressure Ulcers Sacrum, No Edema, No Recent V/D/C

## 2019-01-09 ENCOUNTER — APPOINTMENT (OUTPATIENT)
Dept: NEUROLOGY | Facility: CLINIC | Age: 59
End: 2019-01-09
Payer: COMMERCIAL

## 2019-01-15 ENCOUNTER — RESULT CHARGE (OUTPATIENT)
Age: 59
End: 2019-01-15

## 2019-01-15 NOTE — ED PROVIDER NOTE - CRITICAL CARE PROVIDED
never intubated documentation/consult w/ pt's family directly relating to pts condition/additional history taking/direct patient care (not related to procedure)/interpretation of diagnostic studies/consultation with other physicians

## 2019-01-16 ENCOUNTER — APPOINTMENT (OUTPATIENT)
Dept: NEUROLOGY | Facility: CLINIC | Age: 59
End: 2019-01-16
Payer: COMMERCIAL

## 2019-01-16 ENCOUNTER — RX RENEWAL (OUTPATIENT)
Age: 59
End: 2019-01-16

## 2019-01-16 VITALS
HEIGHT: 68 IN | HEART RATE: 80 BPM | DIASTOLIC BLOOD PRESSURE: 78 MMHG | BODY MASS INDEX: 25.46 KG/M2 | WEIGHT: 168 LBS | SYSTOLIC BLOOD PRESSURE: 129 MMHG

## 2019-01-16 PROCEDURE — 99214 OFFICE O/P EST MOD 30 MIN: CPT

## 2019-01-16 NOTE — REASON FOR VISIT
[Post Hospitalization] : a post hospitalization visit [Family Member] : family member [FreeTextEntry1] : stroke and ICA stent placement

## 2019-01-16 NOTE — PHYSICAL EXAM
[General Appearance - Alert] : alert [General Appearance - In No Acute Distress] : in no acute distress [Oriented To Time, Place, And Person] : oriented to person, place, and time [Impaired Insight] : insight and judgment were intact [Person] : oriented to person [Place] : oriented to place [Time] : oriented to time [Concentration Intact] : normal concentrating ability [Visual Intact] : visual attention was ~T not ~L decreased [Naming Objects] : no difficulty naming common objects [Repeating Phrases] : no difficulty repeating a phrase [Writing A Sentence] : no difficulty writing a sentence [Fluency] : fluency intact [Comprehension] : comprehension intact [Cranial Nerves Optic (II)] : visual acuity intact bilaterally,  visual fields full to confrontation, pupils equal round and reactive to light [Cranial Nerves Oculomotor (III)] : extraocular motion intact [Cranial Nerves Trigeminal (V)] : facial sensation intact symmetrically [Cranial Nerves Vestibulocochlear (VIII)] : hearing was intact bilaterally [Cranial Nerves Glossopharyngeal (IX)] : tongue and palate midline [Cranial Nerves Hypoglossal (XII)] : there was no tongue deviation with protrusion [Cranial Nerves Facial Peripheral - Left Only] : peripheral 7th nerve weakness [Mouth Droop On The Left] : left-sided mouth droop [CNS Accessory - Diminished Shoulder Elev. - Left Trapezius] : weakness of shoulder elevation was present on the left [CNS Accessory - Sternocleidomastoid Weakness Left Only] : sternocleidomastoid weakness was present on the left [No Muscle Atrophy] : normal bulk in all four extremities [3] : shoulder extension 3/5 [1] : forearm supination 1/5 [Hand Weakness Left] : the hand  was weak [0] : fingers flexion 0/5 [5] : ankle plantar flexion 5/5 [4] : hip extension 4/5 [2] : knee extension 2/5 [Tactile Decrease Shoulders Left] : diminished left shoulder [Tactile Decrease Entire Left Side Of Face] : diminished left side of the face [Tactile Decrease Hand] : diminished left hand [Tactile Decrease Neck Left Side] : diminished left side of the neck [Tactile Decrease Entire Leg Left] : diminished left leg [Abnormal Walk] : normal gait [Balance] : balance was intact [Sclera] : the sclera and conjunctiva were normal [PERRL With Normal Accommodation] : pupils were equal in size, round, reactive to light, with normal accommodation [Outer Ear] : the ears and nose were normal in appearance [Oropharynx] : the oropharynx was normal [Neck Appearance] : the appearance of the neck was normal [] : no respiratory distress [Apical Impulse] : the apical impulse was normal [Edema] : there was no peripheral edema [Bowel Sounds] : normal bowel sounds [No CVA Tenderness] : no ~M costovertebral angle tenderness [Gait - Hemiparetic, Left Side] : hemiparetic on the left [Limping On The Left] : limping on the left [Gait - Steppage Left] : a steppage gait was noted on the left [Skin Color & Pigmentation] : normal skin color and pigmentation [Hemiparesis Of Left Side] : hemiparesis was present on the left [Paresis Pronator Drift Right-Sided] : no pronator drift on the right [Motor Strength Upper Extremities Right] : strength was normal in the right upper extremity [Motor Strength Lower Extremities Right] : strength was normal in the right lower extremity [Hand Weakness Right] : normal hand  [Position Sensation Decrease Arm/Hand At Level Of Fingers] : not impaired at the fingers in the right arm [Past-pointing] : there was no past-pointing [Tremor] : no tremor present [Dysdiadochokinesia On The Right] : not present on the ride side [Coordination - Dysmetria Impaired Finger-to-Nose Right Only] : not present on the ride side [Coordination Dysmetria Impaired Heel-Shin Using Right Heel] : not present on the ride side [Plantar Reflex Right Only] : normal on the right [Plantar Reflex Left Only] : normal on the left [FreeTextEntry6] : unable to test pronator drift on left due to hemiparesis  [FreeTextEntry7] : swaying with romberg [FreeTextEntry8] : unable to test on left due to decreased mobility in left hand  [FreeTextEntry1] : mild hypertonicity noted LUE

## 2019-01-16 NOTE — DISCUSSION/SUMMARY
[Antithrombotic therapy with ___] : antithrombotic therapy with  [unfilled] [Intensive Blood Pressure Control] : intensive blood pressure control [Lipid Lowering Therapy] : lipid lowering therapy [Patient encouraged to discuss with Primary MD] : I encouraged the patient to discuss these important issues with ~his/her~ primary care doctor [Goals and Counseling] : I have reviewed the goals of stroke risk factor modification. I counseled the patient on measures to reduce stroke risk, including the importance of medication compliance, risk factor control, exercise, healthy diet and avoidance of smoking. I reviewed stroke warning signs and symptoms and appropriate actions to take if such occur. [FreeTextEntry1] : Impression: \par Cerebral embolism with cerebral infarction. Right MCA distribution stroke - likely etiology being large vessel disease i.e. symptomatic extracranial R ICA severe stenosis leading to artery to artery embolism \par \par - Continue ASA 81 mg once daily and Plavix 75 mg once daily for 6 months followed by ASA 81 mg once daily for aggressive secondary stroke prevention and carotid stent placement. P2Y12 on 11/27 was 50. Plan discussed with Juju PALUMBO (Dr. Carpio's NP)\par - Continue with Fluoxetine 20 mg daily x 3 months for motor recovery as per FLAME trial. Patient educated that Fluoxetine should not be abruptly discontinued due to risk of  rebound depression and side effects. The medication should be titrated to 20 mg PO every other day x 1 week before completely discontinuing. If patient is to experience any depression or side effects after discontinuing they have been instructed to got to ED immediately. \par - Continue to monitor BP at home and notify PCP/Cardiology for readings >140/90. Educated on medication compliance and BP monitoring to prevent secondary stroke and systemic problems \par - Continue Physical therapy and Occupational therapy as directed. I advised patient that it is important to continue the exercises learned at PT and repeat them daily at home to improve potential outcome \par - Continue statin therapy for secondary stroke prevention \par - Follow up with PCP for statin management and primary care needs such as regular blood work including monitoring of HbA1c (9.2) and cholesterol profile, to modify vascular risk factors \par - Follow up with cardiology for evaluation and management of ILR  to screen for occult cardiac arrhythmias like atrial fibrillation which could be the cardiac source of embolism. Possibility of starting therapeutic anticoagulation for secondary stroke prevention was also discussed with patient in detail, if they were to be diagnosed with A. Fib in the future. \par - Follow up with endocrine/PCP for management of DM \par - Follow up with Dr. Carpio for carotid stent follow up appointment \par - Follow up imaging to be obtained before discontinuing dual antiplatelet therapy (MRA vs angio at 6 months) \par - Follow up in 4-6 week with first available Neurovascular attending\par - Carotid Doppler's and TCD to be obtain with next visit \par - Educated on stroke/TIA signs/bleeding signs and symptoms and to call 911 if experiencing any of these symptoms\par - Educated on fall prevention and safety \par \par All concerns and questions were addressed.

## 2019-01-16 NOTE — HISTORY OF PRESENT ILLNESS
[FreeTextEntry1] : Ms. 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 S/p ADRY stent placement \par \par 58-year-old right-handed white lady first evaluated at [facility] on 11/21/18 with left hemiparesis. 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 Perhaps her previous strokes were due to small vessel disease. On 11/21/18, she awoke with a dense left hemiparesis. CT head (11/21/18) showed a chronic right lentiform nucleus lacunar infarct. CTA neck (11/21/18) showed a probable right ICA occlusion with some calcified plaque. CTA head (11/21/18) showed that the right ICA reconstituted perhaps through the ACOM and possibly a large right PCOM. CTP (11/21/18) showed a core infarct of 27 cc and a large hypoperfused area of 125 cc in the right MCA distribution. Given that there was no intracranial occlusion, she was excluded from endovascular thrombectomy. MRI brain subsequently showed R MCA distribution infarct. MRA head and neck to my eye showed severe to critical stenosis of proximal/extracranial right ICA. Conventional angiogram confirmed severe proximal/extracranial right ICA stenosis s/p carotid artery stenting (11/26) for secondary stroke prevention in this patient with symptomatic right ICA severe stenosis. \par \par \par WORKUP: \par CAROTID DOPPLERS (11/27/18): The stent and stented cervical right common and internal \par carotid arteries are widely patent. There is a flow-limiting stenosis of the right external carotid artery.No hemodynamically significant is present on the left.\par \par MRA T1 Fat Sat (11/23/18) Findings consistent with arterial dissection involving the origin of the right internal carotid artery. Decreased, but present flow related enhancement of the more distal right \par ICA. Previously, no flow related enhancement was identified within the right ICA on CTA from 11/21/2018.\par \par MRA BRAIN: Diminished, but present flow related signal within the skull base and supraclinoid right internal carotid artery. iminished flow related signal within the right MCA.\par \par MRI Head (11/21/18): Acute right MCA infarct.\par \par Head CT, perfussion, CTA Head/Neck (11/21/18) \par Brain CT: No identifiable or infarct. Neck \par CTA: The abrupt cut off of the proximal right internal carotid artery with associated scattered areas of calcified plaque. This may be secondary to occlusive thrombus or arterial dissection. A mild narrowing of the takeoff of the right vertebral artery.Normal left internal carotid and left vertebral arteries.\par \par Brain CTA: Markedly diminished flow within the petrous and cavernous segments of the right internal carotid artery with reconstitution of the distal right internal carotid artery likely through collateralization and/or cross filling.. Mildly decreased flow within the right middle cerebral artery as compared with the left without focal stenosis.\par \par Perfusion maps: Mismatch area are of 102 mL with evidence of core infarct and moderately large penumbra. There is a decreased cerebral blood flow and decreased cerebral blood volume in the right frontoparietal region with corresponding elevation of mean transit time.\par \par Today she denies any new or worsening neurological signs or symptoms of stroke, TIA, and/or bleeding. She is reportedly compliant with her medication regimen and denies any adverse reactions. She remains with left sided hemiparesis and uses a cane to ambulate. She denies any history of DVT, PE, recurrent miscarriages. She has had two strokes in the past that left her with left sided facial palsy and denies use of antiplatelet/anticoagulation at time of last event.

## 2019-01-16 NOTE — REVIEW OF SYSTEMS
[Facial Weakness] : facial weakness [Arm Weakness] : arm weakness [Hand Weakness] :  hand weakness [Leg Weakness] : leg weakness [Numbness] : numbness [Tingling] : tingling [Difficulty Walking] : difficulty walking [As Noted in HPI] : as noted in HPI [Feeling Poorly] : not feeling poorly [Feeling Tired] : not feeling tired [Memory Lapses or Loss] : no memory loss [Decr. Concentrating Ability] : no decrease in concentrating ability [Dizziness] : no dizziness [Fainting] : no fainting [Anxiety] : no anxiety [Depression] : no depression [Eyesight Problems] : no eyesight problems [Loss Of Hearing] : no hearing loss [Heart Rate Is Slow] : the heart rate was not slow [Heart Rate Is Fast] : the heart rate was not fast [Shortness Of Breath] : no shortness of breath [Wheezing] : no wheezing [Abdominal Pain] : no abdominal pain [Vomiting] : no vomiting [Dysuria] : no dysuria [Incontinence] : no incontinence [Skin Lesions] : no skin lesions [Skin Wound] : no skin wound [Proptosis] : no proptosis [Hot Flashes] : no hot flashes [Easy Bleeding] : no tendency for easy bleeding [Easy Bruising] : no tendency for easy bruising [de-identified] : LUE>LLE

## 2019-01-24 ENCOUNTER — APPOINTMENT (OUTPATIENT)
Dept: INTERNAL MEDICINE | Facility: CLINIC | Age: 59
End: 2019-01-24
Payer: COMMERCIAL

## 2019-01-24 VITALS
WEIGHT: 170 LBS | HEIGHT: 68 IN | SYSTOLIC BLOOD PRESSURE: 124 MMHG | TEMPERATURE: 98.1 F | BODY MASS INDEX: 25.76 KG/M2 | OXYGEN SATURATION: 98 % | DIASTOLIC BLOOD PRESSURE: 70 MMHG | HEART RATE: 89 BPM

## 2019-01-24 PROCEDURE — 99214 OFFICE O/P EST MOD 30 MIN: CPT

## 2019-01-24 RX ORDER — CARBAMAZEPINE 100 MG/1
100 TABLET, EXTENDED RELEASE ORAL
Qty: 60 | Refills: 0 | Status: DISCONTINUED | COMMUNITY
Start: 2018-10-16

## 2019-01-24 RX ORDER — GLIPIZIDE 5 MG/1
5 TABLET ORAL
Qty: 60 | Refills: 0 | Status: DISCONTINUED | COMMUNITY
Start: 2018-10-15

## 2019-01-24 RX ORDER — LISINOPRIL 2.5 MG/1
2.5 TABLET ORAL DAILY
Qty: 90 | Refills: 1 | Status: DISCONTINUED | COMMUNITY
Start: 2019-01-16 | End: 2019-01-24

## 2019-01-24 RX ORDER — LANCETS 33 GAUGE
EACH MISCELLANEOUS
Qty: 100 | Refills: 0 | Status: DISCONTINUED | COMMUNITY
Start: 2018-12-21

## 2019-01-24 RX ORDER — ERGOCALCIFEROL 1.25 MG/1
1.25 MG CAPSULE, LIQUID FILLED ORAL
Qty: 4 | Refills: 0 | Status: DISCONTINUED | COMMUNITY
Start: 2018-10-15

## 2019-01-24 RX ORDER — FLUVASTATIN 20 MG/1
20 CAPSULE ORAL
Qty: 30 | Refills: 0 | Status: DISCONTINUED | COMMUNITY
Start: 2018-10-15

## 2019-01-24 NOTE — ASSESSMENT
[FreeTextEntry1] : discussed w pt in detail \par \par reviewed her DM management in detail. her diet has improved, monitoring FSG carefully and noted significant improvement to 100s fasting on basal insulin 16 u as well as Jardiance. cont current regimen and diet. will plan for repeat labs at next visit in 6 weeks. pt reports she will be seeing her cardiologist shortly and he may have labs done then. \par \par cough resolved after discontinuation of ACE-I, cont losartan for now, BP well controlled \par \par reviewed neurology consult, cont current rx, cont SRI , clopidogrel, ASA , statin \par cont OT and PT, fall precautions \par \par completed form for handicap parking as requested \par \par RTO 6 weeks for close f/u and will repeat labs at that time, or call earlier prn if any new concerns

## 2019-01-24 NOTE — PHYSICAL EXAM
[No Acute Distress] : no acute distress [Well-Appearing] : well-appearing [Normal Voice/Communication] : normal voice/communication [No JVD] : no jugular venous distention [Supple] : supple [No Lymphadenopathy] : no lymphadenopathy [No Respiratory Distress] : no respiratory distress  [Clear to Auscultation] : lungs were clear to auscultation bilaterally [No Accessory Muscle Use] : no accessory muscle use [Normal Rate] : normal rate  [Regular Rhythm] : with a regular rhythm [Normal S1, S2] : normal S1 and S2 [No Murmur] : no murmur heard [No Edema] : there was no peripheral edema [Normal Affect] : the affect was normal [Normal Insight/Judgement] : insight and judgment were intact [de-identified] : L sided hemiparesis profound, overall unchange compared to prior exam. unsteady gait, confined to wheelchair

## 2019-01-24 NOTE — REVIEW OF SYSTEMS
[Muscle Weakness] : muscle weakness [Unsteady Walking] : ataxia [Negative] : Heme/Lymph [Muscle Pain] : no muscle pain [Back Pain] : no back pain [Headache] : no headache [Fainting] : no fainting [Confusion] : no confusion

## 2019-01-24 NOTE — HISTORY OF PRESENT ILLNESS
[de-identified] : presents for f/u visit to review her progress after suffering CVA in R MCA distribution on 11/21/18 and resulting in L sided hemiparesis. she is following w Dr Libman neurology and saw Ms Daniela PALUMBO recently in followup. she is continuing home PT and OT which she feels is helping.  she has hx of 2 prior CVAs possibly resulting in some facial paralysis in the past. continues on ASA, clopidogrel as prescribed in  addition to high dose statin and SSRI which was recommended for muscular healing based on studies. \par she is s/p R ICA stent placement after her CVA. \par \par HTN well controlled on current regimen. the cough she had previously described is now resolved after stopping lisinopril. she reports she is seeing her cardiologist Dr Wheeler and he has started losartan 25 mg daily instead. she will be scheduled for stress testing in the coming weeks. \par type II DM previously uncontrolled w Hgba1c 9.2% 2 months ago - however after our discussion last visit she has modified diet carefully and monitoring FSG regulary which are greatly improved to 100-120s fasting. she is on Basglar 16 u qhs.

## 2019-02-05 ENCOUNTER — APPOINTMENT (OUTPATIENT)
Dept: ENDOCRINOLOGY | Facility: CLINIC | Age: 59
End: 2019-02-05
Payer: COMMERCIAL

## 2019-02-05 VITALS — WEIGHT: 168 LBS | BODY MASS INDEX: 25.54 KG/M2

## 2019-02-05 LAB — GLUCOSE BLDC GLUCOMTR-MCNC: 89

## 2019-02-05 PROCEDURE — 82962 GLUCOSE BLOOD TEST: CPT

## 2019-02-05 PROCEDURE — 95251 CONT GLUC MNTR ANALYSIS I&R: CPT

## 2019-02-05 PROCEDURE — G0108 DIAB MANAGE TRN  PER INDIV: CPT

## 2019-02-06 ENCOUNTER — CLINICAL ADVICE (OUTPATIENT)
Age: 59
End: 2019-02-06

## 2019-02-06 ENCOUNTER — OTHER (OUTPATIENT)
Age: 59
End: 2019-02-06

## 2019-02-06 RX ORDER — EMPAGLIFLOZIN 10 MG/1
10 TABLET, FILM COATED ORAL
Qty: 90 | Refills: 1 | Status: DISCONTINUED | COMMUNITY
Start: 2019-01-16 | End: 2019-02-06

## 2019-02-08 ENCOUNTER — CLINICAL ADVICE (OUTPATIENT)
Age: 59
End: 2019-02-08

## 2019-02-11 ENCOUNTER — OTHER (OUTPATIENT)
Age: 59
End: 2019-02-11

## 2019-02-13 ENCOUNTER — CLINICAL ADVICE (OUTPATIENT)
Age: 59
End: 2019-02-13

## 2019-02-18 ENCOUNTER — RX RENEWAL (OUTPATIENT)
Age: 59
End: 2019-02-18

## 2019-02-19 ENCOUNTER — MEDICATION RENEWAL (OUTPATIENT)
Age: 59
End: 2019-02-19

## 2019-02-19 RX ORDER — PEN NEEDLE, DIABETIC 29 G X1/2"
32G X 4 MM NEEDLE, DISPOSABLE MISCELLANEOUS
Qty: 1 | Refills: 1 | Status: ACTIVE | COMMUNITY
Start: 2018-12-21 | End: 1900-01-01

## 2019-02-20 ENCOUNTER — CLINICAL ADVICE (OUTPATIENT)
Age: 59
End: 2019-02-20

## 2019-03-06 ENCOUNTER — LABORATORY RESULT (OUTPATIENT)
Age: 59
End: 2019-03-06

## 2019-03-06 ENCOUNTER — APPOINTMENT (OUTPATIENT)
Dept: INTERNAL MEDICINE | Facility: CLINIC | Age: 59
End: 2019-03-06
Payer: COMMERCIAL

## 2019-03-06 VITALS
OXYGEN SATURATION: 99 % | HEART RATE: 105 BPM | TEMPERATURE: 98.2 F | SYSTOLIC BLOOD PRESSURE: 120 MMHG | DIASTOLIC BLOOD PRESSURE: 70 MMHG

## 2019-03-06 DIAGNOSIS — Z86.73 PERSONAL HISTORY OF TRANSIENT ISCHEMIC ATTACK (TIA), AND CEREBRAL INFARCTION W/OUT RESIDUAL DEFICITS: ICD-10-CM

## 2019-03-06 PROCEDURE — 36415 COLL VENOUS BLD VENIPUNCTURE: CPT

## 2019-03-06 PROCEDURE — 99214 OFFICE O/P EST MOD 30 MIN: CPT | Mod: 25

## 2019-03-06 NOTE — HISTORY OF PRESENT ILLNESS
[Family Member] : family member [de-identified] : presents for f/u visit to review management of type II DM and care after CVA in R MCA distribution in 11/18, w residual L hemiparesis. she is requesting a L knee brace to prevent hyperextension of L knee due to hemiparesis and instability. \par she is s/p R ICA stent placement after her CVA. \par \par HTN well controlled on current rx, following w cardiologist Dr Wheeler \par type II DM previously uncontrolled w Hgba1c 9.2% 3-4 months ago, due for repeat labs. FSG fasting remains well controlled in low 100 range mostly. she is on Basaglar 16 u qhs. she is managing w endocrine dept and had glucose monitoring for period of time w Koffi sensor. she is supplementing Humalog 2-6 u before meals based on her glucose levels and feels she is doing well

## 2019-03-06 NOTE — ASSESSMENT
[FreeTextEntry1] : discussed w pt\par \par reviewed current rx \par glucose control is in acceptable range, cont basal insulin and humalog qac as needed. endocrine consult scheduled in few weeks. \par check labs as below including HgbA1c. will recheck Ca and PTH due to mild hypercalcemia noted on recent labs. \par \par f/u w cardiologist as scheduled \par \par f/u w neurology as scheduled, cont current rx , clopidogrel, ASA , statin. she reports she will be stopping the SSRI shortly as per instructions \par cont OT and PT, fall precautions \par \par RTO 3 months for f/u or earlier prn if any new concerns

## 2019-03-06 NOTE — PHYSICAL EXAM
[No Acute Distress] : no acute distress [Well-Appearing] : well-appearing [Normal Voice/Communication] : normal voice/communication [No JVD] : no jugular venous distention [Supple] : supple [No Lymphadenopathy] : no lymphadenopathy [No Respiratory Distress] : no respiratory distress  [Clear to Auscultation] : lungs were clear to auscultation bilaterally [No Accessory Muscle Use] : no accessory muscle use [Normal Rate] : normal rate  [Regular Rhythm] : with a regular rhythm [Normal S1, S2] : normal S1 and S2 [No Murmur] : no murmur heard [No Edema] : there was no peripheral edema [Normal Affect] : the affect was normal [Normal Insight/Judgement] : insight and judgment were intact [Alert and Oriented x3] : oriented to person, place, and time [Normal Mood] : the mood was normal [de-identified] : L sided hemiparesis profound, overall unchanged compared to prior exam. unsteady gait, confined to wheelchair

## 2019-03-07 ENCOUNTER — APPOINTMENT (OUTPATIENT)
Dept: NEUROLOGY | Facility: CLINIC | Age: 59
End: 2019-03-07

## 2019-03-14 ENCOUNTER — CLINICAL ADVICE (OUTPATIENT)
Age: 59
End: 2019-03-14

## 2019-03-26 ENCOUNTER — APPOINTMENT (OUTPATIENT)
Dept: NEUROLOGY | Facility: CLINIC | Age: 59
End: 2019-03-26
Payer: COMMERCIAL

## 2019-03-26 VITALS
DIASTOLIC BLOOD PRESSURE: 78 MMHG | SYSTOLIC BLOOD PRESSURE: 134 MMHG | WEIGHT: 170 LBS | BODY MASS INDEX: 25.76 KG/M2 | HEART RATE: 101 BPM | HEIGHT: 68 IN

## 2019-03-26 DIAGNOSIS — Z78.9 OTHER SPECIFIED HEALTH STATUS: ICD-10-CM

## 2019-03-26 DIAGNOSIS — Z82.49 FAMILY HISTORY OF ISCHEMIC HEART DISEASE AND OTHER DISEASES OF THE CIRCULATORY SYSTEM: ICD-10-CM

## 2019-03-26 DIAGNOSIS — I65.21 OCCLUSION AND STENOSIS OF RIGHT CAROTID ARTERY: ICD-10-CM

## 2019-03-26 DIAGNOSIS — Z95.828 OTHER SPECIFIED POSTPROCEDURAL STATES: ICD-10-CM

## 2019-03-26 DIAGNOSIS — Z98.890 OTHER SPECIFIED POSTPROCEDURAL STATES: ICD-10-CM

## 2019-03-26 PROCEDURE — 99215 OFFICE O/P EST HI 40 MIN: CPT

## 2019-03-26 RX ORDER — FLUOXETINE HYDROCHLORIDE 20 MG/1
20 CAPSULE ORAL
Qty: 30 | Refills: 3 | Status: DISCONTINUED | COMMUNITY
Start: 2018-12-21 | End: 2019-03-26

## 2019-03-29 NOTE — ASSESSMENT
[FreeTextEntry1] : 57 Y/O R handed woman with vascular risk factors of age, HTN, DM II, HPLD and stroke in 2017 - imbalance, L facial droop and dysarthria with residual left facial droop is seen in the vascular neurology office for the evaluation and management of stroke leading to hospital admission in 11/18. On 11/21/18, she presented to OSH with acute left hemiplegia and was subsequently transferred to [facility] for further management. CT brain (11/21/18) showed early changes of ischemia in the right MCA distribution involving insular region. CTA head and neck (11/21/18) showed right proximal ICA occlusion with distal reconstitution in the Rachel/cavernous/supraclinoid segments probably through ophthalmic collaterals as well as ACOM/PCOM with hypoplastic P1 segment. CT perfusion showed a small cord infarct of 27 cc with large area of minimally hypoperfused brain tissue being 129 mL. MRI brain (11/21/18) showed an acute embolic-looking infarct in the right MCA distribution without significant hemorrhagic transformation with minimal leukoaraiosis. MRA head and neck (11/23/18) showed right proximal ICA occlusion versus critical stenosis with reconstitution in the distal cervical ICA segment - improvement in the flow-related signal noted in distal cervical ICA as compared to prior CTA. MRA T1 fat sat images to my eye shows hyperintensity involving the entire right proximal ICA but did not show any obvious intramural thrombus. Conventional angiogram (11/26/18) confirmed severe stenosis of right proximal ICA. She was subsequently treated with carotid revascularization with carotid artery stenting. Carotid duplex (11/27/18): Patent right cervical CCA/ICA stent. TTE did not show any obvious structural cardiac source of embolism. \par \par Impression:\par Cerebral embolism with cerebral infarction. Right MCA distribution stroke - likely etiology being large vessel disease i.e. symptomatic extracranial ICA stenosis (local thrombosis and occlusion on 11/21 but with spontaneous recanalization afterwards) leading to artery to artery embolism\par \par Plan:\par - Agreed to continue with dual antiplatelet therapy in the form of aspirin and clopidogrel for secondary stroke prevention in the setting of carotid artery stent for about 6 months followed by single antiplatelet agent - preferred aspirin indefinitely if not contraindicated due to any specific reasons in the future. P2Y12 testing during hospitalization showed optimal platelet inhibition with clopidogrel. Advised to avoid medications like PPI/omeprazole considering significant drug-drug interaction with clopidogrel\par - Atorvastatin 80 mg and bedtime considering likely atheroembolic etiology of her stroke and age\par - She should follow up closely with her primary care physician for optimal vascular risk factors modifications including blood pressure goal less than 130/80 mmHg, HbA1c goal <7 and preferably 6-6.5 and optimal cholesterol management \par - She is advised to check blood pressure regularly at home, preferably at different times during the day and multiple days a week and was advised to keep a log of BPs to bring to primary care physician visit for further instructions regarding optimal BP management. Also advised to followup closely with PCP regarding regular blood work including monitoring of HbA1c and cholesterol profile\par - Prolonged cardiac monitoring with ICM to screen for occult cardiac arrhythmias like atrial fibrillation being the cardiac source of embolism - through Stoke-AF clinincal trial with, as it could potentially change the measures of secondary stroke prevention. Advised to followup with Stroke-AF trial for periodic ICM monitoring. Possibility of starting therapeutic anticoagulation is also discussed in detail, if she is diagnosed to have A. Fib in the future.\par - Hypercoagulable workup and hematologist consultation to evaluate for underlying hypercoagulable state especially with the history of prior stroke\par - Home sleep study to screen for possible obstructive sleep apnea as a vascular risk factor especially with the history of heavy snoring\par - CUS and TCDs to establish the baseline for future reference \par - TCD with bubble study to evaluate for possible right-to-left shunt\par - Physical therapy/occupational therapy referral\par - Physical medicine and rehabilitation physician evaluation\par \par - Above mentioned plan was discussed with patient and available family members in detail. I have answered all their questions to the best of my abilities. I have reviewed measures for secondary stroke prevention with the patient and available family members, including aggressive vascular risk factors modification, healthy diet, regular exercise and medication compliance. As well as discussed the need for calling 911 immediately in case of any symptoms concerning for stroke in the future. \par - I would follow her in the office in about 2 months or earlier as needed. Also advised to contact me upon completion of imaging studies and/or laboratory testing/investigations to discuss the results over the phone.

## 2019-03-29 NOTE — REASON FOR VISIT
[Post Hospitalization] : a post hospitalization visit [Family Member] : family member [FreeTextEntry1] : for stroke

## 2019-03-29 NOTE — PHYSICAL EXAM
[FreeTextEntry1] : General exam: Sitting in the chair and does not appear to be in distress\par Carotid bruits: None\par CVS: S1-S2 present\par RS: CTA B\par Skin: No lesion noted on visible skin \par \par Neuro exam: \par MS: Alert, awake and is oriented to time, place and person with normal attention span, normal recent and remote memory\par Language: Fluent speech with intact comprehension, with intact naming and repetition, no right-left confusion, no finger agnosia and no apraxia. Normal fund of knowledge. No dysarthria. \par Cr.N.: Pupils bilaterally 3-4 mm in size, equal, round and reactive to light, fundus examination was performed but I was unable to locate the discs due to technical difficulties including poor fixation and small-sized pupils, intact visual fields to confrontation, extraocular movements intact without any diplopia, no ptosis, no nystagmus, UMN ty left facial droop pe with intact facial sensations, no hearing loss to rubbing fingers, tongue is in the midline, uvula elevates in the midline and without any drooping of the soft palate, normal shrugging of the shoulder on the right.\par \par Motor: \par Tone -spasticity on the left side without contracture\par Bulk - No atrophy\par Power - LUE with pronator drift\par RUE and RLE - 5/5\par LUE - D 3/5, B/T 2/5, WE/WF 0/5, hand  0/5\par LLE - HF 4/5, KE 4+/5, FDF/FPF and toes 0/5 \par DTRs - +2 on the right and +3 on the left side \par Plantars: Right flexor and left extensor \par Sensory: Intact to light touch and pinprick, no sensory extinction. \par Cerebellar: No ataxia on finger-nose-finger and knee-heel-shin testing, as well as without any dysdiadochokinesia\par Gait: Was able to get up from the chair with the help of cane without assistance and was able to walk with a cane with left circumducting gait; was unable to perform toe, heel or tandem walking\par Romberg's sign: Negative

## 2019-03-29 NOTE — HISTORY OF PRESENT ILLNESS
[FreeTextEntry1] : 59 Y/O R handed woman with vascular risk factors of age, HTN, DM II, HPLD and stroke in 2017 - imbalance, L facial droop and dysarthria with residual left facial droop is seen in the vascular neurology office for the evaluation and management of stroke leading to hospital admission in 11/18.\par \par On 11/21/18, she had an acute onset of left sided weakness leading to falls without any dysarthria. She initially presented to OSH and was transferred to [facility] for further management. She was discharged to rehab after appropriate work up. Denies taking any antiplatelet medications before her stroke in 2018. Denies any family history of stroke at young age. Denies any personal or family history of DVT/PEs. Denies any history of miscarriages. Denies any history of head or neck injury, headache or neck pain before her stroke. Denies taking any OTC medications or supplements before her stroke. Denies taking any female hormone replacement or contraceptive methods before her stroke. \par \par Since her discharge from the hospital, she reports significant improvement in the neurological symptoms/deficits. Her current post-stroke mRS 3-4 as she needs some assistance in performing her ADLs but reports to be able to walk with and without constant supervision. Denies any new or recurrent episodes of focal neurological symptoms concerning for stroke or TIA since her discharge from the hospital. Reports compliance with her medications and denies any significant side effects. \par \par ROS: All negative except documented in the history of present illness.\par \par \par \par \par \par Workup:\par CT brain (11/21/18): Early changes of ischemia in the right MCA distribution\par CTA head and neck (11/21/18): Right proximal ICA occlusion with distal regurgitation in the Rachel/cavernous/supraclinoid segments probably through ophthalmic collaterals as well as ACOM/PCOM with hypoplastic P1 segment\par CT perfusion: Showed a small cord infarct of 27 cc with large area of minimally hypoperfused brain tissue being 129 mL\par MRI brain (11/21/18): Acute infarct in the right MCA distribution without significant hemorrhagic transformation, minimal leukoaraiosis\par MRA head and neck (11/23/18): Right proximal ICA occlusion with reconstitution in the distal cervical ICA segment - improvement in the flow-related signal noted in distal cervical ICA as compared to prior CTA. MRA T1 fat sat images to my eye shows hyperintensity involving the entire right proximal ICA but did not show any obvious intramural thrombus\par Conventional angina (11/26/18): Severe stenosis of right proximal ICA - s/p CCA/ICA carotid stent placement\par Carotid duplex (11/27/18): Patent right cervical CCA/ICA stent\par \par \par \par \par 58-year-old right-handed white lady first evaluated at [facility] on 11/21/18 with left hemiparesis. 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 Perhaps her previous strokes were due to small vessel disease. On 11/21/18, she awoke with a dense left hemiparesis. CT head (11/21/18) showed a chronic right lentiform nucleus lacunar infarct. CTA neck (11/21/18) showed a probable right ICA occlusion with some calcified plaque. CTA head (11/21/18) showed that the right ICA reconstituted perhaps through the ACOM and possibly a large right PCOM. CTP (11/21/18) showed a core infarct of 27 cc and a large hypoperfused area of 125 cc in the right MCA distribution. Given that there was no intracranial occlusion, she was excluded from endovascular thrombectomy. MRI brain subsequently showed R MCA distribution infarct. MRA head and neck to my eye showed severe to critical stenosis of proximal/extracranial right ICA. Conventional angiogram confirmed severe proximal/extracranial right ICA stenosis s/p carotid artery stenting for secondary stroke prevention in this patient with symptomatic right ICA severe stenosis. Of\par \par Impression: \par Cerebral embolism with cerebral infarction. Right MCA distribution stroke - likely etiology being large vessel disease i.e. symptomatic extracranial R ICA severe stenosis leading to artery to artery embolism \par

## 2019-03-29 NOTE — REVIEW OF SYSTEMS
[As Noted in HPI] : as noted in HPI [Negative] : Heme/Lymph [Suicidal] : not suicidal [Anxiety] : no anxiety [Depression] : no depression [de-identified] : Denies any homicidal ideation

## 2019-04-04 ENCOUNTER — MED ADMIN CHARGE (OUTPATIENT)
Age: 59
End: 2019-04-04

## 2019-04-12 LAB
ALBUMIN SERPL ELPH-MCNC: 4.3 G/DL
ALP BLD-CCNC: 96 U/L
ALT SERPL-CCNC: 26 U/L
ANION GAP SERPL CALC-SCNC: 13 MMOL/L
APPEARANCE: ABNORMAL
AST SERPL-CCNC: 16 U/L
BILIRUB SERPL-MCNC: 0.4 MG/DL
BILIRUBIN URINE: NEGATIVE
BLOOD URINE: NEGATIVE
BUN SERPL-MCNC: 28 MG/DL
CALCIUM SERPL-MCNC: 10.8 MG/DL
CALCIUM SERPL-MCNC: 10.8 MG/DL
CHLORIDE SERPL-SCNC: 104 MMOL/L
CHOLEST SERPL-MCNC: 118 MG/DL
CHOLEST/HDLC SERPL: 2.5 RATIO
CO2 SERPL-SCNC: 25 MMOL/L
COLOR: YELLOW
CREAT SERPL-MCNC: 0.7 MG/DL
CREAT SPEC-SCNC: 190 MG/DL
GLUCOSE QUALITATIVE U: NEGATIVE
GLUCOSE SERPL-MCNC: 122 MG/DL
HBA1C MFR BLD HPLC: 6.4 %
HDLC SERPL-MCNC: 47 MG/DL
KETONES URINE: NEGATIVE
LDLC SERPL CALC-MCNC: 50 MG/DL
LEUKOCYTE ESTERASE URINE: NEGATIVE
MICROALBUMIN 24H UR DL<=1MG/L-MCNC: 4.7 MG/DL
MICROALBUMIN/CREAT 24H UR-RTO: 25 MG/G
NITRITE URINE: NEGATIVE
PARATHYROID HORMONE INTACT: 47 PG/ML
PH URINE: 5.5
POTASSIUM SERPL-SCNC: 4.9 MMOL/L
PROT SERPL-MCNC: 7.5 G/DL
PROTEIN URINE: NORMAL
SODIUM SERPL-SCNC: 142 MMOL/L
SPECIFIC GRAVITY URINE: 1.03
T4 FREE SERPL-MCNC: 1.3 NG/DL
TRIGL SERPL-MCNC: 106 MG/DL
TSH SERPL-ACNC: 0.99 UIU/ML
UROBILINOGEN URINE: NORMAL

## 2019-04-15 ENCOUNTER — APPOINTMENT (OUTPATIENT)
Dept: ENDOCRINOLOGY | Facility: CLINIC | Age: 59
End: 2019-04-15
Payer: COMMERCIAL

## 2019-04-15 VITALS
DIASTOLIC BLOOD PRESSURE: 70 MMHG | WEIGHT: 176 LBS | HEART RATE: 88 BPM | OXYGEN SATURATION: 99 % | SYSTOLIC BLOOD PRESSURE: 120 MMHG | HEIGHT: 68 IN | BODY MASS INDEX: 26.67 KG/M2

## 2019-04-15 LAB
ALBUMIN SERPL ELPH-MCNC: 4.1 G/DL
ALP BLD-CCNC: 88 U/L
ALT SERPL-CCNC: 21 U/L
ANION GAP SERPL CALC-SCNC: 13 MMOL/L
AST SERPL-CCNC: 20 U/L
BILIRUB SERPL-MCNC: 0.5 MG/DL
BUN SERPL-MCNC: 19 MG/DL
C PEPTIDE SERPL-MCNC: 0.8 NG/ML
CALCIUM SERPL-MCNC: 11 MG/DL
CHLORIDE SERPL-SCNC: 105 MMOL/L
CO2 SERPL-SCNC: 23 MMOL/L
CREAT SERPL-MCNC: 0.73 MG/DL
GLUCOSE SERPL-MCNC: 84 MG/DL
POTASSIUM SERPL-SCNC: 3.7 MMOL/L
PROT SERPL-MCNC: 7.4 G/DL
SODIUM SERPL-SCNC: 141 MMOL/L

## 2019-04-15 PROCEDURE — 99244 OFF/OP CNSLTJ NEW/EST MOD 40: CPT

## 2019-04-18 LAB
25(OH)D3 SERPL-MCNC: 18.7 NG/ML
ALBUMIN SERPL ELPH-MCNC: 4.3 G/DL
ALP BLD-CCNC: 85 U/L
ALT SERPL-CCNC: 31 U/L
ANION GAP SERPL CALC-SCNC: 11 MMOL/L
AST SERPL-CCNC: 22 U/L
BILIRUB SERPL-MCNC: 0.4 MG/DL
BUN SERPL-MCNC: 18 MG/DL
CALCIUM SERPL-MCNC: 10.4 MG/DL
CALCIUM SERPL-MCNC: 10.4 MG/DL
CHLORIDE SERPL-SCNC: 105 MMOL/L
CO2 SERPL-SCNC: 26 MMOL/L
CREAT SERPL-MCNC: 0.72 MG/DL
GLUCOSE SERPL-MCNC: 101 MG/DL
PARATHYROID HORMONE INTACT: 59 PG/ML
POTASSIUM SERPL-SCNC: 4.1 MMOL/L
PROT SERPL-MCNC: 7.4 G/DL
SODIUM SERPL-SCNC: 142 MMOL/L

## 2019-04-19 ENCOUNTER — OTHER (OUTPATIENT)
Age: 59
End: 2019-04-19

## 2019-04-19 ENCOUNTER — RX RENEWAL (OUTPATIENT)
Age: 59
End: 2019-04-19

## 2019-04-29 ENCOUNTER — APPOINTMENT (OUTPATIENT)
Age: 59
End: 2019-04-29
Payer: COMMERCIAL

## 2019-04-29 VITALS
HEART RATE: 90 BPM | HEIGHT: 68 IN | WEIGHT: 175 LBS | SYSTOLIC BLOOD PRESSURE: 145 MMHG | DIASTOLIC BLOOD PRESSURE: 75 MMHG | OXYGEN SATURATION: 97 % | BODY MASS INDEX: 26.52 KG/M2

## 2019-04-29 PROCEDURE — 99213 OFFICE O/P EST LOW 20 MIN: CPT

## 2019-04-29 NOTE — REVIEW OF SYSTEMS
[Negative] : Heme/Lymph [FreeTextEntry9] : L arm pain [de-identified] : L sided spasm and weakness.

## 2019-04-29 NOTE — HISTORY OF PRESENT ILLNESS
[FreeTextEntry1] : Patient is a 59 yo with a history of CVA in November 2018.\par Currently getting outpatient rehabilitation. \par Immediately after the stroke had significant left sided weakness. It has slowly improved- particularly in the left lower extremity and proximal left upper extremity. \par However, is getting painful tightness and spasm at the left fingers and wrist.\par Has difficulty opening hand and reaching on the left.\par

## 2019-04-29 NOTE — PHYSICAL EXAM
[Normal] : Oriented to person, place, and time, insight and judgement were intact and the affect was normal [de-identified] : amb w SC with circumducted gait, good balance.  [de-identified] : LLE fair-good grade; LUE good grade prox/trace distal; Spasticity- MAS 3 L FFs and WFs; MAS 2 L EFs; DTRs 3+ on L and 2+ on R; sens decreased on Left

## 2019-05-19 ENCOUNTER — RX RENEWAL (OUTPATIENT)
Age: 59
End: 2019-05-19

## 2019-05-21 ENCOUNTER — APPOINTMENT (OUTPATIENT)
Dept: PHYSICAL MEDICINE AND REHAB | Facility: CLINIC | Age: 59
End: 2019-05-21
Payer: COMMERCIAL

## 2019-05-21 VITALS
HEART RATE: 75 BPM | OXYGEN SATURATION: 100 % | DIASTOLIC BLOOD PRESSURE: 79 MMHG | SYSTOLIC BLOOD PRESSURE: 142 MMHG | TEMPERATURE: 98.2 F

## 2019-05-21 PROCEDURE — 64644 CHEMODENERV 1 EXTREM 5/> MUS: CPT

## 2019-05-21 PROCEDURE — 99213 OFFICE O/P EST LOW 20 MIN: CPT | Mod: 25

## 2019-05-21 PROCEDURE — 95874 GUIDE NERV DESTR NEEDLE EMG: CPT

## 2019-05-21 NOTE — PROCEDURE
[Consent] : consent was given by patient or guardian [Site Verification] : the injection site was verified [Post-Injection Instructions Provided] : post-injection instructions were provided [] : The left [de-identified] : Lot [id number] exp Oct 2021

## 2019-05-21 NOTE — PHYSICAL EXAM
[Normal] : Oriented to person, place, and time, insight and judgement were intact and the affect was normal [de-identified] : amb w SC with circumducted gait, good balance.  [de-identified] : LLE fair-good grade; LUE good grade prox/trace distal; Spasticity- MAS 3 L FFs and WFs; MAS 2 L EFs; DTRs 3+ on L and 2+ on R; sens decreased on Left

## 2019-06-18 ENCOUNTER — APPOINTMENT (OUTPATIENT)
Dept: NEUROLOGY | Facility: CLINIC | Age: 59
End: 2019-06-18
Payer: COMMERCIAL

## 2019-06-18 PROCEDURE — 93892 TCD EMBOLI DETECT W/O INJ: CPT

## 2019-06-18 PROCEDURE — 93880 EXTRACRANIAL BILAT STUDY: CPT

## 2019-06-18 PROCEDURE — 93886 INTRACRANIAL COMPLETE STUDY: CPT

## 2019-06-24 ENCOUNTER — APPOINTMENT (OUTPATIENT)
Dept: NEUROLOGY | Facility: CLINIC | Age: 59
End: 2019-06-24
Payer: COMMERCIAL

## 2019-06-24 VITALS
DIASTOLIC BLOOD PRESSURE: 73 MMHG | BODY MASS INDEX: 26.98 KG/M2 | HEIGHT: 68 IN | WEIGHT: 178 LBS | HEART RATE: 81 BPM | SYSTOLIC BLOOD PRESSURE: 177 MMHG

## 2019-06-24 PROCEDURE — 99214 OFFICE O/P EST MOD 30 MIN: CPT

## 2019-06-24 NOTE — HISTORY OF PRESENT ILLNESS
[FreeTextEntry1] : 59 Y/O R handed woman with vascular risk factors of age, HTN, DM II, HPLD and stroke in 2017 - imbalance, L facial droop and dysarthria with residual left facial droop is seen in the vascular neurology office for the evaluation and management of stroke leading to hospital admission in 11/18.\par \par On 11/21/18, she had an acute onset of left sided weakness leading to falls without any dysarthria. She initially presented to OSH and was transferred to [facility] for further management. She was discharged to rehab after appropriate work up. Denies taking any antiplatelet medications before her stroke in 2018. Denies any family history of stroke at young age. Denies any personal or family history of DVT/PEs. Denies any history of miscarriages. Denies any history of head or neck injury, headache or neck pain before her stroke. Denies taking any OTC medications or supplements before her stroke. Denies taking any female hormone replacement or contraceptive methods before her stroke. \par \par ROS: All negative except documented in the history of present illness.\par \par Workup:\par CT brain (11/21/18): Early changes of ischemia in the right MCA distribution\par CTA head and neck (11/21/18): Right proximal ICA occlusion with distal regurgitation in the Rachel/cavernous/supraclinoid segments probably through ophthalmic collaterals as well as ACOM/PCOM with hypoplastic P1 segment\par CT perfusion: Showed a small cord infarct of 27 cc with large area of minimally hypoperfused brain tissue being 129 mL\par MRI brain (11/21/18): Acute infarct in the right MCA distribution without significant hemorrhagic transformation, minimal leukoaraiosis\par MRA head and neck (11/23/18): Right proximal ICA occlusion with reconstitution in the distal cervical ICA segment - improvement in the flow-related signal noted in distal cervical ICA as compared to prior CTA. MRA T1 fat sat images to my eye shows hyperintensity involving the entire right proximal ICA but did not show any obvious intramural thrombus\par Conventional angina (11/26/18): Severe stenosis of right proximal ICA - s/p CCA/ICA carotid stent placement\par Carotid duplex (11/27/18): Patent right cervical CCA/ICA stent\par \par Since last visit she reports significant improvement in the neurological symptoms/deficits. Her current post-stroke mRS 3-4 as she needs some assistance in performing her ADLs but reports to be able to walk with and without constant supervision. Denies any new or recurrent episodes of focal neurological symptoms concerning for stroke or TIA since last visit. Reports compliance with her medications and denies any significant side effects. She has seen Dr. Segura for Botox x 1 with some relief and is enrolled in PT and OT

## 2019-06-24 NOTE — DISCUSSION/SUMMARY
[Intensive Blood Pressure Control] : intensive blood pressure control [Antithrombotic therapy with ___] : antithrombotic therapy with  [unfilled] [Lipid Lowering Therapy] : lipid lowering therapy [Patient encouraged to discuss with Primary MD] : I encouraged the patient to discuss these important issues with ~his/her~ primary care doctor [Goals and Counseling] : I have reviewed the goals of stroke risk factor modification. I counseled the patient on measures to reduce stroke risk, including the importance of medication compliance, risk factor control, exercise, healthy diet and avoidance of smoking. I reviewed stroke warning signs and symptoms and appropriate actions to take if such occur. [FreeTextEntry1] : 57 Y/O R handed woman with vascular risk factors of age, HTN, DM II, HPLD and stroke in 2017 - imbalance, L facial droop and dysarthria with residual left facial droop is seen in the vascular neurology office for the evaluation and management of stroke leading to hospital admission in 11/18. On 11/21/18, she presented to OSH with acute left hemiplegia and was subsequently transferred to [facility] for further management. CT brain (11/21/18) showed early changes of ischemia in the right MCA distribution involving insular region. CTA head and neck (11/21/18) showed right proximal ICA occlusion with distal reconstitution in the Rachel/cavernous/supraclinoid segments probably through ophthalmic collaterals as well as ACOM/PCOM with hypoplastic P1 segment. CT perfusion showed a small cord infarct of 27 cc with large area of minimally hypoperfused brain tissue being 129 mL. MRI brain (11/21/18) showed an acute embolic-looking infarct in the right MCA distribution without significant hemorrhagic transformation with minimal leukoaraiosis. MRA head and neck (11/23/18) showed right proximal ICA occlusion versus critical stenosis with reconstitution in the distal cervical ICA segment - improvement in the flow-related signal noted in distal cervical ICA as compared to prior CTA. MRA T1 fat sat images to my eye shows hyperintensity involving the entire right proximal ICA but did not show any obvious intramural thrombus. Conventional angiogram (11/26/18) confirmed severe stenosis of right proximal ICA. She was subsequently treated with carotid revascularization with carotid artery stenting. Carotid duplex (11/27/18): Patent right cervical CCA/ICA stent. TTE did not show any obvious structural cardiac source of embolism. \par \par Impression:\par Cerebral embolism with cerebral infarction. Right MCA distribution stroke - likely etiology being large vessel disease i.e. symptomatic extracranial ICA stenosis (local thrombosis and occlusion on 11/21 but with spontaneous recanalization afterwards) leading to artery to artery embolism\par \par Plan:\par - Agreed to continue with dual antiplatelet therapy in the form of aspirin and clopidogrel for secondary stroke prevention in the setting of carotid artery stent for about 6 months followed by single antiplatelet agent - preferred aspirin indefinitely if not contraindicated due to any specific reasons in the future. P2Y12 testing during hospitalization showed optimal platelet inhibition with clopidogrel. Advised to avoid medications like PPI/omeprazole considering significant drug-drug interaction with clopidogrel \par - Follow up with Dr. Carpio as directed \par - Atorvastatin 80 mg and bedtime considering likely atheroembolic etiology of her stroke and age\par - She should follow up closely with her primary care physician for optimal vascular risk factors modifications including blood pressure goal less than 130/80 mmHg, HbA1c goal <7 and preferably 6-6.5 and optimal cholesterol management and routine cancer screenings \par - She is advised to check blood pressure regularly at home, preferably at different times during the day and multiple days a week and was advised to keep a log of BPs to bring to primary care physician visit for further instructions regarding optimal BP management. Also advised to followup closely with PCP regarding regular blood work including monitoring of HbA1c and cholesterol profile\par - Prolonged cardiac monitoring with ICM to screen for occult cardiac arrhythmias like atrial fibrillation being the cardiac source of embolism - through Stoke-AF clinincal trial with, as it could potentially change the measures of secondary stroke prevention. Advised to followup with Stroke-AF trial for periodic ICM monitoring. Possibility of starting therapeutic anticoagulation is also discussed in detail, if she is diagnosed to have A. Fib in the future.\par - Hypercoagulable workup and hematologist consultation to evaluate for underlying hypercoagulable state especially with the history of prior stroke - referral placed \par - Home sleep study to screen for possible obstructive sleep apnea as a vascular risk factor especially with the history of heavy snoring\par - CUS and TCDs to establish the baseline for future reference \par - TCD with bubble study to evaluate for possible right-to-left shunt - patient states she had this done at cardiology \par - Physical therapy/occupational therapy referral\par - Physical medicine and rehabilitation physician evaluation\par \par - Above mentioned plan was discussed with patient and available family members in detail. I have answered all their questions to the best of my abilities. I have reviewed measures for secondary stroke prevention with the patient and available family members, including aggressive vascular risk factors modification, healthy diet, regular exercise and medication compliance. As well as discussed the need for calling 911 immediately in case of any symptoms concerning for stroke in the future. \par - I would follow her in the office in about 2 months or earlier as needed. Also advised to contact me upon completion of imaging studies and/or laboratory testing/investigations to discuss the results over the phone.

## 2019-06-24 NOTE — PHYSICAL EXAM
[FreeTextEntry1] : General exam: Sitting in the chair and does not appear to be in distress\par Carotid bruits: None\par CVS: S1-S2 present\par RS: CTA B\par Skin: No lesion noted on visible skin \par \par Neuro exam: \par MS: Alert, awake and is oriented to time, place and person with normal attention span, normal recent and remote memory\par Language: Fluent speech with intact comprehension, with intact naming and repetition, no right-left confusion, no finger agnosia and no apraxia. Normal fund of knowledge. No dysarthria. \par Cr.N.: Pupils bilaterally 3-4 mm in size, equal, round and reactive to light, fundus examination was performed but I was unable to locate the discs due to technical difficulties including poor fixation and small-sized pupils, intact visual fields to confrontation, extraocular movements intact without any diplopia, no ptosis, no nystagmus, UMN ty left facial droop with intact facial sensations, no hearing loss to rubbing fingers, tongue is in the midline, uvula elevates in the midline and without any drooping of the soft palate, normal shrugging of the shoulder on the right.\par \par Motor: \par Tone -spasticity on the left side without contracture\par Bulk - No atrophy\par Power - LUE with pronator drift\par RUE and RLE - 5/5\par LUE - D 3/5, B/T 2/5, WE/WF 0/5, hand  0/5\par LLE - HF 4/5, KE 4+/5, FDF/FPF and toes 0/5 \par DTRs - +2 on the right and +3 on the left side \par Plantars: Right flexor and left extensor \par Sensory: Intact to light touch and pinprick, no sensory extinction. \par Cerebellar: No ataxia on finger-nose-finger and knee-heel-shin testing, as well as without any dysdiadochokinesia\par Gait: Was able to get up from the chair with the help of cane without assistance and was able to walk with a cane with left circumducting gait; was unable to perform toe, heel or tandem walking\par Romberg's sign: Negative

## 2019-06-24 NOTE — REVIEW OF SYSTEMS
[Suicidal] : not suicidal [Anxiety] : no anxiety [Depression] : no depression [de-identified] : Denies any homicidal ideation

## 2019-06-25 ENCOUNTER — APPOINTMENT (OUTPATIENT)
Dept: INTERNAL MEDICINE | Facility: CLINIC | Age: 59
End: 2019-06-25
Payer: COMMERCIAL

## 2019-06-25 VITALS
HEIGHT: 68 IN | SYSTOLIC BLOOD PRESSURE: 172 MMHG | HEART RATE: 98 BPM | TEMPERATURE: 98 F | OXYGEN SATURATION: 99 % | BODY MASS INDEX: 26.98 KG/M2 | WEIGHT: 178 LBS | DIASTOLIC BLOOD PRESSURE: 70 MMHG

## 2019-06-25 LAB — HBA1C MFR BLD HPLC: 6.9

## 2019-06-25 PROCEDURE — 83036 HEMOGLOBIN GLYCOSYLATED A1C: CPT | Mod: QW

## 2019-06-25 PROCEDURE — 99214 OFFICE O/P EST MOD 30 MIN: CPT | Mod: 25

## 2019-06-25 PROCEDURE — 36415 COLL VENOUS BLD VENIPUNCTURE: CPT

## 2019-06-25 NOTE — PHYSICAL EXAM
[Well-Appearing] : well-appearing [No Acute Distress] : no acute distress [Normal Voice/Communication] : normal voice/communication [No JVD] : no jugular venous distention [No Lymphadenopathy] : no lymphadenopathy [Supple] : supple [Clear to Auscultation] : lungs were clear to auscultation bilaterally [No Respiratory Distress] : no respiratory distress  [No Accessory Muscle Use] : no accessory muscle use [Normal Rate] : normal rate  [Normal S1, S2] : normal S1 and S2 [Regular Rhythm] : with a regular rhythm [No Murmur] : no murmur heard [No Edema] : there was no peripheral edema [Normal Affect] : the affect was normal [Alert and Oriented x3] : oriented to person, place, and time [Normal Mood] : the mood was normal [Normal Insight/Judgement] : insight and judgment were intact [de-identified] : L sided hemiparesis improved compared to prior, ambulating now w cane

## 2019-06-25 NOTE — ASSESSMENT
[FreeTextEntry1] : discussed w pt\par \par she has made significant progress w her rehab, cont OT, PT \par \par reviewed neurology eval, endocrine eval \par \par reviewed current rx \par \par DM remains adequately controlled, cont current insulin regimen, diet control, endocrine f/u . POC HgbA1c 6.9% today \par \par noted elevated BP, will consider adjustment of rx, she will be seeing her cardiologist this week as well \par \par f/u w neurology as scheduled, cont current rx , clopidogrel, ASA , statin. \par \par cont OT and PT, fall precautions \par \par RTO 4 months for f/u or earlier prn if any new concerns

## 2019-06-25 NOTE — HISTORY OF PRESENT ILLNESS
[de-identified] : presents for f/u visit to review her progress. she feels well overall currently. \par hx CVA in R MCA distribution in 11/18, w residual L hemiparesis. she is making steady progress w PT and OT, now ambulating w cane and sometimes on her own with supervision. no falls at home. \par she is s/p R ICA stent placement after her CVA, recent repeat carotid duplex noted w patent stent. she is following w neurology and w PMR Dr Segura for Botox inj which has helped. \par \par HTN well controlled on current rx, following w cardiologist Dr Wheeler \par type II DM previously uncontrolled but continues to remain adequately controlled, she is monitoring FSG regularly, following w endocrine, continuing on basal and preprandial insulins. current POC HgbA1c 6.9%.

## 2019-07-12 ENCOUNTER — APPOINTMENT (OUTPATIENT)
Dept: PHYSICAL MEDICINE AND REHAB | Facility: CLINIC | Age: 59
End: 2019-07-12
Payer: COMMERCIAL

## 2019-07-12 VITALS
TEMPERATURE: 98 F | HEART RATE: 82 BPM | SYSTOLIC BLOOD PRESSURE: 139 MMHG | OXYGEN SATURATION: 99 % | DIASTOLIC BLOOD PRESSURE: 68 MMHG

## 2019-07-12 DIAGNOSIS — M77.02 MEDIAL EPICONDYLITIS, LEFT ELBOW: ICD-10-CM

## 2019-07-12 PROCEDURE — 99213 OFFICE O/P EST LOW 20 MIN: CPT

## 2019-07-12 NOTE — ASSESSMENT
[FreeTextEntry1] : - Continue PT/OT\par - For epicondylitis can use counterforce brace and will check x-ray\par - will f/u for future botox

## 2019-07-12 NOTE — PHYSICAL EXAM
[Normal] : Oriented to person, place, and time, insight and judgement were intact and the affect was normal [de-identified] : amb w SC with circumducted gait, good balance.  [de-identified] : LLE fair-good grade; LUE good grade prox/trace distal; Spasticity- MAS 2 L FFs and MAS 1 WFs; and EFs; DTRs 3+ on L and 2+ on R; sens decreased on Left

## 2019-07-12 NOTE — REVIEW OF SYSTEMS
[Joint Pain] : joint pain [Negative] : Heme/Lymph [FreeTextEntry9] : L elbow [de-identified] : Spastic hemiparesis

## 2019-07-12 NOTE — HISTORY OF PRESENT ILLNESS
[FreeTextEntry1] : Patient did well w botox- notes fingers and wrist are looser.\par Elbow looser but has pain medially. \par Patient with no other medical issues.\par Getting PT and OT

## 2019-07-19 ENCOUNTER — MEDICATION RENEWAL (OUTPATIENT)
Age: 59
End: 2019-07-19

## 2019-07-22 ENCOUNTER — MEDICATION RENEWAL (OUTPATIENT)
Age: 59
End: 2019-07-22

## 2019-07-29 ENCOUNTER — APPOINTMENT (OUTPATIENT)
Dept: NEUROSURGERY | Facility: CLINIC | Age: 59
End: 2019-07-29
Payer: COMMERCIAL

## 2019-07-29 VITALS
HEART RATE: 72 BPM | OXYGEN SATURATION: 97 % | BODY MASS INDEX: 24.71 KG/M2 | DIASTOLIC BLOOD PRESSURE: 62 MMHG | HEIGHT: 68 IN | SYSTOLIC BLOOD PRESSURE: 110 MMHG | RESPIRATION RATE: 19 BRPM | WEIGHT: 163 LBS | TEMPERATURE: 98.3 F

## 2019-07-29 DIAGNOSIS — I65.29 OCCLUSION AND STENOSIS OF UNSPECIFIED CAROTID ARTERY: ICD-10-CM

## 2019-07-29 PROCEDURE — 99212 OFFICE O/P EST SF 10 MIN: CPT

## 2019-07-29 RX ORDER — CLOPIDOGREL BISULFATE 75 MG/1
75 TABLET, FILM COATED ORAL DAILY
Qty: 90 | Refills: 1 | Status: DISCONTINUED | COMMUNITY
Start: 2019-01-16 | End: 2019-07-29

## 2019-08-05 ENCOUNTER — APPOINTMENT (OUTPATIENT)
Dept: ENDOCRINOLOGY | Facility: CLINIC | Age: 59
End: 2019-08-05
Payer: COMMERCIAL

## 2019-08-05 ENCOUNTER — RESULT CHARGE (OUTPATIENT)
Age: 59
End: 2019-08-05

## 2019-08-05 VITALS
BODY MASS INDEX: 24.71 KG/M2 | SYSTOLIC BLOOD PRESSURE: 140 MMHG | DIASTOLIC BLOOD PRESSURE: 72 MMHG | HEIGHT: 68 IN | OXYGEN SATURATION: 98 % | WEIGHT: 163 LBS | HEART RATE: 74 BPM

## 2019-08-05 PROCEDURE — 99215 OFFICE O/P EST HI 40 MIN: CPT

## 2019-08-08 NOTE — HISTORY OF PRESENT ILLNESS
[de-identified] : Katiuska Davis is a 58 yr old female here for a new patient visit. In November 2018 she presented with left hemiparesis to [facility]. She underwent cerebral angiography with angioplasty and stenting of ADRY for symptomatic ADRY stenosis 11/26/2018.  At that time she was started on dual antiplatelet therapy, her PRU was therapeutic at 50 (11/27/2018). She has been following up outpatient with Neurology and recently underwent carotid doppler ultrasound. Today she feels well denies any new symptoms. She was previously plegic on the left side and had left facial palsy from prior stroke.  She is no longer on aspirin or plavix. \par \par HPI: HOSPITAL COURSE NOVEMBER 2018 \par first evaluated at [facility] on 11/21/18 with \par left hemiparesis. She has a history of 2 strokes with residual left facial \par palsy. Perhaps her previous strokes were due to small vessel disease. On \par 11/21/18, she awoke with a dense left hemiparesis. CT head (11/21/18) showed a \par chronic right lentiform nucleus lacunar infarct. CTA neck (11/21/18) showed a \par probable right ICA occlusion with some calcified plaque. CTA head (11/21/18) \par showed that the right ICA reconstituted perhaps through the ACOM and possibly a \par large right PCOM. CTP (11/21/18) showed a core infarct of 27 cc and a large \par hypoperfused area of 125 cc in the right MCA distribution. Given that there was \par no intracranial occlusion, she was excluded from endovascular thrombectomy. MRI \par brain subsequently showed R MCA distribution infarct. MRA head and neck to my \par eye showed severe to critical stenosis of proximal/extracranial right ICA. \par Conventional angiogram confirmed severe proximal/extracranial right ICA \par stenosis s/p carotid artery stenting for secondary stroke prevention in this \par patient with symptomatic right ICA severe stenosis. \par \par Impression: \par Cerebral embolism with cerebral infarction. Right MCA distribution stroke - \par likely etiology being large vessel disease i.e. symptomatic extracranial R ICA \par severe stenosis leading to artery to artery embolism \par \par NEURO: Neuro exam without acute change, continue close monitoring for \par neurologic deterioration, permissive hypertension; underwent selective cerebral \par angiography on  11/24/18 to eval for degree of sx stenosis, dual antiplatelet \par and stain for secondary stroke prevention, titrate statin to LDL goal less than \par 70,MRI Brain, MRA head/Neck with T1 Fat sat results as above. CTA Head/Neck \par results as above, Physical therapy/OT recommends AR.  Patient enrolled in \par Stroke AF trial and randomized to a LOOP  . \par

## 2019-08-08 NOTE — ASSESSMENT
[FreeTextEntry1] : Impression: 58yr old female s/p cerebral angiography with angioplasty and stenting of ADRY for symptomatic ADRY stenosis 11/26/2018 \par \par Plan: \par Carotid Doppler Ultrasound from 6/18/2019 shows ADRY PSV= 116 LICA PSV= 95 (previously 11/28/2019 ADRY JJF=942 LICA PSV= 115)\par Carotid Doppler Ultrasound next year, ok to follow up with Neurology, no need to follow up with us unless she needs a surgical intervention.

## 2019-08-08 NOTE — PHYSICAL EXAM
[General Appearance - In No Acute Distress] : in no acute distress [General Appearance - Alert] : alert [Person] : oriented to person [Place] : oriented to place [Time] : oriented to time [Involuntary Movements] : no involuntary movements were seen [] : no respiratory distress [FreeTextEntry5] : left facial palsy. left side plegic

## 2019-08-09 LAB
25(OH)D3 SERPL-MCNC: 32.3 NG/ML
ALBUMIN SERPL ELPH-MCNC: 4.4 G/DL
ALP BLD-CCNC: 87 U/L
ALT SERPL-CCNC: 22 U/L
ANION GAP SERPL CALC-SCNC: 11 MMOL/L
AST SERPL-CCNC: 15 U/L
BILIRUB SERPL-MCNC: 0.5 MG/DL
BUN SERPL-MCNC: 20 MG/DL
CALCIUM SERPL-MCNC: 10.3 MG/DL
CALCIUM SERPL-MCNC: 10.3 MG/DL
CHLORIDE SERPL-SCNC: 106 MMOL/L
CO2 SERPL-SCNC: 27 MMOL/L
CREAT SERPL-MCNC: 0.78 MG/DL
GLUCOSE SERPL-MCNC: 94 MG/DL
HBA1C MFR BLD HPLC: 6.2
PARATHYROID HORMONE INTACT: 63 PG/ML
POTASSIUM SERPL-SCNC: 4 MMOL/L
PROT SERPL-MCNC: 7 G/DL
SODIUM SERPL-SCNC: 144 MMOL/L

## 2019-08-13 ENCOUNTER — APPOINTMENT (OUTPATIENT)
Dept: INTERNAL MEDICINE | Facility: CLINIC | Age: 59
End: 2019-08-13

## 2019-08-13 ENCOUNTER — EMERGENCY (EMERGENCY)
Facility: HOSPITAL | Age: 59
LOS: 1 days | Discharge: HOME | End: 2019-08-13
Attending: EMERGENCY MEDICINE
Payer: COMMERCIAL

## 2019-08-13 VITALS
HEIGHT: 67 IN | RESPIRATION RATE: 18 BRPM | OXYGEN SATURATION: 98 % | SYSTOLIC BLOOD PRESSURE: 114 MMHG | WEIGHT: 169.98 LBS | TEMPERATURE: 98 F | DIASTOLIC BLOOD PRESSURE: 76 MMHG | HEART RATE: 87 BPM

## 2019-08-13 VITALS
OXYGEN SATURATION: 100 % | SYSTOLIC BLOOD PRESSURE: 146 MMHG | HEART RATE: 87 BPM | TEMPERATURE: 98 F | DIASTOLIC BLOOD PRESSURE: 70 MMHG | RESPIRATION RATE: 18 BRPM

## 2019-08-13 PROCEDURE — 73090 X-RAY EXAM OF FOREARM: CPT

## 2019-08-13 PROCEDURE — 29125 APPL SHORT ARM SPLINT STATIC: CPT | Mod: LT

## 2019-08-13 PROCEDURE — 73502 X-RAY EXAM HIP UNI 2-3 VIEWS: CPT | Mod: 26,LT

## 2019-08-13 PROCEDURE — 73070 X-RAY EXAM OF ELBOW: CPT | Mod: 26,LT

## 2019-08-13 PROCEDURE — 73110 X-RAY EXAM OF WRIST: CPT | Mod: 26,LT

## 2019-08-13 PROCEDURE — 29125 APPL SHORT ARM SPLINT STATIC: CPT

## 2019-08-13 PROCEDURE — 99284 EMERGENCY DEPT VISIT MOD MDM: CPT | Mod: 25

## 2019-08-13 PROCEDURE — 73110 X-RAY EXAM OF WRIST: CPT

## 2019-08-13 PROCEDURE — 73502 X-RAY EXAM HIP UNI 2-3 VIEWS: CPT

## 2019-08-13 PROCEDURE — 73070 X-RAY EXAM OF ELBOW: CPT

## 2019-08-13 PROCEDURE — 73090 X-RAY EXAM OF FOREARM: CPT | Mod: 26,LT

## 2019-08-13 RX ORDER — ACETAMINOPHEN 500 MG
650 TABLET ORAL ONCE
Refills: 0 | Status: COMPLETED | OUTPATIENT
Start: 2019-08-13 | End: 2019-08-13

## 2019-08-13 RX ADMIN — Medication 650 MILLIGRAM(S): at 11:47

## 2019-08-13 NOTE — ED PROVIDER NOTE - PHYSICAL EXAMINATION
Gen: WDWN, NAD  HEENT: EOMI, no nasal discharge, mucous membranes moist  CV: RRR, +S1/S2, no M/R/G  Resp: CTAB, no W/R/R  GI: Abdomen soft non-distended, NTTP, no masses  MSK: +TTP radial and ulnar aspect of distal left wrist, +cap refill <30s L fingers, +TTP lateral left upper thigh, 4cm circular ecchymosis to lateral left thigh, no breaks in skin, pelvis stable, nontender   Neuro: A&Ox4, following commands, 0/5 strength in L upper and lower extremity, sensation intact  Psych: appropriate mood, denies AH, VH, SI Gen: WDWN, NAD  HEENT: EOMI, no nasal discharge, mucous membranes moist  CV: RRR, +S1/S2, no M/R/G  Resp: CTAB, no W/R/R  GI: Abdomen soft non-distended, NTTP, no masses  MSK: +TTP radial and ulnar aspect of distal left wrist, +edema to L wrist +cap refill <2s L fingers, +TTP lateral left upper thigh, 4cm circular ecchymosis to lateral left thigh, no breaks in skin, pelvis stable, nontender   Neuro: A&Ox4, following commands, 0/5 strength in L upper and lower extremity, sensation intact  Psych: appropriate mood, denies AH, VH, SI

## 2019-08-13 NOTE — ED ADULT NURSE NOTE - NSIMPLEMENTINTERV_GEN_ALL_ED
Implemented All Fall with Harm Risk Interventions:  [place] to call system. Call bell, personal items and telephone within reach. Instruct patient to call for assistance. Room bathroom lighting operational. Non-slip footwear when patient is off stretcher. Physically safe environment: no spills, clutter or unnecessary equipment. Stretcher in lowest position, wheels locked, appropriate side rails in place. Provide visual cue, wrist band, yellow gown, etc. Monitor gait and stability. Monitor for mental status changes and reorient to person, place, and time. Review medications for side effects contributing to fall risk. Reinforce activity limits and safety measures with patient and family. Provide visual clues: red socks.

## 2019-08-13 NOTE — ED PROVIDER NOTE - CARE PROVIDER_API CALL
Jean Frazier)  Plastic Surgery  [address]  [place] [address]  Phone: [phone]  Fax: [phone]  Follow Up Time:

## 2019-08-13 NOTE — ED PROVIDER NOTE - CLINICAL SUMMARY MEDICAL DECISION MAKING FREE TEXT BOX
57 y/o F s/p stroke with left sided weakness comes in with left wrist and thigh pain s/p mechanical fall at home this morning. No LOC or head injury. +TTP in L wrist and lateral thigh. DDX radius/ulna fracture, metacarpal fracture, hip fracture, dislocation. XRs, pain control, reassess.

## 2019-08-13 NOTE — ED PROVIDER NOTE - NS ED ROS FT
Gen: Denies fever, weight loss  CV: Denies chest pain, palpitations  Skin: Denies rash, erythema, color changes  Resp: Denies SOB, cough  Endo: Denies sensitivity to heat, cold, increased urination  GI: Denies constipation, nausea, vomiting  Msk: Admits L wrist pain, L thigh pain, Denies back pain, LE swelling,   : Denies dysuria, increased frequency  Neuro: Admits left sided weakness UE/LE 0/5.   Psych: Denies hx of psych, hallucinations

## 2019-08-13 NOTE — ED PROVIDER NOTE - OBJECTIVE STATEMENT
57 y/o F with h/o stroke with left sided deficit of upper and lower extremities presents to the ED c/o wrist pain and left upper thigh pain onset today s/p fall this morning. Patient states she tripped at home. Denies head injury, LOC, confusion. 57 y/o F with h/o stroke with left sided deficit of upper and lower extremities presents to the ED c/o wrist pain and left upper thigh pain onset today s/p fall this morning. Patient states she tripped at home. Denies head injury, LOC, confusion. No other complaints

## 2019-08-13 NOTE — ED PROVIDER NOTE - NSFOLLOWUPINSTRUCTIONS_ED_ALL_ED_FT
Follow up with Dr. Frazier for care of your fractures as discussed today - call tomorrow to schedule an appointment for a permanent cast.     You can use 500-1000mg Tylenol every 6 hours for pain - as needed.  This is an over-the-counter medications - please respect the warnings on the label. This medication come with certain risks and side effects that you need to discuss with your doctor, especially if you are taking it for a prolonged period.    You can use 400-600mg Ibuprofen (such as motrin or advil) every 6 to 8 hours as needed for pain control.  Take ibuprofen with food or milk to lessen stomach upset.  This is an over-the-counter medication please respect the warnings on the label. All medications come with certain risks and side effects that you need to discuss with your doctor, especially if you are taking them for a prolonged period.    Return to the ED for any worsening pain, confusion, headaches, or any new concerning symptoms. Follow up with Dr. Frazier for care of your fractures as discussed today - call tomorrow to schedule an appointment for a permanent cast.     You can use 500-1000mg Tylenol every 6 hours for pain - as needed.  This is an over-the-counter medications - please respect the warnings on the label. This medication come with certain risks and side effects that you need to discuss with your doctor, especially if you are taking it for a prolonged period.    Return to the ED for any worsening pain, confusion, headaches, or any new concerning symptoms.

## 2019-08-13 NOTE — ED PROVIDER NOTE - ATTENDING CONTRIBUTION TO CARE
Pt with loss of balance and fall onto L side (stroke affected side with residual weakness hemiplegia L side) with pain of L wrist since yesterday (fall was yesterday, not this morning).  Pain worse with time and palpation assoc with swelling.  Exam: tender L distal radius and ulnar area without deformity, +swelling, has decr. but intact sensation LUE throughout, cap refill intact.

## 2019-08-13 NOTE — ED ADULT NURSE NOTE - OBJECTIVE STATEMENT
59 y/o female PMH HTN and stroke presents to ED reporting fall yesterday. Pt reports falling yesterday when walking into air conditioner. Pt reports having L sided weakness since stroke in November. Pt reports falling onto L side, falling onto L wrist and hip. Pt reports "bumping" head into air conditioner, denies LOC or dizziness. On exam, AOx3, speaking in complete sentences. L wrist small laceration, swelling. L thigh ecchymosis, able to move extremitiy. PERRL 2mm, weakness to L side, pt reports this is baseline. Lung sounds CTA, NAD. Abdomen soft, non-tender, non-distended. Pt denies CP, dizziness, SOB, n/v/d, fever/chills. Awaiting evaluation by MD at this time.

## 2019-08-21 ENCOUNTER — APPOINTMENT (OUTPATIENT)
Dept: NEUROLOGY | Facility: CLINIC | Age: 59
End: 2019-08-21

## 2019-08-21 ENCOUNTER — APPOINTMENT (OUTPATIENT)
Dept: RADIOLOGY | Facility: CLINIC | Age: 59
End: 2019-08-21

## 2019-08-27 ENCOUNTER — APPOINTMENT (OUTPATIENT)
Dept: NEUROLOGY | Facility: CLINIC | Age: 59
End: 2019-08-27
Payer: COMMERCIAL

## 2019-08-27 PROCEDURE — 99214 OFFICE O/P EST MOD 30 MIN: CPT

## 2019-08-27 RX ORDER — INSULIN LISPRO 100 [IU]/ML
100 INJECTION, SOLUTION INTRAVENOUS; SUBCUTANEOUS
Qty: 3 | Refills: 1 | Status: DISCONTINUED | COMMUNITY
Start: 2019-02-06 | End: 2019-08-27

## 2019-10-07 ENCOUNTER — INPATIENT (INPATIENT)
Facility: HOSPITAL | Age: 59
LOS: 7 days | Discharge: NURSING, REHAB OR LONG-TERM CARE | DRG: 101 | End: 2019-10-15
Attending: PSYCHIATRY & NEUROLOGY | Admitting: PSYCHIATRY & NEUROLOGY
Payer: COMMERCIAL

## 2019-10-07 VITALS
SYSTOLIC BLOOD PRESSURE: 152 MMHG | HEART RATE: 116 BPM | DIASTOLIC BLOOD PRESSURE: 96 MMHG | TEMPERATURE: 98 F | RESPIRATION RATE: 18 BRPM

## 2019-10-07 LAB
ALBUMIN SERPL ELPH-MCNC: 3.4 G/DL — SIGNIFICANT CHANGE UP (ref 3.3–5)
ALP SERPL-CCNC: 80 U/L — SIGNIFICANT CHANGE UP (ref 40–120)
ALT FLD-CCNC: 22 U/L — SIGNIFICANT CHANGE UP (ref 10–45)
ANION GAP SERPL CALC-SCNC: 12 MMOL/L — SIGNIFICANT CHANGE UP (ref 5–17)
APTT BLD: 30.5 SEC — SIGNIFICANT CHANGE UP (ref 27.5–36.3)
AST SERPL-CCNC: 26 U/L — SIGNIFICANT CHANGE UP (ref 10–40)
BASOPHILS # BLD AUTO: 0.03 K/UL — SIGNIFICANT CHANGE UP (ref 0–0.2)
BASOPHILS NFR BLD AUTO: 0.5 % — SIGNIFICANT CHANGE UP (ref 0–2)
BILIRUB SERPL-MCNC: 0.4 MG/DL — SIGNIFICANT CHANGE UP (ref 0.2–1.2)
BUN SERPL-MCNC: 24 MG/DL — HIGH (ref 7–23)
CALCIUM SERPL-MCNC: 9 MG/DL — SIGNIFICANT CHANGE UP (ref 8.4–10.5)
CHLORIDE SERPL-SCNC: 107 MMOL/L — SIGNIFICANT CHANGE UP (ref 96–108)
CO2 SERPL-SCNC: 18 MMOL/L — LOW (ref 22–31)
CREAT SERPL-MCNC: 0.81 MG/DL — SIGNIFICANT CHANGE UP (ref 0.5–1.3)
EOSINOPHIL # BLD AUTO: 0.06 K/UL — SIGNIFICANT CHANGE UP (ref 0–0.5)
EOSINOPHIL NFR BLD AUTO: 1.1 % — SIGNIFICANT CHANGE UP (ref 0–6)
GLUCOSE SERPL-MCNC: 123 MG/DL — HIGH (ref 70–99)
HCT VFR BLD CALC: 25 % — LOW (ref 34.5–45)
HGB BLD-MCNC: 8.2 G/DL — LOW (ref 11.5–15.5)
IMM GRANULOCYTES NFR BLD AUTO: 0.2 % — SIGNIFICANT CHANGE UP (ref 0–1.5)
INR BLD: 1.14 RATIO — SIGNIFICANT CHANGE UP (ref 0.88–1.16)
LYMPHOCYTES # BLD AUTO: 1.02 K/UL — SIGNIFICANT CHANGE UP (ref 1–3.3)
LYMPHOCYTES # BLD AUTO: 18.6 % — SIGNIFICANT CHANGE UP (ref 13–44)
MCHC RBC-ENTMCNC: 29.2 PG — SIGNIFICANT CHANGE UP (ref 27–34)
MCHC RBC-ENTMCNC: 32.8 GM/DL — SIGNIFICANT CHANGE UP (ref 32–36)
MCV RBC AUTO: 89 FL — SIGNIFICANT CHANGE UP (ref 80–100)
MONOCYTES # BLD AUTO: 0.39 K/UL — SIGNIFICANT CHANGE UP (ref 0–0.9)
MONOCYTES NFR BLD AUTO: 7.1 % — SIGNIFICANT CHANGE UP (ref 2–14)
NEUTROPHILS # BLD AUTO: 3.97 K/UL — SIGNIFICANT CHANGE UP (ref 1.8–7.4)
NEUTROPHILS NFR BLD AUTO: 72.5 % — SIGNIFICANT CHANGE UP (ref 43–77)
NRBC # BLD: 0 /100 WBCS — SIGNIFICANT CHANGE UP (ref 0–0)
PLATELET # BLD AUTO: 179 K/UL — SIGNIFICANT CHANGE UP (ref 150–400)
POTASSIUM SERPL-MCNC: 4.9 MMOL/L — SIGNIFICANT CHANGE UP (ref 3.5–5.3)
POTASSIUM SERPL-SCNC: 4.9 MMOL/L — SIGNIFICANT CHANGE UP (ref 3.5–5.3)
PROT SERPL-MCNC: 6.9 G/DL — SIGNIFICANT CHANGE UP (ref 6–8.3)
PROTHROM AB SERPL-ACNC: 13.1 SEC — HIGH (ref 10–12.9)
RBC # BLD: 2.81 M/UL — LOW (ref 3.8–5.2)
RBC # FLD: 12.7 % — SIGNIFICANT CHANGE UP (ref 10.3–14.5)
SODIUM SERPL-SCNC: 137 MMOL/L — SIGNIFICANT CHANGE UP (ref 135–145)
WBC # BLD: 5.48 K/UL — SIGNIFICANT CHANGE UP (ref 3.8–10.5)
WBC # FLD AUTO: 5.48 K/UL — SIGNIFICANT CHANGE UP (ref 3.8–10.5)

## 2019-10-07 PROCEDURE — 70498 CT ANGIOGRAPHY NECK: CPT | Mod: 26

## 2019-10-07 PROCEDURE — 70450 CT HEAD/BRAIN W/O DYE: CPT | Mod: 26,59

## 2019-10-07 PROCEDURE — 70496 CT ANGIOGRAPHY HEAD: CPT | Mod: 26

## 2019-10-07 PROCEDURE — 93010 ELECTROCARDIOGRAM REPORT: CPT

## 2019-10-07 PROCEDURE — 99285 EMERGENCY DEPT VISIT HI MDM: CPT

## 2019-10-07 NOTE — ED PROVIDER NOTE - PHYSICAL EXAMINATION
CONSTITUTIONAL: NAD, awake, alert  HEAD: Normocephalic; atraumatic  EYES: EOMI, no nystagmus  ENMT: External appears normal, MMM  CARD: Normal Sl, S2; no audible murmurs  RESP: normal wob, lungs ctab  ABD: soft, non-distended; non-tender  EXT: no edema, normal ROM in all four extremities  SKIN: Warm, dry, no rashes  NEURO: aaox3, moving all extremities spontaneously, + 0/5 strength LUE and LLE CONSTITUTIONAL: NAD, awake, alert  HEAD: Normocephalic; atraumatic  EYES: EOMI, no nystagmus, PERRL  ENMT: External appears normal, MMM  CARD: Normal Sl, S2; no audible murmurs  RESP: normal wob, lungs ctab  ABD: soft, non-distended; non-tender  EXT: no edema, normal ROM in all four extremities  SKIN: Warm, dry, no rashes  NEURO: aaox3, moving all extremities spontaneously, + 0/5 strength LUE and LLE, left sided facial droop (old per pt)  NIH stroke scale 11

## 2019-10-07 NOTE — CONSULT NOTE ADULT - SUBJECTIVE AND OBJECTIVE BOX
HPI: 58F w/ DM, HLD, HTN, CVA in November w/ L sided residual weakness p/w seizures today. Family at bedside states patient was having trouble looking at them, eyes deviated to L. States seizure lated a few minutes. 4 total. Patient was alert the whole time. Denies nausea, vomiting, headache, trauma, + incontinence. Seizure prophylaxis after TIA in July. Stopped a while after. Denies fevers, chills, cough. States her LLE weakness has worsened since the seizure.    (Stroke only)  NIHSS:   MRS:   ICH:     REVIEW OF SYSTEMS  General:	  Skin/Breast:	  Ophthalmologic:  ENMT:	  Respiratory and Thorax:	  Cardiovascular:	  Gastrointestinal:	  Genitourinary:	  Musculoskeletal:	  Neurological:	  Psychiatric:	  Hematology/Lymphatics:	  Endocrine:	  Allergic/Immunologic:	    A 10-system ROS was performed and is negative except for those items noted above and/or in the HPI.    PAST MEDICAL & SURGICAL HISTORY:  Hypertension  CVA (cerebral vascular accident)  No significant past surgical history    FAMILY HISTORY:    SOCIAL HISTORY:   T/E/D:   Occupation:   Lives with:     MEDICATIONS (HOME):  Home Medications:    MEDICATIONS  (STANDING):    MEDICATIONS  (PRN):    ALLERGIES/INTOLERANCES:  Allergies  No Known Drug Allergies  shellfish (Anaphylaxis)    Intolerances    VITALS & EXAMINATION:  Vital Signs Last 24 Hrs  T(C): 36.8 (07 Oct 2019 21:10), Max: 36.8 (07 Oct 2019 21:10)  T(F): 98.2 (07 Oct 2019 21:10), Max: 98.2 (07 Oct 2019 21:10)  HR: 116 (07 Oct 2019 21:10) (116 - 116)  BP: 152/96 (07 Oct 2019 21:10) (152/96 - 152/96)  BP(mean): --  RR: 18 (07 Oct 2019 21:10) (18 - 18)  SpO2: --    General:  Constitutional: Obese Female, appears stated age, in no apparent distress including pain  Head: Normocephalic & atraumatic.  ENT: Patent ear canals, intact TM, mucus membranes moist & pink, neck supple, no lymphadenopathy.   Respiratory: Patent airway. All lung fields are clear to auscultation bilaterally.  Extremities: No cyanosis, clubbing, or edema.  Skin: No rashes, bruising, or discoloration.    Cardiovascular (>2): RRR no murmurs. Carotid pulsations symmetric, no bruits. Normal capillary beds refill, 1-2 seconds or less.     Neurological (>12):  MS: Awake, alert, oriented to person, place, situation, time. Normal affect. Follows all commands.    Language: Speech is clear, fluent with good repetition & comprehension (able to name objects___)    CNs: PERRLA (R = 3mm, L = 3mm). VFF. EOMI no nystagmus, no diplopia. V1-3 intact to LT/pinprick, well developed masseter muscles b/l. No facial asymmetry b/l, full eye closure strength b/l. Hearing grossly normal (rubbing fingers) b/l. Symmetric palate elevation in midline. Gag reflex deferred. Head turning & shoulder shrug intact b/l. Tongue midline, normal movements, no atrophy.    Fundoscopic: pale w/ sharp discs margins No vascular changes.      Motor: Normal muscle bulk & tone. No noticeable tremor or seizure. No pronator drift.              Deltoid	Biceps	Triceps	Wrist	Finger ABd	   R	5	5	5	5	5		5 	  L	5	5	5	5	5		5    	H-Flex	H-Ext	H-ABd	H-ADd	K-Flex	K-Ext	D-Flex	P-Flex  R	5	5	5	5	5	5	5	5 	   L	5	5	5	5	5	5	5	5	     Sensation: Intact to LT/PP/Temp/Vibration/Position b/l throughout.     Cortical: Extinction on DSS (neglect): none    Reflexes:              Biceps(C5)       BR(C6)     Triceps(C7)               Patellar(L4)    Achilles(S1)    Plantar Resp  R	2	          2	             2		        2		    2		Down   L	2	          2	             2		        2		    2		Down     Coordination: intact rapid-alt movements. No dysmetria to FTN/HTS    Gait: Normal Romberg. No postural instability. Normal stance and tandem gait.     LABORATORY:  CBC                       8.2    5.48  )-----------( 179      ( 07 Oct 2019 22:56 )             25.0     Chem       LFTs   Coagulopathy PT/INR - ( 07 Oct 2019 22:56 )   PT: 13.1 sec;   INR: 1.14 ratio         PTT - ( 07 Oct 2019 22:56 )  PTT:30.5 sec  Lipid Panel   A1c   Cardiac enzymes     U/A   CSF  Immunological  Other    STUDIES & IMAGING:  Studies (EKG, EEG, EMG, etc):     Radiology (XR, CT, MR, U/S, TTE/JAREN): HPI: 58F w/ DM, HLD, HTN, prior R MCA stroke in November due to R ICA dissection/occlusion s/p ICA stent, with L sided residual weakness. Family at bedside as well as patient assist in providing the history. They report that at 8:30 pm, she started having a left gaze deviation and was having shaking episodes. She reports that she was conscious during these episodes. Did not state how long episode of shaking was. Endorses having urinary incontinence. No tongue biting. She reports that     Family at bedside states patient was having trouble looking at them, eyes deviated to L. States seizure lasted a few minutes. 4 total. Patient was alert the whole time. Denies nausea, vomiting, headache, trauma, + incontinence. Seizure prophylaxis after TIA in July. Stopped a while after. Denies fevers, chills, cough. States her LLE weakness has worsened since the seizure.    (Stroke only)  NIHSS: 8  MRS: 4  ICH: 0    REVIEW OF SYSTEMS    A 10-system ROS was performed and is negative except for those items noted above and/or in the HPI.    PAST MEDICAL & SURGICAL HISTORY:  Hypertension  CVA (cerebral vascular accident)  No significant past surgical history    FAMILY HISTORY:    SOCIAL HISTORY:   T/E/D:   Occupation:   Lives with:     MEDICATIONS (HOME):  Home Medications:    MEDICATIONS  (STANDING):    MEDICATIONS  (PRN):    ALLERGIES/INTOLERANCES:  Allergies  No Known Drug Allergies  shellfish (Anaphylaxis)    Intolerances    VITALS & EXAMINATION:  Vital Signs Last 24 Hrs  T(C): 36.8 (07 Oct 2019 21:10), Max: 36.8 (07 Oct 2019 21:10)  T(F): 98.2 (07 Oct 2019 21:10), Max: 98.2 (07 Oct 2019 21:10)  HR: 116 (07 Oct 2019 21:10) (116 - 116)  BP: 152/96 (07 Oct 2019 21:10) (152/96 - 152/96)  BP(mean): --  RR: 18 (07 Oct 2019 21:10) (18 - 18)  SpO2: --    Neurological (>12):  MS: Awake, alert, oriented to person, place, situation, time. Normal affect. Follows all commands.    Language: Speech is clear, fluent with good repetition & comprehension (able to name objects___)    CNs: PERRLA (R = 3mm, L = 3mm). VFF. EOMI no nystagmus, no diplopia. V1-3 intact to LT/pinprick, well developed masseter muscles b/l. No facial asymmetry b/l, full eye closure strength b/l. Hearing grossly normal (rubbing fingers) b/l. Symmetric palate elevation in midline. Gag reflex deferred. Head turning & shoulder shrug intact b/l. Tongue midline, normal movements, no atrophy.    Fundoscopic: pale w/ sharp discs margins No vascular changes.      Motor: Normal muscle bulk & tone. No noticeable tremor or seizure. No pronator drift.              Deltoid	Biceps	Triceps	Wrist	Finger ABd	   R	5	5	5	5	5		5 	  L	5	5	5	5	5		5    	H-Flex	H-Ext	H-ABd	H-ADd	K-Flex	K-Ext	D-Flex	P-Flex  R	5	5	5	5	5	5	5	5 	   L	5	5	5	5	5	5	5	5	     Sensation: Intact to LT/PP/Temp/Vibration/Position b/l throughout.     Cortical: Extinction on DSS (neglect): none    Reflexes:              Biceps(C5)       BR(C6)     Triceps(C7)               Patellar(L4)    Achilles(S1)    Plantar Resp  R	2	          2	             2		        2		    2		Down   L	2	          2	             2		        2		    2		Down     Coordination: intact rapid-alt movements. No dysmetria to FTN/HTS    Gait: Normal Romberg. No postural instability. Normal stance and tandem gait.     LABORATORY:  CBC                       8.2    5.48  )-----------( 179      ( 07 Oct 2019 22:56 )             25.0     Chem       LFTs   Coagulopathy PT/INR - ( 07 Oct 2019 22:56 )   PT: 13.1 sec;   INR: 1.14 ratio         PTT - ( 07 Oct 2019 22:56 )  PTT:30.5 sec  Lipid Panel   A1c   Cardiac enzymes     U/A   CSF  Immunological  Other    STUDIES & IMAGING:  Studies (EKG, EEG, EMG, etc):     Radiology (XR, CT, MR, U/S, TTE/JAREN): HPI: 58F w/ DM, HLD, HTN, prior R MCA stroke in November due to R ICA dissection/occlusion s/p ICA stent, with L sided residual weakness. Family at bedside as well as patient assist in providing the history. They report that at 8:30 pm, she started having a left gaze deviation and was having shaking episodes. She reports that she was conscious during these episodes. Did not state how long episode of shaking was. Endorses having urinary incontinence. No tongue biting. She reports that she had 4 episodes, each episode lasting a few minutes. Per the family, during these episodes, her eyes deviated to L. She reports that shaking was on the left side. Denies any prior history of similar episodes.    She reports that in July 2019 she presented to [facility] with TIA-like symptoms on the left side. She was started on antiepileptics at the time, but she does not remember which medication. She is no longer on any AEDs.    (Stroke only)  NIHSS: 8  MRS: 4  ICH: 0    REVIEW OF SYSTEMS    A 10-system ROS was performed and is negative except for those items noted above and/or in the HPI.    PAST MEDICAL & SURGICAL HISTORY:  Hypertension  CVA (cerebral vascular accident)  No significant past surgical history    FAMILY HISTORY:    SOCIAL HISTORY:   T/E/D:   Occupation:   Lives with:     MEDICATIONS (HOME):  Home Medications:    MEDICATIONS  (STANDING):    MEDICATIONS  (PRN):    ALLERGIES/INTOLERANCES:  Allergies  No Known Drug Allergies  shellfish (Anaphylaxis)    Intolerances    VITALS & EXAMINATION:  Vital Signs Last 24 Hrs  T(C): 36.8 (07 Oct 2019 21:10), Max: 36.8 (07 Oct 2019 21:10)  T(F): 98.2 (07 Oct 2019 21:10), Max: 98.2 (07 Oct 2019 21:10)  HR: 116 (07 Oct 2019 21:10) (116 - 116)  BP: 152/96 (07 Oct 2019 21:10) (152/96 - 152/96)  BP(mean): --  RR: 18 (07 Oct 2019 21:10) (18 - 18)  SpO2: --    Neurological (>12):  MS: Awake, alert, oriented to person, place, situation, time. Normal affect. Follows all commands.    Language: Speech is clear, fluent     CNs: PERRLA (R = 3mm, L = 3mm). VFF. EOMI no nystagmus, no diplopia. V1-3 intact to LT/pinprick, well developed masseter muscles b/l. No facial asymmetry b/l, full eye closure strength b/l. Hearing grossly normal (rubbing fingers) b/l. Symmetric palate elevation in midline. Gag reflex deferred. Head turning & shoulder shrug intact b/l. Tongue midline, normal movements, no atrophy.    Fundoscopic: pale w/ sharp discs margins No vascular changes.      Motor: Normal muscle bulk & tone. No noticeable tremor or seizure. No pronator drift.              Deltoid	Biceps	Triceps	Wrist	Finger ABd	   R	5	5	5	5	5		5 	  L	5	5	5	5	5		5    	H-Flex	H-Ext	H-ABd	H-ADd	K-Flex	K-Ext	D-Flex	P-Flex  R	5	5	5	5	5	5	5	5 	   L	5	5	5	5	5	5	5	5	     Sensation: Intact to LT/PP/Temp/Vibration/Position b/l throughout.     Cortical: Extinction on DSS (neglect): none    Reflexes:              Biceps(C5)       BR(C6)     Triceps(C7)               Patellar(L4)    Achilles(S1)    Plantar Resp  R	2	          2	             2		        2		    2		Down   L	2	          2	             2		        2		    2		Down     Coordination: intact rapid-alt movements. No dysmetria to FTN/HTS    Gait: Normal Romberg. No postural instability. Normal stance and tandem gait.     LABORATORY:  CBC                       8.2    5.48  )-----------( 179      ( 07 Oct 2019 22:56 )             25.0     Chem       LFTs   Coagulopathy PT/INR - ( 07 Oct 2019 22:56 )   PT: 13.1 sec;   INR: 1.14 ratio         PTT - ( 07 Oct 2019 22:56 )  PTT:30.5 sec  Lipid Panel   A1c   Cardiac enzymes     U/A   CSF  Immunological  Other    STUDIES & IMAGING:  Studies (EKG, EEG, EMG, etc):     Radiology (XR, CT, MR, U/S, TTE/JAREN): HPI: 58F w/ DM, HLD, HTN, prior R MCA stroke in November due to R ICA dissection/occlusion s/p ICA stent, with L sided residual weakness. Family at bedside as well as patient assist in providing the history. They report that at 8:30 pm, she started having a left gaze deviation and was having shaking episodes. She reports that she was conscious during these episodes. Did not state how long episode of shaking was. Endorses having urinary incontinence. No tongue biting. She reports that she had 4 episodes, each episode lasting a few minutes. Per the family, during these episodes, her eyes deviated to L. She reports that shaking was on the left side. Denies any prior history of similar episodes.    She reports that in July 2019 she presented to [facility] with TIA-like symptoms on the left side. She was started on antiepileptics at the time, but she does not remember which medication. She is no longer on any AEDs.    (Stroke only)  NIHSS: 8  MRS: 4  ICH: 0    REVIEW OF SYSTEMS    A 10-system ROS was performed and is negative except for those items noted above and/or in the HPI.    PAST MEDICAL & SURGICAL HISTORY:  Hypertension  CVA (cerebral vascular accident)  No significant past surgical history    FAMILY HISTORY:    SOCIAL HISTORY:   T/E/D:   Occupation:   Lives with:     MEDICATIONS (HOME):  Home Medications:    MEDICATIONS  (STANDING):    MEDICATIONS  (PRN):    ALLERGIES/INTOLERANCES:  Allergies  No Known Drug Allergies  shellfish (Anaphylaxis)    Intolerances    VITALS & EXAMINATION:  Vital Signs Last 24 Hrs  T(C): 36.8 (07 Oct 2019 21:10), Max: 36.8 (07 Oct 2019 21:10)  T(F): 98.2 (07 Oct 2019 21:10), Max: 98.2 (07 Oct 2019 21:10)  HR: 116 (07 Oct 2019 21:10) (116 - 116)  BP: 152/96 (07 Oct 2019 21:10) (152/96 - 152/96)  BP(mean): --  RR: 18 (07 Oct 2019 21:10) (18 - 18)  SpO2: --    Neurological (>12):  MS: Awake, alert, oriented to person, place, situation, time. Normal affect. Follows all commands.    Language: Speech is clear, fluent     CNs: VFF. left gaze preference, but able to cross midline. EOMI, no diplopia. No facial asymmetry b/l.     Motor: No pronator drift.              Deltoid	Biceps	Triceps	   R	5	5	5	5 	  L	0	0	0           0    	H-Flex	K-Flex	K-Ext	D-Flex	P-Flex  R	5	5	5	5	5 	   L	2	-	-	0	0	     Sensation: Intact to LT    Coordination:  No dysmetria to FTN on R, unable to test on L    Gait: deferred    LABORATORY:  CBC                       8.2    5.48  )-----------( 179      ( 07 Oct 2019 22:56 )             25.0     Chem       LFTs   Coagulopathy PT/INR - ( 07 Oct 2019 22:56 )   PT: 13.1 sec;   INR: 1.14 ratio         PTT - ( 07 Oct 2019 22:56 )  PTT:30.5 sec  Lipid Panel   A1c   Cardiac enzymes     U/A   CSF  Immunological  Other    STUDIES & IMAGING:  Studies (EKG, EEG, EMG, etc):     Radiology (XR, CT, MR, U/S, TTE/JAREN):  < from: CT Brain Stroke Protocol (10.07.19 @ 23:37) >  FINDINGS:  Right parietotemporal encephalomalacia and gliosis, consistent with   chronic MCA territory infarct. Associated ex vacuo dilatation of the   right lateral ventricle. No acute hemorrhage, mass effect, midline shift,   hydrocephalus, or extra-axial fluid collections. Mild patchy   hypoattenuation throughout the white matter, likely secondary to chronic   microvascular changes. No CT evidence of acute vascular territorial   infarction.    Paranasal sinuses and mastoid air cells are clear. Intraorbital contents   are unremarkable. Visualized osseous structures are intact.    IMPRESSION:    No acute intracranial hemorrhage, mass effect, or evidence of acute   vascular territorial infarction. Chronic right MCA territory infarct.    If clinical symptoms persist or worsen, more sensitive evaluation for   acute ischemia with brain MRI may be obtained, if no contraindications   exist.    < end of copied text >

## 2019-10-07 NOTE — ED PROVIDER NOTE - CLINICAL SUMMARY MEDICAL DECISION MAKING FREE TEXT BOX
58F w/ seziures, s/p CVA w/ residual L sided weakness, no recent illnesses, stopped Keppra after starting it for prophylaxis, head ct, labs, neuro cs 58F w/ seziures, s/p CVA w/ residual L sided weakness, no recent illnesses, stopped Keppra after starting it for prophylaxis, head ct, labs, neuro cs    Attending MDM: see attestation below

## 2019-10-07 NOTE — ED PROVIDER NOTE - NS ED ROS FT
General: denies fever, chills  HENT: denies nasal congestion, rhinorrhea  Eyes: denies visual changes, blurred vision  CV: denies chest pain, palpitations  Resp: denies difficulty breathing, cough  Abdominal: denies nausea, vomiting, diarrhea, abdominal pain  MSK: denies muscle aches, leg swelling  Neuro: denies headaches, + weakness on the L side, + seizures   Skin: denies rashes, bruises

## 2019-10-07 NOTE — ED PROVIDER NOTE - CARE PLAN
Principal Discharge DX:	Seizure Principal Discharge DX:	Seizure-like activity  Secondary Diagnosis:	Cerebrovascular accident (CVA), unspecified mechanism

## 2019-10-07 NOTE — ED ADULT NURSE NOTE - CHPI ED NUR SYMPTOMS NEG
no fever/no vomiting/no nausea/no weakness/no dizziness/no change in level of consciousness/no confusion/no blurred vision/no loss of consciousness/no numbness

## 2019-10-07 NOTE — ED ADULT NURSE NOTE - NSIMPLEMENTINTERV_GEN_ALL_ED
Implemented All Fall with Harm Risk Interventions:  [place] to call system. Call bell, personal items and telephone within reach. Instruct patient to call for assistance. Room bathroom lighting operational. Non-slip footwear when patient is off stretcher. Physically safe environment: no spills, clutter or unnecessary equipment. Stretcher in lowest position, wheels locked, appropriate side rails in place. Provide visual cue, wrist band, yellow gown, etc. Monitor gait and stability. Monitor for mental status changes and reorient to person, place, and time. Review medications for side effects contributing to fall risk. Reinforce activity limits and safety measures with patient and family. Provide visual clues: red socks.

## 2019-10-07 NOTE — ED ADULT NURSE NOTE - OBJECTIVE STATEMENT
58 YOF A&Ox3 with pmh of stroke from Nov 2018 (residual left sided weakness of arm & leg and left sided facial droop) & loop recorder brought in by EMS to ED for 1 seizure episode. EMS states pt was sitting at home in her wheelchair when she began to have a focal seizure, where she was completely aware and was given 5mg of valium by EMS. pt was taken off of preventative seizure medication in June 2019 and that she fell during this seizure but did not hit her head. pt denies having a seizure in the past. at this time pt denies sob, chest pain, n/v/d, headaches, dizziness, blurry vision. pt placed on cardiac monitor showing sinus tachycardia of 116. EMS states pt is most likely tachycardic due to being post-seizure. pt appears to be in no acute distress. safety maintained.

## 2019-10-07 NOTE — ED PROVIDER NOTE - OBJECTIVE STATEMENT
58F w/ DM, HLD, HTN, CVA in November w/ L sided residual weakness p/w seizures today. Family at bedside states patient was having trouble looking at them, eyes deviated to L. States seizure lated a few minutes. 4 total. Patient was alert the whole time. Denies nausea, vomiting, headache, trauma, + incontinence. Seizure prophylaxis after TIA in July. Stopped a while after. Denies fevers, chills, cough. States her LLE weakness has worsened since the seizure

## 2019-10-08 DIAGNOSIS — R56.9 UNSPECIFIED CONVULSIONS: ICD-10-CM

## 2019-10-08 LAB
BLD GP AB SCN SERPL QL: NEGATIVE — SIGNIFICANT CHANGE UP
GLUCOSE BLDC GLUCOMTR-MCNC: 109 MG/DL — HIGH (ref 70–99)
GLUCOSE BLDC GLUCOMTR-MCNC: 149 MG/DL — HIGH (ref 70–99)
GLUCOSE BLDC GLUCOMTR-MCNC: 167 MG/DL — HIGH (ref 70–99)
GLUCOSE BLDC GLUCOMTR-MCNC: 195 MG/DL — HIGH (ref 70–99)
RH IG SCN BLD-IMP: POSITIVE — SIGNIFICANT CHANGE UP

## 2019-10-08 PROCEDURE — 99223 1ST HOSP IP/OBS HIGH 75: CPT

## 2019-10-08 PROCEDURE — 95951: CPT | Mod: 26

## 2019-10-08 PROCEDURE — 95816 EEG AWAKE AND DROWSY: CPT | Mod: 26

## 2019-10-08 RX ORDER — SODIUM CHLORIDE 9 MG/ML
1000 INJECTION INTRAMUSCULAR; INTRAVENOUS; SUBCUTANEOUS ONCE
Refills: 0 | Status: COMPLETED | OUTPATIENT
Start: 2019-10-08 | End: 2019-10-08

## 2019-10-08 RX ORDER — INSULIN LISPRO 100/ML
VIAL (ML) SUBCUTANEOUS
Refills: 0 | Status: DISCONTINUED | OUTPATIENT
Start: 2019-10-08 | End: 2019-10-15

## 2019-10-08 RX ORDER — LACOSAMIDE 50 MG/1
100 TABLET ORAL ONCE
Refills: 0 | Status: DISCONTINUED | OUTPATIENT
Start: 2019-10-08 | End: 2019-10-08

## 2019-10-08 RX ORDER — ONDANSETRON 8 MG/1
4 TABLET, FILM COATED ORAL ONCE
Refills: 0 | Status: COMPLETED | OUTPATIENT
Start: 2019-10-08 | End: 2019-10-08

## 2019-10-08 RX ORDER — DEXTROSE 50 % IN WATER 50 %
15 SYRINGE (ML) INTRAVENOUS ONCE
Refills: 0 | Status: DISCONTINUED | OUTPATIENT
Start: 2019-10-08 | End: 2019-10-15

## 2019-10-08 RX ORDER — DEXTROSE 50 % IN WATER 50 %
25 SYRINGE (ML) INTRAVENOUS ONCE
Refills: 0 | Status: DISCONTINUED | OUTPATIENT
Start: 2019-10-08 | End: 2019-10-15

## 2019-10-08 RX ORDER — LOSARTAN POTASSIUM 100 MG/1
50 TABLET, FILM COATED ORAL DAILY
Refills: 0 | Status: DISCONTINUED | OUTPATIENT
Start: 2019-10-08 | End: 2019-10-09

## 2019-10-08 RX ORDER — LACOSAMIDE 50 MG/1
300 TABLET ORAL ONCE
Refills: 0 | Status: DISCONTINUED | OUTPATIENT
Start: 2019-10-08 | End: 2019-10-08

## 2019-10-08 RX ORDER — LACOSAMIDE 50 MG/1
200 TABLET ORAL ONCE
Refills: 0 | Status: DISCONTINUED | OUTPATIENT
Start: 2019-10-08 | End: 2019-10-08

## 2019-10-08 RX ORDER — SODIUM CHLORIDE 9 MG/ML
1000 INJECTION, SOLUTION INTRAVENOUS
Refills: 0 | Status: DISCONTINUED | OUTPATIENT
Start: 2019-10-08 | End: 2019-10-15

## 2019-10-08 RX ORDER — INSULIN GLARGINE 100 [IU]/ML
16 INJECTION, SOLUTION SUBCUTANEOUS AT BEDTIME
Refills: 0 | Status: DISCONTINUED | OUTPATIENT
Start: 2019-10-08 | End: 2019-10-15

## 2019-10-08 RX ORDER — DEXTROSE 50 % IN WATER 50 %
12.5 SYRINGE (ML) INTRAVENOUS ONCE
Refills: 0 | Status: DISCONTINUED | OUTPATIENT
Start: 2019-10-08 | End: 2019-10-15

## 2019-10-08 RX ORDER — ENOXAPARIN SODIUM 100 MG/ML
40 INJECTION SUBCUTANEOUS DAILY
Refills: 0 | Status: DISCONTINUED | OUTPATIENT
Start: 2019-10-08 | End: 2019-10-15

## 2019-10-08 RX ORDER — ERGOCALCIFEROL 1.25 MG/1
1 CAPSULE ORAL
Qty: 0 | Refills: 0 | DISCHARGE

## 2019-10-08 RX ORDER — ACETAMINOPHEN 500 MG
650 TABLET ORAL EVERY 6 HOURS
Refills: 0 | Status: DISCONTINUED | OUTPATIENT
Start: 2019-10-08 | End: 2019-10-15

## 2019-10-08 RX ORDER — GLUCAGON INJECTION, SOLUTION 0.5 MG/.1ML
1 INJECTION, SOLUTION SUBCUTANEOUS ONCE
Refills: 0 | Status: DISCONTINUED | OUTPATIENT
Start: 2019-10-08 | End: 2019-10-15

## 2019-10-08 RX ORDER — INSULIN LISPRO 100/ML
VIAL (ML) SUBCUTANEOUS AT BEDTIME
Refills: 0 | Status: DISCONTINUED | OUTPATIENT
Start: 2019-10-08 | End: 2019-10-15

## 2019-10-08 RX ORDER — LACOSAMIDE 50 MG/1
100 TABLET ORAL EVERY 12 HOURS
Refills: 0 | Status: DISCONTINUED | OUTPATIENT
Start: 2019-10-08 | End: 2019-10-10

## 2019-10-08 RX ORDER — ATORVASTATIN CALCIUM 80 MG/1
80 TABLET, FILM COATED ORAL AT BEDTIME
Refills: 0 | Status: DISCONTINUED | OUTPATIENT
Start: 2019-10-08 | End: 2019-10-15

## 2019-10-08 RX ORDER — CHOLECALCIFEROL (VITAMIN D3) 125 MCG
2000 CAPSULE ORAL DAILY
Refills: 0 | Status: DISCONTINUED | OUTPATIENT
Start: 2019-10-08 | End: 2019-10-15

## 2019-10-08 RX ORDER — CLOPIDOGREL BISULFATE 75 MG/1
75 TABLET, FILM COATED ORAL DAILY
Refills: 0 | Status: DISCONTINUED | OUTPATIENT
Start: 2019-10-08 | End: 2019-10-15

## 2019-10-08 RX ORDER — ASPIRIN/CALCIUM CARB/MAGNESIUM 324 MG
81 TABLET ORAL DAILY
Refills: 0 | Status: DISCONTINUED | OUTPATIENT
Start: 2019-10-08 | End: 2019-10-15

## 2019-10-08 RX ADMIN — INSULIN GLARGINE 16 UNIT(S): 100 INJECTION, SOLUTION SUBCUTANEOUS at 22:01

## 2019-10-08 RX ADMIN — Medication 1: at 09:02

## 2019-10-08 RX ADMIN — LACOSAMIDE 120 MILLIGRAM(S): 50 TABLET ORAL at 12:52

## 2019-10-08 RX ADMIN — ONDANSETRON 4 MILLIGRAM(S): 8 TABLET, FILM COATED ORAL at 09:01

## 2019-10-08 RX ADMIN — SODIUM CHLORIDE 1000 MILLILITER(S): 9 INJECTION INTRAMUSCULAR; INTRAVENOUS; SUBCUTANEOUS at 03:11

## 2019-10-08 RX ADMIN — LACOSAMIDE 140 MILLIGRAM(S): 50 TABLET ORAL at 12:39

## 2019-10-08 RX ADMIN — CLOPIDOGREL BISULFATE 75 MILLIGRAM(S): 75 TABLET, FILM COATED ORAL at 18:12

## 2019-10-08 RX ADMIN — ATORVASTATIN CALCIUM 80 MILLIGRAM(S): 80 TABLET, FILM COATED ORAL at 22:37

## 2019-10-08 RX ADMIN — Medication 650 MILLIGRAM(S): at 06:42

## 2019-10-08 RX ADMIN — Medication 650 MILLIGRAM(S): at 08:00

## 2019-10-08 RX ADMIN — ENOXAPARIN SODIUM 40 MILLIGRAM(S): 100 INJECTION SUBCUTANEOUS at 15:28

## 2019-10-08 RX ADMIN — ONDANSETRON 4 MILLIGRAM(S): 8 TABLET, FILM COATED ORAL at 03:06

## 2019-10-08 RX ADMIN — Medication 2 MILLIGRAM(S): at 12:10

## 2019-10-08 RX ADMIN — Medication 81 MILLIGRAM(S): at 18:12

## 2019-10-08 RX ADMIN — LACOSAMIDE 120 MILLIGRAM(S): 50 TABLET ORAL at 18:12

## 2019-10-08 RX ADMIN — ONDANSETRON 4 MILLIGRAM(S): 8 TABLET, FILM COATED ORAL at 05:09

## 2019-10-08 RX ADMIN — LOSARTAN POTASSIUM 50 MILLIGRAM(S): 100 TABLET, FILM COATED ORAL at 18:18

## 2019-10-08 NOTE — H&P ADULT - ASSESSMENT
Impression: Possible to have had focal motor seizure w/o impaired awareness as cause for current symptoms, in the setting of old R MCA infarct.    Plan:  -admit to EMU  -continuous EEG  -will decide on AEDs based upon EEG Impression: Possible to have had focal motor seizure w/o impaired awareness as cause for current symptoms, in the setting of old R MCA infarct.    Plan:  -admit to EMU  -continuous EEG  -will decide on AEDs based upon EEG  -c/w home meds    case d/w Dr. Vazquez, epilepsy fellow

## 2019-10-08 NOTE — PHYSICAL THERAPY INITIAL EVALUATION ADULT - PLANNED THERAPY INTERVENTIONS, PT EVAL
strengthening/balance training/bed mobility training/gait training/transfer training/GOAL: Stair Negotiation Training: Patient will be able to negotiate up & down 1 flight of stairs with unilateral rail, step to gait pattern, in 4 weeks.

## 2019-10-08 NOTE — PHYSICAL THERAPY INITIAL EVALUATION ADULT - MANUAL MUSCLE TESTING RESULTS, REHAB EVAL
Strength is grossly at least 3/5 throughout on RUE/BLE and 0/5 for LUE/L ankle/grossly assessed due to

## 2019-10-08 NOTE — ED ADULT NURSE REASSESSMENT NOTE - NS ED NURSE REASSESS COMMENT FT1
code stroke called 23:06 by MD bush. MD bush states pt has new onset left leg weakness. as per pt, pt states left leg weakness is not new. blood work obtained, pt taken to CT scan, FS completed: 178. pt appears to be in no acute distress. safety maintained.

## 2019-10-08 NOTE — PHYSICAL THERAPY INITIAL EVALUATION ADULT - ADDITIONAL COMMENTS
Pt lives with her son in a 2nd floor apt in a private house with 4 steps to enter and 15 steps inside, +B HR. Pt states her son assists pt when needed with functional mobility/ ADLs. Pt owns commode and shower bench. Pt uses a AFO and knee brace on LLE for ambulation. Pt uses a narrow based quad cane for ambulation.

## 2019-10-08 NOTE — PHYSICAL THERAPY INITIAL EVALUATION ADULT - PERTINENT HX OF CURRENT PROBLEM, REHAB EVAL
Pt is a 58 y.o. female w/ DM, HLD, HTN, prior R MCA stroke in November due to R ICA dissection/occlusion s/p ICA stent, with L sided residual weakness. They report that at 8:30 pm, she started having a left gaze deviation and was having shaking episodes. She reports that she was conscious during these episodes. Did not state how long episode of shaking was. Endorses having urinary incontinence. Continued below.

## 2019-10-08 NOTE — PHYSICAL THERAPY INITIAL EVALUATION ADULT - PRECAUTIONS/LIMITATIONS, REHAB EVAL
She reports that she had 4 episodes, each episode lasting a few minutes. Per the family, during these episodes, her eyes deviated to L. She reports that shaking was on the left side. Denies any prior history of similar episodes. (-) Brain Stroke Protocol CT 10/7/19. CT angiography neck 10/7/19: Patent right common/internal carotid artery stent. Bilateral cervical carotid and vertebral arteries are otherwise widely patent without evidence of flow-limiting stenosis or acute dissection. CT angiography brain 10/7/19: Intracranial atherosclerosis including to mild to moderate stenoses at the bilateral cavernous internal carotid arteries. No proximal large vessel occlusion. 1.4 cm right thyroid lobe nodule. She reports that she had 4 episodes, each episode lasting a few minutes. Per the family, during these episodes, her eyes deviated to L. She reports that shaking was on the left side. Denies any prior history of similar episodes. (-) Brain Stroke Protocol CT 10/7/19. CT angiography neck 10/7/19: Patent right common/internal carotid artery stent. Bilateral cervical carotid and vertebral arteries are otherwise widely patent without evidence of flow-limiting stenosis or acute dissection. CT angiography brain 10/7/19: Intracranial atherosclerosis including to mild to moderate stenoses at the bilateral cavernous internal carotid arteries. No proximal large vessel occlusion. 1.4 cm right thyroid lobe nodule./fall precautions

## 2019-10-08 NOTE — H&P ADULT - ATTENDING COMMENTS
54 years old with epilepsy risk factor(R MCA) admitted with stereotype events concerning for focal seizures with impaired awareness.  stable head CT    Plan: emergent VEEG, clinical events witnessed c/w R focal seizures with impaired awareness    PLan: Ativan 2 mg STAT and load Vimpat 300 mg x1 followed by 100 IV BID.

## 2019-10-08 NOTE — H&P ADULT - HISTORY OF PRESENT ILLNESS
58F w/ DM, HLD, HTN, prior R MCA stroke in November due to R ICA dissection/occlusion s/p ICA stent, with L sided residual weakness. Family at bedside as well as patient assist in providing the history. They report that at 8:30 pm, she started having a left gaze deviation and was having shaking episodes. She reports that she was conscious during these episodes. Did not state how long episode of shaking was. Endorses having urinary incontinence. No tongue biting. She reports that she had 4 episodes, each episode lasting a few minutes. Per the family, during these episodes, her eyes deviated to L. She reports that shaking was on the left side. Denies any prior history of similar episodes.    She reports that in July 2019 she presented to [facility] with TIA-like symptoms on the left side. She was started on antiepileptics at the time, but she does not remember which medication. She is no longer on any AEDs.    (Stroke only)  NIHSS: 8  MRS: 4  ICH: 0

## 2019-10-08 NOTE — H&P ADULT - NSHPPHYSICALEXAM_GEN_ALL_CORE
Neurological (>12):  MS: Awake, alert, oriented to person, place, situation, time. Normal affect. Follows all commands.    Language: Speech is clear, fluent     CNs: VFF. left gaze preference, but able to cross midline. EOMI, no diplopia. No facial asymmetry b/l.     Motor: No pronator drift.              Deltoid	Biceps	Triceps	   R	5	5	5	5 	  L	0	0	0           0    	H-Flex	K-Flex	K-Ext	D-Flex	P-Flex  R	5	5	5	5	5 	   L	2	-	-	0	0	     Sensation: Intact to LT    Coordination:  No dysmetria to FTN on R, unable to test on L    Gait: deferred

## 2019-10-08 NOTE — PHYSICAL THERAPY INITIAL EVALUATION ADULT - STRENGTHENING, PT EVAL
GOAL: Patient will improve bilateral UE/LE strength to 4/5, for increased limb stability, to improve gait and facilitate stair negotiation in 4 weeks.

## 2019-10-09 LAB
ANION GAP SERPL CALC-SCNC: 10 MMOL/L — SIGNIFICANT CHANGE UP (ref 5–17)
BUN SERPL-MCNC: 15 MG/DL — SIGNIFICANT CHANGE UP (ref 7–23)
CALCIUM SERPL-MCNC: 10.3 MG/DL — SIGNIFICANT CHANGE UP (ref 8.4–10.5)
CHLORIDE SERPL-SCNC: 102 MMOL/L — SIGNIFICANT CHANGE UP (ref 96–108)
CO2 SERPL-SCNC: 27 MMOL/L — SIGNIFICANT CHANGE UP (ref 22–31)
CREAT SERPL-MCNC: 0.7 MG/DL — SIGNIFICANT CHANGE UP (ref 0.5–1.3)
GLUCOSE BLDC GLUCOMTR-MCNC: 117 MG/DL — HIGH (ref 70–99)
GLUCOSE BLDC GLUCOMTR-MCNC: 130 MG/DL — HIGH (ref 70–99)
GLUCOSE BLDC GLUCOMTR-MCNC: 134 MG/DL — HIGH (ref 70–99)
GLUCOSE BLDC GLUCOMTR-MCNC: 138 MG/DL — HIGH (ref 70–99)
GLUCOSE SERPL-MCNC: 151 MG/DL — HIGH (ref 70–99)
HBA1C BLD-MCNC: 5.3 % — SIGNIFICANT CHANGE UP (ref 4–5.6)
HCT VFR BLD CALC: 35 % — SIGNIFICANT CHANGE UP (ref 34.5–45)
HCV AB S/CO SERPL IA: 0.17 S/CO — SIGNIFICANT CHANGE UP (ref 0–0.99)
HCV AB SERPL-IMP: SIGNIFICANT CHANGE UP
HGB BLD-MCNC: 11.2 G/DL — LOW (ref 11.5–15.5)
MCHC RBC-ENTMCNC: 28 PG — SIGNIFICANT CHANGE UP (ref 27–34)
MCHC RBC-ENTMCNC: 32 GM/DL — SIGNIFICANT CHANGE UP (ref 32–36)
MCV RBC AUTO: 87.5 FL — SIGNIFICANT CHANGE UP (ref 80–100)
PLATELET # BLD AUTO: 227 K/UL — SIGNIFICANT CHANGE UP (ref 150–400)
POTASSIUM SERPL-MCNC: 4.2 MMOL/L — SIGNIFICANT CHANGE UP (ref 3.5–5.3)
POTASSIUM SERPL-SCNC: 4.2 MMOL/L — SIGNIFICANT CHANGE UP (ref 3.5–5.3)
RBC # BLD: 4 M/UL — SIGNIFICANT CHANGE UP (ref 3.8–5.2)
RBC # FLD: 12.5 % — SIGNIFICANT CHANGE UP (ref 10.3–14.5)
SODIUM SERPL-SCNC: 139 MMOL/L — SIGNIFICANT CHANGE UP (ref 135–145)
WBC # BLD: 4.05 K/UL — SIGNIFICANT CHANGE UP (ref 3.8–10.5)
WBC # FLD AUTO: 4.05 K/UL — SIGNIFICANT CHANGE UP (ref 3.8–10.5)

## 2019-10-09 PROCEDURE — 93010 ELECTROCARDIOGRAM REPORT: CPT

## 2019-10-09 PROCEDURE — 95951: CPT | Mod: 26

## 2019-10-09 PROCEDURE — 99233 SBSQ HOSP IP/OBS HIGH 50: CPT

## 2019-10-09 RX ORDER — LOSARTAN POTASSIUM 100 MG/1
100 TABLET, FILM COATED ORAL DAILY
Refills: 0 | Status: DISCONTINUED | OUTPATIENT
Start: 2019-10-09 | End: 2019-10-15

## 2019-10-09 RX ORDER — LABETALOL HCL 100 MG
10 TABLET ORAL THREE TIMES A DAY
Refills: 0 | Status: DISCONTINUED | OUTPATIENT
Start: 2019-10-09 | End: 2019-10-15

## 2019-10-09 RX ORDER — LABETALOL HCL 100 MG
10 TABLET ORAL ONCE
Refills: 0 | Status: COMPLETED | OUTPATIENT
Start: 2019-10-09 | End: 2019-10-09

## 2019-10-09 RX ORDER — DIPHENHYDRAMINE HCL 50 MG
12.5 CAPSULE ORAL ONCE
Refills: 0 | Status: COMPLETED | OUTPATIENT
Start: 2019-10-09 | End: 2019-10-09

## 2019-10-09 RX ADMIN — INSULIN GLARGINE 16 UNIT(S): 100 INJECTION, SOLUTION SUBCUTANEOUS at 21:41

## 2019-10-09 RX ADMIN — Medication 10 MILLIGRAM(S): at 15:22

## 2019-10-09 RX ADMIN — Medication 10 MILLIGRAM(S): at 13:46

## 2019-10-09 RX ADMIN — Medication 12.5 MILLIGRAM(S): at 21:40

## 2019-10-09 RX ADMIN — LACOSAMIDE 120 MILLIGRAM(S): 50 TABLET ORAL at 17:47

## 2019-10-09 RX ADMIN — Medication 81 MILLIGRAM(S): at 12:07

## 2019-10-09 RX ADMIN — LOSARTAN POTASSIUM 50 MILLIGRAM(S): 100 TABLET, FILM COATED ORAL at 05:15

## 2019-10-09 RX ADMIN — Medication 2000 UNIT(S): at 12:07

## 2019-10-09 RX ADMIN — ENOXAPARIN SODIUM 40 MILLIGRAM(S): 100 INJECTION SUBCUTANEOUS at 12:07

## 2019-10-09 RX ADMIN — CLOPIDOGREL BISULFATE 75 MILLIGRAM(S): 75 TABLET, FILM COATED ORAL at 12:07

## 2019-10-09 RX ADMIN — LACOSAMIDE 120 MILLIGRAM(S): 50 TABLET ORAL at 05:15

## 2019-10-09 RX ADMIN — ATORVASTATIN CALCIUM 80 MILLIGRAM(S): 80 TABLET, FILM COATED ORAL at 21:40

## 2019-10-09 NOTE — OCCUPATIONAL THERAPY INITIAL EVALUATION ADULT - PERTINENT HX OF CURRENT PROBLEM, REHAB EVAL
57yo F w/ DM, HLD, HTN, prior R MCA stroke in November due to R ICA dissection/occlusion s/p ICA stent, with L sided residual weakness. Family at bedside as well as patient assist in providing the history. They report that at 8:30 pm, she started having a left gaze deviation and shaking episodes. She reports that she was conscious during these episodes. Endorses having urinary incontinence. She reports 4 episodes, each episode lasting a few minutes (see below)

## 2019-10-09 NOTE — OCCUPATIONAL THERAPY INITIAL EVALUATION ADULT - LEVEL OF INDEPENDENCE: DRESS LOWER BODY, OT EVAL
supervision contact guard/CGA for doff/don socks & min-mod A for don/doff L AFO/minimum assist (75% patients effort)

## 2019-10-09 NOTE — PROVIDER CONTACT NOTE (OTHER) - SITUATION
son brought to attention patient has been having intermittent confusion sarcotically. stated that when on the phone she was arguing it wasn't hers and that her food try that wasn't hers it was 66d

## 2019-10-09 NOTE — OCCUPATIONAL THERAPY INITIAL EVALUATION ADULT - ADL RETRAINING, OT EVAL
GOAL: Pt will perform LB dressing independently in 4 weeks.  GOAL: Patient will be independent with UB dressing within 4 weeks.  GOAL: Patient will perform grooming standing sink level independently in 4 weeks

## 2019-10-09 NOTE — OCCUPATIONAL THERAPY INITIAL EVALUATION ADULT - DIAGNOSIS, OT EVAL
Patient presents with decreased balance, strength, endurance, ROM  impacting ability to perform ADLs and functional mobility

## 2019-10-09 NOTE — OCCUPATIONAL THERAPY INITIAL EVALUATION ADULT - ADDITIONAL COMMENTS
CT Brain Stroke Protocol 10/7: No acute intracranial hemorrhage, mass effect, or evidence of acute   vascular territorial infarction. Chronic right MCA territory infarct.If clinical symptoms persist or worsen, more sensitive evaluation for acute ischemia with brain MRI may be obtained, if no contraindications exist.  CT Angio head/neck 10/7: CT angiography neck: Patent right common/internal carotid artery stent.   Bilateral cervical carotid and vertebral arteries are otherwise widely patent without evidence of flow-limiting stenosis or acute dissection. CT angiography brain: Intracranial atherosclerosis including to mild to moderate stenoses at the bilateral cavernous internal carotid arteries. No proximal large vessel occlusion. 1.4 cm right thyroid lobe nodule. Further characterization with nonemergent targeted ultrasound is recommended, as not previously characterized.

## 2019-10-10 ENCOUNTER — RX CHANGE (OUTPATIENT)
Age: 59
End: 2019-10-10

## 2019-10-10 LAB
GLUCOSE BLDC GLUCOMTR-MCNC: 114 MG/DL — HIGH (ref 70–99)
GLUCOSE BLDC GLUCOMTR-MCNC: 125 MG/DL — HIGH (ref 70–99)
GLUCOSE BLDC GLUCOMTR-MCNC: 146 MG/DL — HIGH (ref 70–99)
GLUCOSE BLDC GLUCOMTR-MCNC: 157 MG/DL — HIGH (ref 70–99)
LEVETIRACETAM SERPL-MCNC: <2 MCG/ML — LOW (ref 12–46)

## 2019-10-10 PROCEDURE — 95951: CPT | Mod: 26

## 2019-10-10 PROCEDURE — 99232 SBSQ HOSP IP/OBS MODERATE 35: CPT

## 2019-10-10 RX ORDER — LACOSAMIDE 50 MG/1
100 TABLET ORAL
Refills: 0 | Status: DISCONTINUED | OUTPATIENT
Start: 2019-10-10 | End: 2019-10-11

## 2019-10-10 RX ADMIN — ENOXAPARIN SODIUM 40 MILLIGRAM(S): 100 INJECTION SUBCUTANEOUS at 13:26

## 2019-10-10 RX ADMIN — Medication 1: at 13:30

## 2019-10-10 RX ADMIN — ATORVASTATIN CALCIUM 80 MILLIGRAM(S): 80 TABLET, FILM COATED ORAL at 21:28

## 2019-10-10 RX ADMIN — Medication 2000 UNIT(S): at 13:26

## 2019-10-10 RX ADMIN — LACOSAMIDE 100 MILLIGRAM(S): 50 TABLET ORAL at 17:16

## 2019-10-10 RX ADMIN — CLOPIDOGREL BISULFATE 75 MILLIGRAM(S): 75 TABLET, FILM COATED ORAL at 13:26

## 2019-10-10 RX ADMIN — INSULIN GLARGINE 16 UNIT(S): 100 INJECTION, SOLUTION SUBCUTANEOUS at 21:28

## 2019-10-10 RX ADMIN — LACOSAMIDE 120 MILLIGRAM(S): 50 TABLET ORAL at 05:30

## 2019-10-10 RX ADMIN — Medication 81 MILLIGRAM(S): at 13:26

## 2019-10-10 RX ADMIN — LOSARTAN POTASSIUM 100 MILLIGRAM(S): 100 TABLET, FILM COATED ORAL at 09:09

## 2019-10-10 NOTE — PROVIDER CONTACT NOTE (OTHER) - SITUATION
PCA witnessed patient seizing, nurse notified and witnessed by provider and nurse. general tonic clonic seizure, pt put on nonrebreather mask. VSS. pt Unresponsive

## 2019-10-10 NOTE — PROVIDER CONTACT NOTE (OTHER) - ACTION/TREATMENT ORDERED:
PA states he will let MD Ludwig know and adjust her meds accordingly
MD made aware and states to continue to monitor, that EEG normal during day and no events. might be related to yesterdays episodes and status.
PA to see patient at bedside
Pa to come to bedside to assess patient.

## 2019-10-10 NOTE — PROVIDER CONTACT NOTE (OTHER) - SITUATION
Pt confused, stating that "my iv is not mine, I don't know where this came from". A&ox4, neurologically unchanged. pupils 3 round/brisk bilaterally. VSS

## 2019-10-10 NOTE — PROVIDER CONTACT NOTE (OTHER) - SITUATION
tel called stating that patient has a left bundle branch block. tech states that patients has had this for a few days and that it is not new as of today

## 2019-10-10 NOTE — PROVIDER CONTACT NOTE (OTHER) - RECOMMENDATIONS
patient on  bedalarm and will closely monitor.
PA to come see patient at bedside, CT scan or medication adjustment
PA to come to bedside and assess patient/ medication
WOUNDS

## 2019-10-10 NOTE — PROVIDER CONTACT NOTE (OTHER) - ASSESSMENT
neuro assessment WDL when assessed.
pt a&ox4,  stating that "My IV is not mine, that is the person next door's" and paranoia. VSS and neurological exam unchanged. No seizure activity reported or witnessed.
pt a&ox4, nonverbal/mute. patient VSS. witnessed general tonic clonic seizure, nonrebreather applied and safety maintained. PA notified.

## 2019-10-11 ENCOUNTER — TRANSCRIPTION ENCOUNTER (OUTPATIENT)
Age: 59
End: 2019-10-11

## 2019-10-11 LAB
GLUCOSE BLDC GLUCOMTR-MCNC: 119 MG/DL — HIGH (ref 70–99)
GLUCOSE BLDC GLUCOMTR-MCNC: 135 MG/DL — HIGH (ref 70–99)
GLUCOSE BLDC GLUCOMTR-MCNC: 136 MG/DL — HIGH (ref 70–99)
GLUCOSE BLDC GLUCOMTR-MCNC: 174 MG/DL — HIGH (ref 70–99)

## 2019-10-11 PROCEDURE — 95951: CPT | Mod: 26

## 2019-10-11 PROCEDURE — 99222 1ST HOSP IP/OBS MODERATE 55: CPT

## 2019-10-11 PROCEDURE — 99232 SBSQ HOSP IP/OBS MODERATE 35: CPT

## 2019-10-11 RX ORDER — LACOSAMIDE 50 MG/1
200 TABLET ORAL
Refills: 0 | Status: DISCONTINUED | OUTPATIENT
Start: 2019-10-11 | End: 2019-10-15

## 2019-10-11 RX ORDER — LACOSAMIDE 50 MG/1
1 TABLET ORAL
Qty: 0 | Refills: 0 | DISCHARGE
Start: 2019-10-11

## 2019-10-11 RX ORDER — LOSARTAN POTASSIUM 100 MG/1
1 TABLET, FILM COATED ORAL
Qty: 0 | Refills: 0 | DISCHARGE
Start: 2019-10-11

## 2019-10-11 RX ORDER — LOSARTAN POTASSIUM 100 MG/1
1 TABLET, FILM COATED ORAL
Qty: 0 | Refills: 0 | DISCHARGE

## 2019-10-11 RX ADMIN — ATORVASTATIN CALCIUM 80 MILLIGRAM(S): 80 TABLET, FILM COATED ORAL at 22:02

## 2019-10-11 RX ADMIN — LOSARTAN POTASSIUM 100 MILLIGRAM(S): 100 TABLET, FILM COATED ORAL at 06:38

## 2019-10-11 RX ADMIN — Medication 81 MILLIGRAM(S): at 14:12

## 2019-10-11 RX ADMIN — INSULIN GLARGINE 16 UNIT(S): 100 INJECTION, SOLUTION SUBCUTANEOUS at 22:02

## 2019-10-11 RX ADMIN — ENOXAPARIN SODIUM 40 MILLIGRAM(S): 100 INJECTION SUBCUTANEOUS at 14:11

## 2019-10-11 RX ADMIN — Medication 1: at 09:26

## 2019-10-11 RX ADMIN — LACOSAMIDE 200 MILLIGRAM(S): 50 TABLET ORAL at 18:00

## 2019-10-11 RX ADMIN — Medication 2000 UNIT(S): at 14:12

## 2019-10-11 RX ADMIN — CLOPIDOGREL BISULFATE 75 MILLIGRAM(S): 75 TABLET, FILM COATED ORAL at 14:12

## 2019-10-11 RX ADMIN — LACOSAMIDE 100 MILLIGRAM(S): 50 TABLET ORAL at 06:38

## 2019-10-11 NOTE — DISCHARGE NOTE PROVIDER - NSDCCPCAREPLAN_GEN_ALL_CORE_FT
PRINCIPAL DISCHARGE DIAGNOSIS  Diagnosis: Focal seizure  Assessment and Plan of Treatment: Take vimpat 200 BID  Follow up with neurologist      SECONDARY DISCHARGE DIAGNOSES  Diagnosis: Cerebrovascular accident (CVA), unspecified mechanism  Assessment and Plan of Treatment:

## 2019-10-11 NOTE — PROGRESS NOTE ADULT - ATTENDING COMMENTS
focal status epilepticus sec to R MCA stroke  Stable on Vimpat 200 BID  Mild worsening hemiparesis requiring acute rehab  will be dc Monday

## 2019-10-11 NOTE — CONSULT NOTE ADULT - SUBJECTIVE AND OBJECTIVE BOX
Patient is a 58y old  Female who presents with a chief complaint of EMU (11 Oct 2019 09:23)    Admission HPI:  58F w/ DM, HLD, HTN, prior R MCA stroke in November due to R ICA dissection/occlusion s/p ICA stent, with L sided residual weakness. Family at bedside as well as patient assist in providing the history. They report that at 8:30 pm, she started having a left gaze deviation and was having shaking episodes. She reports that she was conscious during these episodes. Did not state how long episode of shaking was. Endorses having urinary incontinence. No tongue biting. She reports that she had 4 episodes, each episode lasting a few minutes. Per the family, during these episodes, her eyes deviated to L. She reports that shaking was on the left side. Denies any prior history of similar episodes.    She reports that in July 2019 she presented to [facility] with TIA-like symptoms on the left side. She was started on antiepileptics at the time, but she does not remember which medication. She is no longer on any AEDs.    (Stroke only)  NIHSS: 8  MRS: 4  ICH: 0 (08 Oct 2019 04:59)    Interval History:  Patient had CTH which was negative for acute infarct.  Had some confusion. Medications for seizure adjusted.    REVIEW OF SYSTEMS: No chest pain, shortness of breath, nausea, vomiting or diarhea; other ROS neg     PAST MEDICAL & SURGICAL HISTORY  Hypertension  CVA (cerebral vascular accident)  No significant past surgical history    FUNCTIONAL HISTORY:   Lives w family in 2nd floor apartment w 4 MAEGAN and one flight inside.  Had assistance w ADLs from family; amb w AFO and cane.    CURRENT FUNCTIONAL STATUS:  Mod A transfers and gait.    FAMILY HISTORY   N/C    VITALS  T(C): 36.6 (10-11-19 @ 08:32), Max: 36.9 (10-10-19 @ 16:19)  HR: 101 (10-11-19 @ 08:32) (83 - 101)  BP: 100/63 (10-11-19 @ 08:32) (100/63 - 133/73)  RR: 18 (10-11-19 @ 08:32) (18 - 18)  SpO2: 99% (10-11-19 @ 08:32) (98% - 100%)  Wt(kg): --    ALLERGIES  No Known Drug Allergies  shellfish (Anaphylaxis)      MEDICATIONS   acetaminophen   Tablet .. 650 milliGRAM(s) Oral every 6 hours PRN  aspirin  chewable 81 milliGRAM(s) Oral daily  atorvastatin 80 milliGRAM(s) Oral at bedtime  cholecalciferol 2000 Unit(s) Oral daily  clopidogrel Tablet 75 milliGRAM(s) Oral daily  dextrose 40% Gel 15 Gram(s) Oral once PRN  dextrose 5%. 1000 milliLiter(s) IV Continuous <Continuous>  dextrose 50% Injectable 12.5 Gram(s) IV Push once  dextrose 50% Injectable 25 Gram(s) IV Push once  dextrose 50% Injectable 25 Gram(s) IV Push once  enoxaparin Injectable 40 milliGRAM(s) SubCutaneous daily  glucagon  Injectable 1 milliGRAM(s) IntraMuscular once PRN  insulin glargine Injectable (LANTUS) 16 Unit(s) SubCutaneous at bedtime  insulin lispro (HumaLOG) corrective regimen sliding scale   SubCutaneous three times a day before meals  insulin lispro (HumaLOG) corrective regimen sliding scale   SubCutaneous at bedtime  labetalol Injectable 10 milliGRAM(s) IV Push three times a day PRN  lacosamide 200 milliGRAM(s) Oral two times a day  LORazepam   Injectable 2 milliGRAM(s) IV Push every 6 hours PRN  losartan 100 milliGRAM(s) Oral daily      ----------------------------------------------------------------------------------------  PHYSICAL EXAM  Constitutional - NAD, Comfortable  HEENT - NCAT, EOMI  Neck - Supple, No limited ROM  Chest - CTA bilaterally, No wheeze, No rhonchi, No crackles  Cardiovascular - RRR, S1S2, No murmurs  Abdomen - BS+, Soft, NTND  Extremities - No C/C/E, No calf tenderness   Neurologic Exam -                    Cognitive - Awake, Alert, AAO to self, place, date, year, situation     Communication - Fluent, No dysarthria, no aphasia     Cranial Nerves - CN 2-12 intact     Motor - No focal deficits      Sensory - Intact to LT     Reflexes - DTR Intact, No primitive reflexive       Psychiatric - Mood stable, Affect WNL      Impression:   59 yo with functional deficits secondary to diagnosis of CVA    Plan:  PT- ROM, Bed Mob, Transfers, Amb w AD and bracing as needed  OT- ADLs, bracing  SLP- Dysphagia eval and treat  Prec- Falls, Cardiac  DVT Prophylaxis- Lovenox  Skin- Turn q2 h  Dispo- Patient is a 58y old  Female who presents with a chief complaint of EMU (11 Oct 2019 09:23)    Admission HPI:  58F w/ DM, HLD, HTN, prior R MCA stroke in November due to R ICA dissection/occlusion s/p ICA stent, with L sided residual weakness. Family at bedside as well as patient assist in providing the history. They report that at 8:30 pm, she started having a left gaze deviation and was having shaking episodes. She reports that she was conscious during these episodes. Did not state how long episode of shaking was. Endorses having urinary incontinence. No tongue biting. She reports that she had 4 episodes, each episode lasting a few minutes. Per the family, during these episodes, her eyes deviated to L. She reports that shaking was on the left side. Denies any prior history of similar episodes.    She reports that in July 2019 she presented to [facility] with TIA-like symptoms on the left side. She was started on antiepileptics at the time, but she does not remember which medication. She is no longer on any AEDs.    (Stroke only)  NIHSS: 8  MRS: 4  ICH: 0 (08 Oct 2019 04:59)    Interval History:  Patient had CTH which was negative for acute infarct.  Had some confusion. Medications for seizure adjusted.    REVIEW OF SYSTEMS: L weakness, poor balance, spasticity, No chest pain, shortness of breath, nausea, vomiting or diarhea; other ROS neg     PAST MEDICAL & SURGICAL HISTORY  Hypertension  CVA (cerebral vascular accident)  No significant past surgical history    FUNCTIONAL HISTORY:   Lives w family in 2nd floor apartment w 4 MAEGAN and one flight inside.  Had assistance w ADLs from family; amb w AFO and cane.    CURRENT FUNCTIONAL STATUS:  Mod A transfers and gait.    FAMILY HISTORY   N/C    VITALS  T(C): 36.6 (10-11-19 @ 08:32), Max: 36.9 (10-10-19 @ 16:19)  HR: 101 (10-11-19 @ 08:32) (83 - 101)  BP: 100/63 (10-11-19 @ 08:32) (100/63 - 133/73)  RR: 18 (10-11-19 @ 08:32) (18 - 18)  SpO2: 99% (10-11-19 @ 08:32) (98% - 100%)  Wt(kg): --    ALLERGIES  No Known Drug Allergies  shellfish (Anaphylaxis)    MEDICATIONS   acetaminophen   Tablet .. 650 milliGRAM(s) Oral every 6 hours PRN  aspirin  chewable 81 milliGRAM(s) Oral daily  atorvastatin 80 milliGRAM(s) Oral at bedtime  cholecalciferol 2000 Unit(s) Oral daily  clopidogrel Tablet 75 milliGRAM(s) Oral daily  dextrose 40% Gel 15 Gram(s) Oral once PRN  dextrose 5%. 1000 milliLiter(s) IV Continuous <Continuous>  dextrose 50% Injectable 12.5 Gram(s) IV Push once  dextrose 50% Injectable 25 Gram(s) IV Push once  dextrose 50% Injectable 25 Gram(s) IV Push once  enoxaparin Injectable 40 milliGRAM(s) SubCutaneous daily  glucagon  Injectable 1 milliGRAM(s) IntraMuscular once PRN  insulin glargine Injectable (LANTUS) 16 Unit(s) SubCutaneous at bedtime  insulin lispro (HumaLOG) corrective regimen sliding scale   SubCutaneous three times a day before meals  insulin lispro (HumaLOG) corrective regimen sliding scale   SubCutaneous at bedtime  labetalol Injectable 10 milliGRAM(s) IV Push three times a day PRN  lacosamide 200 milliGRAM(s) Oral two times a day  LORazepam   Injectable 2 milliGRAM(s) IV Push every 6 hours PRN  losartan 100 milliGRAM(s) Oral daily      ----------------------------------------------------------------------------------------  PHYSICAL EXAM  Constitutional - NAD, Comfortable  HEENT - NCAT, EOMI  Neck - Supple, No limited ROM  Chest - CTA bilaterally, No wheeze, No rhonchi, No crackles  Cardiovascular - RRR, S1S2, No murmurs  Abdomen - BS+, Soft, NTND  Extremities - No C/C/E, No calf tenderness   Neurologic Exam -                   AAO x 3    Speech clear/appropriate    LUE trace prox, 0/5 distal, LLE fair prox, 0/5 distal     Psychiatric - Mood stable, Affect WNL    Impression:   57 yo with functional deficits secondary to diagnosis of h/o CVA, seizure.     Plan:  PT- ROM, Bed Mob, Transfers, Amb w AD and bracing as needed  OT- ADLs, bracing  SLP- Dysphagia eval and treat  Prec- Falls, Cardiac  DVT Prophylaxis- Lovenox  Skin- Turn q2 h  Dispo- Acute Rehab- can tolerate 3h/d PT/OT/SLP and requires daily physician visits

## 2019-10-11 NOTE — DISCHARGE NOTE PROVIDER - HOSPITAL COURSE
58F w/ DM, HLD, HTN, prior R MCA stroke in November due to R ICA dissection/occlusion s/p ICA stent, with L sided residual weakness. Family at bedside as well as patient assist in providing the history. They report that at 8:30 pm, she started having a left gaze deviation and was having shaking episodes. She reports that she was conscious during these episodes. Did not state how long episode of shaking was. Endorses having urinary incontinence. No tongue biting. She reports that she had 4 episodes, each episode lasting a few minutes. Per the family, during these episodes, her eyes deviated to L. She reports that shaking was on the left side. Denies any prior history of similar episodes.        She reports that in July 2019 she presented to [facility] with TIA-like symptoms on the left side. She was started on antiepileptics at the time, but she does not remember which medication. She is no longer on any AEDs.        During hospitalization, EEG showed R focal seizures with impaired awareness. Patient started on vimpat 200 BID with improvement.

## 2019-10-11 NOTE — DISCHARGE NOTE PROVIDER - NSDCFUSCHEDAPPT_GEN_ALL_CORE_FT
LUCY ALCANTAR ; 10/28/2019 ; NPP Med Int [address] [facility]  LUCY ALCANTAR ; 12/17/2019 ; NPP Med Endocr [address] [facility] LUCY ALCANTAR ; 10/28/2019 ; NPP Med Int [address] [facility]  LUCY ALCANTAR ; 12/17/2019 ; NPP Med Endocr [address] [facility] LUCY LACANTAR ; 10/28/2019 ; NPP Med Int [address] [facility]  LUCY ALCANTAR ; 12/17/2019 ; NPP Med Endocr [address] [facility] LUCY ALCANTAR ; 10/28/2019 ; NPP Med Int [address] [facility]  LUCY ALCANTAR ; 12/17/2019 ; NPP Med Endocr [address] [facility] LUCY ALCANTAR ; 10/28/2019 ; NPP Med Int [address] [facility]  LUCY ALCANTAR ; 12/17/2019 ; NPP Med Endocr [address] [facility] LUCY ALCANTAR ; 10/28/2019 ; NPP Med Int [address] [facility]  LUCY ALCANTAR ; 12/17/2019 ; NPP Med Endocr [address] [facility]

## 2019-10-12 LAB
GLUCOSE BLDC GLUCOMTR-MCNC: 122 MG/DL — HIGH (ref 70–99)
GLUCOSE BLDC GLUCOMTR-MCNC: 141 MG/DL — HIGH (ref 70–99)
GLUCOSE BLDC GLUCOMTR-MCNC: 142 MG/DL — HIGH (ref 70–99)
GLUCOSE BLDC GLUCOMTR-MCNC: 162 MG/DL — HIGH (ref 70–99)

## 2019-10-12 PROCEDURE — 99231 SBSQ HOSP IP/OBS SF/LOW 25: CPT

## 2019-10-12 RX ADMIN — LACOSAMIDE 200 MILLIGRAM(S): 50 TABLET ORAL at 17:56

## 2019-10-12 RX ADMIN — CLOPIDOGREL BISULFATE 75 MILLIGRAM(S): 75 TABLET, FILM COATED ORAL at 12:08

## 2019-10-12 RX ADMIN — Medication 81 MILLIGRAM(S): at 12:08

## 2019-10-12 RX ADMIN — INSULIN GLARGINE 16 UNIT(S): 100 INJECTION, SOLUTION SUBCUTANEOUS at 22:19

## 2019-10-12 RX ADMIN — LACOSAMIDE 200 MILLIGRAM(S): 50 TABLET ORAL at 05:20

## 2019-10-12 RX ADMIN — ATORVASTATIN CALCIUM 80 MILLIGRAM(S): 80 TABLET, FILM COATED ORAL at 22:19

## 2019-10-12 RX ADMIN — ENOXAPARIN SODIUM 40 MILLIGRAM(S): 100 INJECTION SUBCUTANEOUS at 12:08

## 2019-10-12 RX ADMIN — Medication 2000 UNIT(S): at 12:08

## 2019-10-12 RX ADMIN — LOSARTAN POTASSIUM 100 MILLIGRAM(S): 100 TABLET, FILM COATED ORAL at 05:20

## 2019-10-13 DIAGNOSIS — I44.7 LEFT BUNDLE-BRANCH BLOCK, UNSPECIFIED: ICD-10-CM

## 2019-10-13 DIAGNOSIS — R56.9 UNSPECIFIED CONVULSIONS: ICD-10-CM

## 2019-10-13 DIAGNOSIS — I63.9 CEREBRAL INFARCTION, UNSPECIFIED: ICD-10-CM

## 2019-10-13 DIAGNOSIS — I10 ESSENTIAL (PRIMARY) HYPERTENSION: ICD-10-CM

## 2019-10-13 LAB
GLUCOSE BLDC GLUCOMTR-MCNC: 113 MG/DL — HIGH (ref 70–99)
GLUCOSE BLDC GLUCOMTR-MCNC: 128 MG/DL — HIGH (ref 70–99)
GLUCOSE BLDC GLUCOMTR-MCNC: 130 MG/DL — HIGH (ref 70–99)
GLUCOSE BLDC GLUCOMTR-MCNC: 133 MG/DL — HIGH (ref 70–99)

## 2019-10-13 PROCEDURE — 99232 SBSQ HOSP IP/OBS MODERATE 35: CPT

## 2019-10-13 RX ADMIN — LACOSAMIDE 200 MILLIGRAM(S): 50 TABLET ORAL at 17:29

## 2019-10-13 RX ADMIN — INSULIN GLARGINE 16 UNIT(S): 100 INJECTION, SOLUTION SUBCUTANEOUS at 21:48

## 2019-10-13 RX ADMIN — ATORVASTATIN CALCIUM 80 MILLIGRAM(S): 80 TABLET, FILM COATED ORAL at 21:49

## 2019-10-13 RX ADMIN — LACOSAMIDE 200 MILLIGRAM(S): 50 TABLET ORAL at 05:22

## 2019-10-13 RX ADMIN — Medication 81 MILLIGRAM(S): at 11:35

## 2019-10-13 RX ADMIN — CLOPIDOGREL BISULFATE 75 MILLIGRAM(S): 75 TABLET, FILM COATED ORAL at 11:35

## 2019-10-13 RX ADMIN — LOSARTAN POTASSIUM 100 MILLIGRAM(S): 100 TABLET, FILM COATED ORAL at 05:22

## 2019-10-13 RX ADMIN — Medication 2000 UNIT(S): at 11:35

## 2019-10-13 RX ADMIN — ENOXAPARIN SODIUM 40 MILLIGRAM(S): 100 INJECTION SUBCUTANEOUS at 11:35

## 2019-10-13 NOTE — CONSULT NOTE ADULT - SUBJECTIVE AND OBJECTIVE BOX
CHIEF COMPLAINT:  Seizures     HISTORY OF PRESENT ILLNESS:  59 yo F very well known to me from office with history of  DM, HLD, HTN, prior R MCA stroke in November due to R ICA dissection/occlusion s/p ICA stent, with L sided residual weakness. Family at bedside as well as patient assist in providing the history. They report that at 8:30 pm, she started having a left gaze deviation and was having shaking episodes. She reports that she was conscious during these episodes. Did not state how long episode of shaking was. Endorses having urinary incontinence. No tongue biting. She reports that she had 4 episodes, each episode lasting a few minutes. Per the family, during these episodes, her eyes deviated to L. She reports that shaking was on the left side. Denies any prior history of similar episodes.    She reports that in July 2019 she presented to [facility] with TIA-like symptoms on the left side. She was started on antiepileptics at the time, but she does not remember which medication. She is no longer on any AEDs.        PAST MEDICAL & SURGICAL HISTORY:  Hypertension  CVA (cerebral vascular accident)  No significant past surgical history          MEDICATIONS:  aspirin  chewable 81 milliGRAM(s) Oral daily  clopidogrel Tablet 75 milliGRAM(s) Oral daily  enoxaparin Injectable 40 milliGRAM(s) SubCutaneous daily  labetalol Injectable 10 milliGRAM(s) IV Push three times a day PRN  losartan 100 milliGRAM(s) Oral daily        acetaminophen   Tablet .. 650 milliGRAM(s) Oral every 6 hours PRN  lacosamide 200 milliGRAM(s) Oral two times a day  LORazepam   Injectable 2 milliGRAM(s) IV Push every 6 hours PRN      atorvastatin 80 milliGRAM(s) Oral at bedtime  dextrose 40% Gel 15 Gram(s) Oral once PRN  dextrose 50% Injectable 12.5 Gram(s) IV Push once  dextrose 50% Injectable 25 Gram(s) IV Push once  dextrose 50% Injectable 25 Gram(s) IV Push once  glucagon  Injectable 1 milliGRAM(s) IntraMuscular once PRN  insulin glargine Injectable (LANTUS) 16 Unit(s) SubCutaneous at bedtime  insulin lispro (HumaLOG) corrective regimen sliding scale   SubCutaneous three times a day before meals  insulin lispro (HumaLOG) corrective regimen sliding scale   SubCutaneous at bedtime    cholecalciferol 2000 Unit(s) Oral daily  dextrose 5%. 1000 milliLiter(s) IV Continuous <Continuous>      FAMILY HISTORY:      SOCIAL HISTORY:    [ ] Non-smoker  [ ] Smoker  [ ] Alcohol    Allergies    No Known Drug Allergies  shellfish (Anaphylaxis)    Intolerances    	    REVIEW OF SYSTEMS:  CONSTITUTIONAL: No fever, weight loss, +r fatigue  EYES: No eye pain, visual disturbances, or discharge  ENMT:  No difficulty hearing, tinnitus, vertigo; No sinus or throat pain  NECK: No pain or stiffness  RESPIRATORY: No cough, wheezing, chills or hemoptysis; No Shortness of Breath  CARDIOVASCULAR: No chest pain, palpitations, passing out, dizziness, or leg swelling  GASTROINTESTINAL: No abdominal or epigastric pain. No nausea, vomiting, or hematemesis; No diarrhea or constipation. No melena or hematochezia.  GENITOURINARY: No dysuria, frequency, hematuria, or incontinence  NEUROLOGICAL: No headaches, memory loss,+++ loss of strength, numbness, or tremors  SKIN: No itching, burning, rashes, or lesions   LYMPH Nodes: No enlarged glands  ENDOCRINE: No heat or cold intolerance; No hair loss  MUSCULOSKELETAL: + joint pain or swelling; No muscle, back, or extremity pain  PSYCHIATRIC: No depression, anxiety, mood swings, or difficulty sleeping  HEME/LYMPH: No easy bruising, or bleeding gums  ALLERY AND IMMUNOLOGIC: No hives or eczema	    [ ] All others negative	  [ ] Unable to obtain    PHYSICAL EXAM:  T(C): 36.9 (10-13-19 @ 08:36), Max: 36.9 (10-12-19 @ 13:24)  HR: 68 (10-13-19 @ 08:36) (68 - 87)  BP: 122/76 (10-13-19 @ 08:36) (99/66 - 157/75)  RR: 18 (10-13-19 @ 08:36) (18 - 18)  SpO2: 96% (10-13-19 @ 08:36) (96% - 100%)  Wt(kg): --  I&O's Summary    12 Oct 2019 07:01  -  13 Oct 2019 07:00  --------------------------------------------------------  IN: 0 mL / OUT: 100 mL / NET: -100 mL        Appearance: NAD  HEENT:  Dry  oral mucosa, PERRL, EOMI	  Lymphatic: No lymphadenopathy  Cardiovascular: Normal S1 S2, No JVD, No murmurs, No edema  Respiratory: Lungs clear to auscultation	  Psychiatry: A & O x 3, Mood & affect appropriate  Gastrointestinal:  Soft, Non-tender, + BS	  Skin: No rashes, No ecchymoses, No cyanosis	  Neurologic: 	CNs: VFF. left gaze preference, but able to cross midline. EOMI, no diplopia. No facial asymmetry b/l.     	Motor: No pronator drift.  	            Deltoid	Biceps	Triceps	   	R	5	5	5	5 	  	L	0	0	0           0    		H-Flex	K-Flex	K-Ext	D-Flex	P-Flex  	R	5	5	5	5	5 	   	L	2	-	-	0	0	     	Sensation: Intact to LT    	Coordination:  No dysmetria to FTN on R, unable to test on L    	Gait: deferred  Extremities: Normal range of motion, No clubbing, cyanosis or edema  Vascular: Peripheral pulses palpable 2+ bilaterally    TELEMETRY: 	    ECG:  	NSR, LBBB  RADIOLOGY:  < from: CT Angio Neck w/ IV Cont (10.07.19 @ 23:37) >    EXAM:  CT ANGIO BRAIN (W)AW IC                          EXAM:  CT ANGIO NECK (W)AW IC                            PROCEDURE DATE:  10/07/2019            INTERPRETATION:  CLINICAL INFORMATION: Left-sided weakness.     TECHNIQUE: Contrast enhanced axial CT images were acquired from the   aortic arch to the vertex of the calvarium, during the angiographic   phase. Additional post-contrast axial CT images through the head were   obtained. Three-dimensional maximum intensity projection reformats were  generated from the angiographic images.  70 cc of Omnipaque-300 mg/ml   were administered intravenously, without immediate complication. 30 cc   was discarded.    COMPARISON: CTA brain and neck 11/21/2018    FINDINGS:     CT ANGIOGRAPHY NECK:    Three-vessel aortic arch without evidence of dissection or   hemodynamically significant stenosis.     Patent stent identified within the distal right common and proximal   internal carotid arteries. Remainder of the bilateral common and internal   carotid arteries are patent without significant flow-limiting stenosis by   NASCET criteria. No evidence of acute dissection.    Co-dominant vertebral arteries are widely patent throughout their   cervical course.    The imaged upper lungs are unremarkable. A 1.4 cm right thyroid hypodense   nodule. Nonspecific cranial the normal cervical lordosis.    CT ANGIOGRAPHY BRAIN:    Atherosclerosis involving the bilateral cavernous segments of the   internal carotid arteries with associated mild to moderate luminal   stenoses.    Bilateral proximal anterior and middle cerebral arteries are patent   without evidence of significant flow-limiting stenosis or occlusion.   Anterior to indicating artery is not clearly identified.    Patent bilateral intracranial vertebral, basilar, and bilateral posterior   cerebral arteries. Fetal origin of the right posterior cerebral artery.   Left posterior communicating artery is not discretely identified.   Proximal portions of the bilateral superior cerebellar and posterior   inferior cerebellar arteries are visualized. No evidence of intracranial   aneurysm on CTA.    IMPRESSION:     CT angiography neck: Patent right common/internal carotid artery stent.   Bilateral cervical carotid and vertebral arteries are otherwise widely   patent without evidence of flow-limiting stenosis or acute dissection.    CT angiography brain: Intracranial atherosclerosis including to mild to   moderate stenoses at the bilateral cavernous internal carotid arteries.   No proximallarge vessel occlusion.    1.4 cm right thyroid lobe nodule. Further characterization with   nonemergent targeted ultrasound is recommended, as not previously   characterized.                WILBUR CELIS M.D., RADIOLOGY RESIDENT  This document hasbeen electronically signed.  NAZARIO HUBBARD M.D., ATTENDING RADIOLOGIST  This document has been electronically signed. Oct  8 2019 12:25AM                < end of copied text >    OTHER: 	  	  LABS:	 	    CARDIAC MARKERS:                    proBNP:   Lipid Profile:   HgA1c:   TSH:

## 2019-10-13 NOTE — CONSULT NOTE ADULT - ASSESSMENT
58 year old female with history of R MCA infarct now with right focal seizures with impaired awareness which has resolved
Impression: Possible to have had focal motor seizure w/o impaired awareness as cause for current symptoms.    Plan:  -will d/w epilepsy team

## 2019-10-14 LAB
GLUCOSE BLDC GLUCOMTR-MCNC: 119 MG/DL — HIGH (ref 70–99)
GLUCOSE BLDC GLUCOMTR-MCNC: 162 MG/DL — HIGH (ref 70–99)
GLUCOSE BLDC GLUCOMTR-MCNC: 91 MG/DL — SIGNIFICANT CHANGE UP (ref 70–99)
GLUCOSE BLDC GLUCOMTR-MCNC: 97 MG/DL — SIGNIFICANT CHANGE UP (ref 70–99)

## 2019-10-14 PROCEDURE — 99232 SBSQ HOSP IP/OBS MODERATE 35: CPT

## 2019-10-14 RX ORDER — SODIUM CHLORIDE 9 MG/ML
500 INJECTION INTRAMUSCULAR; INTRAVENOUS; SUBCUTANEOUS ONCE
Refills: 0 | Status: COMPLETED | OUTPATIENT
Start: 2019-10-14 | End: 2019-10-14

## 2019-10-14 RX ADMIN — Medication 81 MILLIGRAM(S): at 13:17

## 2019-10-14 RX ADMIN — LACOSAMIDE 200 MILLIGRAM(S): 50 TABLET ORAL at 18:38

## 2019-10-14 RX ADMIN — ENOXAPARIN SODIUM 40 MILLIGRAM(S): 100 INJECTION SUBCUTANEOUS at 13:17

## 2019-10-14 RX ADMIN — CLOPIDOGREL BISULFATE 75 MILLIGRAM(S): 75 TABLET, FILM COATED ORAL at 13:17

## 2019-10-14 RX ADMIN — LACOSAMIDE 200 MILLIGRAM(S): 50 TABLET ORAL at 06:19

## 2019-10-14 RX ADMIN — Medication 1: at 13:54

## 2019-10-14 RX ADMIN — INSULIN GLARGINE 16 UNIT(S): 100 INJECTION, SOLUTION SUBCUTANEOUS at 21:49

## 2019-10-14 RX ADMIN — LOSARTAN POTASSIUM 100 MILLIGRAM(S): 100 TABLET, FILM COATED ORAL at 06:19

## 2019-10-14 RX ADMIN — ATORVASTATIN CALCIUM 80 MILLIGRAM(S): 80 TABLET, FILM COATED ORAL at 21:49

## 2019-10-14 RX ADMIN — SODIUM CHLORIDE 2000 MILLILITER(S): 9 INJECTION INTRAMUSCULAR; INTRAVENOUS; SUBCUTANEOUS at 18:38

## 2019-10-14 RX ADMIN — Medication 2000 UNIT(S): at 13:17

## 2019-10-14 NOTE — PROGRESS NOTE ADULT - ATTENDING COMMENTS
agree with history and exam.  Focal status, controlled on Vimpat 200 BID.  awaiting placement to acute rehab

## 2019-10-15 ENCOUNTER — TRANSCRIPTION ENCOUNTER (OUTPATIENT)
Age: 59
End: 2019-10-15

## 2019-10-15 ENCOUNTER — INPATIENT (INPATIENT)
Facility: HOSPITAL | Age: 59
LOS: 7 days | Discharge: HOME | DRG: 949 | End: 2019-10-23
Attending: SPECIALIST | Admitting: SPECIALIST
Payer: COMMERCIAL

## 2019-10-15 VITALS
OXYGEN SATURATION: 97 % | SYSTOLIC BLOOD PRESSURE: 128 MMHG | DIASTOLIC BLOOD PRESSURE: 73 MMHG | RESPIRATION RATE: 14 BRPM | TEMPERATURE: 97 F | HEART RATE: 84 BPM | WEIGHT: 167.77 LBS | HEIGHT: 68 IN

## 2019-10-15 VITALS
SYSTOLIC BLOOD PRESSURE: 123 MMHG | DIASTOLIC BLOOD PRESSURE: 79 MMHG | RESPIRATION RATE: 18 BRPM | HEART RATE: 88 BPM | TEMPERATURE: 99 F | OXYGEN SATURATION: 100 %

## 2019-10-15 DIAGNOSIS — R53.81 OTHER MALAISE: ICD-10-CM

## 2019-10-15 LAB
GLUCOSE BLDC GLUCOMTR-MCNC: 134 MG/DL — HIGH (ref 70–99)
GLUCOSE BLDC GLUCOMTR-MCNC: 215 MG/DL — HIGH (ref 70–99)
GLUCOSE BLDC GLUCOMTR-MCNC: 98 MG/DL — SIGNIFICANT CHANGE UP (ref 70–99)

## 2019-10-15 PROCEDURE — 85610 PROTHROMBIN TIME: CPT

## 2019-10-15 PROCEDURE — 85027 COMPLETE CBC AUTOMATED: CPT

## 2019-10-15 PROCEDURE — 83036 HEMOGLOBIN GLYCOSYLATED A1C: CPT

## 2019-10-15 PROCEDURE — 85730 THROMBOPLASTIN TIME PARTIAL: CPT

## 2019-10-15 PROCEDURE — 97112 NEUROMUSCULAR REEDUCATION: CPT

## 2019-10-15 PROCEDURE — C9254: CPT

## 2019-10-15 PROCEDURE — 96374 THER/PROPH/DIAG INJ IV PUSH: CPT | Mod: XU

## 2019-10-15 PROCEDURE — 80177 DRUG SCRN QUAN LEVETIRACETAM: CPT

## 2019-10-15 PROCEDURE — 70498 CT ANGIOGRAPHY NECK: CPT

## 2019-10-15 PROCEDURE — 80048 BASIC METABOLIC PNL TOTAL CA: CPT

## 2019-10-15 PROCEDURE — 86850 RBC ANTIBODY SCREEN: CPT

## 2019-10-15 PROCEDURE — 97530 THERAPEUTIC ACTIVITIES: CPT

## 2019-10-15 PROCEDURE — 97116 GAIT TRAINING THERAPY: CPT

## 2019-10-15 PROCEDURE — 86900 BLOOD TYPING SEROLOGIC ABO: CPT

## 2019-10-15 PROCEDURE — 99221 1ST HOSP IP/OBS SF/LOW 40: CPT | Mod: GC

## 2019-10-15 PROCEDURE — 97162 PT EVAL MOD COMPLEX 30 MIN: CPT

## 2019-10-15 PROCEDURE — 80053 COMPREHEN METABOLIC PANEL: CPT

## 2019-10-15 PROCEDURE — 86803 HEPATITIS C AB TEST: CPT

## 2019-10-15 PROCEDURE — 97760 ORTHOTIC MGMT&TRAING 1ST ENC: CPT

## 2019-10-15 PROCEDURE — 99285 EMERGENCY DEPT VISIT HI MDM: CPT | Mod: 25

## 2019-10-15 PROCEDURE — 97166 OT EVAL MOD COMPLEX 45 MIN: CPT

## 2019-10-15 PROCEDURE — 95951: CPT

## 2019-10-15 PROCEDURE — 86901 BLOOD TYPING SEROLOGIC RH(D): CPT

## 2019-10-15 PROCEDURE — 95816 EEG AWAKE AND DROWSY: CPT

## 2019-10-15 PROCEDURE — 99238 HOSP IP/OBS DSCHRG MGMT 30/<: CPT

## 2019-10-15 PROCEDURE — 70450 CT HEAD/BRAIN W/O DYE: CPT

## 2019-10-15 PROCEDURE — 93005 ELECTROCARDIOGRAM TRACING: CPT

## 2019-10-15 PROCEDURE — 82962 GLUCOSE BLOOD TEST: CPT

## 2019-10-15 PROCEDURE — 70496 CT ANGIOGRAPHY HEAD: CPT

## 2019-10-15 RX ORDER — INSULIN LISPRO 100/ML
VIAL (ML) SUBCUTANEOUS AT BEDTIME
Refills: 0 | Status: DISCONTINUED | OUTPATIENT
Start: 2019-10-15 | End: 2019-10-17

## 2019-10-15 RX ORDER — DEXTROSE 50 % IN WATER 50 %
25 SYRINGE (ML) INTRAVENOUS ONCE
Refills: 0 | Status: DISCONTINUED | OUTPATIENT
Start: 2019-10-15 | End: 2019-10-23

## 2019-10-15 RX ORDER — ENOXAPARIN SODIUM 100 MG/ML
40 INJECTION SUBCUTANEOUS DAILY
Refills: 0 | Status: DISCONTINUED | OUTPATIENT
Start: 2019-10-16 | End: 2019-10-23

## 2019-10-15 RX ORDER — GLUCAGON INJECTION, SOLUTION 0.5 MG/.1ML
1 INJECTION, SOLUTION SUBCUTANEOUS ONCE
Refills: 0 | Status: DISCONTINUED | OUTPATIENT
Start: 2019-10-15 | End: 2019-10-23

## 2019-10-15 RX ORDER — LOSARTAN POTASSIUM 100 MG/1
1 TABLET, FILM COATED ORAL
Qty: 0 | Refills: 0 | DISCHARGE
Start: 2019-10-15

## 2019-10-15 RX ORDER — INSULIN LISPRO 100/ML
VIAL (ML) SUBCUTANEOUS
Refills: 0 | Status: DISCONTINUED | OUTPATIENT
Start: 2019-10-15 | End: 2019-10-17

## 2019-10-15 RX ORDER — SODIUM CHLORIDE 9 MG/ML
1000 INJECTION, SOLUTION INTRAVENOUS
Refills: 0 | Status: DISCONTINUED | OUTPATIENT
Start: 2019-10-15 | End: 2019-10-23

## 2019-10-15 RX ORDER — SENNA PLUS 8.6 MG/1
2 TABLET ORAL AT BEDTIME
Refills: 0 | Status: DISCONTINUED | OUTPATIENT
Start: 2019-10-15 | End: 2019-10-23

## 2019-10-15 RX ORDER — ACETAMINOPHEN 500 MG
650 TABLET ORAL EVERY 6 HOURS
Refills: 0 | Status: DISCONTINUED | OUTPATIENT
Start: 2019-10-15 | End: 2019-10-23

## 2019-10-15 RX ORDER — INSULIN GLARGINE 100 [IU]/ML
16 INJECTION, SOLUTION SUBCUTANEOUS AT BEDTIME
Refills: 0 | Status: DISCONTINUED | OUTPATIENT
Start: 2019-10-15 | End: 2019-10-17

## 2019-10-15 RX ORDER — LOSARTAN POTASSIUM 100 MG/1
50 TABLET, FILM COATED ORAL DAILY
Refills: 0 | Status: DISCONTINUED | OUTPATIENT
Start: 2019-10-15 | End: 2019-10-15

## 2019-10-15 RX ORDER — CLOPIDOGREL BISULFATE 75 MG/1
75 TABLET, FILM COATED ORAL DAILY
Refills: 0 | Status: DISCONTINUED | OUTPATIENT
Start: 2019-10-16 | End: 2019-10-23

## 2019-10-15 RX ORDER — ATORVASTATIN CALCIUM 80 MG/1
80 TABLET, FILM COATED ORAL AT BEDTIME
Refills: 0 | Status: DISCONTINUED | OUTPATIENT
Start: 2019-10-15 | End: 2019-10-23

## 2019-10-15 RX ORDER — DOCUSATE SODIUM 100 MG
100 CAPSULE ORAL
Refills: 0 | Status: DISCONTINUED | OUTPATIENT
Start: 2019-10-15 | End: 2019-10-23

## 2019-10-15 RX ORDER — LOSARTAN POTASSIUM 100 MG/1
50 TABLET, FILM COATED ORAL DAILY
Refills: 0 | Status: DISCONTINUED | OUTPATIENT
Start: 2019-10-16 | End: 2019-10-23

## 2019-10-15 RX ORDER — SODIUM CHLORIDE 9 MG/ML
500 INJECTION INTRAMUSCULAR; INTRAVENOUS; SUBCUTANEOUS ONCE
Refills: 0 | Status: DISCONTINUED | OUTPATIENT
Start: 2019-10-15 | End: 2019-10-15

## 2019-10-15 RX ORDER — CHOLECALCIFEROL (VITAMIN D3) 125 MCG
2000 CAPSULE ORAL DAILY
Refills: 0 | Status: DISCONTINUED | OUTPATIENT
Start: 2019-10-15 | End: 2019-10-23

## 2019-10-15 RX ORDER — ASPIRIN/CALCIUM CARB/MAGNESIUM 324 MG
81 TABLET ORAL DAILY
Refills: 0 | Status: DISCONTINUED | OUTPATIENT
Start: 2019-10-16 | End: 2019-10-23

## 2019-10-15 RX ORDER — DEXTROSE 50 % IN WATER 50 %
12.5 SYRINGE (ML) INTRAVENOUS ONCE
Refills: 0 | Status: DISCONTINUED | OUTPATIENT
Start: 2019-10-15 | End: 2019-10-23

## 2019-10-15 RX ORDER — LACOSAMIDE 50 MG/1
200 TABLET ORAL
Refills: 0 | Status: DISCONTINUED | OUTPATIENT
Start: 2019-10-15 | End: 2019-10-17

## 2019-10-15 RX ORDER — DEXTROSE 50 % IN WATER 50 %
15 SYRINGE (ML) INTRAVENOUS ONCE
Refills: 0 | Status: DISCONTINUED | OUTPATIENT
Start: 2019-10-15 | End: 2019-10-23

## 2019-10-15 RX ORDER — METFORMIN HYDROCHLORIDE 850 MG/1
1 TABLET ORAL
Qty: 0 | Refills: 0 | DISCHARGE

## 2019-10-15 RX ADMIN — ATORVASTATIN CALCIUM 80 MILLIGRAM(S): 80 TABLET, FILM COATED ORAL at 21:05

## 2019-10-15 RX ADMIN — ENOXAPARIN SODIUM 40 MILLIGRAM(S): 100 INJECTION SUBCUTANEOUS at 11:58

## 2019-10-15 RX ADMIN — Medication 100 MILLIGRAM(S): at 21:05

## 2019-10-15 RX ADMIN — CLOPIDOGREL BISULFATE 75 MILLIGRAM(S): 75 TABLET, FILM COATED ORAL at 11:58

## 2019-10-15 RX ADMIN — Medication 81 MILLIGRAM(S): at 11:58

## 2019-10-15 RX ADMIN — LACOSAMIDE 200 MILLIGRAM(S): 50 TABLET ORAL at 05:40

## 2019-10-15 RX ADMIN — LACOSAMIDE 200 MILLIGRAM(S): 50 TABLET ORAL at 17:05

## 2019-10-15 RX ADMIN — Medication 2000 UNIT(S): at 11:58

## 2019-10-15 RX ADMIN — INSULIN GLARGINE 16 UNIT(S): 100 INJECTION, SOLUTION SUBCUTANEOUS at 21:05

## 2019-10-15 RX ADMIN — LOSARTAN POTASSIUM 100 MILLIGRAM(S): 100 TABLET, FILM COATED ORAL at 05:40

## 2019-10-15 NOTE — H&P ADULT - HISTORY OF PRESENT ILLNESS
59 yo F with PMH/PSH DM, HLD, HTN, prior R MCA stroke in November due to R ICA dissection/occlusion s/p ICA stent, with L sided residual weakness. As per Family at bedside, on 10/7 at 8:30 pm, patient started having left gaze deviation and was having shaking episodes. Patient reported to be conscious during these episodes. Patient endorsed to having urinary incontinence but no tongue biting. She reported that she had 4 episodes, each episode lasting a few minutes. Per the family, during these episodes, her eyes deviated to the left. Patient reported that shaking was on the left side. Denied any prior history of similar episodes.    Of note, patient reported that in July 2019 she presented to [facility] with TIA-like symptoms on the left side. She was started on antiepileptics at the time, but she did not remember which medication. She was no longer on any AEDs during this admission.     CTH was done which showed no acute hemorrhage and chronic right MCA infarct. Patient was admitted to the epilepsy monitoring unit and had emergent VEEG done. During hospitalization, EEG showed R focal seizures with impaired awareness. Patient was initially started on vimpat 100 BID but was noted to have some confusion. Vimpat was increased to 200mg BID with improvement. Patient was seen by PM&R and was recommended for rehab when medically stable. Patient was medically cleared by medical team for discharge to acute rehab today. LUCY ALCANTAR is a 59yo Female with PMH/PSH DM, HLD, HTN, prior R MCA stroke in November due to R ICA dissection/occlusion s/p ICA stent, with L sided residual weakness. As per Family at bedside, on 10/7 at 8:30 pm, patient started having left gaze deviation and was having shaking episodes. Patient reported to be conscious during these episodes. Patient endorsed to having urinary incontinence but no tongue biting. She reported that she had 4 episodes, each episode lasting a few minutes. Per the family, during these episodes, her eyes deviated to the left. Patient reported that shaking was on the left side. Denied any prior history of similar episodes.    Of note, patient reported that in July 2019 she presented to [facility] with TIA-like symptoms on the left side. She was started on antiepileptics at the time, but she did not remember which medication. She was no longer on any AEDs during this admission.     CTH was done which showed no acute hemorrhage and chronic right MCA infarct. Patient was admitted to the epilepsy monitoring unit and had emergent VEEG done. During hospitalization, EEG showed R focal seizures with impaired awareness. Patient was initially started on vimpat 100 BID but was noted to have some confusion. Vimpat was increased to 200mg BID with improvement. Patient was seen by PM&R and was recommended for rehab when medically stable. Patient was medically cleared by medical team for discharge to acute rehab today.

## 2019-10-15 NOTE — DISCHARGE NOTE NURSING/CASE MANAGEMENT/SOCIAL WORK - PATIENT PORTAL LINK FT
You can access the FollowMyHealth Patient Portal offered by [facility] by registering at the following website: [email or web address]. By joining [facility]’s FollowMyHealth portal, you will also be able to view your health information using other applications (apps) compatible with our system.

## 2019-10-15 NOTE — H&P ADULT - NSHPSOCIALHISTORY_GEN_ALL_CORE
SOCIAL HISTORY  Smoking - Denied  EtOH - Denied   Drugs - Denied    FUNCTIONAL HISTORY  Lives   Prior Level of Function: Independent in ADLs and ambulation    CURRENT FUNCTIONAL STATUS  - Bed Mobility:  - Transfers:   - Gait:  - ADLs: FUNCTIONAL HISTORY  Lives w/ son in apt, [address]STE  Prior Level of Function: ambulate with L AFO and knee brace using quad cane    CURRENT FUNCTIONAL STATUS  - Transfers: Jluis  - Gait:40' RW Jluis

## 2019-10-15 NOTE — PROGRESS NOTE ADULT - ASSESSMENT
58 year old female with history of R MCA infarct now with right focal seizures with impaired awareness which has resolved.    Continue vimpat 200 BID    Rescue: Ativan 2 mg    Dispo: Plan for discharge to [place] possibly 10/15
58 year old female with history of R MCA infarct now with right focal seizures with impaired awareness which has resolved.    [x] vEEG: Discontinued  [x] Continue vimpat 200 BID    Rescue: Ativan 2 mg    Dispo: Plan for discharge to acute rehab 10/14. Pending Case Management.
58 year old female with history of R MCA infarct now with right focal seizures with impaired awareness which has resolved.    Continue vimpat 200 BID    Rescue: Ativan 2 mg    Dispo: Plan for discharge to acute rehab 10/14
58 year old female with history of R MCA infarct now with right focal seizures with impaired awareness which has resolved.    Continue vimpat 200 BID    Rescue: Ativan 2 mg    Dispo: Plan for discharge to acute rehab pending choices and case management
58 year old female with history of R MCA infarct now with right focal seizures with impaired awareness which has resolved.    [x] vEEG: Discontinued  [x] Continue vimpat 200 BID    Rescue: Ativan 2 mg    Dispo: Plan for discharge to acute rehab 10/14
58 year old female with history of R MCA infarct now with right focal seizures with impaired awareness.    EMU    Continue vimpat 100 BID    Rescue: Ativan 2 mg
58 year old female with history of R MCA infarct now with right focal seizures with impaired awareness.    EMU    Continue vimpat 100 BID    Rescue: Ativan 2 mg

## 2019-10-15 NOTE — PROGRESS NOTE ADULT - ATTENDING COMMENTS
agree with history and exam.  patient with focal status now control on Vimpat 200 BID , awaiting for safe acute rehab placement

## 2019-10-15 NOTE — DISCHARGE NOTE NURSING/CASE MANAGEMENT/SOCIAL WORK - NSDCPEWEB_GEN_ALL_CORE
[facility] for Tobacco Control website --- [email or web address] website --- [email or web address]

## 2019-10-15 NOTE — PROGRESS NOTE ADULT - SUBJECTIVE AND OBJECTIVE BOX
Neurology  Progress Note  10-13-19    Name:  LUCY ALCANTAR; 58y    Interval History:  No overnight events.     HPI:  58F w/ DM, HLD, HTN, prior R MCA stroke in November due to R ICA dissection/occlusion s/p ICA stent, with L sided residual weakness. Family at bedside as well as patient assist in providing the history. They report that at 8:30 pm, she started having a left gaze deviation and was having shaking episodes. She reports that she was conscious during these episodes. Did not state how long episode of shaking was. Endorses having urinary incontinence. No tongue biting. She reports that she had 4 episodes, each episode lasting a few minutes. Per the family, during these episodes, her eyes deviated to L. She reports that shaking was on the left side. Denies any prior history of similar episodes.    She reports that in July 2019 she presented to [place] with TIA-like symptoms on the left side. She was started on antiepileptics at the time, but she does not remember which medication. She is no longer on any AEDs.    (Stroke only)  NIHSS: 8  MRS: 4  ICH: 0 (08 Oct 2019 04:59)    REVIEW OF SYSTEMS:  As states in HPI.    Medications:  acetaminophen   Tablet .. 650 milliGRAM(s) Oral every 6 hours PRN  aspirin  chewable 81 milliGRAM(s) Oral daily  atorvastatin 80 milliGRAM(s) Oral at bedtime  cholecalciferol 2000 Unit(s) Oral daily  clopidogrel Tablet 75 milliGRAM(s) Oral daily  dextrose 40% Gel 15 Gram(s) Oral once PRN  dextrose 5%. 1000 milliLiter(s) IV Continuous <Continuous>  dextrose 50% Injectable 12.5 Gram(s) IV Push once  dextrose 50% Injectable 25 Gram(s) IV Push once  dextrose 50% Injectable 25 Gram(s) IV Push once  diphenhydrAMINE   Injectable 12.5 milliGRAM(s) IV Push once  enoxaparin Injectable 40 milliGRAM(s) SubCutaneous daily  glucagon  Injectable 1 milliGRAM(s) IntraMuscular once PRN  insulin glargine Injectable (LANTUS) 16 Unit(s) SubCutaneous at bedtime  insulin lispro (HumaLOG) corrective regimen sliding scale   SubCutaneous three times a day before meals  insulin lispro (HumaLOG) corrective regimen sliding scale   SubCutaneous at bedtime  labetalol Injectable 10 milliGRAM(s) IV Push three times a day PRN  labetalol Injectable 10 milliGRAM(s) IV Push once  lacosamide 200 milliGRAM(s) Oral two times a day  lacosamide IVPB 200 milliGRAM(s) IV Intermittent once  lacosamide IVPB 100 milliGRAM(s) IV Intermittent once  LORazepam   Injectable 2 milliGRAM(s) IV Push every 6 hours PRN  LORazepam   Injectable 2 milliGRAM(s) IV Push once PRN  losartan 100 milliGRAM(s) Oral daily  ondansetron Injectable 4 milliGRAM(s) IV Push once  ondansetron Injectable 4 milliGRAM(s) IV Push once  ondansetron Injectable 4 milliGRAM(s) IV Push once  sodium chloride 0.9% Bolus 1000 milliLiter(s) IV Bolus once    Vitals:  T(C): 36.9 (10-13-19 @ 08:36), Max: 36.9 (10-12-19 @ 13:24)  HR: 68 (10-13-19 @ 08:36) (68 - 87)  BP: 122/76 (10-13-19 @ 08:36) (99/66 - 157/75)  RR: 18 (10-13-19 @ 08:36) (18 - 18)  SpO2: 96% (10-13-19 @ 08:36) (96% - 100%)    Labs:          CAPILLARY BLOOD GLUCOSE      POCT Blood Glucose.: 113 mg/dL (13 Oct 2019 08:40)  PHYSICAL EXAMINATION  Constitutional: No apparent distress.  Head: Normocephalic & atraumatic.  Extremities: No edema, clubbing, cyanosis  Skin: No rashes    Neurological:  MS: Awake, alert, oriented to person, place, situation, time. Normal affect. Follows all commands.    	Language: Speech is clear, fluent     	CNs: Pupils reactive to light. VFF. EOMI, no diplopia. No facial asymmetry b/l.     	Motor: No pronator drift.  	            Deltoid	Biceps	Triceps	   	R	5	5	5	5 	  	L	0	0	0           0    		H-Flex	K-Flex	K-Ext	D-Flex	P-Flex  	R	5	5	5	5	5 	   	L	2	-	-	0	0	     	Sensation: Intact to LT    	Coordination:  No dysmetria to FTN on R, unable to test on L
SUBJECTIVE: No events overnight    MEDICATIONS (HOME):  Home Medications:  lacosamide 200 mg oral tablet: 1 tab(s) orally 2 times a day (11 Oct 2019 09:23)  losartan 100 mg oral tablet: 1 tab(s) orally once a day (11 Oct 2019 09:22)  metFORMIN 500 mg oral tablet: 1 tab(s) orally 2 times a day (08 Oct 2019 05:39)  Plavix 75 mg oral tablet: 1 tab(s) orally once a day (08 Oct 2019 11:34)  Vitamin D2 2000 intl units oral capsule: 1 cap(s) orally once a day (08 Oct 2019 11:34)    MEDICATIONS  (STANDING):  aspirin  chewable 81 milliGRAM(s) Oral daily  atorvastatin 80 milliGRAM(s) Oral at bedtime  cholecalciferol 2000 Unit(s) Oral daily  clopidogrel Tablet 75 milliGRAM(s) Oral daily  dextrose 5%. 1000 milliLiter(s) (50 mL/Hr) IV Continuous <Continuous>  dextrose 50% Injectable 12.5 Gram(s) IV Push once  dextrose 50% Injectable 25 Gram(s) IV Push once  dextrose 50% Injectable 25 Gram(s) IV Push once  enoxaparin Injectable 40 milliGRAM(s) SubCutaneous daily  insulin glargine Injectable (LANTUS) 16 Unit(s) SubCutaneous at bedtime  insulin lispro (HumaLOG) corrective regimen sliding scale   SubCutaneous three times a day before meals  insulin lispro (HumaLOG) corrective regimen sliding scale   SubCutaneous at bedtime  lacosamide 200 milliGRAM(s) Oral two times a day  losartan 100 milliGRAM(s) Oral daily    MEDICATIONS  (PRN):  acetaminophen   Tablet .. 650 milliGRAM(s) Oral every 6 hours PRN Temp greater or equal to 38C (100.4F), Mild Pain (1 - 3), Moderate Pain (4 - 6)  dextrose 40% Gel 15 Gram(s) Oral once PRN Blood Glucose LESS THAN 70 milliGRAM(s)/deciliter  glucagon  Injectable 1 milliGRAM(s) IntraMuscular once PRN Glucose LESS THAN 70 milligrams/deciliter  labetalol Injectable 10 milliGRAM(s) IV Push three times a day PRN SBP >180  LORazepam   Injectable 2 milliGRAM(s) IV Push every 6 hours PRN seizures lasting longer than 2 minutes or greater than 3 episodes in 10 minutes    ALLERGIES/INTOLERANCES:  Allergies  No Known Drug Allergies  shellfish (Anaphylaxis)    Intolerances    VITALS & EXAMINATION:  Vital Signs Last 24 Hrs  T(C): 36.4 (15 Oct 2019 05:38), Max: 36.8 (14 Oct 2019 13:28)  T(F): 97.6 (15 Oct 2019 05:38), Max: 98.3 (14 Oct 2019 13:28)  HR: 76 (15 Oct 2019 05:38) (76 - 104)  BP: 153/77 (15 Oct 2019 05:38) (95/64 - 165/84)  BP(mean): --  RR: 16 (15 Oct 2019 05:38) (16 - 18)  SpO2: 98% (15 Oct 2019 05:38) (96% - 99%)    PHYSICAL EXAMINATION  Constitutional: No apparent distress.  Head: Normocephalic & atraumatic.  Extremities: No edema, clubbing, cyanosis  Skin: No rashes    Neurological:  MS: Awake, alert, oriented to person, place, situation, time. Normal affect. Follows all commands.    	Language: Speech is clear, fluent     	CNs: Pupils reactive to light. VFF. EOMI, no diplopia. No facial asymmetry b/l.     	Motor: No pronator drift.  	            Deltoid	Biceps	Triceps	   	R	5	5	5	5 	  	L	0	0	0           0    		H-Flex	K-Flex	K-Ext	D-Flex	P-Flex  	R	5	5	5	5	5 	   	L	2	-	-	0	0	     Sensation: Intact to LT    Coordination:  No dysmetria to FTN on R, unable to test on L
SUBJECTIVE: No events overnight    MEDICATIONS (HOME):  Home Medications:  lacosamide 200 mg oral tablet: 1 tab(s) orally 2 times a day (11 Oct 2019 09:23)  losartan 100 mg oral tablet: 1 tab(s) orally once a day (11 Oct 2019 09:22)  metFORMIN 500 mg oral tablet: 1 tab(s) orally 2 times a day (08 Oct 2019 05:39)  Plavix 75 mg oral tablet: 1 tab(s) orally once a day (08 Oct 2019 11:34)  Vitamin D2 2000 intl units oral capsule: 1 cap(s) orally once a day (08 Oct 2019 11:34)    MEDICATIONS  (STANDING):  aspirin  chewable 81 milliGRAM(s) Oral daily  atorvastatin 80 milliGRAM(s) Oral at bedtime  cholecalciferol 2000 Unit(s) Oral daily  clopidogrel Tablet 75 milliGRAM(s) Oral daily  dextrose 5%. 1000 milliLiter(s) (50 mL/Hr) IV Continuous <Continuous>  dextrose 50% Injectable 12.5 Gram(s) IV Push once  dextrose 50% Injectable 25 Gram(s) IV Push once  dextrose 50% Injectable 25 Gram(s) IV Push once  enoxaparin Injectable 40 milliGRAM(s) SubCutaneous daily  insulin glargine Injectable (LANTUS) 16 Unit(s) SubCutaneous at bedtime  insulin lispro (HumaLOG) corrective regimen sliding scale   SubCutaneous three times a day before meals  insulin lispro (HumaLOG) corrective regimen sliding scale   SubCutaneous at bedtime  lacosamide 200 milliGRAM(s) Oral two times a day  losartan 100 milliGRAM(s) Oral daily    MEDICATIONS  (PRN):  acetaminophen   Tablet .. 650 milliGRAM(s) Oral every 6 hours PRN Temp greater or equal to 38C (100.4F), Mild Pain (1 - 3), Moderate Pain (4 - 6)  dextrose 40% Gel 15 Gram(s) Oral once PRN Blood Glucose LESS THAN 70 milliGRAM(s)/deciliter  glucagon  Injectable 1 milliGRAM(s) IntraMuscular once PRN Glucose LESS THAN 70 milligrams/deciliter  labetalol Injectable 10 milliGRAM(s) IV Push three times a day PRN SBP >180  LORazepam   Injectable 2 milliGRAM(s) IV Push every 6 hours PRN seizures lasting longer than 2 minutes or greater than 3 episodes in 10 minutes      ALLERGIES/INTOLERANCES:  No Known Drug Allergies  shellfish (Anaphylaxis)    VITALS & EXAMINATION:  Vital Signs Last 24 Hrs  T(C): 36.6 (12 Oct 2019 16:45), Max: 36.9 (11 Oct 2019 23:33)  T(F): 97.8 (12 Oct 2019 16:45), Max: 98.5 (12 Oct 2019 13:24)  HR: 79 (12 Oct 2019 16:45) (72 - 91)  BP: 99/66 (12 Oct 2019 16:45) (99/66 - 128/76)  BP(mean): --  RR: 18 (12 Oct 2019 16:45) (18 - 18)  SpO2: 98% (12 Oct 2019 16:45) (97% - 99%)    PHYSICAL EXAMINATION  Constitutional: No apparent distress.  Head: Normocephalic & atraumatic.  Extremities: No edema, clubbing, cyanosis  Skin: No rashes    Neurological:  MS: Awake, alert, oriented to person, place, situation, time. Normal affect. Follows all commands.    	Language: Speech is clear, fluent     	CNs: Pupils reactive to light. VFF. EOMI, no diplopia. No facial asymmetry b/l.     	Motor: No pronator drift.  	            Deltoid	Biceps	Triceps	   	R	5	5	5	5 	  	L	0	0	0           0    		H-Flex	K-Flex	K-Ext	D-Flex	P-Flex  	R	5	5	5	5	5 	   	L	2	-	-	0	0	     	Sensation: Intact to LT    	Coordination:  No dysmetria to FTN on R, unable to test on L
SUBJECTIVE: No events overnight    MEDICATIONS (HOME):  Home Medications:  lacosamide 200 mg oral tablet: 1 tab(s) orally 2 times a day (11 Oct 2019 09:23)  losartan 100 mg oral tablet: 1 tab(s) orally once a day (11 Oct 2019 09:22)  metFORMIN 500 mg oral tablet: 1 tab(s) orally 2 times a day (08 Oct 2019 05:39)  Plavix 75 mg oral tablet: 1 tab(s) orally once a day (08 Oct 2019 11:34)  Vitamin D2 2000 intl units oral capsule: 1 cap(s) orally once a day (08 Oct 2019 11:34)    MEDICATIONS  (STANDING):  aspirin  chewable 81 milliGRAM(s) Oral daily  atorvastatin 80 milliGRAM(s) Oral at bedtime  cholecalciferol 2000 Unit(s) Oral daily  clopidogrel Tablet 75 milliGRAM(s) Oral daily  dextrose 5%. 1000 milliLiter(s) (50 mL/Hr) IV Continuous <Continuous>  dextrose 50% Injectable 12.5 Gram(s) IV Push once  dextrose 50% Injectable 25 Gram(s) IV Push once  dextrose 50% Injectable 25 Gram(s) IV Push once  enoxaparin Injectable 40 milliGRAM(s) SubCutaneous daily  insulin glargine Injectable (LANTUS) 16 Unit(s) SubCutaneous at bedtime  insulin lispro (HumaLOG) corrective regimen sliding scale   SubCutaneous three times a day before meals  insulin lispro (HumaLOG) corrective regimen sliding scale   SubCutaneous at bedtime  lacosamide 200 milliGRAM(s) Oral two times a day  losartan 100 milliGRAM(s) Oral daily    MEDICATIONS  (PRN):  acetaminophen   Tablet .. 650 milliGRAM(s) Oral every 6 hours PRN Temp greater or equal to 38C (100.4F), Mild Pain (1 - 3), Moderate Pain (4 - 6)  dextrose 40% Gel 15 Gram(s) Oral once PRN Blood Glucose LESS THAN 70 milliGRAM(s)/deciliter  glucagon  Injectable 1 milliGRAM(s) IntraMuscular once PRN Glucose LESS THAN 70 milligrams/deciliter  labetalol Injectable 10 milliGRAM(s) IV Push three times a day PRN SBP >180  LORazepam   Injectable 2 milliGRAM(s) IV Push every 6 hours PRN seizures lasting longer than 2 minutes or greater than 3 episodes in 10 minutes    ALLERGIES/INTOLERANCES:  No Known Drug Allergies  shellfish (Anaphylaxis)    VITALS & EXAMINATION:  Vital Signs Last 24 Hrs  T(C): 36.6 (11 Oct 2019 08:32), Max: 36.9 (10 Oct 2019 16:19)  T(F): 97.9 (11 Oct 2019 08:32), Max: 98.4 (10 Oct 2019 16:19)  HR: 101 (11 Oct 2019 08:32) (83 - 101)  BP: 100/63 (11 Oct 2019 08:32) (100/63 - 133/73)  RR: 18 (11 Oct 2019 08:32) (18 - 18)  SpO2: 99% (11 Oct 2019 08:32) (98% - 100%)    PHYSICAL EXAMINATION  Constitutional: No apparent distress.  Head: Normocephalic & atraumatic.  Extremities: No edema, clubbing, cyanosis  Skin: No rashes    Neurological:  MS: Awake, alert, oriented to person, place, situation, time. Normal affect. Follows all commands.    	Language: Speech is clear, fluent     	CNs: Pupils reactive to light. VFF. EOMI, no diplopia. No facial asymmetry b/l.     	Motor: No pronator drift.  	            Deltoid	Biceps	Triceps	   	R	5	5	5	5 	  	L	0	0	0           0    		H-Flex	K-Flex	K-Ext	D-Flex	P-Flex  	R	5	5	5	5	5 	   	L	2	-	-	0	0	     	Sensation: Intact to LT    	Coordination:  No dysmetria to FTN on R, unable to test on L
SUBJECTIVE: No events overnight    MEDICATIONS (HOME):  Home Medications:  lacosamide 200 mg oral tablet: 1 tab(s) orally 2 times a day (11 Oct 2019 09:23)  losartan 100 mg oral tablet: 1 tab(s) orally once a day (11 Oct 2019 09:22)  metFORMIN 500 mg oral tablet: 1 tab(s) orally 2 times a day (08 Oct 2019 05:39)  Plavix 75 mg oral tablet: 1 tab(s) orally once a day (08 Oct 2019 11:34)  Vitamin D2 2000 intl units oral capsule: 1 cap(s) orally once a day (08 Oct 2019 11:34)    MEDICATIONS  (STANDING):  aspirin  chewable 81 milliGRAM(s) Oral daily  atorvastatin 80 milliGRAM(s) Oral at bedtime  cholecalciferol 2000 Unit(s) Oral daily  clopidogrel Tablet 75 milliGRAM(s) Oral daily  dextrose 5%. 1000 milliLiter(s) (50 mL/Hr) IV Continuous <Continuous>  dextrose 50% Injectable 12.5 Gram(s) IV Push once  dextrose 50% Injectable 25 Gram(s) IV Push once  dextrose 50% Injectable 25 Gram(s) IV Push once  enoxaparin Injectable 40 milliGRAM(s) SubCutaneous daily  insulin glargine Injectable (LANTUS) 16 Unit(s) SubCutaneous at bedtime  insulin lispro (HumaLOG) corrective regimen sliding scale   SubCutaneous three times a day before meals  insulin lispro (HumaLOG) corrective regimen sliding scale   SubCutaneous at bedtime  lacosamide 200 milliGRAM(s) Oral two times a day  losartan 100 milliGRAM(s) Oral daily    MEDICATIONS  (PRN):  acetaminophen   Tablet .. 650 milliGRAM(s) Oral every 6 hours PRN Temp greater or equal to 38C (100.4F), Mild Pain (1 - 3), Moderate Pain (4 - 6)  dextrose 40% Gel 15 Gram(s) Oral once PRN Blood Glucose LESS THAN 70 milliGRAM(s)/deciliter  glucagon  Injectable 1 milliGRAM(s) IntraMuscular once PRN Glucose LESS THAN 70 milligrams/deciliter  labetalol Injectable 10 milliGRAM(s) IV Push three times a day PRN SBP >180  LORazepam   Injectable 2 milliGRAM(s) IV Push every 6 hours PRN seizures lasting longer than 2 minutes or greater than 3 episodes in 10 minutes    ALLERGIES/INTOLERANCES:  shellfish (Anaphylaxis)    VITALS & EXAMINATION:  Vital Signs Last 24 Hrs  T(C): 37 (14 Oct 2019 06:47), Max: 37 (14 Oct 2019 06:47)  T(F): 98.6 (14 Oct 2019 06:47), Max: 98.6 (14 Oct 2019 06:47)  HR: 96 (14 Oct 2019 06:47) (80 - 96)  BP: 121/74 (14 Oct 2019 06:47) (110/67 - 121/74)  RR: 17 (14 Oct 2019 06:47) (17 - 18)  SpO2: 97% (14 Oct 2019 06:47) (97% - 100%)    PHYSICAL EXAMINATION  Constitutional: No apparent distress.  Head: Normocephalic & atraumatic.  Extremities: No edema, clubbing, cyanosis  Skin: No rashes    Neurological:  MS: Awake, alert, oriented to person, place, situation, time. Normal affect. Follows all commands.    	Language: Speech is clear, fluent     	CNs: Pupils reactive to light. VFF. EOMI, no diplopia. No facial asymmetry b/l.     	Motor: No pronator drift.  	            Deltoid	Biceps	Triceps	   	R	5	5	5	5 	  	L	0	0	0           0    		H-Flex	K-Flex	K-Ext	D-Flex	P-Flex  	R	5	5	5	5	5 	   	L	2	-	-	0	0	     Sensation: Intact to LT    Coordination:  No dysmetria to FTN on R, unable to test on L
SUBJECTIVE: Patient with some confusion overnight.    MEDICATIONS (HOME):  Home Medications:  losartan 50 mg oral tablet: 1 tab(s) orally once a day (08 Oct 2019 11:34)  metFORMIN 500 mg oral tablet: 1 tab(s) orally 2 times a day (08 Oct 2019 05:39)  Plavix 75 mg oral tablet: 1 tab(s) orally once a day (08 Oct 2019 11:34)  Vitamin D2 2000 intl units oral capsule: 1 cap(s) orally once a day (08 Oct 2019 11:34)    MEDICATIONS  (STANDING):  aspirin  chewable 81 milliGRAM(s) Oral daily  atorvastatin 80 milliGRAM(s) Oral at bedtime  cholecalciferol 2000 Unit(s) Oral daily  clopidogrel Tablet 75 milliGRAM(s) Oral daily  dextrose 5%. 1000 milliLiter(s) (50 mL/Hr) IV Continuous <Continuous>  dextrose 50% Injectable 12.5 Gram(s) IV Push once  dextrose 50% Injectable 25 Gram(s) IV Push once  dextrose 50% Injectable 25 Gram(s) IV Push once  enoxaparin Injectable 40 milliGRAM(s) SubCutaneous daily  insulin glargine Injectable (LANTUS) 16 Unit(s) SubCutaneous at bedtime  insulin lispro (HumaLOG) corrective regimen sliding scale   SubCutaneous three times a day before meals  insulin lispro (HumaLOG) corrective regimen sliding scale   SubCutaneous at bedtime  lacosamide 100 milliGRAM(s) Oral two times a day  losartan 100 milliGRAM(s) Oral daily    MEDICATIONS  (PRN):  acetaminophen   Tablet .. 650 milliGRAM(s) Oral every 6 hours PRN Temp greater or equal to 38C (100.4F), Mild Pain (1 - 3), Moderate Pain (4 - 6)  dextrose 40% Gel 15 Gram(s) Oral once PRN Blood Glucose LESS THAN 70 milliGRAM(s)/deciliter  glucagon  Injectable 1 milliGRAM(s) IntraMuscular once PRN Glucose LESS THAN 70 milligrams/deciliter  labetalol Injectable 10 milliGRAM(s) IV Push three times a day PRN SBP >180  LORazepam   Injectable 2 milliGRAM(s) IV Push every 6 hours PRN seizures lasting longer than 2 minutes or greater than 3 episodes in 10 minutes    ALLERGIES/INTOLERANCES:  shellfish (Anaphylaxis)    VITALS & EXAMINATION:  Vital Signs Last 24 Hrs  T(C): 36.9 (10 Oct 2019 08:57), Max: 37.2 (09 Oct 2019 19:36)  T(F): 98.4 (10 Oct 2019 08:57), Max: 99 (09 Oct 2019 19:36)  HR: 80 (10 Oct 2019 08:57) (78 - 91)  BP: 152/77 (10 Oct 2019 08:57) (106/67 - 172/98)  RR: 18 (10 Oct 2019 08:57) (18 - 18)  SpO2: 98% (10 Oct 2019 08:57) (97% - 100%)    PHYSICAL EXAMINATION  Constitutional: No apparent distress.  Head: Normocephalic & atraumatic.  Extremities: No edema, clubbing, cyanosis  Skin: No rashes    Neurological:  Physical Exam: Neurological (>12):  MS: Awake, alert, oriented to person, place, situation, time. Normal affect. Follows all commands.   Language: Speech is clear, fluent    CNs: VFF. left gaze preference, but able to cross midline. EOMI, no diplopia. No facial asymmetry b/l.    Motor: No pronator drift.              Deltoid Biceps Triceps    R 5 5 5 5    L 0 0 0           0    H-Flex K-Flex K-Ext D-Flex P-Flex  R 5 5 5 5 5     L 2 - - 0 0     Sensation: Intact to LT   Coordination:  No dysmetria to FTN on R, unable to test on L   Gait: deferred	    LABORATORY:  CBC                       11.2   4.05  )-----------( 227      ( 09 Oct 2019 09:45 )             35.0     Chem 10-09    139  |  102  |  15  ----------------------------<  151<H>  4.2   |  27  |  0.70    Ca    10.3      09 Oct 2019 06:58
SUBJECTIVE: Yesterday patient had multiple events of turning to the left. Was loaded with vimpat.    MEDICATIONS (HOME):  Home Medications:  losartan 50 mg oral tablet: 1 tab(s) orally once a day (08 Oct 2019 11:34)  metFORMIN 500 mg oral tablet: 1 tab(s) orally 2 times a day (08 Oct 2019 05:39)  Plavix 75 mg oral tablet: 1 tab(s) orally once a day (08 Oct 2019 11:34)  Vitamin D2 2000 intl units oral capsule: 1 cap(s) orally once a day (08 Oct 2019 11:34)    MEDICATIONS  (STANDING):  aspirin  chewable 81 milliGRAM(s) Oral daily  atorvastatin 80 milliGRAM(s) Oral at bedtime  cholecalciferol 2000 Unit(s) Oral daily  clopidogrel Tablet 75 milliGRAM(s) Oral daily  dextrose 5%. 1000 milliLiter(s) (50 mL/Hr) IV Continuous <Continuous>  dextrose 50% Injectable 12.5 Gram(s) IV Push once  dextrose 50% Injectable 25 Gram(s) IV Push once  dextrose 50% Injectable 25 Gram(s) IV Push once  enoxaparin Injectable 40 milliGRAM(s) SubCutaneous daily  insulin glargine Injectable (LANTUS) 16 Unit(s) SubCutaneous at bedtime  insulin lispro (HumaLOG) corrective regimen sliding scale   SubCutaneous three times a day before meals  insulin lispro (HumaLOG) corrective regimen sliding scale   SubCutaneous at bedtime  lacosamide IVPB 100 milliGRAM(s) IV Intermittent every 12 hours  losartan 50 milliGRAM(s) Oral daily    MEDICATIONS  (PRN):  acetaminophen   Tablet .. 650 milliGRAM(s) Oral every 6 hours PRN Temp greater or equal to 38C (100.4F), Mild Pain (1 - 3), Moderate Pain (4 - 6)  dextrose 40% Gel 15 Gram(s) Oral once PRN Blood Glucose LESS THAN 70 milliGRAM(s)/deciliter  glucagon  Injectable 1 milliGRAM(s) IntraMuscular once PRN Glucose LESS THAN 70 milligrams/deciliter  LORazepam   Injectable 2 milliGRAM(s) IV Push every 6 hours PRN seizures lasting longer than 2 minutes or greater than 3 episodes in 10 minutes    ALLERGIES/INTOLERANCES:  shellfish (Anaphylaxis)    VITALS & EXAMINATION:  Vital Signs Last 24 Hrs  T(C): 36.9 (09 Oct 2019 06:15), Max: 37.3 (08 Oct 2019 23:13)  T(F): 98.4 (09 Oct 2019 06:15), Max: 99.1 (08 Oct 2019 23:13)  HR: 84 (09 Oct 2019 06:15) (83 - 101)  BP: 151/86 (09 Oct 2019 06:15) (142/83 - 165/84)  RR: 18 (09 Oct 2019 06:15) (18 - 18)  SpO2: 99% (09 Oct 2019 06:15) (98% - 100%)    PHYSICAL EXAMINATION  Constitutional: No apparent distress.  Head: Normocephalic & atraumatic.  Extremities: No edema, clubbing, cyanosis  Skin: No rashes    Neurological:  Physical Exam: Neurological (>12):  MS: Awake, alert, oriented to person, place, situation, time. Normal affect. Follows all commands.   Language: Speech is clear, fluent    CNs: VFF. left gaze preference, but able to cross midline. EOMI, no diplopia. No facial asymmetry b/l.    Motor: No pronator drift.              Deltoid Biceps Triceps    R 5 5 5 5    L 0 0 0           0    H-Flex K-Flex K-Ext D-Flex P-Flex  R 5 5 5 5 5     L 2 - - 0 0     Sensation: Intact to LT   Coordination:  No dysmetria to FTN on R, unable to test on L   Gait: deferred	    LABORATORY:  CBC                       8.2    5.48  )-----------( 179      ( 07 Oct 2019 22:56 )             25.0     Chem 10-09    139  |  102  |  15  ----------------------------<  151<H>  4.2   |  27  |  0.70    Ca    10.3      09 Oct 2019 06:58    TPro  6.9  /  Alb  3.4  /  TBili  0.4  /  DBili  x   /  AST  26  /  ALT  22  /  AlkPhos  80  10-07    LFTs LIVER FUNCTIONS - ( 07 Oct 2019 22:56 )  Alb: 3.4 g/dL / Pro: 6.9 g/dL / ALK PHOS: 80 U/L / ALT: 22 U/L / AST: 26 U/L / GGT: x           Coagulopathy PT/INR - ( 07 Oct 2019 22:56 )   PT: 13.1 sec;   INR: 1.14 ratio       PTT - ( 07 Oct 2019 22:56 )  PTT:30.5 sec

## 2019-10-15 NOTE — H&P ADULT - ATTENDING COMMENTS
Chart reviewed. Patient seen at bedside  58 year old female with history as stated above admitted to rehab after recent hospitalization for post stroke seizures. Patient with residual left spastic hemiparesis and now with functional decline due to current medical course  Begin full rehab program  See daily progress note

## 2019-10-15 NOTE — H&P ADULT - ASSESSMENT
ASSESSMENT/PLAN  LUCY ALCANTAR is a  58 year old female with PMH/PSH DM, HLD, HTN, prior R MCA stroke in November due to R ICA dissection/occlusion s/p ICA stent, with L sided residual weakness, presented with left gaze deviation and shaking episodes. Admitted to EMU with VEEG monitoring showing right focal seizures with impaired awareness, started on Vimpat which seizures now resolved. Now with decreased functional mobility, gait instability and ADL impairments.      COMORBIDITES/ACTIVE MEDICAL ISSUES     Gait Instability, ADL impairments and Functional impairments: start Comprehensive Rehab Program of PT/OT     #Right Focal seizure   -Confirmed on VEEG  -Continue Vimpat 200mg BID     #Chronic right MCA infarct   -Residual left sided hemiparesis   -Continue aspirin 81mg daily  -Continue plavix 75mg daily     #HTN  -Continue losartan 100mg daily     #HLD  -Continue atorvastatin 80mg qhs     #DM2  -ACU checks AC and qhs   -Continue lantus 16 Unit(s) SubCutaneous at bedtime  -ISS premeals and qhs         Pain Mgmt - Tylenol PRN  GI/Bowel Mgmt - Colace, Senna,  Miralax PRN  /Bladder Mgmt - Voiding independently, PVR      Precautions / PROPHYLAXIS:   - Falls, Seizure   - Ortho: Weight bearing status: WBAT   - Lungs: Aspiration, Incentive Spirometer   - Pressure injury/Skin: Turn Q2hrs while in bed, OOB to Chair, PT/OT    - DVT: Lovenox, SCDs, TEDs     MEDICAL PROGNOSIS: GOOD            REHAB POTENTIAL: GOOD             ESTIMATED DISPOSITION: HOME WITH HOME CARE            ELOS: 10-14 Days   EXPECTED THERAPY:     P.T. 1hr/day       O.T. 1hr/day      S.L.P. 1hr/day     P&O Unnecessary     EXP FREQUENCY: 5 days per 7 day period     PRESCREEN COMPARISION:   I have reviewed the prescreen information and I have found no relevant changes between the preadmission screening and my post admission evaluation     RATIONALE FOR INPATIENT ADMISSION - Patient demonstrates the following: (check all that apply)  [X] Medically appropriate for rehabilitation admission  [X] Has attainable rehab goals with an appropriate initial discharge plan  [X] Has rehabilitation potential (expected to make a significant improvement within a reasonable period of time)   [X] Requires close medical management by a rehab physician, rehab nursing care, Hospitalist and comprehensive interdisciplinary team (including PT, OT, & or SLP, Prosthetics and Orthotics) ASSESSMENT/PLAN  LUCY ALCANTAR is a  58 year old female with PMH/PSH DM, HLD, HTN, prior R MCA stroke in November due to R ICA dissection/occlusion s/p ICA stent, with L sided residual weakness, presented with left gaze deviation and shaking episodes. Admitted to EMU with VEEG monitoring showing right focal seizures with impaired awareness, started on Vimpat which seizures now resolved. Now with decreased functional mobility, gait instability and ADL impairments.      COMORBIDITES/ACTIVE MEDICAL ISSUES     Gait Instability, ADL impairments and Functional impairments: start Comprehensive Rehab Program of PT/OT     #Right Focal seizure   -Confirmed on VEEG  -Continue Vimpat 200mg BID     #Chronic right MCA infarct   -Residual left sided hemiparesis   -Continue aspirin 81mg daily  -Continue plavix 75mg daily     #HTN  -Continue losartan 100mg daily     #HLD  -Continue atorvastatin 80mg qhs     #DM2  -ACU checks AC and qhs   -Continue lantus 16 Unit(s) SubCutaneous at bedtime  -ISS premeals and qhs     #LBBB  -EKG done on 10/8 showed LBBB  -Asymptomatic   -As per cardio, patient's recent SPECT in office was normal         Pain Mgmt - Tylenol PRN  GI/Bowel Mgmt - Colace, Senna,  Miralax PRN  /Bladder Mgmt - Voiding independently, PVR      Precautions / PROPHYLAXIS:   - Falls, Seizure   - Ortho: Weight bearing status: WBAT   - Lungs: Aspiration, Incentive Spirometer   - Pressure injury/Skin: Turn Q2hrs while in bed, OOB to Chair, PT/OT    - DVT: Lovenox, SCDs, TEDs     MEDICAL PROGNOSIS: GOOD            REHAB POTENTIAL: GOOD             ESTIMATED DISPOSITION: HOME WITH HOME CARE            ELOS: 10-14 Days   EXPECTED THERAPY:     P.T. 1hr/day       O.T. 1hr/day      S.L.P. 1hr/day     P&O Unnecessary     EXP FREQUENCY: 5 days per 7 day period     PRESCREEN COMPARISION:   I have reviewed the prescreen information and I have found no relevant changes between the preadmission screening and my post admission evaluation     RATIONALE FOR INPATIENT ADMISSION - Patient demonstrates the following: (check all that apply)  [X] Medically appropriate for rehabilitation admission  [X] Has attainable rehab goals with an appropriate initial discharge plan  [X] Has rehabilitation potential (expected to make a significant improvement within a reasonable period of time)   [X] Requires close medical management by a rehab physician, rehab nursing care, Hospitalist and comprehensive interdisciplinary team (including PT, OT, & or SLP, Prosthetics and Orthotics) ASSESSMENT/PLAN  LUCY ALCANTAR is a  58 year old female with PMH/PSH DM, HLD, HTN, prior R MCA stroke in November due to R ICA dissection/occlusion s/p ICA stent, with L sided residual weakness, presented with left gaze deviation and shaking episodes. Admitted to EMU with VEEG monitoring showing right focal seizures with impaired awareness, started on Vimpat which seizures now resolved. Now with decreased functional mobility, gait instability and ADL impairments.      COMORBIDITES/ACTIVE MEDICAL ISSUES     Gait Instability, ADL impairments and Functional impairments: start Comprehensive Rehab Program of PT/OT     #Right Focal seizure   -Confirmed on VEEG  -Continue Vimpat 200mg BID     #Chronic right MCA infarct   -Residual left sided hemiparesis   -Continue aspirin 81mg daily  -Continue plavix 75mg daily     #HTN  -Continue losartan 100mg daily     #HLD  -Continue atorvastatin 80mg qhs     #DM2  -Accu checks AC and qhs   -Continue lantus 16 Unit(s) SubCutaneous at bedtime  -ISS premeals and qhs     #LBBB  -EKG done on 10/8 showed LBBB  -Asymptomatic   -As per cardio, patient's recent SPECT in office was normal         Pain Mgmt - Tylenol PRN  GI/Bowel Mgmt - Colace, Senna,  Miralax PRN  /Bladder Mgmt - Voiding independently, PVR      Precautions / PROPHYLAXIS:   - Falls, Seizure   - Ortho: Weight bearing status: WBAT   - Lungs: Aspiration, Incentive Spirometer   - Pressure injury/Skin: Turn Q2hrs while in bed, OOB to Chair, PT/OT    - DVT: Lovenox, SCDs, TEDs     MEDICAL PROGNOSIS: GOOD            REHAB POTENTIAL: GOOD             ESTIMATED DISPOSITION: HOME WITH HOME CARE            ELOS: 10-14 Days   EXPECTED THERAPY:     P.T. 1hr/day       O.T. 1hr/day      S.L.P. 1hr/day     P&O Unnecessary     EXP FREQUENCY: 5 days per 7 day period     PRESCREEN COMPARISION:   I have reviewed the prescreen information and I have found no relevant changes between the preadmission screening and my post admission evaluation     RATIONALE FOR INPATIENT ADMISSION - Patient demonstrates the following: (check all that apply)  [X] Medically appropriate for rehabilitation admission  [X] Has attainable rehab goals with an appropriate initial discharge plan  [X] Has rehabilitation potential (expected to make a significant improvement within a reasonable period of time)   [X] Requires close medical management by a rehab physician, rehab nursing care, Hospitalist and comprehensive interdisciplinary team (including PT, OT, & or SLP, Prosthetics and Orthotics) ASSESSMENT/PLAN  LUCY ALCANTAR is a  58 year old female with PMH/PSH DM, HLD, HTN, prior R MCA stroke in November due to R ICA dissection/occlusion s/p ICA stent, with L sided residual weakness, presented with left gaze deviation and shaking episodes. Admitted to EMU with VEEG monitoring showing right focal seizures with impaired awareness, started on Vimpat which seizures now resolved. Now with decreased functional mobility, gait instability and ADL impairments.      COMORBIDITES/ACTIVE MEDICAL ISSUES     Gait Instability, ADL impairments and Functional impairments: start Comprehensive Rehab Program of PT/OT     #Right Focal seizure   -Confirmed on VEEG  -Continue Vimpat 200mg BID     #Chronic right MCA infarct   -Residual left sided hemiparesis   -Continue aspirin 81mg daily  -Continue plavix 75mg daily     #HTN  -Continue losartan 50 mg daily     #HLD  -Continue atorvastatin 80mg qhs     #DM2  -Accu checks AC and qhs   -Continue lantus 16 Unit(s) SubCutaneous at bedtime  -ISS premeals and qhs     #LBBB  -EKG done on 10/8 showed LBBB  -Asymptomatic   -As per cardio, patient's recent SPECT in office was normal         Pain Mgmt - Tylenol PRN  GI/Bowel Mgmt - Colace, Senna,  Miralax PRN  /Bladder Mgmt - Voiding independently, PVR      Precautions / PROPHYLAXIS:   - Falls, Seizure   - Ortho: Weight bearing status: WBAT   - Lungs: Aspiration, Incentive Spirometer   - Pressure injury/Skin: Turn Q2hrs while in bed, OOB to Chair, PT/OT    - DVT: Lovenox, SCDs, TEDs     MEDICAL PROGNOSIS: GOOD            REHAB POTENTIAL: GOOD             ESTIMATED DISPOSITION: HOME WITH HOME CARE            ELOS: 10-14 Days   EXPECTED THERAPY:     P.T. 1hr/day       O.T. 1hr/day      S.L.P. 1hr/day     P&O Unnecessary     EXP FREQUENCY: 5 days per 7 day period     PRESCREEN COMPARISION:   I have reviewed the prescreen information and I have found no relevant changes between the preadmission screening and my post admission evaluation     RATIONALE FOR INPATIENT ADMISSION - Patient demonstrates the following: (check all that apply)  [X] Medically appropriate for rehabilitation admission  [X] Has attainable rehab goals with an appropriate initial discharge plan  [X] Has rehabilitation potential (expected to make a significant improvement within a reasonable period of time)   [X] Requires close medical management by a rehab physician, rehab nursing care, Hospitalist and comprehensive interdisciplinary team (including PT, OT, & or SLP, Prosthetics and Orthotics) ASSESSMENT/PLAN  LUCY ALCANTAR is a  58 year old female with PMH/PSH DM, HLD, HTN, prior R MCA stroke in November due to R ICA dissection/occlusion s/p ICA stent, with L sided residual weakness, presented with left gaze deviation and shaking episodes. Admitted to EMU with VEEG monitoring showing right focal seizures with impaired awareness, started on Vimpat which seizures now resolved. Now with decreased functional mobility, gait instability and ADL impairments.      COMORBIDITES/ACTIVE MEDICAL ISSUES     Gait Instability, ADL impairments and Functional impairments: start Comprehensive Rehab Program of PT/OT     #Right Focal seizure   -Confirmed on VEEG  -Continue Vimpat 200mg BID     #Chronic right MCA infarct   -Residual left sided hemiparesis   -Continue aspirin 81mg daily  -Continue plavix 75mg daily     #HTN  -Continue losartan 50 mg daily     #HLD  -Continue atorvastatin 80mg qhs     #DM2  -Accu checks AC and qhs   -Continue lantus 16 Unit(s) SubCutaneous at bedtime  -ISS premeals and qhs     #LBBB  -EKG done on 10/8 showed LBBB  -Asymptomatic   -As per cardio, patient's recent SPECT in office was normal         Pain Mgmt - Tylenol PRN  GI/Bowel Mgmt - Colace, Senna PRN   /Bladder Mgmt - Voiding independently, PVR      Precautions / PROPHYLAXIS:   - Falls, Seizure   - Ortho: Weight bearing status: WBAT   - Lungs: Aspiration, Incentive Spirometer   - Pressure injury/Skin: Turn Q2hrs while in bed, OOB to Chair, PT/OT    - DVT: Lovenox, SCDs, TEDs     MEDICAL PROGNOSIS: GOOD            REHAB POTENTIAL: GOOD             ESTIMATED DISPOSITION: HOME WITH HOME CARE            ELOS: 10-14 Days   EXPECTED THERAPY:     P.T. 1hr/day       O.T. 1hr/day      S.L.P. 1hr/day     P&O Unnecessary     EXP FREQUENCY: 5 days per 7 day period     PRESCREEN COMPARISION:   I have reviewed the prescreen information and I have found no relevant changes between the preadmission screening and my post admission evaluation     RATIONALE FOR INPATIENT ADMISSION - Patient demonstrates the following: (check all that apply)  [X] Medically appropriate for rehabilitation admission  [X] Has attainable rehab goals with an appropriate initial discharge plan  [X] Has rehabilitation potential (expected to make a significant improvement within a reasonable period of time)   [X] Requires close medical management by a rehab physician, rehab nursing care, Hospitalist and comprehensive interdisciplinary team (including PT, OT, & or SLP, Prosthetics and Orthotics) ASSESSMENT/PLAN  LUCY ALCANTAR is a  58 year old female with PMH/PSH DM, HLD, HTN, prior R MCA stroke in November due to R ICA dissection/occlusion s/p ICA stent, with L sided residual weakness, presented with left gaze deviation and shaking episodes. Admitted to EMU with VEEG monitoring showing right focal seizures with impaired awareness, started on Vimpat which seizures now resolved. Now with decreased functional mobility, gait instability and ADL impairments.      COMORBIDITES/ACTIVE MEDICAL ISSUES     Gait Instability, ADL impairments and Functional impairments: start Comprehensive Rehab Program of PT/OT     #Right Focal seizure   -Confirmed on VEEG  -Continue Vimpat 200mg BID     #Chronic right MCA infarct   -Residual left sided hemiparesis   -Continue aspirin 81mg daily  -Continue plavix 75mg daily     #HTN  -Continue losartan 50 mg daily     #HLD  -Continue atorvastatin 80mg qhs     #DM2  -Accu checks AC and qhs   -Continue lantus 16 Unit(s) SubCutaneous at bedtime  -ISS premeals and qhs     #LBBB  -EKG done on 10/8 showed LBBB  -Asymptomatic   -As per cardio, patient's recent SPECT in office was normal       Diet: Diabetic diet     Pain Mgmt - Tylenol PRN  GI/Bowel Mgmt - Colace, Senna PRN   /Bladder Mgmt - Voiding independently, PVR      Precautions / PROPHYLAXIS:   - Falls, Seizure   - Ortho: Weight bearing status: WBAT   - Lungs: Aspiration, Incentive Spirometer   - Pressure injury/Skin: Turn Q2hrs while in bed, OOB to Chair, PT/OT    - DVT: Lovenox, SCDs, TEDs     MEDICAL PROGNOSIS: GOOD            REHAB POTENTIAL: GOOD             ESTIMATED DISPOSITION: HOME WITH HOME CARE            ELOS: 10-14 Days   EXPECTED THERAPY:     P.T. 1hr/day       O.T. 1hr/day      S.L.P. 1hr/day     P&O Unnecessary     EXP FREQUENCY: 5 days per 7 day period     PRESCREEN COMPARISION:   I have reviewed the prescreen information and I have found no relevant changes between the preadmission screening and my post admission evaluation     RATIONALE FOR INPATIENT ADMISSION - Patient demonstrates the following: (check all that apply)  [X] Medically appropriate for rehabilitation admission  [X] Has attainable rehab goals with an appropriate initial discharge plan  [X] Has rehabilitation potential (expected to make a significant improvement within a reasonable period of time)   [X] Requires close medical management by a rehab physician, rehab nursing care, Hospitalist and comprehensive interdisciplinary team (including PT, OT, & or SLP, Prosthetics and Orthotics) LUCY ALCANTAR is a  58 year old female with PMH/PSH DM, HLD, HTN, prior R MCA stroke in November due to R ICA dissection/occlusion s/p ICA stent, with L sided residual weakness, presented with left gaze deviation and shaking episodes. Admitted to EMU with VEEG monitoring showing right focal seizures with impaired awareness, started on Vimpat which seizures now resolved. Now with decreased functional mobility, gait instability and ADL impairments.      COMORBIDITES/ACTIVE MEDICAL ISSUES     Gait Instability, ADL impairments and Functional impairments: start Comprehensive Rehab Program of PT/OT     #Right Focal seizure   -Confirmed on VEEG  -Continue Vimpat 200mg BID     #Chronic right MCA infarct   -Residual left sided hemiparesis   -Continue aspirin 81mg daily  -Continue plavix 75mg daily     #HTN  -Continue losartan 50 mg daily     #HLD  -Continue atorvastatin 80mg qhs     #DM2  -Accu checks AC and qhs   -Continue lantus 16 Unit(s) SubCutaneous at bedtime  -ISS premeals and qhs     #LBBB  -EKG done on 10/8 showed LBBB  -Asymptomatic   -As per cardio, patient's recent SPECT in office was normal       Diet: Diabetic diet     Pain Mgmt - Tylenol PRN  GI/Bowel Mgmt - Colace, Senna PRN   /Bladder Mgmt - Voiding independently, PVR      Precautions / PROPHYLAXIS:   - Falls, Seizure   - Ortho: Weight bearing status: WBAT   - Lungs: Aspiration, Incentive Spirometer   - Pressure injury/Skin: Turn Q2hrs while in bed, OOB to Chair, PT/OT    - DVT: Lovenox, SCDs, TEDs     MEDICAL PROGNOSIS: GOOD            REHAB POTENTIAL: GOOD             ESTIMATED DISPOSITION: HOME WITH HOME CARE            ELOS: 10-14 Days   EXPECTED THERAPY:     P.T. 1hr/day       O.T. 1hr/day      S.L.P. 1hr/day     P&O Unnecessary     EXP FREQUENCY: 5 days per 7 day period     PRESCREEN COMPARISION:   I have reviewed the prescreen information and I have found no relevant changes between the preadmission screening and my post admission evaluation     RATIONALE FOR INPATIENT ADMISSION - Patient demonstrates the following: (check all that apply)  [X] Medically appropriate for rehabilitation admission  [X] Has attainable rehab goals with an appropriate initial discharge plan  [X] Has rehabilitation potential (expected to make a significant improvement within a reasonable period of time)   [X] Requires close medical management by a rehab physician, rehab nursing care, Hospitalist and comprehensive interdisciplinary team (including PT, OT, & or SLP, Prosthetics and Orthotics)

## 2019-10-15 NOTE — H&P ADULT - NSHPPHYSICALEXAM_GEN_ALL_CORE
PHYSICAL EXAM  VITALS  T(C): 36.6 (10-15-19 @ 13:28), Max: 36.8 (10-15-19 @ 10:36)  HR: 89 (10-15-19 @ 13:28) (76 - 104)  BP: 104/68 (10-15-19 @ 13:28) (95/66 - 153/77)  RR: 18 (10-15-19 @ 13:28) (16 - 18)  SpO2: 98% (10-15-19 @ 13:28) (96% - 100%)    Gen - NAD, Comfortable  HEENT - NCAT, EOMI, MMM  Neck - Supple, No limited ROM  Pulm - CTAB, No wheeze, No rhonchi, No crackles  Cardiovascular - RRR, S1S2, No murmurs  Abdomen - Soft, NT/ND, +BS  Extremities - No C/C/E, No calf tenderness  Neuro-     Cognitive - AAOx3     Communication - Fluent, No dysarthria     Attention: Intact      Judgement: Good evidence of judgement     Memory: Recall 3 objects immediate and 3 min later         Cranial Nerves - CN 2-12 intact     Motor -                     LEFT    UE - ShAB 5/5, EF 5/5, EE 5/5, WE 5/5,  5/5                    RIGHT UE - ShAB 5/5, EF 5/5, EE 5/5, WE 5/5,  5/5                    LEFT    LE - HF 5/5, KE 5/5, DF 5/5, PF 5/5                    RIGHT LE - HF 5/5, KE 5/5, DF 5/5, PF 5/5        Sensory - Intact to LT     Reflexes - DTR Intact, No primitive reflexive     Coordination - FTN intact     Tone - normal  Psychiatric - Mood stable, Affect WNL  Skin:  all skin intact Vital Signs  T(C): 36.3 (10-15 @ 19:45)  HR: 84 (10-15 @ 19:45)  BP: 128/73 (10-15 @ 19:45)  RR: 14 (10-15 @ 19:45)  SpO2: 97% (10-15 @ 19:45)    Constitutional - NAD, Comfortable  HEENT - NCAT, EOMI  Neck - Supple  Chest - CTAB  Cardiovascular - RRR  Abdomen - BS+, Soft, NTND  Extremities - No C/C/E, No calf tenderness   Neurologic Exam -                    Cognitive - Awake, Alert, AAO to self, place, date, year, situation     Communication - Fluent, No dysarthria     Attention: impaired reverse months      Memory: recalled 3/3 after 10min     Cranial Nerves - End gaze nystagmus, L facial droop, L shoulder shrug weakness     Motor -                     LEFT    UE - ShAB 1/5, EF 1/5, EE 2/5, WE 5/5,  5/5                    RIGHT UE - 5/5                    LEFT    LE - HF 4/5, KE 5/5, DF 0/5, PF 1/5                    RIGHT LE - 5/5        Sensory - impaired on left side of body     Reflexes - 2+ bilateral patellar and biceps, upgoing babinski bilateral      Coordination - FTN intact on R, limited by weakness on L  Psychiatric - Mood stable, Affect WNL  Skin - intact

## 2019-10-15 NOTE — H&P ADULT - NSHPLABSRESULTS_GEN_ALL_CORE
RECENT LABS/IMAGING    CBC                       11.2   4.05  )-----------( 227      ( 09 Oct 2019 09:45 )             35.0     Chem 10-09    139  |  102  |  15  ----------------------------<  151<H>  4.2   |  27  |  0.70    Ca    10.3      09 Oct 2019 06:58    < from: CT Brain Stroke Protocol (10.07.19 @ 23:37) >    No acute intracranial hemorrhage, mass effect, or evidence of acute   vascular territorial infarction. Chronic right MCA territory infarct.    If clinical symptoms persist or worsen, more sensitive evaluation for   acute ischemia with brain MRI may be obtained, if no contraindications   exist.    < end of copied text >      < from: CT Angio Neck w/ IV Cont (10.07.19 @ 23:37) >    CT angiography neck: Patent right common/internal carotid artery stent.   Bilateral cervical carotid and vertebral arteries are otherwise widely   patent without evidence of flow-limiting stenosis or acute dissection.    CT angiography brain: Intracranial atherosclerosis including to mild to   moderate stenoses at the bilateral cavernous internal carotid arteries.   No proximallarge vessel occlusion.    1.4 cm right thyroid lobe nodule. Further characterization with   nonemergent targeted ultrasound is recommended, as not previously   characterized.    < end of copied text >

## 2019-10-15 NOTE — H&P ADULT - NSHPREVIEWOFSYSTEMS_GEN_ALL_CORE
REVIEW OF SYSTEMS  Constitutional - Denies fevers, chills  HEENT - Denies changes in vision or hearing  Respiratory - Denies cough, dyspnea  Cardiovascular - Denies chest pain, palpitations  Gastrointestinal - Denies n/v, constipation, bowel incontinence  Genitourinary - Denies dysuria, urinary incontinence  Neurological - Denies weakness, numbness, headaches  Skin - Denies rashes  Musculoskeletal - Denies arthralgia, myalgias, back pain  Psychiatric - Denies depressed mood, anxiety      Surgery in the past year?  2 or more falls in the past year?  1 fall with injury in the past year? REVIEW OF SYSTEMS  Constitutional - Denies fevers, chills  HEENT - Denies changes in vision or hearing  Respiratory - Denies cough, dyspnea  Cardiovascular - Denies chest pain, palpitations  Gastrointestinal - Denies n/v, constipation, bowel incontinence. LBM today  Genitourinary - +urinary incontinence  Neurological - +L sided weakness +HA  Skin - Denies rashes  Musculoskeletal - Denies arthralgia, myalgias, back pain  Psychiatric - Denies depressed mood, anxiety

## 2019-10-15 NOTE — DISCHARGE NOTE NURSING/CASE MANAGEMENT/SOCIAL WORK - NSDCPEEMAIL_GEN_ALL_CORE
[facility] email [email or web address]

## 2019-10-16 LAB
ALBUMIN SERPL ELPH-MCNC: 3 G/DL — LOW (ref 3.3–5)
ALP SERPL-CCNC: 88 U/L — SIGNIFICANT CHANGE UP (ref 40–120)
ALT FLD-CCNC: 39 U/L — SIGNIFICANT CHANGE UP (ref 10–45)
ANION GAP SERPL CALC-SCNC: 4 MMOL/L — LOW (ref 5–17)
AST SERPL-CCNC: 32 U/L — SIGNIFICANT CHANGE UP (ref 10–40)
BASOPHILS # BLD AUTO: 0.04 K/UL — SIGNIFICANT CHANGE UP (ref 0–0.2)
BASOPHILS NFR BLD AUTO: 1.1 % — SIGNIFICANT CHANGE UP (ref 0–2)
BILIRUB SERPL-MCNC: 0.3 MG/DL — SIGNIFICANT CHANGE UP (ref 0.2–1.2)
BUN SERPL-MCNC: 23 MG/DL — SIGNIFICANT CHANGE UP (ref 7–23)
CALCIUM SERPL-MCNC: 9.7 MG/DL — SIGNIFICANT CHANGE UP (ref 8.4–10.5)
CHLORIDE SERPL-SCNC: 110 MMOL/L — HIGH (ref 96–108)
CO2 SERPL-SCNC: 29 MMOL/L — SIGNIFICANT CHANGE UP (ref 22–31)
CREAT SERPL-MCNC: 0.82 MG/DL — SIGNIFICANT CHANGE UP (ref 0.5–1.3)
EOSINOPHIL # BLD AUTO: 0.14 K/UL — SIGNIFICANT CHANGE UP (ref 0–0.5)
EOSINOPHIL NFR BLD AUTO: 3.9 % — SIGNIFICANT CHANGE UP (ref 0–6)
GLUCOSE BLDC GLUCOMTR-MCNC: 100 MG/DL — HIGH (ref 70–99)
GLUCOSE BLDC GLUCOMTR-MCNC: 114 MG/DL — HIGH (ref 70–99)
GLUCOSE BLDC GLUCOMTR-MCNC: 132 MG/DL — HIGH (ref 70–99)
GLUCOSE BLDC GLUCOMTR-MCNC: 92 MG/DL — SIGNIFICANT CHANGE UP (ref 70–99)
GLUCOSE SERPL-MCNC: 99 MG/DL — SIGNIFICANT CHANGE UP (ref 70–99)
HCT VFR BLD CALC: 30.5 % — LOW (ref 34.5–45)
HGB BLD-MCNC: 10.1 G/DL — LOW (ref 11.5–15.5)
IMM GRANULOCYTES NFR BLD AUTO: 0.3 % — SIGNIFICANT CHANGE UP (ref 0–1.5)
LYMPHOCYTES # BLD AUTO: 1.62 K/UL — SIGNIFICANT CHANGE UP (ref 1–3.3)
LYMPHOCYTES # BLD AUTO: 44.9 % — HIGH (ref 13–44)
MAGNESIUM SERPL-MCNC: 1.6 MG/DL — SIGNIFICANT CHANGE UP (ref 1.6–2.6)
MCHC RBC-ENTMCNC: 28.5 PG — SIGNIFICANT CHANGE UP (ref 27–34)
MCHC RBC-ENTMCNC: 33.1 GM/DL — SIGNIFICANT CHANGE UP (ref 32–36)
MCV RBC AUTO: 85.9 FL — SIGNIFICANT CHANGE UP (ref 80–100)
MONOCYTES # BLD AUTO: 0.39 K/UL — SIGNIFICANT CHANGE UP (ref 0–0.9)
MONOCYTES NFR BLD AUTO: 10.8 % — SIGNIFICANT CHANGE UP (ref 2–14)
NEUTROPHILS # BLD AUTO: 1.41 K/UL — LOW (ref 1.8–7.4)
NEUTROPHILS NFR BLD AUTO: 39 % — LOW (ref 43–77)
NRBC # BLD: 0 /100 WBCS — SIGNIFICANT CHANGE UP (ref 0–0)
PLATELET # BLD AUTO: 211 K/UL — SIGNIFICANT CHANGE UP (ref 150–400)
POTASSIUM SERPL-MCNC: 4.2 MMOL/L — SIGNIFICANT CHANGE UP (ref 3.5–5.3)
POTASSIUM SERPL-SCNC: 4.2 MMOL/L — SIGNIFICANT CHANGE UP (ref 3.5–5.3)
PROT SERPL-MCNC: 6.9 G/DL — SIGNIFICANT CHANGE UP (ref 6–8.3)
RBC # BLD: 3.55 M/UL — LOW (ref 3.8–5.2)
RBC # FLD: 12.3 % — SIGNIFICANT CHANGE UP (ref 10.3–14.5)
SODIUM SERPL-SCNC: 143 MMOL/L — SIGNIFICANT CHANGE UP (ref 135–145)
WBC # BLD: 3.61 K/UL — LOW (ref 3.8–10.5)
WBC # FLD AUTO: 3.61 K/UL — LOW (ref 3.8–10.5)

## 2019-10-16 PROCEDURE — 99222 1ST HOSP IP/OBS MODERATE 55: CPT | Mod: GC,AI

## 2019-10-16 PROCEDURE — 99255 IP/OBS CONSLTJ NEW/EST HI 80: CPT

## 2019-10-16 RX ADMIN — ENOXAPARIN SODIUM 40 MILLIGRAM(S): 100 INJECTION SUBCUTANEOUS at 12:01

## 2019-10-16 RX ADMIN — LOSARTAN POTASSIUM 50 MILLIGRAM(S): 100 TABLET, FILM COATED ORAL at 05:35

## 2019-10-16 RX ADMIN — LACOSAMIDE 200 MILLIGRAM(S): 50 TABLET ORAL at 17:18

## 2019-10-16 RX ADMIN — INSULIN GLARGINE 16 UNIT(S): 100 INJECTION, SOLUTION SUBCUTANEOUS at 21:28

## 2019-10-16 RX ADMIN — Medication 100 MILLIGRAM(S): at 17:18

## 2019-10-16 RX ADMIN — CLOPIDOGREL BISULFATE 75 MILLIGRAM(S): 75 TABLET, FILM COATED ORAL at 12:01

## 2019-10-16 RX ADMIN — Medication 100 MILLIGRAM(S): at 05:35

## 2019-10-16 RX ADMIN — ATORVASTATIN CALCIUM 80 MILLIGRAM(S): 80 TABLET, FILM COATED ORAL at 21:28

## 2019-10-16 RX ADMIN — Medication 2000 UNIT(S): at 12:01

## 2019-10-16 RX ADMIN — LACOSAMIDE 200 MILLIGRAM(S): 50 TABLET ORAL at 05:35

## 2019-10-16 RX ADMIN — Medication 81 MILLIGRAM(S): at 12:01

## 2019-10-16 NOTE — CONSULT NOTE ADULT - ASSESSMENT
58 year old female with PMH/PSH DM, HLD, HTN, prior R MCA stroke in November due to R ICA dissection/occlusion s/p ICA stent, with L sided residual weakness, presented with left gaze deviation and shaking episodes. Admitted to EMU with VEEG monitoring showing right focal seizures with impaired awareness, started on Vimpat which seizures now resolved. Now with decreased functional mobility, gait instability and ADL impairments- PT/OT/DVT PPX, PAIN MEDS    Right Focal seizure on EEG- Vimpat     Chronic right MCA infarct /left sided hemiparesis - aspirin , plavix     HTN- losartan     HLD- atorvastatiN    DM2-Accu checks AC and qhs , Lantus  bedtime, ISS, pre meals and qhs     LBBB-EKG done on 10/8 showed LBBB/Asymptomatic, recent SPECT in office was normal per cardio    DVT: Lovenox, SCDs, TEDs     will follow, d/w dr. oakes

## 2019-10-16 NOTE — CONSULT NOTE ADULT - SUBJECTIVE AND OBJECTIVE BOX
59yo Female with PMH/PSH DM2 on basalgar 16 hs at home, HLD on statin, HTN on losartan, prior R MCA stroke in November due to R ICA dissection/occlusion s/p ICA stent, with L sided residual weakness. As per Family at bedside, on 10/7 at 8:30 pm, patient started having left gaze deviation and was having shaking episodes. Patient reported to be conscious during these episodes. Patient endorsed to having urinary incontinence but no tongue biting. She reported that she had 4 episodes, each episode lasting a few minutes. Per the family, during these episodes, her eyes deviated to the left. Patient reported that shaking was on the left side. Denied any prior history of similar episodes.    Of note, patient reported that in July 2019 she presented to [facility] with TIA-like symptoms on the left side. She was started on antiepileptics at the time, but she did not remember which medication. She was no longer on any AEDs during this admission.     CTH was done which showed no acute hemorrhage and chronic right MCA infarct. Patient was admitted to the epilepsy monitoring unit and had emergent VEEG done. During hospitalization, EEG showed R focal seizures with impaired awareness. Patient was initially started on vimpat 100 BID but was noted to have some confusion. Vimpat was increased to 200mg BID with improvement. Patient was seen by PM&R and was recommended for rehab when medically stable. Patient was medically cleared by medical team for discharge to acute rehab today.     seen at the bedside, says she cant tolerate metformin, makes her sick, otherwise feels well, no n/v, no sob, tolerating po diet, no dysuria., has left sided weakness for cva    Review of Systems: pe rhpi        Allergies and Intolerances:        Allergies:  	No Known Drug Allergies:   	shellfish: Food, Anaphylaxis    Home Medications:   * Patient Currently Takes Medications as of 15-Oct-2019 11:24 documented in Structured Notes  · 	losartan 50 mg oral tablet: 1 tab(s) orally once a day  · 	lacosamide 200 mg oral tablet: 1 tab(s) orally 2 times a day  · 	aspirin 81 mg oral tablet, chewable: 1 tab(s) orally once a day  · 	atorvastatin 80 mg oral tablet: 1 tab(s) orally once a day (at bedtime)  · 	insulin glargine 100 units/mL subcutaneous solution: 16 unit(s) subcutaneous once a day (at bedtime)   · 	metFORMIN 500 mg oral tablet: 1 tab(s) orally 2 times a day  · 	Plavix 75 mg oral tablet: 1 tab(s) orally once a day  · 	Vitamin D2 2000 intl units oral capsule: 1 cap(s) orally once a day    Patient History:   Past Medical, Past Surgical, and Family History:  PAST MEDICAL HISTORY:  CVA (cerebral vascular accident)     Hypertension.     PAST SURGICAL HISTORY:  No significant past surgical history.    Social History:   no smoking or etoh    Vital Signs Last 24 Hrs  T(C): 36.7 (16 Oct 2019 07:55), Max: 37 (15 Oct 2019 17:30)  T(F): 98 (16 Oct 2019 07:55), Max: 98.6 (15 Oct 2019 17:30)  HR: 76 (16 Oct 2019 07:55) (74 - 89)  BP: 127/76 (16 Oct 2019 07:55) (96/62 - 133/64)  BP(mean): --  RR: 14 (16 Oct 2019 07:55) (14 - 18)  SpO2: 99% (16 Oct 2019 07:55) (97% - 100%)    GENERAL- NAD  EAR/NOSE/MOUTH/THROAT - no pharyngeal exudates, no oral leisions,  MMM  EYES- TIP, conjunctiva and Sclera clear  NECK- supple  RESPIRATORY-  clear to auscultation bilaterally  CARDIOVASCULAR - SIS2, RRR  GI - soft NT BS present  EXTREMITIES- no pedal edema  NEUROLOGY- left sided weakness, left foot drop, AFO+  SKIN- no rashes, warm to touch  PSYCHIATRY- AAO X 3  MUSCULOSKELETAL- ROM diminishes LLE, and LUE                            10.1   3.61  )-----------( 211      ( 16 Oct 2019 05:35 )             30.5       10-16    143  |  110<H>  |  23  ----------------------------<  99  4.2   |  29  |  0.82    Ca    9.7      16 Oct 2019 05:35  Mg     1.6     10-16    TPro  6.9  /  Alb  3.0<L>  /  TBili  0.3  /  DBili  x   /  AST  32  /  ALT  39  /  AlkPhos  88  10-16            POCT Blood Glucose.: 132 mg/dL (16 Oct 2019 11:59)        Hemoglobin A1C, Whole Blood: 5.3: Method: Immunoassay       Reference Range                4.0-5.6%       High risk (prediabetic)        5.7-6.4%       Diabetic, diagnostic             >=6.5%       ADA diabetic treatment goal       <7.0%  The Hemoglobin A1c testing is NGSP-certified.Reference ranges are based  upon the 2010 recommendations of  the American Diabetes Association.  Interpretation may vary for children  and adolescents. % (10.09.19 @ 09:45)    Labs reviewed:     CAPILLARY BLOOD GLUCOSE      POCT Blood Glucose.: 132 mg/dL (16 Oct 2019 11:59)  POCT Blood Glucose.: 100 mg/dL (16 Oct 2019 07:57)  POCT Blood Glucose.: 215 mg/dL (15 Oct 2019 20:49)  POCT Blood Glucose.: 134 mg/dL (15 Oct 2019 13:18)    < from: CT Angio Neck w/ IV Cont (10.07.19 @ 23:37) >  IMPRESSION:     CT angiography neck: Patent right common/internal carotid artery stent.   Bilateral cervical carotid and vertebral arteries are otherwise widely   patent without evidence of flow-limiting stenosis or acute dissection.    CT angiography brain: Intracranial atherosclerosis including to mild to   moderate stenoses at the bilateral cavernous internal carotid arteries.   No proximallarge vessel occlusion.    1.4 cm right thyroid lobe nodule. Further characterization with   nonemergent targeted ultrasound is recommended, as not previously   characterized.        ECG reviewed and interpreted:   < from: 12 Lead ECG (10.09.19 @ 21:18) >    Ventricular Rate 100 BPM    Atrial Rate 100 BPM    P-R Interval 128 ms    QRS Duration 152 ms    Q-T Interval 402 ms    QTC Calculation(Bezet) 518 ms    P Axis 70 degrees    R Axis 18 degrees    T Axis 141 degrees    Diagnosis Line NORMAL SINUS RHYTHM  LEFT BUNDLE BRANCH BLOCK  ABNORMAL ECG      < from: CT Brain Stroke Protocol (10.07.19 @ 23:37) >  IMPRESSION:    No acute intracranial hemorrhage, mass effect, or evidence of acute   vascular territorial infarction. Chronic right MCA territory infarct.    If clinical symptoms persist or worsen, more sensitive evaluation for   acute ischemia with brain MRI may be obtained, if no contraindications   exist.    < end of copied text >    < from: Transthoracic Echocardiogram (11.26.18 @ 07:14) >  Conclusions:  1. Normal mitral valve. Minimal mitral regurgitation.  2. Calcified trileaflet aortic valve with normal opening.  No aortic valve regurgitation seen.  3. Endocardium not well visualized;hyperdynamic left  ventricular systolic function.  4. Normal right ventricular size and function.  5. Trace pericardial effusion.    < end of copied text >

## 2019-10-16 NOTE — DIETITIAN INITIAL EVALUATION ADULT. - OTHER INFO
58yr Old Female - Dx: CVA - Initial Assessment - Consistent Carbohydrate Diet w/ Thin Liquids (Recommend Initiate Glucerna 8oz PO Daily), Shellfish Food Allergy/Intolerance, Tolerates Diet Well, No Chewing/Swallowing Complications (Per Patient), Stage 1 Pressure Ulcer on Sacrum (as Per Nursing Flow Sheets), No Edema Noted (as Per Nursing Flow Sheets), No Recent Diarrhea/Constipation & Some Recent Nausea/Vomiting(as Per Patient)

## 2019-10-16 NOTE — PROGRESS NOTE ADULT - SUBJECTIVE AND OBJECTIVE BOX
INTERVAL SUBJECTIVE & REVIEW OF SYMPTOMS:  Patient seen at bedside.   Admitted to rehab after recent hospital admission for new onset post stroke seizures  Patient denies any new issues overnight  Home meds reviewed. Patient reports GI intolerance to metformin        REVIEW OF SYSTEMS  [x   ] Constitutional WNL  [  x ] Cardio WNL  [  x ] Resp WNL  [  x ] GI WNL  [ x  ]  WNL  [ x  ] Heme WNL  [   ] Endo- DM  [ x  ] Skin WNL  [   ] MSK WNL  [   ] Neuro WNL  [   ] Cognitive WNL  [   ] Psych WNL    VITAL SIGNS  Vital Signs Last 24 Hrs  T(C): 36.7 (16 Oct 2019 07:55), Max: 37 (15 Oct 2019 17:30)  T(F): 98 (16 Oct 2019 07:55), Max: 98.6 (15 Oct 2019 17:30)  HR: 76 (16 Oct 2019 07:55) (74 - 88)  BP: 127/76 (16 Oct 2019 07:55) (96/62 - 133/64)  BP(mean): --  RR: 14 (16 Oct 2019 07:55) (14 - 18)  SpO2: 99% (16 Oct 2019 07:55) (97% - 100%)    PHYSICAL EXAM  General: NAD  Cardio: S1S2+  Resp: clear  Abdomen: soft  Neuro: no aphasia or dysarthria, left spastic hemiparesis  Extrem: no edema or calf tenderness  Skin: no breakdown  Functional Exam: Eval today     RECENT LABS:                        10.1   3.61  )-----------( 211      ( 16 Oct 2019 05:35 )             30.5     10-16    143  |  110<H>  |  23  ----------------------------<  99  4.2   |  29  |  0.82    Ca    9.7      16 Oct 2019 05:35  Mg     1.6     10-16    TPro  6.9  /  Alb  3.0<L>  /  TBili  0.3  /  DBili  x   /  AST  32  /  ALT  39  /  AlkPhos  88  10-16      CAPILLARY BLOOD GLUCOSE      POCT Blood Glucose.: 132 mg/dL (16 Oct 2019 11:59)  POCT Blood Glucose.: 100 mg/dL (16 Oct 2019 07:57)  POCT Blood Glucose.: 215 mg/dL (15 Oct 2019 20:49)      RADIOLOGY/OTHER RESULTS:    58 year old female with PMH/PSH DM, HLD, HTN, prior R MCA stroke in November due to R ICA dissection/occlusion s/p ICA stent, with L sided residual weakness, admitted to rehab after recent focal seizures. Now with decreased functional mobility.      COMORBIDITES/ACTIVE MEDICAL ISSUES     Start Comprehensive Rehab Program of PT/OT . 3 HRS/DAY 5 days/week No need for ST     Focal seizure :-Continue Vimpat 200mg BID     Chronic right MCA infarct with residual left sided hemiparesis   -Continue aspirin 81mg daily, plavix 75mg daily and statins    HTN:-Continue losartan 50 mg daily     HLD:-Continue atorvastatin 80mg qhs     DM2:-Accu checks AC and qhs, lantus 16 Unit(s) SubCutaneous at bedtime, ISS    LBBB:-EKG done on 10/8 showed LBBB  -Asymptomatic, -As per cardio, patient's recent SPECT in office was normal     Labs with mild leucopenia    Diet: Diabetic diet     Pain Mgmt - Tylenol PRN    GI/Bowel Mgmt - Colace, Senna PRN     /Bladder Mgmt - Toileting schedule      Precautions / PROPHYLAXIS:   - Falls, Seizure   - DVT Prophylaxis: Lovenox.

## 2019-10-16 NOTE — CHART NOTE - NSCHARTNOTEFT_GEN_A_CORE
Upon Nutritional Assessment by the Registered Dietitian Your Patient was Determined to Meet criteria/has Evidence of the Following Diagnosis:          [X]  Moderate Protein Calorie Malnutrition    Findings as based on:  [X] Comprehensive Nutrition Assessment   [X] Nutrition Focused Physical Exam - Temporalis, Orbital Fat Pads, Buccal Fat Pads, Gastrocnemius(Calf) & Interosseous(Hand) Wasting/Depletion Observed  [X] Other: Poor PO Intake 5+ Days     Nutrition Plan/Recommendations:    1) Glucerna 8oz PO Daily (Provides 220kcal-10grams of Protein)     PROVIDER Section:   By Signing This Assessment You Are Acknowledging & Agree with The Diagnosis Assigned by the Registered Dietitian    Arvin Baron RDN

## 2019-10-16 NOTE — DIETITIAN INITIAL EVALUATION ADULT. - DIET TYPE
Consistent Carbohydrate Diet w/ Thin Liquids  Patient Does Try to Follow Low Sodium/Sugar Diet @Home & Does Take Vitamin D Supplement @Home   Education Provided on Need for Supplementation & Consistent Carbohydrate Diet Recommend Initiate Glucerna 8oz PO Daily (Provides 220kcal-10grams of Protein)/supplement (specify)/consistent carbohydrate (no snacks)

## 2019-10-16 NOTE — DIETITIAN INITIAL EVALUATION ADULT. - ADD RECOMMEND
1) Monitor Weights, Intake, Tolerance, Skin, POCT & Labwork   2) Education Provided on Need for Supplementation & Consistent Carbohydrate Diet 3) Glucerna 8oz PO Daily 4) Continue Nutrition Plan of Care

## 2019-10-16 NOTE — DIETITIAN INITIAL EVALUATION ADULT. - PERTINENT LABORATORY DATA
10/16 - Na-143, K-4.2, BUN-23, Creat-0.82, Gluc-99 10/9 - Hemoglobin A1c - 5.3 POCT (over Last 3 Days) - Ranging from

## 2019-10-16 NOTE — DIETITIAN INITIAL EVALUATION ADULT. - ENERGY NEEDS
Height: 68Inches   Weight: 167lb   Body Mass Index (BMI): 25.5kg/m2   Ideal Body Weight Range: 140lb (+/-10%)   Percent Ideal Body Weight ~119%

## 2019-10-17 LAB
GLUCOSE BLDC GLUCOMTR-MCNC: 168 MG/DL — HIGH (ref 70–99)
GLUCOSE BLDC GLUCOMTR-MCNC: 81 MG/DL — SIGNIFICANT CHANGE UP (ref 70–99)
GLUCOSE BLDC GLUCOMTR-MCNC: 99 MG/DL — SIGNIFICANT CHANGE UP (ref 70–99)

## 2019-10-17 PROCEDURE — 99232 SBSQ HOSP IP/OBS MODERATE 35: CPT | Mod: GC

## 2019-10-17 PROCEDURE — 99233 SBSQ HOSP IP/OBS HIGH 50: CPT

## 2019-10-17 RX ORDER — INSULIN LISPRO 100/ML
VIAL (ML) SUBCUTANEOUS
Refills: 0 | Status: DISCONTINUED | OUTPATIENT
Start: 2019-10-17 | End: 2019-10-23

## 2019-10-17 RX ORDER — INSULIN GLARGINE 100 [IU]/ML
14 INJECTION, SOLUTION SUBCUTANEOUS AT BEDTIME
Refills: 0 | Status: DISCONTINUED | OUTPATIENT
Start: 2019-10-17 | End: 2019-10-23

## 2019-10-17 RX ORDER — LACOSAMIDE 50 MG/1
200 TABLET ORAL
Refills: 0 | Status: DISCONTINUED | OUTPATIENT
Start: 2019-10-17 | End: 2019-10-23

## 2019-10-17 RX ADMIN — Medication 100 MILLIGRAM(S): at 17:06

## 2019-10-17 RX ADMIN — Medication 81 MILLIGRAM(S): at 12:34

## 2019-10-17 RX ADMIN — LOSARTAN POTASSIUM 50 MILLIGRAM(S): 100 TABLET, FILM COATED ORAL at 06:19

## 2019-10-17 RX ADMIN — Medication 100 MILLIGRAM(S): at 06:19

## 2019-10-17 RX ADMIN — CLOPIDOGREL BISULFATE 75 MILLIGRAM(S): 75 TABLET, FILM COATED ORAL at 12:33

## 2019-10-17 RX ADMIN — LACOSAMIDE 200 MILLIGRAM(S): 50 TABLET ORAL at 06:20

## 2019-10-17 RX ADMIN — LACOSAMIDE 200 MILLIGRAM(S): 50 TABLET ORAL at 17:07

## 2019-10-17 RX ADMIN — ENOXAPARIN SODIUM 40 MILLIGRAM(S): 100 INJECTION SUBCUTANEOUS at 12:33

## 2019-10-17 RX ADMIN — ATORVASTATIN CALCIUM 80 MILLIGRAM(S): 80 TABLET, FILM COATED ORAL at 22:15

## 2019-10-17 RX ADMIN — INSULIN GLARGINE 14 UNIT(S): 100 INJECTION, SOLUTION SUBCUTANEOUS at 22:18

## 2019-10-17 RX ADMIN — Medication 2000 UNIT(S): at 12:33

## 2019-10-17 NOTE — PROGRESS NOTE ADULT - SUBJECTIVE AND OBJECTIVE BOX
INTERVAL SUBJECTIVE & REVIEW OF SYMPTOMS:  Patient seen at bedside this AM. Endorses to doing well at the moment. Blood sugar noted to be elevated in the 200's yesterday which patient stated was because of her increased carb consumption. No acute medical issues noted overnight.       REVIEW OF SYSTEMS  [x   ] Constitutional WNL  [  x ] Cardio WNL  [  x ] Resp WNL  [  x ] GI WNL  [ x  ]  WNL  [ x  ] Heme WNL  [   ] Endo- DM  [ x  ] Skin WNL  [   ] MSK WNL  [   ] Neuro WNL  [   ] Cognitive WNL  [   ] Psych WNL    Vital Signs Last 24 Hrs  T(C): 36.4 (17 Oct 2019 07:52), Max: 36.6 (16 Oct 2019 21:53)  T(F): 97.5 (17 Oct 2019 07:52), Max: 97.8 (16 Oct 2019 21:53)  HR: 78 (17 Oct 2019 07:52) (64 - 78)  BP: 136/74 (17 Oct 2019 07:52) (128/67 - 162/80)  BP(mean): --  RR: 14 (17 Oct 2019 07:52) (14 - 14)  SpO2: 98% (17 Oct 2019 07:52) (98% - 100%)    PHYSICAL EXAM  General: NAD  Cardio: S1S2+  Resp: clear  Abdomen: soft  Neuro: no aphasia or dysarthria, left spastic hemiparesis  Extrem: no edema or calf tenderness  Skin: no breakdown    Functional Exam:   Supervision upper body dressing   Mi A for lower body dressing       MEDICATIONS  (STANDING):  aspirin  chewable 81 milliGRAM(s) Oral daily  atorvastatin 80 milliGRAM(s) Oral at bedtime  cholecalciferol 2000 Unit(s) Oral daily  clopidogrel Tablet 75 milliGRAM(s) Oral daily  dextrose 5%. 1000 milliLiter(s) (50 mL/Hr) IV Continuous <Continuous>  dextrose 50% Injectable 12.5 Gram(s) IV Push once  dextrose 50% Injectable 25 Gram(s) IV Push once  dextrose 50% Injectable 25 Gram(s) IV Push once  docusate sodium 100 milliGRAM(s) Oral two times a day  enoxaparin Injectable 40 milliGRAM(s) SubCutaneous daily  insulin glargine Injectable (LANTUS) 14 Unit(s) SubCutaneous at bedtime  insulin lispro (HumaLOG) corrective regimen sliding scale   SubCutaneous three times a day before meals  insulin lispro (HumaLOG) corrective regimen sliding scale   SubCutaneous at bedtime  lacosamide 200 milliGRAM(s) Oral two times a day  losartan 50 milliGRAM(s) Oral daily    MEDICATIONS  (PRN):  acetaminophen   Tablet .. 650 milliGRAM(s) Oral every 6 hours PRN Temp greater or equal to 38C (100.4F), Mild Pain (1 - 3), Moderate Pain (4 - 6)  dextrose 40% Gel 15 Gram(s) Oral once PRN Blood Glucose LESS THAN 70 milliGRAM(s)/deciliter  glucagon  Injectable 1 milliGRAM(s) IntraMuscular once PRN Glucose LESS THAN 70 milligrams/deciliter  senna 2 Tablet(s) Oral at bedtime PRN Constipation      RECENT LABS:                        10.1   3.61  )-----------( 211      ( 16 Oct 2019 05:35 )             30.5     10-16    143  |  110<H>  |  23  ----------------------------<  99  4.2   |  29  |  0.82    Ca    9.7      16 Oct 2019 05:35  Mg     1.6     10-16    TPro  6.9  /  Alb  3.0<L>  /  TBili  0.3  /  DBili  x   /  AST  32  /  ALT  39  /  AlkPhos  88  10-16      CAPILLARY BLOOD GLUCOSE    POCT Blood Glucose.: 81 mg/dL (17 Oct 2019 07:38)  POCT Blood Glucose.: 114 mg/dL (16 Oct 2019 21:25)  POCT Blood Glucose.: 92 mg/dL (16 Oct 2019 17:04)  POCT Blood Glucose.: 132 mg/dL (16 Oct 2019 11:59)  POCT Blood Glucose.: 100 mg/dL (16 Oct 2019 07:57)  POCT Blood Glucose.: 215 mg/dL (15 Oct 2019 20:49)      RADIOLOGY/OTHER RESULTS:    58 year old female with PMH/PSH DM, HLD, HTN, prior R MCA stroke in November due to R ICA dissection/occlusion s/p ICA stent, with L sided residual weakness, admitted to rehab after recent focal seizures. Now with decreased functional mobility.      COMORBIDITES/ACTIVE MEDICAL ISSUES     Start Comprehensive Rehab Program of PT/OT . 3 HRS/DAY 5 days/week No need for ST     Focal seizure :-Continue Vimpat 200mg BID     Chronic right MCA infarct with residual left sided hemiparesis   -Continue aspirin 81mg daily, plavix 75mg daily and statins    HTN:-Continue losartan 50 mg daily     HLD:-Continue atorvastatin 80mg qhs     DM2:-Accu checks AC and qhs, lantus 16 Unit(s) SubCutaneous at bedtime, ISS    LBBB:-EKG done on 10/8 showed LBBB  -Asymptomatic, -As per cardio, patient's recent SPECT in office was normal     Labs with mild leucopenia    Diet: Diabetic diet     Pain Mgmt - Tylenol PRN    GI/Bowel Mgmt - Colace, Senna PRN     /Bladder Mgmt - Toileting schedule      Precautions / PROPHYLAXIS:   - Falls, Seizure   - DVT Prophylaxis: Lovenox.     Team meeting 10/17: progressing well in therapy, goals for Mod I, EDOD: 10/23 INTERVAL SUBJECTIVE & REVIEW OF SYMPTOMS:  Patient seen at bedside this AM. Endorses to doing well at the moment. Blood sugar noted to be elevated in the 200's yesterday which patient stated was because of her increased carb consumption. No acute medical issues noted overnight.       REVIEW OF SYSTEMS  [x   ] Constitutional WNL  [  x ] Cardio WNL  [  x ] Resp WNL  [  x ] GI WNL  [ x  ]  WNL  [ x  ] Heme WNL  [   ] Endo- DM  [ x  ] Skin WNL  [   ] MSK WNL  [   ] Neuro WNL  [   ] Cognitive WNL  [   ] Psych WNL    Vital Signs Last 24 Hrs  T(C): 36.4 (17 Oct 2019 07:52), Max: 36.6 (16 Oct 2019 21:53)  T(F): 97.5 (17 Oct 2019 07:52), Max: 97.8 (16 Oct 2019 21:53)  HR: 78 (17 Oct 2019 07:52) (64 - 78)  BP: 136/74 (17 Oct 2019 07:52) (128/67 - 162/80)  BP(mean): --  RR: 14 (17 Oct 2019 07:52) (14 - 14)  SpO2: 98% (17 Oct 2019 07:52) (98% - 100%)    PHYSICAL EXAM  General: NAD  Cardio: S1S2+  Resp: clear  Abdomen: soft  Neuro: no aphasia or dysarthria, left spastic hemiparesis  Extrem: no edema or calf tenderness  Skin: no breakdown    Functional Exam:   Supervision upper body dressing   Mi A for lower body dressing       MEDICATIONS  (STANDING):  aspirin  chewable 81 milliGRAM(s) Oral daily  atorvastatin 80 milliGRAM(s) Oral at bedtime  cholecalciferol 2000 Unit(s) Oral daily  clopidogrel Tablet 75 milliGRAM(s) Oral daily  dextrose 5%. 1000 milliLiter(s) (50 mL/Hr) IV Continuous <Continuous>  dextrose 50% Injectable 12.5 Gram(s) IV Push once  dextrose 50% Injectable 25 Gram(s) IV Push once  dextrose 50% Injectable 25 Gram(s) IV Push once  docusate sodium 100 milliGRAM(s) Oral two times a day  enoxaparin Injectable 40 milliGRAM(s) SubCutaneous daily  insulin glargine Injectable (LANTUS) 14 Unit(s) SubCutaneous at bedtime  insulin lispro (HumaLOG) corrective regimen sliding scale   SubCutaneous three times a day before meals  insulin lispro (HumaLOG) corrective regimen sliding scale   SubCutaneous at bedtime  lacosamide 200 milliGRAM(s) Oral two times a day  losartan 50 milliGRAM(s) Oral daily    MEDICATIONS  (PRN):  acetaminophen   Tablet .. 650 milliGRAM(s) Oral every 6 hours PRN Temp greater or equal to 38C (100.4F), Mild Pain (1 - 3), Moderate Pain (4 - 6)  dextrose 40% Gel 15 Gram(s) Oral once PRN Blood Glucose LESS THAN 70 milliGRAM(s)/deciliter  glucagon  Injectable 1 milliGRAM(s) IntraMuscular once PRN Glucose LESS THAN 70 milligrams/deciliter  senna 2 Tablet(s) Oral at bedtime PRN Constipation      RECENT LABS:                        10.1   3.61  )-----------( 211      ( 16 Oct 2019 05:35 )             30.5     10-16    143  |  110<H>  |  23  ----------------------------<  99  4.2   |  29  |  0.82    Ca    9.7      16 Oct 2019 05:35  Mg     1.6     10-16    TPro  6.9  /  Alb  3.0<L>  /  TBili  0.3  /  DBili  x   /  AST  32  /  ALT  39  /  AlkPhos  88  10-16      CAPILLARY BLOOD GLUCOSE    POCT Blood Glucose.: 81 mg/dL (17 Oct 2019 07:38)  POCT Blood Glucose.: 114 mg/dL (16 Oct 2019 21:25)  POCT Blood Glucose.: 92 mg/dL (16 Oct 2019 17:04)  POCT Blood Glucose.: 132 mg/dL (16 Oct 2019 11:59)  POCT Blood Glucose.: 100 mg/dL (16 Oct 2019 07:57)  POCT Blood Glucose.: 215 mg/dL (15 Oct 2019 20:49)      RADIOLOGY/OTHER RESULTS:    58 year old female with PMH/PSH DM, HLD, HTN, prior R MCA stroke in November due to R ICA dissection/occlusion s/p ICA stent, with L sided residual weakness, admitted to rehab after recent focal seizures. Now with decreased functional mobility.      COMORBIDITES/ACTIVE MEDICAL ISSUES     Start Comprehensive Rehab Program of PT/OT . 3 HRS/DAY 5 days/week No need for ST     Focal seizure :-Continue Vimpat 200mg BID     Chronic right MCA infarct with residual left sided hemiparesis   -Continue aspirin 81mg daily, plavix 75mg daily and statins    HTN:-Continue losartan 50 mg daily     HLD:-Continue atorvastatin 80mg qhs     DM2:-Accu checks decreased to BID, lantus decreased to 14 Unit(s), ISS     LBBB:-EKG done on 10/8 showed LBBB  -Asymptomatic, -As per cardio, patient's recent SPECT in office was normal     Labs with mild leucopenia    Diet: Diabetic diet     Pain Mgmt - Tylenol PRN    GI/Bowel Mgmt - Colace, Senna PRN     /Bladder Mgmt - Toileting schedule      Precautions / PROPHYLAXIS:   - Falls, Seizure   - DVT Prophylaxis: Lovenox.     Team meeting 10/17: progressing well in therapy, goals for Mod I, EDOD: 10/23

## 2019-10-17 NOTE — PROGRESS NOTE ADULT - ATTENDING COMMENTS
Chart reviewed. Patient seen at bedside.   Feels well. Slept well    2. DM-II: Blood sugars low ( 80s this am) D/W hospitalist. Lantus reduced. Decrease accuchecks to bid    3. Team conference today: Goals of modified independent to min assist ( baseline) d/c date 10/23

## 2019-10-17 NOTE — PROGRESS NOTE ADULT - SUBJECTIVE AND OBJECTIVE BOX
58 year old female with PMH/PSH DM, HLD, HTN, prior R MCA stroke in November due to R ICA dissection/occlusion s/p ICA stent, with L sided residual weakness, presented with left gaze deviation and shaking episodes. Admitted to EMU with VEEG monitoring showing right focal seizures     seen at the bedside, no n/v, no sob, tolerating po diet, no dysuria      Vital Signs Last 24 Hrs  T(C): 36.4 (17 Oct 2019 07:52), Max: 36.6 (16 Oct 2019 21:53)  T(F): 97.5 (17 Oct 2019 07:52), Max: 97.8 (16 Oct 2019 21:53)  HR: 78 (17 Oct 2019 07:52) (64 - 78)  BP: 136/74 (17 Oct 2019 07:52) (128/67 - 162/80)  BP(mean): --  RR: 14 (17 Oct 2019 07:52) (14 - 14)  SpO2: 98% (17 Oct 2019 07:52) (98% - 100%)      GENERAL- NAD  EAR/NOSE/MOUTH/THROAT - no pharyngeal exudates, no oral lesions  MMM  EYES- TIP, conjunctiva and Sclera clear  NECK- supple  RESPIRATORY-  clear to auscultation bilaterally  CARDIOVASCULAR - SIS2, RRR  GI - soft NT BS present  EXTREMITIES- no pedal edema  NEUROLOGY- left sided weakness, left foot drop, AFO+  SKIN- no rashes, warm to touch  PSYCHIATRY- AAO X 3  MUSCULOSKELETAL- ROM diminishes LLE, and LUE                10.1                 143  | 29   | 23           3.61  >-----------< 211     ------------------------< 99                    30.5                 4.2  | 110  | 0.82                                         Ca 9.7   Mg 1.6   Ph x        CAPILLARY BLOOD GLUCOSE      POCT Blood Glucose.: 81 mg/dL (17 Oct 2019 07:38)  POCT Blood Glucose.: 114 mg/dL (16 Oct 2019 21:25)  POCT Blood Glucose.: 92 mg/dL (16 Oct 2019 17:04)  POCT Blood Glucose.: 132 mg/dL (16 Oct 2019 11:59)    Hemoglobin A1C, Whole Blood: 5.3: Method: Immunoassay       Reference Range                4.0-5.6%       High risk (prediabetic)        5.7-6.4%       Diabetic, diagnostic             >=6.5%       ADA diabetic treatment goal       <7.0%  The Hemoglobin A1c testing is NGSP-certified.Reference ranges are based  upon the 2010 recommendations of  the American Diabetes Association.  Interpretation may vary for children  and adolescents. % (10.09.19 @ 09:45)

## 2019-10-18 DIAGNOSIS — I69.398 OTHER SEQUELAE OF CEREBRAL INFARCTION: ICD-10-CM

## 2019-10-18 LAB
GLUCOSE BLDC GLUCOMTR-MCNC: 107 MG/DL — HIGH (ref 70–99)
GLUCOSE BLDC GLUCOMTR-MCNC: 107 MG/DL — HIGH (ref 70–99)
GLUCOSE BLDC GLUCOMTR-MCNC: 118 MG/DL — HIGH (ref 70–99)

## 2019-10-18 RX ADMIN — Medication 81 MILLIGRAM(S): at 12:10

## 2019-10-18 RX ADMIN — LOSARTAN POTASSIUM 50 MILLIGRAM(S): 100 TABLET, FILM COATED ORAL at 05:32

## 2019-10-18 RX ADMIN — LACOSAMIDE 200 MILLIGRAM(S): 50 TABLET ORAL at 17:27

## 2019-10-18 RX ADMIN — Medication 100 MILLIGRAM(S): at 17:27

## 2019-10-18 RX ADMIN — Medication 100 MILLIGRAM(S): at 05:32

## 2019-10-18 RX ADMIN — ATORVASTATIN CALCIUM 80 MILLIGRAM(S): 80 TABLET, FILM COATED ORAL at 21:17

## 2019-10-18 RX ADMIN — Medication 2000 UNIT(S): at 12:10

## 2019-10-18 RX ADMIN — CLOPIDOGREL BISULFATE 75 MILLIGRAM(S): 75 TABLET, FILM COATED ORAL at 12:10

## 2019-10-18 RX ADMIN — INSULIN GLARGINE 14 UNIT(S): 100 INJECTION, SOLUTION SUBCUTANEOUS at 21:18

## 2019-10-18 RX ADMIN — LACOSAMIDE 200 MILLIGRAM(S): 50 TABLET ORAL at 05:32

## 2019-10-18 RX ADMIN — ENOXAPARIN SODIUM 40 MILLIGRAM(S): 100 INJECTION SUBCUTANEOUS at 12:10

## 2019-10-19 LAB
GLUCOSE BLDC GLUCOMTR-MCNC: 101 MG/DL — HIGH (ref 70–99)
GLUCOSE BLDC GLUCOMTR-MCNC: 124 MG/DL — HIGH (ref 70–99)

## 2019-10-19 PROCEDURE — 99232 SBSQ HOSP IP/OBS MODERATE 35: CPT

## 2019-10-19 RX ADMIN — Medication 2000 UNIT(S): at 13:14

## 2019-10-19 RX ADMIN — ENOXAPARIN SODIUM 40 MILLIGRAM(S): 100 INJECTION SUBCUTANEOUS at 13:19

## 2019-10-19 RX ADMIN — Medication 81 MILLIGRAM(S): at 13:20

## 2019-10-19 RX ADMIN — LOSARTAN POTASSIUM 50 MILLIGRAM(S): 100 TABLET, FILM COATED ORAL at 05:36

## 2019-10-19 RX ADMIN — Medication 100 MILLIGRAM(S): at 05:36

## 2019-10-19 RX ADMIN — LACOSAMIDE 200 MILLIGRAM(S): 50 TABLET ORAL at 05:35

## 2019-10-19 RX ADMIN — INSULIN GLARGINE 14 UNIT(S): 100 INJECTION, SOLUTION SUBCUTANEOUS at 20:55

## 2019-10-19 RX ADMIN — Medication 100 MILLIGRAM(S): at 17:44

## 2019-10-19 RX ADMIN — LACOSAMIDE 200 MILLIGRAM(S): 50 TABLET ORAL at 17:44

## 2019-10-19 RX ADMIN — SENNA PLUS 2 TABLET(S): 8.6 TABLET ORAL at 17:44

## 2019-10-19 RX ADMIN — CLOPIDOGREL BISULFATE 75 MILLIGRAM(S): 75 TABLET, FILM COATED ORAL at 13:20

## 2019-10-19 RX ADMIN — ATORVASTATIN CALCIUM 80 MILLIGRAM(S): 80 TABLET, FILM COATED ORAL at 20:54

## 2019-10-19 NOTE — PROGRESS NOTE ADULT - SUBJECTIVE AND OBJECTIVE BOX
58 year old female with PMH/PSH DM, HLD, HTN, prior R MCA stroke in November due to R ICA dissection/occlusion s/p ICA stent, with L sided residual weakness, presented with left gaze deviation and shaking episodes. Admitted to EMU with VEEG monitoring showing right focal seizures     seen at the bedside, no n/v, no sob, tolerating po diet, no dysuria      Vital Signs Last 24 Hrs  T(C): 36.6 (19 Oct 2019 09:20), Max: 36.8 (18 Oct 2019 22:13)  T(F): 97.9 (19 Oct 2019 09:20), Max: 98.2 (18 Oct 2019 22:13)  HR: 99 (19 Oct 2019 09:20) (63 - 99)  BP: 135/73 (19 Oct 2019 09:20) (126/75 - 161/79)  BP(mean): --  RR: 14 (19 Oct 2019 09:20) (14 - 14)  SpO2: 100% (19 Oct 2019 09:20) (99% - 100%)    GENERAL- NAD  EAR/NOSE/MOUTH/THROAT - no pharyngeal exudates, no oral lesions  MMM  EYES- TIP, conjunctiva and Sclera clear  NECK- supple  RESPIRATORY-  clear to auscultation bilaterally  CARDIOVASCULAR - SIS2, RRR  GI - soft NT BS present  EXTREMITIES- no pedal edema  NEUROLOGY- left sided weakness, left foot drop, AFO+  SKIN- no rashes, warm to touch  PSYCHIATRY- AAO X 3  MUSCULOSKELETAL- ROM diminishes LLE, and LUE    Hemoglobin A1C, Whole Blood: 5.3: Method: Immunoassay       Reference Range                4.0-5.6%       High risk (prediabetic)        5.7-6.4%       Diabetic, diagnostic             >=6.5%       ADA diabetic treatment goal       <7.0%  The Hemoglobin A1c testing is NGSP-certified.Reference ranges are based  upon the 2010 recommendations of  the American Diabetes Association.  Interpretation may vary for children  and adolescents. % (10.09.19 @ 09:45)    CAPILLARY BLOOD GLUCOSE      POCT Blood Glucose.: 101 mg/dL (19 Oct 2019 07:50)  POCT Blood Glucose.: 118 mg/dL (18 Oct 2019 21:17)  POCT Blood Glucose.: 107 mg/dL (18 Oct 2019 17:11)

## 2019-10-19 NOTE — PROGRESS NOTE ADULT - SUBJECTIVE AND OBJECTIVE BOX
No overnight events.  Feels well.  Offers no complaints.      REVIEW OF SYSTEMS  Constitutional - No fever,  No fatigue  Neurological - No headaches, No loss of strength  Musculoskeletal - No joint pain, No joint swelling, No muscle pain    VITALS  T(C): 36.6 (10-19-19 @ 09:20), Max: 36.8 (10-18-19 @ 22:13)  HR: 99 (10-19-19 @ 09:20) (63 - 99)  BP: 135/73 (10-19-19 @ 09:20) (126/75 - 161/79)  RR: 14 (10-19-19 @ 09:20) (14 - 14)  SpO2: 100% (10-19-19 @ 09:20) (99% - 100%)  Wt(kg): --       MEDICATIONS   acetaminophen   Tablet .. 650 milliGRAM(s) every 6 hours PRN  aspirin  chewable 81 milliGRAM(s) daily  atorvastatin 80 milliGRAM(s) at bedtime  cholecalciferol 2000 Unit(s) daily  clopidogrel Tablet 75 milliGRAM(s) daily  dextrose 40% Gel 15 Gram(s) once PRN  dextrose 5%. 1000 milliLiter(s) <Continuous>  dextrose 50% Injectable 12.5 Gram(s) once  dextrose 50% Injectable 25 Gram(s) once  dextrose 50% Injectable 25 Gram(s) once  docusate sodium 100 milliGRAM(s) two times a day  enoxaparin Injectable 40 milliGRAM(s) daily  glucagon  Injectable 1 milliGRAM(s) once PRN  insulin glargine Injectable (LANTUS) 14 Unit(s) at bedtime  insulin lispro (HumaLOG) corrective regimen sliding scale   two times a day before meals  lacosamide 200 milliGRAM(s) two times a day  losartan 50 milliGRAM(s) daily  senna 2 Tablet(s) at bedtime PRN      RECENT LABS/IMAGING                      POCT Blood Glucose.: 101 mg/dL (10-19-19 @ 07:50)  POCT Blood Glucose.: 118 mg/dL (10-18-19 @ 21:17)  POCT Blood Glucose.: 107 mg/dL (10-18-19 @ 17:11)    ---------  PHYSICAL EXAM  Constitutional - NAD, Comfortable  Pulm - Breathing comfortably, No wheezing  Abd - Soft, NTND  Extremities - No edema, No calf tenderness  Neurologic Exam -                    Cognitive - Awake, Alert     Communication - Fluent     Motor - No focal deficits     Sensory - Intact to LT  Psychiatric - Mood WNL, Affect WNL    ASSESSMENT/PLAN  59y Female with functional deficits after seizure d/o  Continue current medical management  Pain - Tylenol PRN  DVT PPX - enoxaparin Injectable 40 milliGRAM(s) daily  Active issues - monitor BP for hypotension  Continue 3hrs a day of comprehensive rehab program.

## 2019-10-20 LAB
GLUCOSE BLDC GLUCOMTR-MCNC: 110 MG/DL — HIGH (ref 70–99)
GLUCOSE BLDC GLUCOMTR-MCNC: 125 MG/DL — HIGH (ref 70–99)
GLUCOSE BLDC GLUCOMTR-MCNC: 92 MG/DL — SIGNIFICANT CHANGE UP (ref 70–99)

## 2019-10-20 PROCEDURE — 99232 SBSQ HOSP IP/OBS MODERATE 35: CPT | Mod: GC

## 2019-10-20 PROCEDURE — 99232 SBSQ HOSP IP/OBS MODERATE 35: CPT

## 2019-10-20 RX ADMIN — ATORVASTATIN CALCIUM 80 MILLIGRAM(S): 80 TABLET, FILM COATED ORAL at 22:41

## 2019-10-20 RX ADMIN — CLOPIDOGREL BISULFATE 75 MILLIGRAM(S): 75 TABLET, FILM COATED ORAL at 12:44

## 2019-10-20 RX ADMIN — LACOSAMIDE 200 MILLIGRAM(S): 50 TABLET ORAL at 05:56

## 2019-10-20 RX ADMIN — Medication 81 MILLIGRAM(S): at 12:44

## 2019-10-20 RX ADMIN — Medication 100 MILLIGRAM(S): at 18:05

## 2019-10-20 RX ADMIN — ENOXAPARIN SODIUM 40 MILLIGRAM(S): 100 INJECTION SUBCUTANEOUS at 12:44

## 2019-10-20 RX ADMIN — Medication 2000 UNIT(S): at 12:44

## 2019-10-20 RX ADMIN — Medication 100 MILLIGRAM(S): at 05:57

## 2019-10-20 RX ADMIN — INSULIN GLARGINE 14 UNIT(S): 100 INJECTION, SOLUTION SUBCUTANEOUS at 22:41

## 2019-10-20 RX ADMIN — LACOSAMIDE 200 MILLIGRAM(S): 50 TABLET ORAL at 18:05

## 2019-10-20 NOTE — PROGRESS NOTE ADULT - SUBJECTIVE AND OBJECTIVE BOX
Patient seen and examined at bedside. No overnight events per nursing or chart review. Patient states she feels well. Denies additional symptoms of dizziness that she noted yesterday, that were attributed to her hypotensive episodes.     ALLERGIES:  No Known Drug Allergies  shellfish (Anaphylaxis)    MEDICATIONS  (STANDING):  aspirin  chewable 81 milliGRAM(s) Oral daily  atorvastatin 80 milliGRAM(s) Oral at bedtime  cholecalciferol 2000 Unit(s) Oral daily  clopidogrel Tablet 75 milliGRAM(s) Oral daily  dextrose 5%. 1000 milliLiter(s) (50 mL/Hr) IV Continuous <Continuous>  dextrose 50% Injectable 12.5 Gram(s) IV Push once  dextrose 50% Injectable 25 Gram(s) IV Push once  dextrose 50% Injectable 25 Gram(s) IV Push once  docusate sodium 100 milliGRAM(s) Oral two times a day  enoxaparin Injectable 40 milliGRAM(s) SubCutaneous daily  insulin glargine Injectable (LANTUS) 14 Unit(s) SubCutaneous at bedtime  insulin lispro (HumaLOG) corrective regimen sliding scale   SubCutaneous two times a day before meals  lacosamide 200 milliGRAM(s) Oral two times a day  losartan 50 milliGRAM(s) Oral daily    MEDICATIONS  (PRN):  acetaminophen   Tablet .. 650 milliGRAM(s) Oral every 6 hours PRN Temp greater or equal to 38C (100.4F), Mild Pain (1 - 3), Moderate Pain (4 - 6)  dextrose 40% Gel 15 Gram(s) Oral once PRN Blood Glucose LESS THAN 70 milliGRAM(s)/deciliter  glucagon  Injectable 1 milliGRAM(s) IntraMuscular once PRN Glucose LESS THAN 70 milligrams/deciliter  senna 2 Tablet(s) Oral at bedtime PRN Constipation    Vital Signs Last 24 Hrs  T(F): 98.1 (20 Oct 2019 08:16), Max: 98.2 (19 Oct 2019 19:20)  HR: 67 (20 Oct 2019 08:16) (67 - 82)  BP: 114/69 (20 Oct 2019 08:16) (98/60 - 114/69)  RR: 14 (20 Oct 2019 08:16) (14 - 15)  SpO2: 100% (20 Oct 2019 08:16) (98% - 100%)  I&O's Summary    19 Oct 2019 07:01  -  20 Oct 2019 07:00  --------------------------------------------------------  IN: 400 mL / OUT: 0 mL / NET: 400 mL      BMI (kg/m2): 25.5 (10-15-19 @ 19:45)  PHYSICAL EXAM:  Gen: nad, resting in bed  Neuro: aaox3, left sided weakness (per patient this is her baseline)  Heent: eomi b/l, no jvd, no oral exudates  Pulm: cta b/l, no w/r/r  CV: +s1s2, no m/r/g  Ab: soft, nt/nd, normoactive bs x 4  Extrem: no edema, pulses intact and equal    POC Blood Glucose  POCT Blood Glucose.: 92 mg/dL (20 Oct 2019 07:29)  POCT Blood Glucose.: 124 mg/dL (19 Oct 2019 20:54)    10-09 [id number] 5.3

## 2019-10-20 NOTE — PROGRESS NOTE ADULT - SUBJECTIVE AND OBJECTIVE BOX
No overnight events.  Feels well.  Offers no complaints.      REVIEW OF SYSTEMS  Constitutional - No fever,  No fatigue  Neurological - No headaches, No loss of strength  Musculoskeletal - No joint pain, No joint swelling, No muscle pain    VITALS  T(C): 36.7 (10-20-19 @ 08:16), Max: 36.8 (10-19-19 @ 19:20)  HR: 67 (10-20-19 @ 08:16) (67 - 82)  BP: 114/69 (10-20-19 @ 08:16) (98/60 - 114/69)  RR: 14 (10-20-19 @ 08:16) (14 - 15)  SpO2: 100% (10-20-19 @ 08:16) (98% - 100%)  Wt(kg): --       MEDICATIONS   acetaminophen   Tablet .. 650 milliGRAM(s) every 6 hours PRN  aspirin  chewable 81 milliGRAM(s) daily  atorvastatin 80 milliGRAM(s) at bedtime  cholecalciferol 2000 Unit(s) daily  clopidogrel Tablet 75 milliGRAM(s) daily  dextrose 40% Gel 15 Gram(s) once PRN  dextrose 5%. 1000 milliLiter(s) <Continuous>  dextrose 50% Injectable 12.5 Gram(s) once  dextrose 50% Injectable 25 Gram(s) once  dextrose 50% Injectable 25 Gram(s) once  docusate sodium 100 milliGRAM(s) two times a day  enoxaparin Injectable 40 milliGRAM(s) daily  glucagon  Injectable 1 milliGRAM(s) once PRN  insulin glargine Injectable (LANTUS) 14 Unit(s) at bedtime  insulin lispro (HumaLOG) corrective regimen sliding scale   two times a day before meals  lacosamide 200 milliGRAM(s) two times a day  losartan 50 milliGRAM(s) daily  senna 2 Tablet(s) at bedtime PRN      RECENT LABS/IMAGING                      POCT Blood Glucose.: 92 mg/dL (10-20-19 @ 07:29)  POCT Blood Glucose.: 124 mg/dL (10-19-19 @ 20:54)    ---------  PHYSICAL EXAM  Constitutional - NAD, Comfortable  Pulm - Breathing comfortably, No wheezing  Abd - Soft, NTND  Extremities - No edema, No calf tenderness  Neurologic Exam -                    Cognitive - Awake, Alert     Communication - Fluent     Motor - L HP     Sensory - Intact to LT  Psychiatric - Mood WNL, Affect WNL    ASSESSMENT/PLAN  59y Female with functional deficits after R ICA dissection/CVA / L HP  Continue current medical management  Pain - Tylenol PRN  DVT PPX - enoxaparin Injectable 40 milliGRAM(s) daily  Active issues - none  Continue 3hrs a day of comprehensive rehab program.

## 2019-10-21 LAB
ANION GAP SERPL CALC-SCNC: 5 MMOL/L — SIGNIFICANT CHANGE UP (ref 5–17)
BUN SERPL-MCNC: 18 MG/DL — SIGNIFICANT CHANGE UP (ref 7–23)
CALCIUM SERPL-MCNC: 10.3 MG/DL — SIGNIFICANT CHANGE UP (ref 8.4–10.5)
CHLORIDE SERPL-SCNC: 108 MMOL/L — SIGNIFICANT CHANGE UP (ref 96–108)
CO2 SERPL-SCNC: 28 MMOL/L — SIGNIFICANT CHANGE UP (ref 22–31)
CREAT SERPL-MCNC: 0.8 MG/DL — SIGNIFICANT CHANGE UP (ref 0.5–1.3)
GLUCOSE BLDC GLUCOMTR-MCNC: 106 MG/DL — HIGH (ref 70–99)
GLUCOSE BLDC GLUCOMTR-MCNC: 84 MG/DL — SIGNIFICANT CHANGE UP (ref 70–99)
GLUCOSE SERPL-MCNC: 89 MG/DL — SIGNIFICANT CHANGE UP (ref 70–99)
HCT VFR BLD CALC: 30.3 % — LOW (ref 34.5–45)
HGB BLD-MCNC: 9.9 G/DL — LOW (ref 11.5–15.5)
MCHC RBC-ENTMCNC: 28.9 PG — SIGNIFICANT CHANGE UP (ref 27–34)
MCHC RBC-ENTMCNC: 32.7 GM/DL — SIGNIFICANT CHANGE UP (ref 32–36)
MCV RBC AUTO: 88.3 FL — SIGNIFICANT CHANGE UP (ref 80–100)
NRBC # BLD: 0 /100 WBCS — SIGNIFICANT CHANGE UP (ref 0–0)
PLATELET # BLD AUTO: 221 K/UL — SIGNIFICANT CHANGE UP (ref 150–400)
POTASSIUM SERPL-MCNC: 4.8 MMOL/L — SIGNIFICANT CHANGE UP (ref 3.5–5.3)
POTASSIUM SERPL-SCNC: 4.8 MMOL/L — SIGNIFICANT CHANGE UP (ref 3.5–5.3)
RBC # BLD: 3.43 M/UL — LOW (ref 3.8–5.2)
RBC # FLD: 12.5 % — SIGNIFICANT CHANGE UP (ref 10.3–14.5)
SODIUM SERPL-SCNC: 141 MMOL/L — SIGNIFICANT CHANGE UP (ref 135–145)
WBC # BLD: 3.86 K/UL — SIGNIFICANT CHANGE UP (ref 3.8–10.5)
WBC # FLD AUTO: 3.86 K/UL — SIGNIFICANT CHANGE UP (ref 3.8–10.5)

## 2019-10-21 PROCEDURE — 99232 SBSQ HOSP IP/OBS MODERATE 35: CPT | Mod: GC

## 2019-10-21 RX ORDER — POLYETHYLENE GLYCOL 3350 17 G/17G
17 POWDER, FOR SOLUTION ORAL DAILY
Refills: 0 | Status: DISCONTINUED | OUTPATIENT
Start: 2019-10-21 | End: 2019-10-23

## 2019-10-21 RX ADMIN — Medication 100 MILLIGRAM(S): at 05:58

## 2019-10-21 RX ADMIN — LACOSAMIDE 200 MILLIGRAM(S): 50 TABLET ORAL at 05:58

## 2019-10-21 RX ADMIN — LOSARTAN POTASSIUM 50 MILLIGRAM(S): 100 TABLET, FILM COATED ORAL at 05:58

## 2019-10-21 RX ADMIN — LACOSAMIDE 200 MILLIGRAM(S): 50 TABLET ORAL at 17:26

## 2019-10-21 RX ADMIN — ENOXAPARIN SODIUM 40 MILLIGRAM(S): 100 INJECTION SUBCUTANEOUS at 11:11

## 2019-10-21 RX ADMIN — SENNA PLUS 2 TABLET(S): 8.6 TABLET ORAL at 21:48

## 2019-10-21 RX ADMIN — ATORVASTATIN CALCIUM 80 MILLIGRAM(S): 80 TABLET, FILM COATED ORAL at 21:47

## 2019-10-21 RX ADMIN — POLYETHYLENE GLYCOL 3350 17 GRAM(S): 17 POWDER, FOR SOLUTION ORAL at 15:37

## 2019-10-21 RX ADMIN — INSULIN GLARGINE 14 UNIT(S): 100 INJECTION, SOLUTION SUBCUTANEOUS at 21:47

## 2019-10-21 RX ADMIN — Medication 2000 UNIT(S): at 11:11

## 2019-10-21 RX ADMIN — Medication 81 MILLIGRAM(S): at 11:11

## 2019-10-21 RX ADMIN — CLOPIDOGREL BISULFATE 75 MILLIGRAM(S): 75 TABLET, FILM COATED ORAL at 11:11

## 2019-10-21 RX ADMIN — Medication 100 MILLIGRAM(S): at 17:26

## 2019-10-21 NOTE — PROGRESS NOTE ADULT - SUBJECTIVE AND OBJECTIVE BOX
Patient is a 59y old  Female who presents with a chief complaint of Focal seizures (20 Oct 2019 11:11)      HPI:  LUCY ALCANTAR is a 57yo Female with PMH/PSH DM, HLD, HTN, prior R MCA stroke in November due to R ICA dissection/occlusion s/p ICA stent, with L sided residual weakness. As per Family at bedside, on 10/7 at 8:30 pm, patient started having left gaze deviation and was having shaking episodes. Patient reported to be conscious during these episodes. Patient endorsed to having urinary incontinence but no tongue biting. She reported that she had 4 episodes, each episode lasting a few minutes. Per the family, during these episodes, her eyes deviated to the left. Patient reported that shaking was on the left side. Denied any prior history of similar episodes.    Of note, patient reported that in July 2019 she presented to [facility] with TIA-like symptoms on the left side. She was started on antiepileptics at the time, but she did not remember which medication. She was no longer on any AEDs during this admission.     CTH was done which showed no acute hemorrhage and chronic right MCA infarct. Patient was admitted to the epilepsy monitoring unit and had emergent VEEG done. During hospitalization, EEG showed R focal seizures with impaired awareness. Patient was initially started on vimpat 100 BID but was noted to have some confusion. Vimpat was increased to 200mg BID with improvement. Patient was seen by PM&R and was recommended for rehab when medically stable. Patient was medically cleared by medical team for discharge to acute rehab today. (15 Oct 2019 14:53)        SUBJECTIVE: Patient seen and examined. No acute overnight events. Patient states that she has transient episodes of lightheadedness that occur after taking Vimpat. Was hypotensive over the weekend, improved with increasing PO hydration.       REVIEW OF SYSTEMS  Constitutional: No fever, No Chills, No fatigue,   HEENT: No eye pain, No visual disturbances, No difficulty hearing  Pulm: No cough,  No shortness of breath  Cardio: No chest pain, No palpitations  GI:  No abdominal pain, No nausea, No vomiting, No diarrhea, No constipation  : No dysuria, No frequency, No hematuria  Neuro: +lightheadedness, No headaches, No memory loss, No loss of strength, No numbness, No tremors  Skin: No itching, No rashes, No lesions   Endo: No temperature intolerance  MSK: No joint pain, No joint swelling, No muscle pain, No Neck or back pain  Psych:  No depression, No anxiety        MEDICATIONS  (STANDING):  aspirin  chewable 81 milliGRAM(s) Oral daily  atorvastatin 80 milliGRAM(s) Oral at bedtime  cholecalciferol 2000 Unit(s) Oral daily  clopidogrel Tablet 75 milliGRAM(s) Oral daily  dextrose 5%. 1000 milliLiter(s) (50 mL/Hr) IV Continuous <Continuous>  dextrose 50% Injectable 12.5 Gram(s) IV Push once  dextrose 50% Injectable 25 Gram(s) IV Push once  dextrose 50% Injectable 25 Gram(s) IV Push once  docusate sodium 100 milliGRAM(s) Oral two times a day  enoxaparin Injectable 40 milliGRAM(s) SubCutaneous daily  insulin glargine Injectable (LANTUS) 14 Unit(s) SubCutaneous at bedtime  insulin lispro (HumaLOG) corrective regimen sliding scale   SubCutaneous two times a day before meals  lacosamide 200 milliGRAM(s) Oral two times a day  losartan 50 milliGRAM(s) Oral daily    MEDICATIONS  (PRN):  acetaminophen   Tablet .. 650 milliGRAM(s) Oral every 6 hours PRN Temp greater or equal to 38C (100.4F), Mild Pain (1 - 3), Moderate Pain (4 - 6)  dextrose 40% Gel 15 Gram(s) Oral once PRN Blood Glucose LESS THAN 70 milliGRAM(s)/deciliter  glucagon  Injectable 1 milliGRAM(s) IntraMuscular once PRN Glucose LESS THAN 70 milligrams/deciliter  senna 2 Tablet(s) Oral at bedtime PRN Constipation        VITALS  59y  Vital Signs Last 24 Hrs  T(C): 36.8 (21 Oct 2019 07:39), Max: 36.8 (21 Oct 2019 07:39)  T(F): 98.2 (21 Oct 2019 07:39), Max: 98.2 (21 Oct 2019 07:39)  HR: 97 (21 Oct 2019 07:39) (64 - 97)  BP: 152/78 (21 Oct 2019 07:39) (113/68 - 152/78)  BP(mean): --  RR: 14 (21 Oct 2019 07:39) (14 - 16)  SpO2: 99% (21 Oct 2019 07:39) (99% - 99%)  Daily     Daily       PHYSICAL EXAM  General: NAD  Cardio: S1S2+  Resp: clear  Abdomen: soft  Neuro: no aphasia or dysarthria, +L facial droop  Left UE: Shoulder abduction 1/5, EE 1/5, EE 1/5, grasp 1/5  Left LE: Hip flexion 4+/5, KE 5/5, PF 1/5, DF 05  Right UE and LE: 5/5   +left spastic hemiparesis  Extrem: no edema or calf tenderness  Skin: no breakdown    Functional Exam:   Supervision upper body dressing   Mi A for lower body dressing       RECENT LABS:                          9.9    3.86  )-----------( 221      ( 21 Oct 2019 06:30 )             30.3     10-21    141  |  108  |  18  ----------------------------<  89  4.8   |  28  |  0.80    Ca    10.3      21 Oct 2019 06:30      CAPILLARY BLOOD GLUCOSE      POCT Blood Glucose.: 84 mg/dL (21 Oct 2019 07:41)  POCT Blood Glucose.: 125 mg/dL (20 Oct 2019 22:35)  POCT Blood Glucose.: 110 mg/dL (20 Oct 2019 17:10)          RADIOLOGY/OTHER RESULTS:            ASSESSMENT/PLAN    58 year old female with PMH/PSH DM, HLD, HTN, prior R MCA stroke in November due to R ICA dissection/occlusion s/p ICA stent, with L sided residual weakness, admitted to rehab after recent focal seizures. Now with decreased functional mobility.      COMORBIDITES/ACTIVE MEDICAL ISSUES     Continue Comprehensive Rehab Program of PT/OT . 3 HRS/DAY 5 days/week No need for ST     #Focal seizure   - Continue Vimpat 200mg BID     #Chronic right MCA infarct with residual left sided hemiparesis   - Continue aspirin 81mg daily, plavix 75mg daily and statins    #HTN  - Continue losartan 50 mg daily     #HLD  - Continue atorvastatin 80mg qhs     #DM2  - Accu checks  BID, lantus  14 Unit(s), ISS     #LBBB:  - EKG done on 10/8 showed LBBB  - Asymptomatic, -As per cardio, patient's recent SPECT in office was normal     #Diet:   - Diabetic diet     #Pain Mgmt   - Tylenol PRN    #GI/Bowel Mgmt   - Colace, Senna PRN     #/Bladder Mgmt   - Toileting schedule    #Precautions / PROPHYLAXIS:   - Falls, Seizure   - DVT Prophylaxis: Lovenox.     Team meeting 10/17: progressing well in therapy, goals for Mod I, EDOD: 10/23

## 2019-10-21 NOTE — PROGRESS NOTE ADULT - ATTENDING COMMENTS
Chart reviewed. Patient seen at bedside  Reports some light headedness during therapy, and happens intermittently.   Improved with PO hydration. BP better this am. Will monitor    2. Labs stable    3. Continue rehab program

## 2019-10-21 NOTE — CHART NOTE - NSCHARTNOTEFT_GEN_A_CORE
Nutrition Follow Up Note  Hospital Course   (Per Electronic Medical Record):     Source:   Patient [X]  Medical Record [X]      Diet:   Consistent Carbohydrate Diet w/ Thin Liquids   Tolerates Diet Well  No Chewing/Swallowing Difficulties  No Recent Nausea, Vomiting or Diarrhea (Some Constipation Occasionally)  Consumes 80% of Meals (as Per Documentation)   on Glucerna 8oz PO Daily (Provides 220kcal-10grams of Protein)  Patient Takes Nutrition Supplement  Education Provided on Blood Glucose Management     Enteral/Parenteral Nutrition: N/A    Current Weight: 167.7lb on 10/15  Obtain New Weight  Obtain Weights Weekly     Pertinent Medications: MEDICATIONS  (STANDING):  aspirin  chewable 81 milliGRAM(s) Oral daily  atorvastatin 80 milliGRAM(s) Oral at bedtime  cholecalciferol 2000 Unit(s) Oral daily  clopidogrel Tablet 75 milliGRAM(s) Oral daily  dextrose 5%. 1000 milliLiter(s) (50 mL/Hr) IV Continuous <Continuous>  dextrose 50% Injectable 12.5 Gram(s) IV Push once  dextrose 50% Injectable 25 Gram(s) IV Push once  dextrose 50% Injectable 25 Gram(s) IV Push once  docusate sodium 100 milliGRAM(s) Oral two times a day  enoxaparin Injectable 40 milliGRAM(s) SubCutaneous daily  insulin glargine Injectable (LANTUS) 14 Unit(s) SubCutaneous at bedtime  insulin lispro (HumaLOG) corrective regimen sliding scale   SubCutaneous two times a day before meals  lacosamide 200 milliGRAM(s) Oral two times a day  losartan 50 milliGRAM(s) Oral daily    MEDICATIONS  (PRN):  acetaminophen   Tablet .. 650 milliGRAM(s) Oral every 6 hours PRN Temp greater or equal to 38C (100.4F), Mild Pain (1 - 3), Moderate Pain (4 - 6)  dextrose 40% Gel 15 Gram(s) Oral once PRN Blood Glucose LESS THAN 70 milliGRAM(s)/deciliter  glucagon  Injectable 1 milliGRAM(s) IntraMuscular once PRN Glucose LESS THAN 70 milligrams/deciliter  senna 2 Tablet(s) Oral at bedtime PRN Constipation    Pertinent Labs:  10-21 Na141 mmol/L Glu 89 mg/dL K+ 4.8 mmol/L Cr  0.80 mg/dL BUN 18 mg/dL 10-16 Alb 3.0 g/dL<L> 10-09 [id number] 5.3 %    POCT (over Last 2 Days) - Ranging from     Skin: Stage 1 Pressure Ulcer on Sacrum    Edema: None Noted     Last Bowel Movement: on 10/20    Estimated Needs:   [X] No Change Since Previous Assessment    Previous Nutrition Diagnosis:   Moderate Malnutrition  Increased Nutrient Needs - Calories & Protein    Nutrition Diagnosis is [X] Ongoing - Continues on Nutrition Supplement & Patient Takes Nutrition Supplement     New Nutrition Diagnosis: [X] Not Applicable    Interventions:   1. Education Provided on Blood Glucose Management   2. Recommend Continue Nutrition Plan of Care     Monitoring & Evaluation:   [X] Weights   [X] PO Intake   [X] Follow Up (Per Protocol)  [X] Tolerance to Diet Prescription   [X] Other: Labs & POCT    Registered Dietitian/Nutritionist Remains Available.  Arvin Baron RDN    Pager # [phone]  Phone# [phone]

## 2019-10-22 ENCOUNTER — TRANSCRIPTION ENCOUNTER (OUTPATIENT)
Age: 59
End: 2019-10-22

## 2019-10-22 LAB
GLUCOSE BLDC GLUCOMTR-MCNC: 121 MG/DL — HIGH (ref 70–99)
GLUCOSE BLDC GLUCOMTR-MCNC: 89 MG/DL — SIGNIFICANT CHANGE UP (ref 70–99)

## 2019-10-22 PROCEDURE — 99232 SBSQ HOSP IP/OBS MODERATE 35: CPT | Mod: GC

## 2019-10-22 RX ORDER — CLOPIDOGREL BISULFATE 75 MG/1
1 TABLET, FILM COATED ORAL
Qty: 0 | Refills: 0 | DISCHARGE

## 2019-10-22 RX ORDER — ASPIRIN/CALCIUM CARB/MAGNESIUM 324 MG
1 TABLET ORAL
Qty: 0 | Refills: 0 | DISCHARGE
Start: 2019-10-22

## 2019-10-22 RX ORDER — INSULIN GLARGINE 100 [IU]/ML
14 INJECTION, SOLUTION SUBCUTANEOUS
Qty: 1 | Refills: 0
Start: 2019-10-22 | End: 2019-11-20

## 2019-10-22 RX ORDER — CLOPIDOGREL BISULFATE 75 MG/1
1 TABLET, FILM COATED ORAL
Qty: 0 | Refills: 0 | DISCHARGE
Start: 2019-10-22

## 2019-10-22 RX ORDER — LACOSAMIDE 50 MG/1
1 TABLET ORAL
Qty: 60 | Refills: 0
Start: 2019-10-22

## 2019-10-22 RX ORDER — ATORVASTATIN CALCIUM 80 MG/1
1 TABLET, FILM COATED ORAL
Qty: 0 | Refills: 0 | DISCHARGE
Start: 2019-10-22

## 2019-10-22 RX ORDER — LOSARTAN POTASSIUM 100 MG/1
1 TABLET, FILM COATED ORAL
Qty: 0 | Refills: 0 | DISCHARGE
Start: 2019-10-22

## 2019-10-22 RX ADMIN — Medication 100 MILLIGRAM(S): at 06:24

## 2019-10-22 RX ADMIN — Medication 2000 UNIT(S): at 11:15

## 2019-10-22 RX ADMIN — POLYETHYLENE GLYCOL 3350 17 GRAM(S): 17 POWDER, FOR SOLUTION ORAL at 17:27

## 2019-10-22 RX ADMIN — LACOSAMIDE 200 MILLIGRAM(S): 50 TABLET ORAL at 06:24

## 2019-10-22 RX ADMIN — ATORVASTATIN CALCIUM 80 MILLIGRAM(S): 80 TABLET, FILM COATED ORAL at 21:56

## 2019-10-22 RX ADMIN — INSULIN GLARGINE 14 UNIT(S): 100 INJECTION, SOLUTION SUBCUTANEOUS at 21:55

## 2019-10-22 RX ADMIN — LOSARTAN POTASSIUM 50 MILLIGRAM(S): 100 TABLET, FILM COATED ORAL at 06:24

## 2019-10-22 RX ADMIN — ENOXAPARIN SODIUM 40 MILLIGRAM(S): 100 INJECTION SUBCUTANEOUS at 11:15

## 2019-10-22 RX ADMIN — CLOPIDOGREL BISULFATE 75 MILLIGRAM(S): 75 TABLET, FILM COATED ORAL at 11:15

## 2019-10-22 RX ADMIN — Medication 81 MILLIGRAM(S): at 11:14

## 2019-10-22 RX ADMIN — LACOSAMIDE 200 MILLIGRAM(S): 50 TABLET ORAL at 17:28

## 2019-10-22 RX ADMIN — Medication 100 MILLIGRAM(S): at 17:28

## 2019-10-22 NOTE — DISCHARGE NOTE PROVIDER - CARE PROVIDER_API CALL
Isha Leal (MD)  EEGEpilepsy; Neurology  [address], [address]  [place] [address]  Phone: [phone]  Follow Up Time: Isha Leal)  EEGEpilepsy; Neurology  [address], [address]  [place] [address]  Phone: [phone]  Follow Up Time:     Primary, Physician  Phone: (   )    -  Fax: (   )    -  Follow Up Time: 1 week    Rodríguez Segura (DO)  PhysicalRehab Medicine; Spinal Cord Injury Medicine  [address], [address] [place] [address]  Phone: [phone]  Fax: [phone]  Follow Up Time: 1 month

## 2019-10-22 NOTE — PROGRESS NOTE ADULT - SUBJECTIVE AND OBJECTIVE BOX
Patient is a 59y old  Female who presents with a chief complaint of Focal seizures (21 Oct 2019 10:32)      HPI:  LUCY ALCANTAR is a 59yo Female with PMH/PSH DM, HLD, HTN, prior R MCA stroke in November due to R ICA dissection/occlusion s/p ICA stent, with L sided residual weakness. As per Family at bedside, on 10/7 at 8:30 pm, patient started having left gaze deviation and was having shaking episodes. Patient reported to be conscious during these episodes. Patient endorsed to having urinary incontinence but no tongue biting. She reported that she had 4 episodes, each episode lasting a few minutes. Per the family, during these episodes, her eyes deviated to the left. Patient reported that shaking was on the left side. Denied any prior history of similar episodes.    Of note, patient reported that in July 2019 she presented to [facility] with TIA-like symptoms on the left side. She was started on antiepileptics at the time, but she did not remember which medication. She was no longer on any AEDs during this admission.     CTH was done which showed no acute hemorrhage and chronic right MCA infarct. Patient was admitted to the epilepsy monitoring unit and had emergent VEEG done. During hospitalization, EEG showed R focal seizures with impaired awareness. Patient was initially started on vimpat 100 BID but was noted to have some confusion. Vimpat was increased to 200mg BID with improvement. Patient was seen by PM&R and was recommended for rehab when medically stable. Patient was medically cleared by medical team for discharge to acute rehab today. (15 Oct 2019 14:53)        SUBJECTIVE: Patient seen and examined. No acute overnight events. Patient denies any further episodes of lightheadedness yesterday. Patient expresses some worry over potential seizure recurrence. Provided education and reassurance.       REVIEW OF SYSTEMS  Constitutional: No fever, No Chills, No fatigue  HEENT: No eye pain, No visual disturbances, No difficulty hearing  Pulm: No cough,  No shortness of breath  Cardio: No chest pain, No palpitations  GI:  No abdominal pain, No nausea, No vomiting, No diarrhea, No constipation  : No dysuria, No frequency, No hematuria  Neuro: No headaches, No memory loss, +loss of strength at baseline, No numbness, No tremors  Skin: No itching, No rashes, No lesions   Endo: No temperature intolerance  MSK: No joint pain, No joint swelling, No muscle pain, No Neck or back pain  Psych:  No depression, No anxiety      MEDICATIONS  (STANDING):  aspirin  chewable 81 milliGRAM(s) Oral daily  atorvastatin 80 milliGRAM(s) Oral at bedtime  cholecalciferol 2000 Unit(s) Oral daily  clopidogrel Tablet 75 milliGRAM(s) Oral daily  dextrose 5%. 1000 milliLiter(s) (50 mL/Hr) IV Continuous <Continuous>  dextrose 50% Injectable 12.5 Gram(s) IV Push once  dextrose 50% Injectable 25 Gram(s) IV Push once  dextrose 50% Injectable 25 Gram(s) IV Push once  docusate sodium 100 milliGRAM(s) Oral two times a day  enoxaparin Injectable 40 milliGRAM(s) SubCutaneous daily  insulin glargine Injectable (LANTUS) 14 Unit(s) SubCutaneous at bedtime  insulin lispro (HumaLOG) corrective regimen sliding scale   SubCutaneous two times a day before meals  lacosamide 200 milliGRAM(s) Oral two times a day  losartan 50 milliGRAM(s) Oral daily    MEDICATIONS  (PRN):  acetaminophen   Tablet .. 650 milliGRAM(s) Oral every 6 hours PRN Temp greater or equal to 38C (100.4F), Mild Pain (1 - 3), Moderate Pain (4 - 6)  bisacodyl 5 milliGRAM(s) Oral every 12 hours PRN Constipation  dextrose 40% Gel 15 Gram(s) Oral once PRN Blood Glucose LESS THAN 70 milliGRAM(s)/deciliter  glucagon  Injectable 1 milliGRAM(s) IntraMuscular once PRN Glucose LESS THAN 70 milligrams/deciliter  polyethylene glycol 3350 17 Gram(s) Oral daily PRN Constipation  senna 2 Tablet(s) Oral at bedtime PRN Constipation          VITALS  59y  Vital Signs Last 24 Hrs  T(C): 36.7 (22 Oct 2019 07:24), Max: 36.9 (21 Oct 2019 20:35)  T(F): 98.1 (22 Oct 2019 07:24), Max: 98.5 (21 Oct 2019 20:35)  HR: 91 (22 Oct 2019 07:24) (77 - 91)  BP: 137/74 (22 Oct 2019 07:24) (131/77 - 137/74)  BP(mean): --  RR: 14 (22 Oct 2019 07:24) (14 - 14)  SpO2: 97% (22 Oct 2019 07:24) (97% - 98%)  Daily     Daily       RECENT LABS:                          9.9    3.86  )-----------( 221      ( 21 Oct 2019 06:30 )             30.3     10-21    141  |  108  |  18  ----------------------------<  89  4.8   |  28  |  0.80    Ca    10.3      21 Oct 2019 06:30                CAPILLARY BLOOD GLUCOSE      POCT Blood Glucose.: 89 mg/dL (22 Oct 2019 07:53)  POCT Blood Glucose.: 106 mg/dL (21 Oct 2019 21:44)        PHYSICAL EXAM:   General: NAD  Cardio: S1S2+  Resp: clear  Abdomen: soft  Neuro: no aphasia or dysarthria, +L facial droop  Left UE: Shoulder abduction 1/5, EE 1/5, EE 1/5, grasp 1/5  Left LE: Hip flexion 4+/5, KE 5/5, PF 1/5, DF 05  Right UE and LE: 5/5   +left spastic hemiparesis  Extrem: no edema or calf tenderness  Skin: no breakdown    Functional Exam:   Supervision upper body dressing   Mi A for lower body dressing

## 2019-10-22 NOTE — PROGRESS NOTE ADULT - ATTENDING COMMENTS
Chart reviewed.  Patient without complaints except some anxiety about being alone at home.    Has met rehab goals and scheduled for dc home in am with home care.- including request for HHA Chart reviewed.  Patient without complaints except some anxiety about being alone at home.    Has met rehab goals and scheduled for dc home in am with home care.- including request for HHA    2. DM- II: Patient to be discharged home on home dose of basilgar ( Glargine ) without metformin. FU with Primary/endo after discharge

## 2019-10-22 NOTE — DISCHARGE NOTE PROVIDER - NSDCFUSCHEDAPPT_GEN_ALL_CORE_FT
LUCY ALCANTAR ; 10/28/2019 ; NPP Med Int [address] [facility]  LUCY ALCANTAR ; 12/17/2019 ; NPP Med Endocr [address] [facility] LUCY ALCANTAR ; 10/28/2019 ; NPP Med Int [address] [facility]  LUCY ALCANTAR ; 12/17/2019 ; NPP Med Endocr [address] [facility] LUCY ALCANTAR ; 10/28/2019 ; NPP Med Int [address] [facility]  LUCY ALCANTAR ; 12/17/2019 ; NPP Med Endocr [address] [facility]

## 2019-10-22 NOTE — DISCHARGE NOTE PROVIDER - HOSPITAL COURSE
LUCY ALCANTAR is a 59yo Female with PMH/PSH DM, HLD, HTN, prior R MCA stroke in November due to R ICA dissection/occlusion s/p ICA stent, with L sided residual weakness. As per Family at bedside, on 10/7 at 8:30 pm, patient started having left gaze deviation and was having shaking episodes. Patient reported to be conscious during these episodes. Patient endorsed to having urinary incontinence but no tongue biting. She reported that she had 4 episodes, each episode lasting a few minutes. Per the family, during these episodes, her eyes deviated to the left. Patient reported that shaking was on the left side. Denied any prior history of similar episodes.        Of note, patient reported that in July 2019 she presented to [facility] with TIA-like symptoms on the left side. She was started on antiepileptics at the time, but she did not remember which medication. She was no longer on any AEDs during this admission.         CTH was done which showed no acute hemorrhage and chronic right MCA infarct. Patient was admitted to the epilepsy monitoring unit and had emergent VEEG done. During hospitalization, EEG showed R focal seizures with impaired awareness. Patient was initially started on vimpat 100 BID but was noted to have some confusion. Vimpat was increased to 200mg BID with improvement. Patient was seen by PM&R and was recommended for rehab when medically stable. Patient was medically cleared by medical team for discharge to acute rehab 15 Oct 2019.         Rehab course complicated by an episode of hypotension during physical therapy and intermittent episodes of lightheadedness that occurred after patient took AED medication. Symptoms improved with increased PO fluid hydration. Patient tolerated course of inpatient PT/OT rehab with significant functional improvements and was able to meet goals for discharge.        Pt was medically cleared on 10/23/2019 for discharged to home. Pt will follow up with neuro and PCP upon discharge.

## 2019-10-22 NOTE — DISCHARGE NOTE PROVIDER - NSDCACTIVITY_GEN_ALL_CORE
Walking - Outdoors allowed/Showering allowed/Walking - Indoors allowed/Do not drive or operate machinery/Stairs allowed

## 2019-10-22 NOTE — DISCHARGE NOTE PROVIDER - PROVIDER TOKENS
PROVIDER:[TOKEN:[[id number]]] PROVIDER:[TOKEN:[[id number]]],FREE:[LAST:[Primary],FIRST:[Physician],PHONE:[(   )    -],FAX:[(   )    -],FOLLOWUP:[1 week]],PROVIDER:[TOKEN:[[id number]],FOLLOWUP:[1 month]]

## 2019-10-22 NOTE — DISCHARGE NOTE PROVIDER - NSDCCPCAREPLAN_GEN_ALL_CORE_FT
PRINCIPAL DISCHARGE DIAGNOSIS  Diagnosis: Seizure  Assessment and Plan of Treatment: vimpat      SECONDARY DISCHARGE DIAGNOSES  Diagnosis: Hypertension  Assessment and Plan of Treatment:     Diagnosis: Hyperlipidemia  Assessment and Plan of Treatment:     Diagnosis: Type 2 diabetes mellitus  Assessment and Plan of Treatment:     Diagnosis: History of CVA (cerebrovascular accident)  Assessment and Plan of Treatment:

## 2019-10-22 NOTE — DISCHARGE NOTE PROVIDER - CARE PROVIDERS DIRECT ADDRESSES
,DirectAddress_Unknown ,DirectAddress_Unknown,DirectAddress_Unknown,[email or web address]

## 2019-10-23 ENCOUNTER — TRANSCRIPTION ENCOUNTER (OUTPATIENT)
Age: 59
End: 2019-10-23

## 2019-10-23 VITALS
HEART RATE: 64 BPM | OXYGEN SATURATION: 95 % | SYSTOLIC BLOOD PRESSURE: 138 MMHG | TEMPERATURE: 98 F | DIASTOLIC BLOOD PRESSURE: 80 MMHG | RESPIRATION RATE: 14 BRPM

## 2019-10-23 LAB — GLUCOSE BLDC GLUCOMTR-MCNC: 89 MG/DL — SIGNIFICANT CHANGE UP (ref 70–99)

## 2019-10-23 PROCEDURE — 83735 ASSAY OF MAGNESIUM: CPT

## 2019-10-23 PROCEDURE — 97116 GAIT TRAINING THERAPY: CPT

## 2019-10-23 PROCEDURE — 92523 SPEECH SOUND LANG COMPREHEN: CPT

## 2019-10-23 PROCEDURE — 82962 GLUCOSE BLOOD TEST: CPT

## 2019-10-23 PROCEDURE — 80048 BASIC METABOLIC PNL TOTAL CA: CPT

## 2019-10-23 PROCEDURE — 97530 THERAPEUTIC ACTIVITIES: CPT

## 2019-10-23 PROCEDURE — 99238 HOSP IP/OBS DSCHRG MGMT 30/<: CPT

## 2019-10-23 PROCEDURE — 97140 MANUAL THERAPY 1/> REGIONS: CPT

## 2019-10-23 PROCEDURE — 99232 SBSQ HOSP IP/OBS MODERATE 35: CPT

## 2019-10-23 PROCEDURE — 85027 COMPLETE CBC AUTOMATED: CPT

## 2019-10-23 PROCEDURE — 80053 COMPREHEN METABOLIC PANEL: CPT

## 2019-10-23 PROCEDURE — 97167 OT EVAL HIGH COMPLEX 60 MIN: CPT

## 2019-10-23 PROCEDURE — 92610 EVALUATE SWALLOWING FUNCTION: CPT

## 2019-10-23 PROCEDURE — 97535 SELF CARE MNGMENT TRAINING: CPT

## 2019-10-23 PROCEDURE — 97110 THERAPEUTIC EXERCISES: CPT

## 2019-10-23 PROCEDURE — 97163 PT EVAL HIGH COMPLEX 45 MIN: CPT

## 2019-10-23 PROCEDURE — 97112 NEUROMUSCULAR REEDUCATION: CPT

## 2019-10-23 PROCEDURE — 97760 ORTHOTIC MGMT&TRAING 1ST ENC: CPT

## 2019-10-23 RX ORDER — INSULIN LISPRO 100/ML
2 VIAL (ML) SUBCUTANEOUS
Qty: 1 | Refills: 0
Start: 2019-10-23 | End: 2019-11-21

## 2019-10-23 RX ORDER — LACOSAMIDE 50 MG/1
1 TABLET ORAL
Qty: 60 | Refills: 0
Start: 2019-10-23 | End: 2019-11-21

## 2019-10-23 RX ORDER — ISOPROPYL ALCOHOL, BENZOCAINE .7; .06 ML/ML; ML/ML
1 SWAB TOPICAL
Qty: 100 | Refills: 1
Start: 2019-10-23 | End: 2019-12-11

## 2019-10-23 RX ORDER — INSULIN GLARGINE 100 [IU]/ML
14 INJECTION, SOLUTION SUBCUTANEOUS
Qty: 1 | Refills: 0
Start: 2019-10-23 | End: 2019-11-21

## 2019-10-23 RX ADMIN — Medication 5 MILLIGRAM(S): at 09:24

## 2019-10-23 RX ADMIN — Medication 100 MILLIGRAM(S): at 05:31

## 2019-10-23 RX ADMIN — LACOSAMIDE 200 MILLIGRAM(S): 50 TABLET ORAL at 05:31

## 2019-10-23 RX ADMIN — CLOPIDOGREL BISULFATE 75 MILLIGRAM(S): 75 TABLET, FILM COATED ORAL at 11:06

## 2019-10-23 RX ADMIN — Medication 81 MILLIGRAM(S): at 11:06

## 2019-10-23 RX ADMIN — LOSARTAN POTASSIUM 50 MILLIGRAM(S): 100 TABLET, FILM COATED ORAL at 05:31

## 2019-10-23 RX ADMIN — Medication 2000 UNIT(S): at 11:07

## 2019-10-23 NOTE — DISCHARGE NOTE NURSING/CASE MANAGEMENT/SOCIAL WORK - NSDCPEPTSTRK_GEN_ALL_CORE
Call 911 for stroke/Need for follow up after discharge/Prescribed medications/Stroke support groups for patients, families, and friends/Risk factors for stroke/Stroke education booklet/Stroke warning signs and symptoms/Signs and symptoms of stroke

## 2019-10-23 NOTE — PROGRESS NOTE ADULT - REASON FOR ADMISSION
Focal seizures

## 2019-10-23 NOTE — PROGRESS NOTE ADULT - ASSESSMENT
58 year old female with PMH/PSH DM, HLD, HTN, prior R MCA stroke in November due to R ICA dissection/occlusion s/p ICA stent, with L sided residual weakness, admitted to rehab after recent focal seizures. Now with decreased functional mobility.      #Focal seizure   - Continue Vimpat 200mg BID   - Continue Comprehensive Rehab Program of PT/OT 3 HRS/DAY 5 days/week No need for ST     #Chronic right MCA infarct with residual left sided hemiparesis   - Continue aspirin 81mg daily, plavix 75mg daily and statins    #HTN  - Continue losartan 50 mg daily     #HLD  - Continue atorvastatin 80mg qhs     #DM2  - Accu checks BID, lantus 14 Unit(s), ISS     #LBBB:  - EKG done on 10/8 showed LBBB  - Asymptomatic, -As per cardio, patient's recent SPECT in office was normal     #Diet:   - Diabetic diet     #Pain Mgmt   - Tylenol PRN    #GI/Bowel Mgmt   - Colace, Senna PRN     #/Bladder Mgmt   - Toileting schedule    #Precautions / PROPHYLAXIS:   - Falls, Seizure   - DVT Prophylaxis: Lovenox.     Team meeting 10/17: progressing well in therapy, goals for Mod I, EDOD: 10/23
58 year old female with PMH/PSH DM, HLD, HTN, prior R MCA stroke in November due to R ICA dissection/occlusion s/p ICA stent, with L sided residual weakness, presented with left gaze deviation and shaking episodes. Admitted to EMU with VEEG monitoring showing right focal seizures with impaired awareness, started on Vimpat which seizures now resolved. Now with decreased functional mobility, gait instability and ADL impairments- PT/OT/DVT PPX, PAIN MEDS    Right Focal seizure on EEG- Vimpat     Chronic right MCA infarct /left sided hemiparesis - aspirin , plavix     HTN- losartan     HLD- atorvastatiN    DM2-Accu checks AC and qhs reviewed, this am bs 80, will decrease Lantus to 14 units , ISS    LBBB-EKG done on 10/8 showed LBBB/Asymptomatic, recent SPECT in office was normal per cardio    DVT: Lovenox, SCDs, TEDs     will follow, d/w dr. oakes
58 year old female with PMH/PSH DM, HLD, HTN, prior R MCA stroke in November due to R ICA dissection/occlusion s/p ICA stent, with L sided residual weakness, presented with left gaze deviation and shaking episodes. Admitted to EMU with VEEG monitoring showing right focal seizures with impaired awareness, started on Vimpat which seizures now resolved. Now with decreased functional mobility, gait instability and ADL impairments- PT/OT/DVT PPX, PAIN MEDS    Right Focal seizure on EEG- Vimpat     Chronic right MCA infarct /left sided hemiparesis - aspirin , plavix     HTN- losartan     HLD- atorvastatiN    DM2-Accu checks reviewed, will c/w [id number] units , ISS    LBBB- EKG done on 10/8 showed LBBB/Asymptomatic, recent SPECT in office was normal per cardio    DVT: Lovenox, SCDs, TEDs     will follow
58 year old female with PMH/PSH DM, HLD, HTN, prior R MCA stroke in November due to R ICA dissection/occlusion s/p ICA stent, with L sided residual weakness, presented with left gaze deviation and shaking episodes. Admitted to EMU with VEEG monitoring showing right focal seizures with impaired awareness, started on Vimpat which seizures now resolved. Now with decreased functional mobility, gait instability and ADL impairments- PT/OT/DVT PPX, PAIN MEDS    Right Focal seizure on EEG- Vimpat     Chronic right MCA infarct /left sided hemiparesis - aspirin , plavix     HTN- losartan     HLD- atorvastatiN    DM2-Accu checks reviewed, will c/w [id number] units , ISS    LBBB- EKG done on 10/8 showed LBBB/Asymptomatic, recent SPECT in office was normal per cardio    DVT: Lovenox, SCDs, TEDs     will follow
Ms Bell is a pleasant 58 year old female with history of DM, HLD, HTN, prior R MCA stroke in November due to R ICA dissection/occlusion s/p ICA stent, with L sided residual weakness, She presented with left gaze deviation and shaking episodes to EMU and was admitted. During her admission VEEG monitoring showing right focal seizures with impaired awareness, started on Vimpat which seizures now resolved. However she is now with decreased functional mobility, gait instability and ADL impairments and thus required admission to [facility] for acute rehabilitation      Neurology  Seizures   - lacosamide 200mg bid  Prior stroke  - asa 81mg qd and plavix 75mg qd  - atorvastatin 80mg qhs  rehab/pain management per primary team    Cardiovascular  Hypertension  - losartan 50mg qd with strict hold parameters  -LBBB (old)  -asa 81mg qd   Hyperlipidemia  - atorvastatin 40mg qhs    Endocrine  Diabetes Mellitus  lantus 14 units subq  humalog sliding scale    DVT prophylaxis: Lovenox     case discussed with PMR resident  should a clinical question arise regarding this patient, please do not hesitate to contact me at [phone]

## 2019-10-23 NOTE — PROGRESS NOTE ADULT - PROVIDER SPECIALTY LIST ADULT
Hospitalist
Physiatry
Rehab Medicine

## 2019-10-23 NOTE — DISCHARGE NOTE NURSING/CASE MANAGEMENT/SOCIAL WORK - NSDCCRTYPESERV_GEN_ALL_CORE_FT
Your clinicals and prescription was sent to Lea Leos ([phone]) the  at [facility] to resume your outpatient therapy. Call Lea to schedule your next appointment. Take your original prescription with you to your first appointment

## 2019-10-23 NOTE — PROGRESS NOTE ADULT - SUBJECTIVE AND OBJECTIVE BOX
58 year old female with PMH/PSH DM, HLD, HTN, prior R MCA stroke in November due to R ICA dissection/occlusion s/p ICA stent, with L sided residual weakness, presented with left gaze deviation and shaking episodes. Admitted to EMU with VEEG monitoring showing right focal seizures     seen at the bedside, no n/v, no sob, tolerating po diet, no dysuria      Vital Signs Last 24 Hrs  T(C): 36.5 (23 Oct 2019 07:25), Max: 36.9 (22 Oct 2019 21:40)  T(F): 97.7 (23 Oct 2019 07:25), Max: 98.5 (22 Oct 2019 21:40)  HR: 64 (23 Oct 2019 07:25) (64 - 64)  BP: 138/80 (23 Oct 2019 07:25) (127/76 - 138/80)  BP(mean): --  RR: 14 (23 Oct 2019 07:25) (14 - 14)  SpO2: 95% (23 Oct 2019 07:25) (95% - 99%)    GENERAL- NAD  EAR/NOSE/MOUTH/THROAT - no pharyngeal exudates, no oral lesions  MMM  EYES- TIP, conjunctiva and Sclera clear  NECK- supple  RESPIRATORY-  clear to auscultation bilaterally  CARDIOVASCULAR - SIS2, RRR  GI - soft NT BS present  EXTREMITIES- no pedal edema  NEUROLOGY- left sided weakness, left foot drop, AFO+  SKIN- no rashes, warm to touch  PSYCHIATRY- AAO X 3  MUSCULOSKELETAL- ROM diminishes LLE, and LUE    Hemoglobin A1C, Whole Blood: 5.3 % (10.09.19 @ 09:45)      CAPILLARY BLOOD GLUCOSE      POCT Blood Glucose.: 89 mg/dL (23 Oct 2019 07:27)  POCT Blood Glucose.: 121 mg/dL (22 Oct 2019 21:51)

## 2019-10-23 NOTE — PROGRESS NOTE ADULT - SUBJECTIVE AND OBJECTIVE BOX
Patient is a 59y old  Female who presents with a chief complaint of Focal seizures (21 Oct 2019 10:32)      HPI:  LUCY ALCANTAR is a 57yo Female with PMH/PSH DM, HLD, HTN, prior R MCA stroke in November due to R ICA dissection/occlusion s/p ICA stent, with L sided residual weakness. As per Family at bedside, on 10/7 at 8:30 pm, patient started having left gaze deviation and was having shaking episodes. Patient reported to be conscious during these episodes. Patient endorsed to having urinary incontinence but no tongue biting. She reported that she had 4 episodes, each episode lasting a few minutes. Per the family, during these episodes, her eyes deviated to the left. Patient reported that shaking was on the left side. Denied any prior history of similar episodes.    Of note, patient reported that in July 2019 she presented to [facility] with TIA-like symptoms on the left side. She was started on antiepileptics at the time, but she did not remember which medication. She was no longer on any AEDs during this admission.     CTH was done which showed no acute hemorrhage and chronic right MCA infarct. Patient was admitted to the epilepsy monitoring unit and had emergent VEEG done. During hospitalization, EEG showed R focal seizures with impaired awareness. Patient was initially started on vimpat 100 BID but was noted to have some confusion. Vimpat was increased to 200mg BID with improvement. Patient was seen by PM&R and was recommended for rehab when medically stable. Patient was medically cleared by medical team for discharge to acute rehab today. (15 Oct 2019 14:53)        SUBJECTIVE: Patient seen and examined. No acute overnight events. Patient denies any further episodes of lightheadedness yesterday. Patient expresses some worry over potential seizure recurrence. Provided education and reassurance.       REVIEW OF SYSTEMS  Constitutional: No fever, No Chills, No fatigue  HEENT: No eye pain,   Pulm: No cough,  No shortness of breath  Cardio: No chest pain, No palpitations  GI:  No abdominal pain, No nausea, No vomiting, No diarrhea, No constipation  : No dysuria, No frequency, No hematuria  Neuro: No headaches,  +loss of strength at baseline, No numbness, No tremors  Skin: No itching, No rashes, No lesions   Endo: No temperature intolerance  MSK: No joint pain,   Psych:  No depression, No anxiety      MEDICATIONS  (STANDING):  aspirin  chewable 81 milliGRAM(s) Oral daily  atorvastatin 80 milliGRAM(s) Oral at bedtime  cholecalciferol 2000 Unit(s) Oral daily  clopidogrel Tablet 75 milliGRAM(s) Oral daily  dextrose 5%. 1000 milliLiter(s) (50 mL/Hr) IV Continuous <Continuous>  dextrose 50% Injectable 12.5 Gram(s) IV Push once  dextrose 50% Injectable 25 Gram(s) IV Push once  dextrose 50% Injectable 25 Gram(s) IV Push once  docusate sodium 100 milliGRAM(s) Oral two times a day  enoxaparin Injectable 40 milliGRAM(s) SubCutaneous daily  insulin glargine Injectable (LANTUS) 14 Unit(s) SubCutaneous at bedtime  insulin lispro (HumaLOG) corrective regimen sliding scale   SubCutaneous two times a day before meals  lacosamide 200 milliGRAM(s) Oral two times a day  losartan 50 milliGRAM(s) Oral daily    MEDICATIONS  (PRN):  acetaminophen   Tablet .. 650 milliGRAM(s) Oral every 6 hours PRN Temp greater or equal to 38C (100.4F), Mild Pain (1 - 3), Moderate Pain (4 - 6)  bisacodyl 5 milliGRAM(s) Oral every 12 hours PRN Constipation  dextrose 40% Gel 15 Gram(s) Oral once PRN Blood Glucose LESS THAN 70 milliGRAM(s)/deciliter  glucagon  Injectable 1 milliGRAM(s) IntraMuscular once PRN Glucose LESS THAN 70 milligrams/deciliter  polyethylene glycol 3350 17 Gram(s) Oral daily PRN Constipation  senna 2 Tablet(s) Oral at bedtime PRN Constipation          Vital Signs Last 24 Hrs  T(C): 36.5 (23 Oct 2019 07:25), Max: 36.9 (22 Oct 2019 21:40)  T(F): 97.7 (23 Oct 2019 07:25), Max: 98.5 (22 Oct 2019 21:40)  HR: 64 (23 Oct 2019 07:25) (64 - 64)  BP: 138/80 (23 Oct 2019 07:25) (127/76 - 138/80)  BP(mean): --  RR: 14 (23 Oct 2019 07:25) (14 - 14)  SpO2: 95% (23 Oct 2019 07:25) (95% - 99%)    PHYSICAL EXAM:   General: NAD  Cardio: S1S2+  Resp: clear  Abdomen: soft  Neuro: no aphasia or dysarthria, +L facial droop  Left UE: Shoulder abduction 1/5, EE 1/5, EE 1/5, grasp 1/5  Left LE: Hip flexion 4+/5, KE 5/5, PF 1/5, DF 05  Right UE and LE: 5/5   +left spastic hemiparesis  Extrem: no edema or calf tenderness  Skin: no breakdown    Functional Exam:   Supervision upper body dressing   Mi A for lower body dressing         58 year old female with PMH/PSH DM, HLD, HTN, prior R MCA stroke in November due to R ICA dissection/occlusion s/p ICA stent, with L sided residual weakness, admitted to rehab after recent focal seizures. Now with decreased functional mobility.      #Focal seizure   - Continue Vimpat 200mg BID   - Continue Comprehensive Rehab Program of PT/OT 3 HRS/DAY 5 days/week No need for ST     #Chronic right MCA infarct with residual left sided hemiparesis   - Continue aspirin 81mg daily, plavix 75mg daily and statins    #HTN  - Continue losartan 50 mg daily     #HLD  - Continue atorvastatin 80mg qhs     #DM2  - Accu checks BID, lantus 14 Unit(s), ISS     #LBBB:  - EKG done on 10/8 showed LBBB  - Asymptomatic, -As per cardio, patient's recent SPECT in office was normal     #Diet:   - Diabetic diet     #Pain Mgmt   - Tylenol PRN    #GI/Bowel Mgmt   - Colace, Senna PRN     #/Bladder Mgmt   - Toileting schedule    #Precautions / PROPHYLAXIS:   - Falls, Seizure   - DVT Prophylaxis: Lovenox.     Disp: home today with outpatient rehab  FU with PCP, neurology in 1 week, and Physiatry in 4 weeks  For diabetes patient will be discharged on Basilgar( home insulin) and humalog for coverage. No metformin at this time  FU with endocrinology in 3-4 weeks

## 2019-10-23 NOTE — PHARMACOTHERAPY INTERVENTION NOTE - COMMENTS
.  Called [facility] to confirm coverage for Vimpat  Pharmacy filled partial quantity with the rest on order--Unable to obtain copay for full Rx  Provided patient with written information and coupon coverage  Reviewed uses and side effects of Vimpat  .

## 2019-10-23 NOTE — DISCHARGE NOTE NURSING/CASE MANAGEMENT/SOCIAL WORK - PATIENT PORTAL LINK FT
You can access the FollowMyHealth Patient Portal offered by [facility] by registering at the following website: [email or web address]. By joining [employer]’s FollowMyHealth portal, you will also be able to view your health information using other applications (apps) compatible with our system.

## 2019-10-24 NOTE — DISCUSSION/SUMMARY
[Rehab] : patient was discharged to rehab [FreeTextEntry3] : Pt admitted to [facility] facility from 10/15/19- 10/23/19 after having seizures. Patient states she receives help from family member.  [FreeTextEntry1] : Spoke to patient about hospital visit at [facility] from 10/08/19-10/15/19. Pt\par stated she had x4 seizures with some left sided weakness. After hospital visit pt went to [facility] from 10/15/19-10/23/19. Pt states she feels better now and receives help from family. Pt was started on Vimpat 200 mg which she is still currently taken and Metformin was stopped by Physician. Pt has an appointment for 10/28/19 with Dr. Arenas, verbalized understanding.

## 2019-10-28 ENCOUNTER — APPOINTMENT (OUTPATIENT)
Dept: INTERNAL MEDICINE | Facility: CLINIC | Age: 59
End: 2019-10-28
Payer: COMMERCIAL

## 2019-10-28 VITALS
HEART RATE: 106 BPM | DIASTOLIC BLOOD PRESSURE: 80 MMHG | SYSTOLIC BLOOD PRESSURE: 170 MMHG | TEMPERATURE: 98.9 F | OXYGEN SATURATION: 98 %

## 2019-10-28 PROCEDURE — 99496 TRANSJ CARE MGMT HIGH F2F 7D: CPT

## 2019-10-28 RX ORDER — METFORMIN HYDROCHLORIDE 500 MG/1
500 TABLET, COATED ORAL
Qty: 1 | Refills: 4 | Status: DISCONTINUED | COMMUNITY
Start: 2019-08-05 | End: 2019-10-28

## 2019-10-28 NOTE — HISTORY OF PRESENT ILLNESS
[Post-hospitalization from ___ Hospital] : Post-hospitalization from [unfilled] Hospital [Admitted on: ___] : The patient was admitted on [unfilled] [Discharged on ___] : discharged on [unfilled] [Discharge Summary] : discharge summary [Pertinent Labs] : pertinent labs [Radiology Findings] : radiology findings [Discharge Med List] : discharge medication list [Med Reconciliation] : medication reconciliation has been completed [Patient Contacted By: ____] : and contacted by [unfilled] [FreeTextEntry2] : \par presents for f/u visit after hospitalization at [facility] for focal seizure episodes. confirmed on VEEG, monitored during admission and tx started with Vimpat, dose titrated up. CT head showed no new acute infarct. known R MCA CVA from 11/18 unchanged on imaging. L sided residual weakness unchanged. scheduled for f/u w neurology. discharged on 10/15 to acute rehab at [place], remained until 10/23 until discharge to home. now planning to cont outpt regular PT at [facility]. pt adhering to regular hydration, has mild lightheadedness w Vimpat but tolerable for now. no further seizure episodes. \par \par type II DM now well controlled on basal insulin 14 u qhs currently, FSG very well controlled. no hypoglycemia. metformin discontinued due to ongoing GI intolerance. \par HTN controlled on rx \par

## 2019-10-28 NOTE — ASSESSMENT
[FreeTextEntry1] : discussed w pt \par \par reviewed discharge summary in detail \par reviewed current rx\par cont Vimpat, tolerating acceptably for now and seizure free. advised on rx compliance. \par neurology f/u as scheduled in next few weeks. \par \par type II DM now well controlled on basal insulin. metformin discontinued w significant improvement in GI symptoms , cont diet control and FSG monitoring \par \par declines influenza vaccine \par \par cont out pt rehab for CVA hemiparesis \par \par RTO few months for f/u or earlier prn if any new concerns

## 2019-10-28 NOTE — PHYSICAL EXAM
[No JVD] : no jugular venous distention [No Lymphadenopathy] : no lymphadenopathy [Normal] : normal rate, regular rhythm, normal S1 and S2 and no murmur heard [No Edema] : there was no peripheral edema [Normal Affect] : the affect was normal [Normal Mood] : the mood was normal [Normal Insight/Judgement] : insight and judgment were intact [40182 - High Complexity requires an extensive number of possible diagnoses and/or the management options, extensive complexity of the medical data (tests, etc.) to be reviewed, and a high risk of significant complications, morbidity, and/or mortality as w] : High Complexity  [de-identified] : L sided hemiparesis, impaired gait

## 2019-10-28 NOTE — REVIEW OF SYSTEMS
[Dizziness] : dizziness [Unsteady Walking] : ataxia [Negative] : Heme/Lymph [Fever] : no fever [Chills] : no chills [Chest Pain] : no chest pain [Lower Ext Edema] : no lower extremity edema [Orthopnea] : no orthopnea [Headache] : no headache [Fainting] : no fainting [Anxiety] : no anxiety [Depression] : no depression

## 2019-11-11 ENCOUNTER — APPOINTMENT (OUTPATIENT)
Dept: NEUROLOGY | Facility: CLINIC | Age: 59
End: 2019-11-11
Payer: COMMERCIAL

## 2019-11-11 PROCEDURE — 99214 OFFICE O/P EST MOD 30 MIN: CPT

## 2019-12-17 ENCOUNTER — APPOINTMENT (OUTPATIENT)
Dept: ENDOCRINOLOGY | Facility: CLINIC | Age: 59
End: 2019-12-17
Payer: COMMERCIAL

## 2019-12-17 VITALS
OXYGEN SATURATION: 98 % | SYSTOLIC BLOOD PRESSURE: 118 MMHG | WEIGHT: 160 LBS | DIASTOLIC BLOOD PRESSURE: 60 MMHG | BODY MASS INDEX: 24.25 KG/M2 | HEIGHT: 68 IN | HEART RATE: 94 BPM

## 2019-12-17 LAB — HBA1C MFR BLD HPLC: 5.5

## 2019-12-17 PROCEDURE — 99214 OFFICE O/P EST MOD 30 MIN: CPT | Mod: 25

## 2019-12-17 PROCEDURE — 83036 HEMOGLOBIN GLYCOSYLATED A1C: CPT | Mod: QW

## 2019-12-17 RX ORDER — BLOOD SUGAR DIAGNOSTIC
STRIP MISCELLANEOUS 4 TIMES DAILY
Qty: 2 | Refills: 5 | Status: ACTIVE | COMMUNITY
Start: 2019-12-17 | End: 1900-01-01

## 2020-01-06 ENCOUNTER — APPOINTMENT (OUTPATIENT)
Dept: NEUROLOGY | Facility: CLINIC | Age: 60
End: 2020-01-06
Payer: COMMERCIAL

## 2020-01-06 VITALS
WEIGHT: 160 LBS | BODY MASS INDEX: 24.25 KG/M2 | HEART RATE: 85 BPM | DIASTOLIC BLOOD PRESSURE: 92 MMHG | HEIGHT: 68 IN | SYSTOLIC BLOOD PRESSURE: 162 MMHG

## 2020-01-06 PROCEDURE — 99215 OFFICE O/P EST HI 40 MIN: CPT

## 2020-01-09 NOTE — ASSESSMENT
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  Focal motor seizures currently well controlled on Vimpat monotherapy, no side effects.\par \par Plan:\par 1. Vimpat 200mg PO BID\par 2. Seizure precautions, including no driving or operating heavy equipment, no unsupervised swimming, using a shower instead of a bathtub, no climbing ladders or working at elevations, no use of sharp dangerous tools or devices.\par 3. Patient agrees with plan.\par 4. Follow up in 6 months.\par \par James Tobias MD\par [facility]\par \par Greater than 50% of the encounter was spent on counseling and coordination of care discussing differential diagnosis, diagnostic testing, and  treatment options. We have talked about appropriate follow up, and I have spent 45 minutes of face to face time with the patient.\par

## 2020-01-09 NOTE — HISTORY OF PRESENT ILLNESS
[FreeTextEntry1] : 58 yo RH woman with a history of a stroke in 2017 and 2018, with residual left hemiparesis, who presented with focal motor seizures (left sided clonic movements) which were stabilized on Vimpat 200mg BID. \par \par She is currently seizure free on Vimpat monotherapy, with no side effects.  Doing relatively well.\par \par CT brain (11/21/18): Early changes of ischemia in the right MCA distribution\par CTA head and neck (11/21/18): Right proximal ICA occlusion with distal regurgitation in the Rachel/cavernous/supraclinoid segments probably through ophthalmic collaterals as well as ACOM/PCOM with hypoplastic P1 segment\par CT perfusion: Showed a small cord infarct of 27 cc with large area of minimally hypoperfused brain tissue being 129 mL\par MRI brain (11/21/18): Acute infarct in the right MCA distribution without significant hemorrhagic transformation, minimal leukoaraiosis\par MRA head and neck (11/23/18): Right proximal ICA occlusion with reconstitution in the distal cervical ICA segment - improvement in the flow-related signal noted in distal cervical ICA as compared to prior CTA. MRA T1 fat sat images to my eye shows hyperintensity involving the entire right proximal ICA but did not show any obvious intramural thrombus\par Conventional angina (11/26/18): Severe stenosis of right proximal ICA - s/p CCA/ICA carotid stent placement\par Carotid duplex (11/27/18): Patent right cervical CCA/ICA stent

## 2020-01-21 NOTE — PATIENT PROFILE ADULT - LONGEST PERIOD TOBACCO-FREE
Abnormal cervical Papanicolaou smear, unspecified abnormal pap finding  right before preg.  UTI (urinary tract infection)
quit long time ago

## 2020-02-03 ENCOUNTER — APPOINTMENT (OUTPATIENT)
Dept: NEUROLOGY | Facility: CLINIC | Age: 60
End: 2020-02-03

## 2020-02-21 ENCOUNTER — APPOINTMENT (OUTPATIENT)
Dept: NEUROLOGY | Facility: CLINIC | Age: 60
End: 2020-02-21

## 2020-03-06 RX ORDER — INSULIN LISPRO 100 [IU]/ML
100 INJECTION, SOLUTION INTRAVENOUS; SUBCUTANEOUS
Qty: 3 | Refills: 3 | Status: DISCONTINUED | COMMUNITY
Start: 2019-12-17 | End: 2020-03-06

## 2020-04-06 ENCOUNTER — APPOINTMENT (OUTPATIENT)
Dept: INTERNAL MEDICINE | Facility: CLINIC | Age: 60
End: 2020-04-06

## 2020-04-13 ENCOUNTER — APPOINTMENT (OUTPATIENT)
Dept: ENDOCRINOLOGY | Facility: CLINIC | Age: 60
End: 2020-04-13
Payer: COMMERCIAL

## 2020-04-13 PROCEDURE — 99214 OFFICE O/P EST MOD 30 MIN: CPT | Mod: 95

## 2020-04-20 ENCOUNTER — APPOINTMENT (OUTPATIENT)
Dept: ENDOCRINOLOGY | Facility: CLINIC | Age: 60
End: 2020-04-20

## 2020-04-24 ENCOUNTER — RX RENEWAL (OUTPATIENT)
Age: 60
End: 2020-04-24

## 2020-04-24 RX ORDER — BLOOD SUGAR DIAGNOSTIC
STRIP MISCELLANEOUS
Qty: 1 | Refills: 5 | Status: ACTIVE | COMMUNITY
Start: 2020-04-24 | End: 1900-01-01

## 2020-04-24 RX ORDER — LANCETS 28 GAUGE
EACH MISCELLANEOUS
Qty: 90 | Refills: 5 | Status: ACTIVE | COMMUNITY
Start: 2020-04-24 | End: 1900-01-01

## 2020-04-24 RX ORDER — BLOOD-GLUCOSE METER
W/DEVICE EACH MISCELLANEOUS
Qty: 1 | Refills: 0 | Status: ACTIVE | COMMUNITY
Start: 2020-04-24 | End: 1900-01-01

## 2020-04-26 ENCOUNTER — RX RENEWAL (OUTPATIENT)
Age: 60
End: 2020-04-26

## 2020-05-07 ENCOUNTER — APPOINTMENT (OUTPATIENT)
Dept: NEUROLOGY | Facility: CLINIC | Age: 60
End: 2020-05-07

## 2020-06-11 ENCOUNTER — APPOINTMENT (OUTPATIENT)
Dept: INTERNAL MEDICINE | Facility: CLINIC | Age: 60
End: 2020-06-11
Payer: COMMERCIAL

## 2020-06-11 VITALS — SYSTOLIC BLOOD PRESSURE: 136 MMHG | DIASTOLIC BLOOD PRESSURE: 78 MMHG

## 2020-06-11 VITALS
WEIGHT: 160 LBS | DIASTOLIC BLOOD PRESSURE: 85 MMHG | HEIGHT: 68 IN | TEMPERATURE: 98.6 F | SYSTOLIC BLOOD PRESSURE: 142 MMHG | BODY MASS INDEX: 24.25 KG/M2 | OXYGEN SATURATION: 98 % | HEART RATE: 88 BPM

## 2020-06-11 DIAGNOSIS — Z11.59 ENCOUNTER FOR SCREENING FOR OTHER VIRAL DISEASES: ICD-10-CM

## 2020-06-11 DIAGNOSIS — I69.359 HEMIPLEGIA AND HEMIPARESIS FOLLOWING CEREBRAL INFARCTION AFFECTING UNSPECIFIED SIDE: ICD-10-CM

## 2020-06-11 PROCEDURE — 36415 COLL VENOUS BLD VENIPUNCTURE: CPT

## 2020-06-11 PROCEDURE — 99214 OFFICE O/P EST MOD 30 MIN: CPT | Mod: 25

## 2020-06-11 NOTE — REVIEW OF SYSTEMS
[Abdominal Pain] : no abdominal pain [Constipation] : constipation [Vomiting] : no vomiting [Joint Pain] : no joint pain [Joint Swelling] : no joint swelling [Melena] : no melena [Back Pain] : no back pain [Muscle Weakness] : muscle weakness [Headache] : no headache [Confusion] : no confusion [Unsteady Walking] : ataxia [Fainting] : no fainting [Negative] : Psychiatric

## 2020-06-11 NOTE — PHYSICAL EXAM
[No Acute Distress] : no acute distress [Well-Appearing] : well-appearing [Normal Voice/Communication] : normal voice/communication [Normal] : no respiratory distress, lungs were clear to auscultation bilaterally and no accessory muscle use [Pedal Pulses Present] : the pedal pulses are present [No Edema] : there was no peripheral edema [Speech Grossly Normal] : speech grossly normal [Alert and Oriented x3] : oriented to person, place, and time [Normal Mood] : the mood was normal [Normal Insight/Judgement] : insight and judgment were intact [de-identified] : L hemiparesis

## 2020-06-11 NOTE — PHYSICAL EXAM
[No Acute Distress] : no acute distress [Well-Appearing] : well-appearing [Normal Voice/Communication] : normal voice/communication [Normal] : normal rate, regular rhythm, normal S1 and S2 and no murmur heard [No Edema] : there was no peripheral edema [Pedal Pulses Present] : the pedal pulses are present [Speech Grossly Normal] : speech grossly normal [Alert and Oriented x3] : oriented to person, place, and time [Normal Mood] : the mood was normal [Normal Insight/Judgement] : insight and judgment were intact [de-identified] : L hemiparesis

## 2020-06-11 NOTE — HISTORY OF PRESENT ILLNESS
[de-identified] : presents for f/u visit accompanied by her family member. feeling well overall curerntly. recently completed OT for L hemiparesis s/p CVA, some functioning of L UE improved . continuing w PT.\par no concerns during COVID19 pandemic \par \par HTN recently more uncontrolled, following w her cardiologist regularly and she reports he increased losartan to 100 mg and added atenolol 25 mg qd. BP improved today on 2 checks.\par \par type II DM on basal and preprandial insulin , she reports adequate control of glucose, scheduled for f/u w endocrine, has been working on diet control. 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 s/p R RCA stent placement. following w cardiology. \par hx of focal seizures w hospital admission this past years, continues on Vimpat. now following w Dr Huber neurology with recent repeat EEG done as per pt. \par chronic constipation unchanged

## 2020-06-11 NOTE — REVIEW OF SYSTEMS
[Abdominal Pain] : no abdominal pain [Constipation] : constipation [Vomiting] : no vomiting [Melena] : no melena [Joint Swelling] : no joint swelling [Joint Pain] : no joint pain [Headache] : no headache [Muscle Weakness] : muscle weakness [Back Pain] : no back pain [Fainting] : no fainting [Confusion] : no confusion [Unsteady Walking] : ataxia [Negative] : Psychiatric

## 2020-06-11 NOTE — ASSESSMENT
[FreeTextEntry1] : discussed w pt \par \par reviewed current rx and recent additions to regimen. BP improved today on repeat. \par cont f/u w cardiology as scheduled. \par cont current rx\par \par neurology f/u as scheduled w Dr Huber , EEG done recently \par \par cont diet control, current insulin regimen for control of type II DM. f/u scheduled w Dr Gaspar. \par will check labs today as she reports last labs done about 3 months ago elsewhere. check COVID19 ab \par \par cont PT, completed OT recently \par fall precautions \par \par RTO ~ 6 months, continue f/u w specialists. call or return earlier prn if any new concerns

## 2020-06-11 NOTE — HISTORY OF PRESENT ILLNESS
[de-identified] : presents for f/u visit accompanied by her family member. feeling well overall curerntly. recently completed OT for L hemiparesis s/p CVA, some functioning of L UE improved . continuing w PT.\par no concerns during COVID19 pandemic \par \par HTN recently more uncontrolled, following w her cardiologist regularly and she reports he increased losartan to 100 mg and added atenolol 25 mg qd. BP improved today on 2 checks.\par \par type II DM on basal and preprandial insulin , she reports adequate control of glucose, scheduled for f/u w endocrine, has been working on diet control. 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 s/p R RCA stent placement. following w cardiology. \par hx of focal seizures w hospital admission this past years, continues on Vimpat. now following w Dr Huber neurology with recent repeat EEG done as per pt. \par chronic constipation unchanged

## 2020-06-12 ENCOUNTER — APPOINTMENT (OUTPATIENT)
Dept: PHYSICAL MEDICINE AND REHAB | Facility: CLINIC | Age: 60
End: 2020-06-12
Payer: COMMERCIAL

## 2020-06-12 VITALS
HEART RATE: 77 BPM | SYSTOLIC BLOOD PRESSURE: 151 MMHG | TEMPERATURE: 97.5 F | OXYGEN SATURATION: 98 % | DIASTOLIC BLOOD PRESSURE: 75 MMHG

## 2020-06-12 PROCEDURE — 99213 OFFICE O/P EST LOW 20 MIN: CPT | Mod: 25

## 2020-06-12 PROCEDURE — 95874 GUIDE NERV DESTR NEEDLE EMG: CPT

## 2020-06-12 PROCEDURE — 64644 CHEMODENERV 1 EXTREM 5/> MUS: CPT

## 2020-06-12 NOTE — PHYSICAL EXAM
[Normal] : Oriented to person, place, and time, insight and judgement were intact and the affect was normal [de-identified] : LLE fair-good grade; LUE good grade prox/trace distal; Spasticity- MAS 3+  L FFs and MAS 2 WFs; and EFs; DTRs 3+ on L and 2+ on R; sens decreased on Left [de-identified] : amb w SC with circumducted gait, good balance.

## 2020-06-12 NOTE — HISTORY OF PRESENT ILLNESS
[FreeTextEntry1] : Patient here for botox.\par Had injections last year with noted improvement of LUE spasticity.\par Unfortunately had a fall and sustained a L wrist fracture which is now healed.\par No other medical issues and would like repeat injection.

## 2020-06-12 NOTE — PROCEDURE
[Consent] : consent was given by patient or guardian [Site Verification] : the injection site was verified [Post-Injection Instructions Provided] : post-injection instructions were provided [] : EMG Guidance was used during the procedure [de-identified] : Botox lot [id number], exp June 2022\par Botox lot [id number] exp June 2022 [TWNoteComboBox1] : Flexor Carpi Ulnaris [TWNoteComboBox2] : 75 Units [de-identified] : Flexor Carpi Radialis [de-identified] : 75 Units [de-identified] : Flexor Digitorum Superficialis [de-identified] : Biceps [de-identified] : 50 Units [de-identified] : 100 Units [de-identified] : Brachioradialis [de-identified] : Pronator Teres [de-identified] : 50 Units [de-identified] : 50 Units

## 2020-06-23 LAB
ALBUMIN SERPL ELPH-MCNC: 4.4 G/DL
ALP BLD-CCNC: 101 U/L
ALT SERPL-CCNC: 18 U/L
ANION GAP SERPL CALC-SCNC: 12 MMOL/L
AST SERPL-CCNC: 18 U/L
BASOPHILS # BLD AUTO: 0.03 K/UL
BASOPHILS NFR BLD AUTO: 0.5 %
BILIRUB SERPL-MCNC: 0.7 MG/DL
BUN SERPL-MCNC: 27 MG/DL
CALCIUM SERPL-MCNC: 10.4 MG/DL
CHLORIDE SERPL-SCNC: 105 MMOL/L
CHOLEST SERPL-MCNC: 144 MG/DL
CHOLEST/HDLC SERPL: 2.4 RATIO
CO2 SERPL-SCNC: 23 MMOL/L
CREAT SERPL-MCNC: 0.95 MG/DL
EOSINOPHIL # BLD AUTO: 0.04 K/UL
EOSINOPHIL NFR BLD AUTO: 0.7 %
ESTIMATED AVERAGE GLUCOSE: 108 MG/DL
GLUCOSE SERPL-MCNC: 84 MG/DL
HBA1C MFR BLD HPLC: 5.4 %
HCT VFR BLD CALC: 35.1 %
HDLC SERPL-MCNC: 59 MG/DL
HGB BLD-MCNC: 11.4 G/DL
IMM GRANULOCYTES NFR BLD AUTO: 0.2 %
LDLC SERPL CALC-MCNC: 76 MG/DL
LYMPHOCYTES # BLD AUTO: 1.08 K/UL
LYMPHOCYTES NFR BLD AUTO: 17.7 %
MAN DIFF?: NORMAL
MCHC RBC-ENTMCNC: 29.1 PG
MCHC RBC-ENTMCNC: 32.5 GM/DL
MCV RBC AUTO: 89.5 FL
MONOCYTES # BLD AUTO: 0.48 K/UL
MONOCYTES NFR BLD AUTO: 7.9 %
NEUTROPHILS # BLD AUTO: 4.46 K/UL
NEUTROPHILS NFR BLD AUTO: 73 %
PLATELET # BLD AUTO: 231 K/UL
POTASSIUM SERPL-SCNC: 4.5 MMOL/L
PROT SERPL-MCNC: 7.4 G/DL
RBC # BLD: 3.92 M/UL
RBC # FLD: 12.8 %
SARS-COV-2 IGG SERPL IA-ACNC: 0.01 INDEX
SARS-COV-2 IGG SERPL QL IA: NEGATIVE
SODIUM SERPL-SCNC: 140 MMOL/L
T4 FREE SERPL-MCNC: 1.4 NG/DL
TRIGL SERPL-MCNC: 45 MG/DL
TSH SERPL-ACNC: 1.21 UIU/ML
WBC # FLD AUTO: 6.1 K/UL

## 2020-07-02 ENCOUNTER — APPOINTMENT (OUTPATIENT)
Dept: NEUROLOGY | Facility: CLINIC | Age: 60
End: 2020-07-02

## 2020-07-03 ENCOUNTER — RX RENEWAL (OUTPATIENT)
Age: 60
End: 2020-07-03

## 2020-07-09 ENCOUNTER — APPOINTMENT (OUTPATIENT)
Dept: NEUROLOGY | Facility: CLINIC | Age: 60
End: 2020-07-09

## 2020-07-24 ENCOUNTER — APPOINTMENT (OUTPATIENT)
Dept: PHYSICAL MEDICINE AND REHAB | Facility: CLINIC | Age: 60
End: 2020-07-24

## 2020-07-24 ENCOUNTER — APPOINTMENT (OUTPATIENT)
Dept: ENDOCRINOLOGY | Facility: CLINIC | Age: 60
End: 2020-07-24
Payer: COMMERCIAL

## 2020-07-24 VITALS
SYSTOLIC BLOOD PRESSURE: 140 MMHG | WEIGHT: 161 LBS | TEMPERATURE: 97.6 F | HEART RATE: 68 BPM | DIASTOLIC BLOOD PRESSURE: 80 MMHG | HEIGHT: 68 IN | OXYGEN SATURATION: 98 % | BODY MASS INDEX: 24.4 KG/M2

## 2020-07-24 PROCEDURE — 99214 OFFICE O/P EST MOD 30 MIN: CPT

## 2020-07-28 LAB
CREAT SPEC-SCNC: 205 MG/DL
MICROALBUMIN 24H UR DL<=1MG/L-MCNC: 4.4 MG/DL
MICROALBUMIN/CREAT 24H UR-RTO: 21 MG/G

## 2020-07-31 ENCOUNTER — APPOINTMENT (OUTPATIENT)
Dept: PHYSICAL MEDICINE AND REHAB | Facility: CLINIC | Age: 60
End: 2020-07-31

## 2020-08-03 ENCOUNTER — RX RENEWAL (OUTPATIENT)
Age: 60
End: 2020-08-03

## 2020-10-20 ENCOUNTER — RX RENEWAL (OUTPATIENT)
Age: 60
End: 2020-10-20

## 2020-10-20 RX ORDER — LANCETS
EACH MISCELLANEOUS
Qty: 200 | Refills: 2 | Status: ACTIVE | COMMUNITY
Start: 2019-03-06 | End: 1900-01-01

## 2020-11-20 ENCOUNTER — APPOINTMENT (OUTPATIENT)
Dept: ENDOCRINOLOGY | Facility: CLINIC | Age: 60
End: 2020-11-20
Payer: COMMERCIAL

## 2020-11-20 PROCEDURE — 99214 OFFICE O/P EST MOD 30 MIN: CPT | Mod: 95

## 2020-11-20 RX ORDER — BLOOD SUGAR DIAGNOSTIC
STRIP MISCELLANEOUS
Qty: 100 | Refills: 2 | Status: ACTIVE | COMMUNITY
Start: 2019-02-05 | End: 1900-01-01

## 2020-11-20 NOTE — ASSESSMENT
[FreeTextEntry1] : This is a 59 y.o.woamn w/ DM2, h/o CVA x 3 w/ L hemiparesis, HTN, HLD here for f/u pt. visit for DM2.\par \par DM2: HbA1C  5.4 6/2020. FS's  well controlled on current regimen. C/w Basaglar to 12u qhs. Will cont. Humalog PRN. She was counseled on lifestyle modification through diet.  We went over target FS's ranges and pt. instructed to call if FS's below or above range. Her exercise is limited 2/2 her stroke and residual L-sided weakness.\par Urine MicroALB WNL 7/2020.\par Due for optho appt.  No foot complaints today.\par \par HTN: Cont. losartan. has cardiology f/u.\par \par HLD: Cont. statin.  LDL 76 1/2020.\par \par Hypercalcemia: Prior labs showed elevated Ca in the setting of inappropriately normal PTH. Repeat showed high normal Ca and Vit D. \par \par \par RTC 3 mths

## 2020-11-20 NOTE — HISTORY OF PRESENT ILLNESS
[FreeTextEntry1] : This is a 59 y.o.woamn w/ DM2, h/o CVA x 3 w/ L hemiparesis, HTN, HLD here for f/u pt. visit for DM2.\par \par HbA1C 5.4 6/2020.\par \par This visit was provided via telehealth using real-time 2-way audio visual technology. The patient was located at home at the time of the visit. \par The provider  was located at the medical office located in  at the time of the visit. The patient and Provider participated in the telehealth encounter. \par Verbal consent given by the patient.\par \par Pt reports Dr Taylor, PCP at that time rx Novolin mix BID, and then transitioned to glipizide 5mg/d prior to most recent hospitalization. HBA1C (11/22) 9.2%, (11/27) Cr 0.65, eGFR 99. Pt was d/c from [facility] on lantus 5 units qhs and HL 3 units ac meals. Transitioned to rehab @ [facility] from 11/28-12/21. D/c home on basalglar 16 units and jardiance 10 mg from [facility].\par \par At visit w/ CDE a C-peptide was checked and was 0.8. At that point Jardiance was stopped.\par \par Current regimen:\par Basaglar 24u qhs\par Humalog 2u if FS >150. She takes it infrequently.\par She was unable to tolerate Metformin.\par \par FS:\par Reports FS's running 90-low 100's. She had few outliers > 200 related to diet.\par \par She states her diet has been better. She eats 2 meals a day, usually skipping bkfst or lunch.\par \par Weight has been stable.\par \par She was doing PT but it finished for the year. She will be starting again 1/2021.\par \par She has had L hemiparesis since her most recent stroke. \par \par She has been feeling overall ok. Reports good energy level.\par \par She saw optho 10/2019. She last saw podiatry <1 year ago. No c/o neuropathy.\par \par Labs done 2/2019: Ca 11.0, Alb 4.1.\par Repeat labs done 8/2019: Ca 10.3, PTH 63, vit D 32.3..\par \par She is  taking Vit D 2000u daily.\par \par She has constipation which is chronic. No h/o kidney stones. In 8/2019 she walked into a window and lost her balance and broke her wrist.\par

## 2020-12-10 ENCOUNTER — APPOINTMENT (OUTPATIENT)
Dept: INTERNAL MEDICINE | Facility: CLINIC | Age: 60
End: 2020-12-10
Payer: COMMERCIAL

## 2020-12-10 VITALS
DIASTOLIC BLOOD PRESSURE: 70 MMHG | TEMPERATURE: 97.6 F | HEART RATE: 71 BPM | HEIGHT: 67 IN | SYSTOLIC BLOOD PRESSURE: 150 MMHG | OXYGEN SATURATION: 99 % | WEIGHT: 164 LBS | BODY MASS INDEX: 25.74 KG/M2

## 2020-12-10 PROCEDURE — 99214 OFFICE O/P EST MOD 30 MIN: CPT | Mod: 25

## 2020-12-10 PROCEDURE — 99072 ADDL SUPL MATRL&STAF TM PHE: CPT

## 2020-12-10 PROCEDURE — 36415 COLL VENOUS BLD VENIPUNCTURE: CPT

## 2020-12-10 RX ORDER — CHOLECALCIFEROL (VITAMIN D3) 50 MCG
50 MCG TABLET ORAL
Qty: 30 | Refills: 5 | Status: DISCONTINUED | COMMUNITY
Start: 2019-12-17 | End: 2020-12-10

## 2020-12-10 RX ORDER — AMLODIPINE BESYLATE 5 MG/1
5 TABLET ORAL
Qty: 30 | Refills: 0 | Status: DISCONTINUED | COMMUNITY
Start: 2018-08-14 | End: 2020-12-10

## 2020-12-10 RX ORDER — ATENOLOL 25 MG/1
25 TABLET ORAL
Refills: 0 | Status: DISCONTINUED | COMMUNITY
End: 2020-12-10

## 2020-12-10 RX ORDER — ONABOTULINUMTOXINA 200 [USP'U]/1
200 INJECTION, POWDER, LYOPHILIZED, FOR SOLUTION INTRADERMAL; INTRAMUSCULAR
Qty: 2 | Refills: 0 | Status: DISCONTINUED | OUTPATIENT
Start: 2019-04-29 | End: 2020-12-10

## 2020-12-10 RX ORDER — ASPIRIN 81 MG/1
81 TABLET, CHEWABLE ORAL
Qty: 30 | Refills: 0 | Status: DISCONTINUED | COMMUNITY
Start: 2018-12-21 | End: 2020-12-10

## 2020-12-10 NOTE — HISTORY OF PRESENT ILLNESS
[Family Member] : family member [de-identified] : presents for f/u visit accompanied by her brother for review of her progress w multiple medical issues and past CVA. feeling well overall , no new concerns. recent f/u w Dr Gaspar endocrine noted for her well-controlled DM on basal insulin. \par \par  L hemiparesis s/p CVA, some functioning of L UE improved , awaiting restarting PT during the new year due to insurance \par \par HTN recently controlled, following w her cardiologist Dr Wheeler regularly. 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 s/p R RCA stent placement. following w cardiology. \par hx of focal seizures w hospital admission this past years, continues on Vimpat. now following w Dr Huber neurology\par chronic constipation unchanged

## 2020-12-10 NOTE — REVIEW OF SYSTEMS
[Constipation] : constipation [Muscle Weakness] : muscle weakness [Unsteady Walking] : ataxia [Negative] : Heme/Lymph [Abdominal Pain] : no abdominal pain [Vomiting] : no vomiting [Melena] : no melena [Joint Pain] : no joint pain [Joint Swelling] : no joint swelling [Back Pain] : no back pain [Headache] : no headache [Fainting] : no fainting [Confusion] : no confusion

## 2020-12-10 NOTE — ASSESSMENT
[FreeTextEntry1] : discussed w pt \par \par reviewed current rx , recent endocrine f/u \par cont f/u w cardiology as scheduled. \par cont current rx\par \par due for routine labs, ordered today. monitor Hgba1c, she is aware to monitor for hypoglycemia as glucose well controlled, basla insulin reduced to 12 u qhs \par \par neurology f/u as scheduled w Dr Huber , seizure-free, cont Vimpat \par \par fall precautions \par \par she declines influenza vaccine \par \par RTO ~ 6 months, continue f/u w specialists. call or return earlier prn if any new concerns

## 2020-12-10 NOTE — PHYSICAL EXAM
[No Acute Distress] : no acute distress [Well-Appearing] : well-appearing [Normal Voice/Communication] : normal voice/communication [Normal] : normal rate, regular rhythm, normal S1 and S2 and no murmur heard [Pedal Pulses Present] : the pedal pulses are present [No Edema] : there was no peripheral edema [Speech Grossly Normal] : speech grossly normal [Alert and Oriented x3] : oriented to person, place, and time [Normal Mood] : the mood was normal [Normal Insight/Judgement] : insight and judgment were intact [de-identified] : L hemiparesis unchanged

## 2020-12-11 LAB
ALBUMIN SERPL ELPH-MCNC: 4.2 G/DL
ALP BLD-CCNC: 89 U/L
ALT SERPL-CCNC: 55 U/L
ANION GAP SERPL CALC-SCNC: 9 MMOL/L
AST SERPL-CCNC: 34 U/L
BASOPHILS # BLD AUTO: 0.03 K/UL
BASOPHILS NFR BLD AUTO: 0.8 %
BILIRUB SERPL-MCNC: 0.5 MG/DL
BUN SERPL-MCNC: 24 MG/DL
CALCIUM SERPL-MCNC: 10.1 MG/DL
CHLORIDE SERPL-SCNC: 106 MMOL/L
CHOLEST SERPL-MCNC: 150 MG/DL
CO2 SERPL-SCNC: 25 MMOL/L
CREAT SERPL-MCNC: 0.95 MG/DL
EOSINOPHIL # BLD AUTO: 0.07 K/UL
EOSINOPHIL NFR BLD AUTO: 1.8 %
ESTIMATED AVERAGE GLUCOSE: 111 MG/DL
GLUCOSE SERPL-MCNC: 76 MG/DL
HBA1C MFR BLD HPLC: 5.5 %
HCT VFR BLD CALC: 35 %
HDLC SERPL-MCNC: 57 MG/DL
HGB BLD-MCNC: 10.9 G/DL
IMM GRANULOCYTES NFR BLD AUTO: 0 %
LDLC SERPL CALC-MCNC: 80 MG/DL
LYMPHOCYTES # BLD AUTO: 1.07 K/UL
LYMPHOCYTES NFR BLD AUTO: 27.4 %
MAN DIFF?: NORMAL
MCHC RBC-ENTMCNC: 28.7 PG
MCHC RBC-ENTMCNC: 31.1 GM/DL
MCV RBC AUTO: 92.1 FL
MONOCYTES # BLD AUTO: 0.29 K/UL
MONOCYTES NFR BLD AUTO: 7.4 %
NEUTROPHILS # BLD AUTO: 2.45 K/UL
NEUTROPHILS NFR BLD AUTO: 62.6 %
NONHDLC SERPL-MCNC: 94 MG/DL
PLATELET # BLD AUTO: 230 K/UL
POTASSIUM SERPL-SCNC: 4.7 MMOL/L
PROT SERPL-MCNC: 7.1 G/DL
RBC # BLD: 3.8 M/UL
RBC # FLD: 13.1 %
SODIUM SERPL-SCNC: 140 MMOL/L
T4 FREE SERPL-MCNC: 1.4 NG/DL
TRIGL SERPL-MCNC: 66 MG/DL
TSH SERPL-ACNC: 0.78 UIU/ML
WBC # FLD AUTO: 3.91 K/UL

## 2021-01-01 NOTE — ED ADULT NURSE NOTE - OBJECTIVE STATEMENT
33w6d 58 year old female patient BIBA transferred from [facility] for code stroke. Patient A&Ox3, states she awoke this morning at about 630, and was unable to move her LUE or LLE. Patient has two strokes in the past with residual L facial droop. Last known normal about 130am when patient was getting ready for bed. Patient denies current HA, dizziness, change in vision, CP, SOB, abd pain, n/v/d, fever, chills, urinary symptoms. Patient appears anxious- made aware of plan of care. Family on the way. VSS. See neuro flowsheet for further documentation. 20G IV present to RAC, flushing without difficulty. Patient given ASA, plavix and .45NS by [facility] per EMS

## 2021-01-01 NOTE — PHYSICAL THERAPY INITIAL EVALUATION ADULT - ACTIVE RANGE OF MOTION EXAMINATION, REHAB EVAL
Statement Selected
deficits as listed below/Right LE Active ROM was WFL (within functional limits)/No AROM L UE, L ankle; decr. AROM L hip/knee/Right UE Active ROM was WFL (within functional limits)

## 2021-01-04 ENCOUNTER — RX RENEWAL (OUTPATIENT)
Age: 61
End: 2021-01-04

## 2021-01-06 ENCOUNTER — RX RENEWAL (OUTPATIENT)
Age: 61
End: 2021-01-06

## 2021-01-06 RX ORDER — MULTIVIT-MIN/FOLIC/VIT K/LYCOP 400-300MCG
50 MCG TABLET ORAL
Qty: 90 | Refills: 1 | Status: ACTIVE | COMMUNITY
Start: 2021-01-06 | End: 1900-01-01

## 2021-03-27 NOTE — H&P ADULT - NSICDXPASTMEDICALHX_GEN_ALL_CORE_FT
PAST MEDICAL HISTORY:  CVA (cerebral vascular accident)     Hypertension <<----- Click to add NO significant Past Surgical History

## 2021-04-11 ENCOUNTER — RX RENEWAL (OUTPATIENT)
Age: 61
End: 2021-04-11

## 2021-05-25 ENCOUNTER — NON-APPOINTMENT (OUTPATIENT)
Age: 61
End: 2021-05-25

## 2021-07-02 ENCOUNTER — APPOINTMENT (OUTPATIENT)
Dept: INTERNAL MEDICINE | Facility: CLINIC | Age: 61
End: 2021-07-02
Payer: MEDICARE

## 2021-07-02 ENCOUNTER — LABORATORY RESULT (OUTPATIENT)
Age: 61
End: 2021-07-02

## 2021-07-02 VITALS
DIASTOLIC BLOOD PRESSURE: 80 MMHG | HEIGHT: 67.5 IN | BODY MASS INDEX: 26.06 KG/M2 | WEIGHT: 168 LBS | TEMPERATURE: 97.4 F | SYSTOLIC BLOOD PRESSURE: 144 MMHG | OXYGEN SATURATION: 99 % | HEART RATE: 85 BPM

## 2021-07-02 PROCEDURE — 36415 COLL VENOUS BLD VENIPUNCTURE: CPT

## 2021-07-02 PROCEDURE — 99214 OFFICE O/P EST MOD 30 MIN: CPT | Mod: 25

## 2021-07-02 PROCEDURE — 99072 ADDL SUPL MATRL&STAF TM PHE: CPT

## 2021-07-02 RX ORDER — ADHESIVE TAPE 3"X 2.3 YD
50 MCG TAPE, NON-MEDICATED TOPICAL
Qty: 100 | Refills: 0 | Status: DISCONTINUED | COMMUNITY
Start: 2020-04-24 | End: 2021-07-02

## 2021-07-02 RX ORDER — DOCOSAHEXAENOIC ACID 300 MG
50 MCG CAPSULE ORAL
Qty: 90 | Refills: 3 | Status: ACTIVE | COMMUNITY
Start: 2019-04-19 | End: 1900-01-01

## 2021-07-02 NOTE — PHYSICAL EXAM
[Well-Appearing] : well-appearing [Normal Voice/Communication] : normal voice/communication [Normal] : normal rate, regular rhythm, normal S1 and S2 and no murmur heard [Pedal Pulses Present] : the pedal pulses are present [No Edema] : there was no peripheral edema [Speech Grossly Normal] : speech grossly normal [Alert and Oriented x3] : oriented to person, place, and time [Normal Mood] : the mood was normal [Normal Insight/Judgement] : insight and judgment were intact [No Acute Distress] : no acute distress [de-identified] : L hemiparesis unchanged , L UE brace

## 2021-07-02 NOTE — HISTORY OF PRESENT ILLNESS
[Family Member] : family member [de-identified] : presents for f/u visit for review of her progress w multiple medical issues, hx of CVA, DM management. she is feeling well, no new events or concerns. she is participating in regular exercises at home. \par \par she has declined to have COVID vaccines despite education on this issue \par \par L hemiparesis s/p CVA in 2018\par HTN recently controlled, following w her cardiologist Dr Wheeler regularly. 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 s/p R RCA stent placement. following w cardiology. \par hx of focal seizures, continues on Vimpat. following w Dr Huber neurology\par chronic constipation unchanged

## 2021-07-02 NOTE — ASSESSMENT
[FreeTextEntry1] : discussed w pt \par \par due for monitoring of routine labs \par \par she will cont on basal insulin, currently 12 u qhs, she is maintaining adequate control of glucose , cont preprandial insulin prn. endocrine f/u in few months as scheduled \par \par check routine labs as below \par \par HTN adequately controlled, on losartan and atenolol from her cardiologist, will renew rx as scheduled \par \par neurology f/u as scheduled w Dr Huber , seizure-free, cont Vimpat \par \par fall precautions \par \par she declines vaccines including COVID vaccine, despite education on this issue. she will consider for the future \par \par RTO 4 months, continue f/u w specialists. call or return earlier prn if any new concerns

## 2021-07-09 LAB
ALBUMIN SERPL ELPH-MCNC: 4.3 G/DL
ALP BLD-CCNC: 84 U/L
ALT SERPL-CCNC: 45 U/L
ANION GAP SERPL CALC-SCNC: 9 MMOL/L
APPEARANCE: CLEAR
AST SERPL-CCNC: 32 U/L
BASOPHILS # BLD AUTO: 0.03 K/UL
BASOPHILS NFR BLD AUTO: 0.7 %
BILIRUB SERPL-MCNC: 0.5 MG/DL
BILIRUBIN URINE: NEGATIVE
BLOOD URINE: NEGATIVE
BUN SERPL-MCNC: 31 MG/DL
CALCIUM SERPL-MCNC: 10.3 MG/DL
CHLORIDE SERPL-SCNC: 107 MMOL/L
CHOLEST SERPL-MCNC: 134 MG/DL
CO2 SERPL-SCNC: 24 MMOL/L
COLOR: YELLOW
CREAT SERPL-MCNC: 1.17 MG/DL
CREAT SPEC-SCNC: 374 MG/DL
EOSINOPHIL # BLD AUTO: 0.05 K/UL
EOSINOPHIL NFR BLD AUTO: 1.2 %
ESTIMATED AVERAGE GLUCOSE: 126 MG/DL
GLUCOSE QUALITATIVE U: NEGATIVE
GLUCOSE SERPL-MCNC: 133 MG/DL
HBA1C MFR BLD HPLC: 6 %
HCT VFR BLD CALC: 35 %
HDLC SERPL-MCNC: 46 MG/DL
HGB BLD-MCNC: 11.5 G/DL
IMM GRANULOCYTES NFR BLD AUTO: 0.2 %
KETONES URINE: NEGATIVE
LDLC SERPL CALC-MCNC: 67 MG/DL
LEUKOCYTE ESTERASE URINE: NEGATIVE
LYMPHOCYTES # BLD AUTO: 1.14 K/UL
LYMPHOCYTES NFR BLD AUTO: 27.9 %
MAN DIFF?: NORMAL
MCHC RBC-ENTMCNC: 30.6 PG
MCHC RBC-ENTMCNC: 32.9 GM/DL
MCV RBC AUTO: 93.1 FL
MICROALBUMIN 24H UR DL<=1MG/L-MCNC: 3.4 MG/DL
MICROALBUMIN/CREAT 24H UR-RTO: 9 MG/G
MONOCYTES # BLD AUTO: 0.25 K/UL
MONOCYTES NFR BLD AUTO: 6.1 %
NEUTROPHILS # BLD AUTO: 2.6 K/UL
NEUTROPHILS NFR BLD AUTO: 63.9 %
NITRITE URINE: NEGATIVE
NONHDLC SERPL-MCNC: 87 MG/DL
PH URINE: 5.5
PLATELET # BLD AUTO: 189 K/UL
POTASSIUM SERPL-SCNC: 4.3 MMOL/L
PROT SERPL-MCNC: 7.3 G/DL
PROTEIN URINE: ABNORMAL
RBC # BLD: 3.76 M/UL
RBC # FLD: 12.8 %
SODIUM SERPL-SCNC: 141 MMOL/L
SPECIFIC GRAVITY URINE: 1.03
T4 FREE SERPL-MCNC: 1.2 NG/DL
TRIGL SERPL-MCNC: 103 MG/DL
TSH SERPL-ACNC: 1.24 UIU/ML
UROBILINOGEN URINE: ABNORMAL
WBC # FLD AUTO: 4.08 K/UL

## 2021-07-22 ENCOUNTER — RX RENEWAL (OUTPATIENT)
Age: 61
End: 2021-07-22

## 2021-09-22 NOTE — DISCHARGE NOTE ADULT - DEVELOP COPING SKILLS. FOR EXAMPLE, STRATEGIES AND LIFESTYLE CHANGES THAT REDUCE NEGATIVE MOODS, STRESS, AND EXPOSURE TO SMOKING CUES
TOTAL JOINT REPLACEMENT SURGERY   PreAdmit Appointment  Pre-Operative Education Note       1) Did you take a Total Joint Replacement Pre-Operative Education class?  Where?  Answer: Yes a video with [facility]    2) If you did not take a class, did you receive pre-op education in the form of a book or through an online class?    Answer: NA    3) Have you had the same joint replacement procedure within the last 3 years?   Answer: Yes ( Bilateral TSA, R reverse TSA       Additional Question:  Is patient planning for a same-day discharge?DISCHARGE PLANNING NOTE - TOTAL JOINT    Procedure: Procedure(s):  ARTHROPLASTY, SHOULDER, TOTAL  ARTHROPLASTY, SHOULDER, TOTAL, REVERSE - POSSIBLE  TENODESIS, BICEPS  REMOVAL, HARDWARE  Procedure Date: 9/23/2021  Insurance: Payor: MEDICARE / Plan: MEDICARE PART A & B / Product Type: *No Product type* /    Equipment currently available at home?  Ice machine and incentive spirometer    Steps into the home? 1  Steps within the home? 0, plans on staying on 1st floor  Toilet height? Standard  Type of shower? walk-in shower  Who will be with you during your recovery? other: S/O Jigna Mckay  Is Outpatient Physical Therapy set up after surgery? Yes  Did you take the Total Joint Class and where? Yes  Planning same day discharge?Yes     Plan: Sx was scheduled at [facility], was to be same day but the time is changed to 1500 so pt may be 23 hr stay, he wants tp go home.   
Statement Selected

## 2021-10-06 RX ORDER — APIXABAN 5 MG/1
5 TABLET, FILM COATED ORAL
Qty: 60 | Refills: 5 | Status: ACTIVE | COMMUNITY
Start: 1900-01-01 | End: 1900-01-01

## 2021-11-02 NOTE — ED PROVIDER NOTE - ATTENDING CONTRIBUTION TO CARE
Yes New onset  seizure-like episode now with new onset L lower leg weakness worse than her baseline. occurred 3 hrs prior to evaluation by me. initial report to triage nurse was that left leg weakness was stable from prior so pt not activated as stroke upon arrival but when I evaluted pt and she clarified this weakness was new (corroborated by fam), stroke activation went off. neuro bedside right away. Ct head pending. will determine if candidate for tpa or not but this weakness may be related to new stroke. New onset  seizure-like episode now with new onset L lower leg weakness worse than her baseline. occurred 3 hrs prior to evaluation by me. initial report to triage nurse was that left leg weakness was stable from prior so pt not activated as stroke upon arrival but when I evaluted pt and she clarified this weakness was new (corroborated by fam), stroke activation went off. neuro bedside right away. Ct head pending. will determine if candidate for tpa or not but this weakness may be related to new seizure.    CT unremarkable. pt evaluated by neuro. not tpa candidate. admitted to neuro for further care.

## 2021-11-06 DIAGNOSIS — Z01.818 ENCOUNTER FOR OTHER PREPROCEDURAL EXAMINATION: ICD-10-CM

## 2021-11-09 ENCOUNTER — APPOINTMENT (OUTPATIENT)
Dept: DISASTER EMERGENCY | Facility: CLINIC | Age: 61
End: 2021-11-09

## 2021-11-09 ENCOUNTER — NON-APPOINTMENT (OUTPATIENT)
Age: 61
End: 2021-11-09

## 2021-11-10 LAB — SARS-COV-2 N GENE NPH QL NAA+PROBE: NOT DETECTED

## 2021-11-19 ENCOUNTER — APPOINTMENT (OUTPATIENT)
Dept: INTERNAL MEDICINE | Facility: CLINIC | Age: 61
End: 2021-11-19

## 2021-11-22 ENCOUNTER — APPOINTMENT (OUTPATIENT)
Dept: PHYSICAL MEDICINE AND REHAB | Facility: CLINIC | Age: 61
End: 2021-11-22
Payer: MEDICARE

## 2021-11-22 VITALS
SYSTOLIC BLOOD PRESSURE: 169 MMHG | DIASTOLIC BLOOD PRESSURE: 71 MMHG | TEMPERATURE: 95.9 F | OXYGEN SATURATION: 100 % | HEART RATE: 74 BPM

## 2021-11-22 PROCEDURE — 99213 OFFICE O/P EST LOW 20 MIN: CPT | Mod: 25

## 2021-11-22 PROCEDURE — 64644 CHEMODENERV 1 EXTREM 5/> MUS: CPT

## 2021-11-22 PROCEDURE — 95874 GUIDE NERV DESTR NEEDLE EMG: CPT

## 2021-11-22 NOTE — PROCEDURE
[Consent] : consent was given by patient or guardian [Site Verification] : the injection site was verified [Post-Injection Instructions Provided] : post-injection instructions were provided [] : EMG Guidance was used during the procedure [de-identified] : Botox Lot [id number], exp June 2024 [TWNoteComboBox1] : Biceps [TWNoteComboBox2] : 100 Units [de-identified] : Brachioradialis [de-identified] : 50 Units [de-identified] : Flexor Carpi Radialis [de-identified] : 50 Units [de-identified] : Flexor Digitorum Superficialis [de-identified] : 75 Units [de-identified] : Flexor Digitorum Profundus [de-identified] : 75 Units [de-identified] : Pronator Teres [de-identified] : 50 Units

## 2021-11-22 NOTE — HISTORY OF PRESENT ILLNESS
[FreeTextEntry1] : Last had botox in June 2020-  noted improvement of LUE spasticity- noted significant improvement for a long period of time but now with worsening tone of the LUE. \par No new medical issues.\par Doing home exercises.\par

## 2021-11-22 NOTE — PHYSICAL EXAM
[Normal] : Normal bowel sounds, soft, non-tender, no hepato-splenomegaly and no abdominal mass palpated [de-identified] : amb w SC with circumducted gait, good balance.  [de-identified] : LLE 4/5; LUE 3+/5 prox, 1/5 distal; Spasticity- MAS 3+  L FFs and MAS 1+  WFs;, MAS 2  EFs, MAS 3 Pronator DTRs 3+ on L and 2+ on R; sens decreased on Left

## 2021-11-23 ENCOUNTER — APPOINTMENT (OUTPATIENT)
Dept: INTERNAL MEDICINE | Facility: CLINIC | Age: 61
End: 2021-11-23
Payer: MEDICARE

## 2021-11-23 VITALS
HEIGHT: 67 IN | DIASTOLIC BLOOD PRESSURE: 64 MMHG | HEART RATE: 59 BPM | BODY MASS INDEX: 25.9 KG/M2 | SYSTOLIC BLOOD PRESSURE: 148 MMHG | TEMPERATURE: 96.7 F | OXYGEN SATURATION: 99 % | WEIGHT: 165 LBS

## 2021-11-23 DIAGNOSIS — Z86.69 PERSONAL HISTORY OF OTHER DISEASES OF THE NERVOUS SYSTEM AND SENSE ORGANS: ICD-10-CM

## 2021-11-23 PROCEDURE — 99214 OFFICE O/P EST MOD 30 MIN: CPT

## 2021-11-23 RX ORDER — LACOSAMIDE 100 MG/1
100 TABLET, FILM COATED ORAL TWICE DAILY
Qty: 60 | Refills: 4 | Status: DISCONTINUED | COMMUNITY
Start: 2019-10-10 | End: 2021-11-23

## 2021-12-17 ENCOUNTER — RX RENEWAL (OUTPATIENT)
Age: 61
End: 2021-12-17

## 2021-12-25 PROBLEM — Z86.69 HISTORY OF PARTIAL SEIZURES: Status: RESOLVED | Noted: 2019-10-28 | Resolved: 2021-12-25

## 2021-12-26 NOTE — ASSESSMENT
[FreeTextEntry1] : discussed w pt \par \par reviewed f/u w Dr Segura s/p Botox inj for spasticity \par \par cont f/u w Dr Wheeler cardiology \par \par following w endocrine, lab testing completed recently , she will forward results . cont insulin regimen \par \par check routine labs as below \par \par HTN adequately controlled, on losartan and atenolol from her cardiologist \par \par neurology f/u as scheduled w Dr Huber , seizure-free, cont Vimpat \par \par cont fall precautions and regular exercises \par \par she declines vaccines including COVID vaccine, despite education on this issue \par \par RTO 4 months, call or return earlier prn if any new concerns

## 2021-12-26 NOTE — PHYSICAL EXAM
[No Acute Distress] : no acute distress [Well-Appearing] : well-appearing [Normal Voice/Communication] : normal voice/communication [Normal] : normal rate, regular rhythm, normal S1 and S2 and no murmur heard [Pedal Pulses Present] : the pedal pulses are present [No Edema] : there was no peripheral edema [Speech Grossly Normal] : speech grossly normal [Alert and Oriented x3] : oriented to person, place, and time [Normal Mood] : the mood was normal [Normal Insight/Judgement] : insight and judgment were intact [de-identified] : L hemiparesis unchanged , L UE brace

## 2022-01-11 ENCOUNTER — APPOINTMENT (OUTPATIENT)
Dept: ENDOCRINOLOGY | Facility: CLINIC | Age: 62
End: 2022-01-11
Payer: MEDICARE

## 2022-01-11 ENCOUNTER — APPOINTMENT (OUTPATIENT)
Dept: PHYSICAL MEDICINE AND REHAB | Facility: CLINIC | Age: 62
End: 2022-01-11

## 2022-01-11 ENCOUNTER — NON-APPOINTMENT (OUTPATIENT)
Age: 62
End: 2022-01-11

## 2022-01-11 PROCEDURE — 99214 OFFICE O/P EST MOD 30 MIN: CPT | Mod: 95

## 2022-01-31 NOTE — PROGRESS NOTE ADULT - SUBJECTIVE AND OBJECTIVE BOX
INTERVAL SUBJECTIVE & REVIEW OF SYMPTOMS:  Patient seen at bedside this AM. Endorsing to tightness in her left upper extremity this morning. Stated that she was able to sleep last night and that she is ready for her therapy this morning. Denies recurrences of seizures. Doing well at the moment. No acute medical issues noted overnight.       REVIEW OF SYSTEMS  [x   ] Constitutional WNL  [  x ] Cardio WNL  [  x ] Resp WNL  [  x ] GI WNL  [ x  ]  WNL  [ x  ] Heme WNL  [   ] Endo- DM  [ x  ] Skin WNL  [   ] MSK WNL  [   ] Neuro WNL  [   ] Cognitive WNL  [   ] Psych WNL    Vital Signs Last 24 Hrs  T(C): 36.6 (18 Oct 2019 07:58), Max: 36.6 (17 Oct 2019 20:47)  T(F): 97.9 (18 Oct 2019 07:58), Max: 97.9 (18 Oct 2019 07:58)  HR: 75 (18 Oct 2019 07:58) (75 - 100)  BP: 138/76 (18 Oct 2019 07:58) (123/73 - 144/74)  BP(mean): --  RR: 14 (18 Oct 2019 07:58) (14 - 14)  SpO2: 98% (18 Oct 2019 07:58) (98% - 99%)    PHYSICAL EXAM  General: NAD  Cardio: S1S2+  Resp: clear  Abdomen: soft  Neuro: no aphasia or dysarthria,   left spastic hemiparesis  Extrem: no edema or calf tenderness  Skin: no breakdown    Functional Exam:   Supervision upper body dressing   Mi A for lower body dressing       MEDICATIONS  (STANDING):  aspirin  chewable 81 milliGRAM(s) Oral daily  atorvastatin 80 milliGRAM(s) Oral at bedtime  cholecalciferol 2000 Unit(s) Oral daily  clopidogrel Tablet 75 milliGRAM(s) Oral daily  dextrose 5%. 1000 milliLiter(s) (50 mL/Hr) IV Continuous <Continuous>  dextrose 50% Injectable 12.5 Gram(s) IV Push once  dextrose 50% Injectable 25 Gram(s) IV Push once  dextrose 50% Injectable 25 Gram(s) IV Push once  docusate sodium 100 milliGRAM(s) Oral two times a day  enoxaparin Injectable 40 milliGRAM(s) SubCutaneous daily  insulin glargine Injectable (LANTUS) 14 Unit(s) SubCutaneous at bedtime  insulin lispro (HumaLOG) corrective regimen sliding scale   SubCutaneous three times a day before meals  insulin lispro (HumaLOG) corrective regimen sliding scale   SubCutaneous at bedtime  lacosamide 200 milliGRAM(s) Oral two times a day  losartan 50 milliGRAM(s) Oral daily    MEDICATIONS  (PRN):  acetaminophen   Tablet .. 650 milliGRAM(s) Oral every 6 hours PRN Temp greater or equal to 38C (100.4F), Mild Pain (1 - 3), Moderate Pain (4 - 6)  dextrose 40% Gel 15 Gram(s) Oral once PRN Blood Glucose LESS THAN 70 milliGRAM(s)/deciliter  glucagon  Injectable 1 milliGRAM(s) IntraMuscular once PRN Glucose LESS THAN 70 milligrams/deciliter  senna 2 Tablet(s) Oral at bedtime PRN Constipation      RECENT LABS:                        10.1   3.61  )-----------( 211      ( 16 Oct 2019 05:35 )             30.5     10-16    143  |  110<H>  |  23  ----------------------------<  99  4.2   |  29  |  0.82    Ca    9.7      16 Oct 2019 05:35  Mg     1.6     10-16    TPro  6.9  /  Alb  3.0<L>  /  TBili  0.3  /  DBili  x   /  AST  32  /  ALT  39  /  AlkPhos  88  10-16      CAPILLARY BLOOD GLUCOSE    POCT Blood Glucose.: 81 mg/dL (17 Oct 2019 07:38)  POCT Blood Glucose.: 114 mg/dL (16 Oct 2019 21:25)  POCT Blood Glucose.: 92 mg/dL (16 Oct 2019 17:04)  POCT Blood Glucose.: 132 mg/dL (16 Oct 2019 11:59)  POCT Blood Glucose.: 100 mg/dL (16 Oct 2019 07:57)  POCT Blood Glucose.: 215 mg/dL (15 Oct 2019 20:49)      RADIOLOGY/OTHER RESULTS:    58 year old female with PMH/PSH DM, HLD, HTN, prior R MCA stroke in November due to R ICA dissection/occlusion s/p ICA stent, with L sided residual weakness, admitted to rehab after recent focal seizures. Now with decreased functional mobility.      COMORBIDITES/ACTIVE MEDICAL ISSUES     Start Comprehensive Rehab Program of PT/OT . 3 HRS/DAY 5 days/week No need for ST     Focal seizure :-Continue Vimpat 200mg BID     Chronic right MCA infarct with residual left sided hemiparesis   -Continue aspirin 81mg daily, plavix 75mg daily and statins    HTN:-Continue losartan 50 mg daily     HLD:-Continue atorvastatin 80mg qhs     DM2:-Accu checks decreased to BID, lantus decreased to 14 Unit(s), ISS     LBBB:-EKG done on 10/8 showed LBBB  -Asymptomatic, -As per cardio, patient's recent SPECT in office was normal     Labs with mild leucopenia    Diet: Diabetic diet     Pain Mgmt - Tylenol PRN    GI/Bowel Mgmt - Colace, Senna PRN     /Bladder Mgmt - Toileting schedule      Precautions / PROPHYLAXIS:   - Falls, Seizure   - DVT Prophylaxis: Lovenox.     Team meeting 10/17: progressing well in therapy, goals for Mod I, EDOD: 10/23 INTERVAL SUBJECTIVE & REVIEW OF SYMPTOMS:  Patient seen at bedside this AM. Endorsing to tightness in her left upper extremity this morning. Stated that she was able to sleep last night and that she is ready for her therapy this morning. Denies recurrences of seizures. Doing well at the moment. No acute medical issues noted overnight.       REVIEW OF SYSTEMS  [x   ] Constitutional WNL  [  x ] Cardio WNL  [  x ] Resp WNL  [  x ] GI WNL  [ x  ]  WNL  [ x  ] Heme WNL  [   ] Endo- DM  [ x  ] Skin WNL  [   ] MSK WNL  [   ] Neuro WNL  [   ] Cognitive WNL  [   ] Psych WNL    Vital Signs Last 24 Hrs  T(C): 36.6 (18 Oct 2019 07:58), Max: 36.6 (17 Oct 2019 20:47)  T(F): 97.9 (18 Oct 2019 07:58), Max: 97.9 (18 Oct 2019 07:58)  HR: 75 (18 Oct 2019 07:58) (75 - 100)  BP: 138/76 (18 Oct 2019 07:58) (123/73 - 144/74)  BP(mean): --  RR: 14 (18 Oct 2019 07:58) (14 - 14)  SpO2: 98% (18 Oct 2019 07:58) (98% - 99%)    PHYSICAL EXAM  General: NAD  Cardio: S1S2+  Resp: clear  Abdomen: soft  Neuro: no aphasia or dysarthria,  Left UE: Shoulder abduction 1/5, EE 1/5, EE 1/5, grasp 1/5  Left LE: Hip flexion 4+/5, KE 5/5, PF 1/5, DF 05  Right UE and LE: 5/5   +left spastic hemiparesis  Extrem: no edema or calf tenderness  Skin: no breakdown    Functional Exam:   Supervision upper body dressing   Mi A for lower body dressing       MEDICATIONS  (STANDING):  aspirin  chewable 81 milliGRAM(s) Oral daily  atorvastatin 80 milliGRAM(s) Oral at bedtime  cholecalciferol 2000 Unit(s) Oral daily  clopidogrel Tablet 75 milliGRAM(s) Oral daily  dextrose 5%. 1000 milliLiter(s) (50 mL/Hr) IV Continuous <Continuous>  dextrose 50% Injectable 12.5 Gram(s) IV Push once  dextrose 50% Injectable 25 Gram(s) IV Push once  dextrose 50% Injectable 25 Gram(s) IV Push once  docusate sodium 100 milliGRAM(s) Oral two times a day  enoxaparin Injectable 40 milliGRAM(s) SubCutaneous daily  insulin glargine Injectable (LANTUS) 14 Unit(s) SubCutaneous at bedtime  insulin lispro (HumaLOG) corrective regimen sliding scale   SubCutaneous two times a day before meals  lacosamide 200 milliGRAM(s) Oral two times a day  losartan 50 milliGRAM(s) Oral daily    MEDICATIONS  (PRN):  acetaminophen   Tablet .. 650 milliGRAM(s) Oral every 6 hours PRN Temp greater or equal to 38C (100.4F), Mild Pain (1 - 3), Moderate Pain (4 - 6)  dextrose 40% Gel 15 Gram(s) Oral once PRN Blood Glucose LESS THAN 70 milliGRAM(s)/deciliter  glucagon  Injectable 1 milliGRAM(s) IntraMuscular once PRN Glucose LESS THAN 70 milligrams/deciliter  senna 2 Tablet(s) Oral at bedtime PRN Constipation        RECENT LABS:                        10.1   3.61  )-----------( 211      ( 16 Oct 2019 05:35 )             30.5     10-16    143  |  110<H>  |  23  ----------------------------<  99  4.2   |  29  |  0.82    Ca    9.7      16 Oct 2019 05:35  Mg     1.6     10-16    TPro  6.9  /  Alb  3.0<L>  /  TBili  0.3  /  DBili  x   /  AST  32  /  ALT  39  /  AlkPhos  88  10-16      CAPILLARY BLOOD GLUCOSE    POCT Blood Glucose.: 107 mg/dL (18 Oct 2019 08:23)  POCT Blood Glucose.: 168 mg/dL (17 Oct 2019 22:16)  POCT Blood Glucose.: 99 mg/dL (17 Oct 2019 17:02)  POCT Blood Glucose.: 81 mg/dL (17 Oct 2019 07:38)  POCT Blood Glucose.: 114 mg/dL (16 Oct 2019 21:25)  POCT Blood Glucose.: 92 mg/dL (16 Oct 2019 17:04)  POCT Blood Glucose.: 132 mg/dL (16 Oct 2019 11:59)  POCT Blood Glucose.: 100 mg/dL (16 Oct 2019 07:57)  POCT Blood Glucose.: 215 mg/dL (15 Oct 2019 20:49)      RADIOLOGY/OTHER RESULTS:    58 year old female with PMH/PSH DM, HLD, HTN, prior R MCA stroke in November due to R ICA dissection/occlusion s/p ICA stent, with L sided residual weakness, admitted to rehab after recent focal seizures. Now with decreased functional mobility.      COMORBIDITES/ACTIVE MEDICAL ISSUES     Start Comprehensive Rehab Program of PT/OT . 3 HRS/DAY 5 days/week No need for ST     Focal seizure :-Continue Vimpat 200mg BID     Chronic right MCA infarct with residual left sided hemiparesis   -Continue aspirin 81mg daily, plavix 75mg daily and statins    HTN:-Continue losartan 50 mg daily     HLD:-Continue atorvastatin 80mg qhs     DM2:-Accu checks  BID, lantus  14 Unit(s), ISS     LBBB:-EKG done on 10/8 showed LBBB  -Asymptomatic, -As per cardio, patient's recent SPECT in office was normal     Labs with mild leucopenia    Diet: Diabetic diet     Pain Mgmt - Tylenol PRN    GI/Bowel Mgmt - Colace, Senna PRN     /Bladder Mgmt - Toileting schedule      Precautions / PROPHYLAXIS:   - Falls, Seizure   - DVT Prophylaxis: Lovenox.     Team meeting 10/17: progressing well in therapy, goals for Mod I, EDOD: 10/23 n/a

## 2022-02-11 ENCOUNTER — RX RENEWAL (OUTPATIENT)
Age: 62
End: 2022-02-11

## 2022-03-07 NOTE — PATIENT PROFILE ADULT - VISION (WITH CORRECTIVE LENSES IF THE PATIENT USUALLY WEARS THEM):
Normal vision: sees adequately in most situations; can see medication labels, newsprint
I personally performed the service described in the documentation recorded by the scribe in my presence, and it accurately and completely records my words and actions.

## 2022-03-17 NOTE — CONSULT NOTE ADULT - PROBLEM/RECOMMENDATION-1
1st attempt to schedule from referral   No answer  LMOM 
DISPLAY PLAN FREE TEXT
DISPLAY PLAN FREE TEXT

## 2022-04-11 PROBLEM — Z11.59 SCREENING FOR VIRAL DISEASE: Status: ACTIVE | Noted: 2020-06-11

## 2022-04-30 NOTE — PATIENT PROFILE ADULT - NSTRANSFERBELONGINGSRESP_GEN_A_NUR
REASON FOR ADMISSION:  Nonfunctioning hemocatheter.    HISTORY OF PRESENT ILLNESS:  This is a 71 year old female with end stage renal disease, hemodialysis dependent, who could not have adequate dialysis via her catheter that I replaced about two months ago, and the dialysis unit informed me about this.  I was told that last week the dialysis was completed but this Tuesday the 9th they could hardly exchange in spite of multiple positioning and changing the patient and even doing TPA of the catheter.    PAST MEDICAL HISTORY:  Hypertension, atrial fibrillation, diabetes mellitus, congestive heart failure, chronic kidney disease.    PAST SURGICAL HISTORY:  Dialysis access (catheter and AV fistula on the right cubital fossa created by Dr. Coto).    REVIEW OF SYSTEMS:  Episode of fluid overload with anasarca (mainly prior to dialysis).  Easy fatigue and shortness of breath.  Anuric.  Last meal at midnight.    ALLERGIES:  Betadine, lisinopril, pravastatin, seafood.    SOCIAL HISTORY:  No use of recreational substances.    PHYSICAL EXAMINATION:  GENERAL:  Awake and alert and is not in acute distress.   VITAL SIGNS:  Temperature 36.5, blood pressure 140/72, heart rate 76.  Pulse oximetry is 95% on room air.   NECK:  Limited mobility.  Scar from previous catheter inserted.   CHEST:  Scar from catheter exit site.  She has a tunneled hemocatheter on the left upper chest with the cuff seeming to be just outside of the skin.   ABDOMEN:  Large and soft.   EXTREMITIES:  The patient does have an AV fistula in the cubital fossa which is rather protruding and pulsatile.  The thrill followed the track of the basilic vein.  She has range of motion and sensation of both hands.    DIAGNOSTICS:  EKG showed normal sinus rhythm.    LABORATORY DATA:  Potassium 3.9.    IMPRESSION:    1. Dislodged hemocatheter.  2. End stage renal disease on hemodialysis.    PLAN:  Insertion of a new hemocatheter once this one is removed.  Note that  the patient has a fistula but it is not mature enough to be used for dialysis.        ______________________________  Kelton AIKEN Recio, MD    JRP/CS  DD:  01/12/2018  Time:  10:52AM  DT:  01/12/2018  Time:  11:45AM  Job #:  [id number]    The H&P was reviewed, the patient was examined, and the following changes to the patients condition are noted:  ______________________________________________________________________________  ______________________________________________________________________________  ______________________________________________________________________________  [  ] No changes to the patient's condition:      ______________________________                                             ___________________  PHYSICIAN SIGNATURE                                                             DATE/TIME      yes

## 2022-05-11 ENCOUNTER — APPOINTMENT (OUTPATIENT)
Dept: INTERNAL MEDICINE | Facility: CLINIC | Age: 62
End: 2022-05-11
Payer: COMMERCIAL

## 2022-05-11 VITALS
DIASTOLIC BLOOD PRESSURE: 68 MMHG | OXYGEN SATURATION: 99 % | WEIGHT: 165 LBS | HEIGHT: 67 IN | BODY MASS INDEX: 25.9 KG/M2 | HEART RATE: 83 BPM | TEMPERATURE: 97.8 F | SYSTOLIC BLOOD PRESSURE: 132 MMHG

## 2022-05-11 DIAGNOSIS — G40.209 LOCALIZATION-RELATED (FOCAL) (PARTIAL) SYMPTOMATIC EPILEPSY AND EPILEPTIC SYNDROMES WITH COMPLEX PARTIAL SEIZURES, NOT INTRACTABLE, W/OUT STATUS EPILEPTICUS: ICD-10-CM

## 2022-05-11 PROCEDURE — 99214 OFFICE O/P EST MOD 30 MIN: CPT | Mod: 25

## 2022-05-11 PROCEDURE — 36415 COLL VENOUS BLD VENIPUNCTURE: CPT

## 2022-05-11 RX ORDER — LACOSAMIDE 100 MG/1
100 TABLET ORAL
Qty: 60 | Refills: 0 | Status: ACTIVE | COMMUNITY
Start: 2019-11-11

## 2022-05-11 NOTE — HISTORY OF PRESENT ILLNESS
[Family Member] : family member [de-identified] : presents for f/u visit for review chronic conditions, update on her progress, current rx. \par she is gradually increasing her activities \par \par no recent illnesses or new concerns. \par \par she continues to decline any COVID vaccination, she understands risks \par \par s/p Botox inj w Dr Segura PMR which has helped with spasticity \par \par L hemiparesis s/p CVA in 2018\par HTN recently controlled, following w her cardiologist Dr Wheeler regularly. continues on Eliquis for CVA prevention, at low dose \par type II DM on basal insulin, preprandial only prn , glucose has remained adequately controlled, following w endocrinologist, on basal insulin 10 u qhs , due for repeat labs \par \par hx of CVA in R MCA distribution in 11/18 w residual L hemiparesis. s/p R RCA stent placement. following w cardiology. \par hx of focal seizures, continues on Vimpat, dose was lowered to 100 mg bid. following w Dr Huber neurology\par chronic constipation unchanged

## 2022-05-11 NOTE — PHYSICAL EXAM
[No Acute Distress] : no acute distress [Well-Appearing] : well-appearing [Normal Voice/Communication] : normal voice/communication [Normal] : normal rate, regular rhythm, normal S1 and S2 and no murmur heard [Pedal Pulses Present] : the pedal pulses are present [No Edema] : there was no peripheral edema [Speech Grossly Normal] : speech grossly normal [Alert and Oriented x3] : oriented to person, place, and time [Normal Mood] : the mood was normal [Normal Insight/Judgement] : insight and judgment were intact [de-identified] : L hemiparesis unchanged, using cane for ambulation

## 2022-05-11 NOTE — ASSESSMENT
[FreeTextEntry1] : discussed w pt \par \par reviewed current rx\par \par she is doing well clinically \par \par cont f/u w Dr Wheeler cardiology \par \par following w endocrine, cont insulin regimen , due for repeat labs, monitoring Hgba1c, renal function \par \par reminded her re ophthalmology screening, referred \par \par check routine labs as below \par \par HTN adequately controlled, on losartan and atenolol\par \par neurology f/u as scheduled w Dr Huber , seizure-free, cont Vimpat , dose lowered recently \par \par cont fall precautions and regular exercises \par \par she continues to decline all vaccines including COVID vaccine, despite education on this issue , advised on risks \par \par RTO 4 months, call or return earlier prn if any new concerns

## 2022-05-25 ENCOUNTER — RX RENEWAL (OUTPATIENT)
Age: 62
End: 2022-05-25

## 2022-05-25 RX ORDER — ATENOLOL 25 MG/1
25 TABLET ORAL
Qty: 90 | Refills: 1 | Status: ACTIVE | COMMUNITY
Start: 2021-07-02 | End: 1900-01-01

## 2022-06-29 NOTE — DISCHARGE NOTE PROVIDER - NSDCCAREPROVSEEN_GEN_ALL_CORE_FT
[facility] - Kasia Cook, Bart, Blas, Kay, Juvenal Jeffries  Office Number:  [phone]    Patient resting comfortably in bed in NAD.  Laying flat with no respiratory distress.  No complaints of chest pain, dyspnea, palpitations, PND, or orthopnea.  fobt pos    ROS: negative unless otherwise mentioned.    Telemetry:  sr    MEDICATIONS  (STANDING):  allopurinol 100 milliGRAM(s) Oral daily  aspirin enteric coated 81 milliGRAM(s) Oral daily  atorvastatin 40 milliGRAM(s) Oral at bedtime  buPROPion XL (24-Hour) . 150 milliGRAM(s) Oral daily  clopidogrel Tablet 75 milliGRAM(s) Oral daily  dextrose 5%. 1000 milliLiter(s) (50 mL/Hr) IV Continuous <Continuous>  dextrose 5%. 1000 milliLiter(s) (100 mL/Hr) IV Continuous <Continuous>  dextrose 50% Injectable 25 Gram(s) IV Push once  dextrose 50% Injectable 12.5 Gram(s) IV Push once  dextrose 50% Injectable 25 Gram(s) IV Push once  escitalopram 10 milliGRAM(s) Oral daily  glucagon  Injectable 1 milliGRAM(s) IntraMuscular once  heparin   Injectable 5000 Unit(s) SubCutaneous every 12 hours  insulin glargine Injectable (LANTUS) 6 Unit(s) SubCutaneous at bedtime  insulin lispro (ADMELOG) corrective regimen sliding scale   SubCutaneous three times a day before meals  insulin lispro (ADMELOG) corrective regimen sliding scale   SubCutaneous at bedtime  insulin lispro Injectable (ADMELOG) 4 Unit(s) SubCutaneous three times a day before meals  lisinopril 15 milliGRAM(s) Oral daily  metoprolol succinate ER 50 milliGRAM(s) Oral daily  sodium chloride 0.9%. 1000 milliLiter(s) (75 mL/Hr) IV Continuous <Continuous>    MEDICATIONS  (PRN):  dextrose Oral Gel 15 Gram(s) Oral once PRN Blood Glucose LESS THAN 70 milliGRAM(s)/deciliter      Allergies    No Known Allergies    Intolerances        Vital Signs Last 24 Hrs  T(C): 36.6 (29 Jun 2022 04:52), Max: 36.6 (28 Jun 2022 20:51)  T(F): 97.9 (29 Jun 2022 04:52), Max: 97.9 (29 Jun 2022 04:52)  HR: 70 (29 Jun 2022 04:52) (70 - 73)  BP: 167/69 (29 Jun 2022 04:52) (130/71 - 167/69)  BP(mean): --  RR: 18 (29 Jun 2022 04:52) (18 - 18)  SpO2: 95% (29 Jun 2022 04:52) (95% - 97%)    I&O's Summary    28 Jun 2022 07:01  -  29 Jun 2022 07:00  --------------------------------------------------------  IN: 2140 mL / OUT: 1 mL / NET: 2139 mL        ON EXAM:    Constitutional: well-nourished, well-developed, NAD   HEENT:  MMM, sclerae anicteric, conjunctivae clear, no oral cyanosis.  Pulmonary: Non-labored, breath sounds are clear bilaterally, No wheezing, rales or rhonchi  Cardiovascular: Regular, S1 and S2.  No murmur.  No rubs, gallops or clicks  Gastrointestinal: Bowel Sounds present, soft, nontender.   Lymph: No peripheral edema.   Neurological: Alert, no focal deficits  Skin: No rashes.  Psych:  Mood & affect appropriate    LABS: All Labs Reviewed:                        9.5    6.98  )-----------( 146      ( 29 Jun 2022 01:20 )             30.0                         10.1   6.43  )-----------( 137      ( 28 Jun 2022 07:17 )             31.6                         10.3   9.48  )-----------( 151      ( 27 Jun 2022 02:27 )             33.2     28 Jun 2022 07:18    138    |  104    |  16     ----------------------------<  156    4.3     |  23     |  0.68   27 Jun 2022 02:27    138    |  103    |  28     ----------------------------<  147    4.3     |  24     |  0.68   26 Jun 2022 14:30    138    |  104    |  29     ----------------------------<  180    4.5     |  30     |  0.91     Ca    8.5        28 Jun 2022 07:18  Ca    8.8        27 Jun 2022 02:27  Ca    9.1        26 Jun 2022 14:30    TPro  6.6    /  Alb  3.5    /  TBili  0.3    /  DBili  x      /  AST  13     /  ALT  13     /  AlkPhos  57     27 Jun 2022 02:27  TPro  7.2    /  Alb  3.1    /  TBili  0.5    /  DBili  x      /  AST  12     /  ALT  17     /  AlkPhos  59     26 Jun 2022 14:30          Blood Culture:     06-28 @ 07:19  TSH: 2.47     Cat Pierre

## 2022-07-06 LAB
25(OH)D3 SERPL-MCNC: 42.2 NG/ML
ALBUMIN SERPL ELPH-MCNC: 4.4 G/DL
ALP BLD-CCNC: 77 U/L
ALT SERPL-CCNC: 18 U/L
ANION GAP SERPL CALC-SCNC: 11 MMOL/L
AST SERPL-CCNC: 16 U/L
BASOPHILS # BLD AUTO: 0.03 K/UL
BASOPHILS NFR BLD AUTO: 0.8 %
BILIRUB SERPL-MCNC: 0.4 MG/DL
BUN SERPL-MCNC: 28 MG/DL
CALCIUM SERPL-MCNC: 10.5 MG/DL
CHLORIDE SERPL-SCNC: 107 MMOL/L
CHOLEST SERPL-MCNC: 237 MG/DL
CO2 SERPL-SCNC: 26 MMOL/L
CREAT SERPL-MCNC: 0.95 MG/DL
EGFR: 69 ML/MIN/1.73M2
EOSINOPHIL # BLD AUTO: 0.08 K/UL
EOSINOPHIL NFR BLD AUTO: 2.3 %
ESTIMATED AVERAGE GLUCOSE: 131 MG/DL
GLUCOSE SERPL-MCNC: 119 MG/DL
HBA1C MFR BLD HPLC: 6.2 %
HCT VFR BLD CALC: 37.8 %
HDLC SERPL-MCNC: 55 MG/DL
HGB BLD-MCNC: 11.6 G/DL
IMM GRANULOCYTES NFR BLD AUTO: 0 %
LDLC SERPL CALC-MCNC: 154 MG/DL
LYMPHOCYTES # BLD AUTO: 1.28 K/UL
LYMPHOCYTES NFR BLD AUTO: 36.3 %
MAN DIFF?: NORMAL
MCHC RBC-ENTMCNC: 28.4 PG
MCHC RBC-ENTMCNC: 30.7 GM/DL
MCV RBC AUTO: 92.6 FL
MONOCYTES # BLD AUTO: 0.31 K/UL
MONOCYTES NFR BLD AUTO: 8.8 %
NEUTROPHILS # BLD AUTO: 1.83 K/UL
NEUTROPHILS NFR BLD AUTO: 51.8 %
NONHDLC SERPL-MCNC: 182 MG/DL
PLATELET # BLD AUTO: 246 K/UL
POTASSIUM SERPL-SCNC: 5.3 MMOL/L
PROT SERPL-MCNC: 7.9 G/DL
RBC # BLD: 4.08 M/UL
RBC # FLD: 12.9 %
SODIUM SERPL-SCNC: 144 MMOL/L
T4 FREE SERPL-MCNC: 1.3 NG/DL
TRIGL SERPL-MCNC: 140 MG/DL
TSH SERPL-ACNC: 1.31 UIU/ML
WBC # FLD AUTO: 3.53 K/UL

## 2022-07-11 ENCOUNTER — RX RENEWAL (OUTPATIENT)
Age: 62
End: 2022-07-11

## 2022-07-28 NOTE — ED PROVIDER NOTE - DATA REVIEWED, MDM
Bleeding that does not stop/Swelling that gets worse/Pain not relieved by Medications/Wound/Surgical Site with redness, or foul smelling discharge or pus vital signs/old records

## 2022-08-01 ENCOUNTER — APPOINTMENT (OUTPATIENT)
Dept: ENDOCRINOLOGY | Facility: CLINIC | Age: 62
End: 2022-08-01

## 2022-08-08 NOTE — OCCUPATIONAL THERAPY INITIAL EVALUATION ADULT - LIVES WITH, PROFILE
suprapubic/periumbilical abd pain, sent in by PMD. NKA. No PMH. Family instructed to be NPO prior to MD [place]. children/Pt resides with son in house with 4 MAEGAN and 15 steps to second floor, +HR both sides, +tub shower, +grab bars.

## 2022-11-14 ENCOUNTER — APPOINTMENT (OUTPATIENT)
Dept: INTERNAL MEDICINE | Facility: CLINIC | Age: 62
End: 2022-11-14

## 2022-11-14 NOTE — PROGRESS NOTE ADULT - PROBLEM/PLAN-3
DISPLAY PLAN FREE TEXT
1.96

## 2022-11-18 ENCOUNTER — RESULT CHARGE (OUTPATIENT)
Age: 62
End: 2022-11-18

## 2022-11-18 ENCOUNTER — APPOINTMENT (OUTPATIENT)
Dept: ENDOCRINOLOGY | Facility: CLINIC | Age: 62
End: 2022-11-18

## 2022-11-18 VITALS
WEIGHT: 169 LBS | DIASTOLIC BLOOD PRESSURE: 80 MMHG | HEART RATE: 73 BPM | HEIGHT: 67 IN | SYSTOLIC BLOOD PRESSURE: 132 MMHG | TEMPERATURE: 97.2 F | OXYGEN SATURATION: 99 % | BODY MASS INDEX: 26.53 KG/M2

## 2022-11-18 DIAGNOSIS — Z86.39 PERSONAL HISTORY OF OTHER ENDOCRINE, NUTRITIONAL AND METABOLIC DISEASE: ICD-10-CM

## 2022-11-18 LAB
GLUCOSE BLDC GLUCOMTR-MCNC: 126
HBA1C MFR BLD HPLC: 5.7

## 2022-11-18 PROCEDURE — 83036 HEMOGLOBIN GLYCOSYLATED A1C: CPT | Mod: QW

## 2022-11-18 PROCEDURE — 99215 OFFICE O/P EST HI 40 MIN: CPT | Mod: 25

## 2022-11-18 RX ORDER — INSULIN ASPART 100 [IU]/ML
100 INJECTION, SOLUTION INTRAVENOUS; SUBCUTANEOUS
Qty: 15 | Refills: 0 | Status: DISCONTINUED | COMMUNITY
Start: 2020-03-06 | End: 2022-11-18

## 2022-11-18 NOTE — ASSESSMENT
[FreeTextEntry1] : Patient is a 61 Fwith history od T2D, CVA x3 with L hemiparesis, HTN, HLD here for follow up for T2D.  \par \par #.T2D\par - HgB A1C: 5.7 \par - Complications: CVA, retinopathy  \par - Aspirin: on eliquis \par - Most recent urine microalbumin   check today   . ACE-I/ARB: yes \par - Most recent LDL:  154 . On statin: yes but does not take it consistently\par - Opthalmology up to date: yes,  advised for yearly check up\par - Podiatry up to date:  no advised for yearly check up\par - Patient to call for persistent glucose < 70 or > 300\par - Patient counseled on the importance of consistent carbohydrate diet and regular physical activity \par - Advised patient to continue checking FSG and to bring meter to all visits \par - Symptoms of hypoglycemia discussed\par - Medications changes: \par - Patient's A1C at goal and on minimum dose of insulin. Interested in trial off insulin and try another DM medications. We discussed GLP1RA and SGLT-2I as potential options as both have cardio and renal benefits. For GLP1A, I discussed potential benefits for patients with clinical ASCVD as well as reduced the risk of cardiovascular mortality.  I discussed side effects of the GLP-1 agonist including pancreatitis, nausea or vomiting, injection site reactions and benefits including potential for weight loss and glycemic benefits. Patient confirmed that they have no history of pancreatitis and no family history of  thyroid cancer. I also discussed side effects of SGLT2i including UTI, candida infection, euglycemic DKA. Patient is interested in GLP1RA. \par - Start Trulicity 0.75 mg weekly \par - Can stop Basaglar after starting Trulicity \par \par \par # HTN\par - BP today 132/80 at goal\par - on losartan 100 mg daily \par \par # HLD\par - On atorvastatin 80 mg daily not taking its consistently \par - Last \par - I counseled the patient on the importance of taking lipitor consistently due to her being at high risk fo another CV events (history of CVA)\par \par # vitamin D deficiency \par - Continue with vitamin D supplement \par \par FOLLOW UP: I recommend that next visit for diabetes care be in 6 month\par \par To do at next clinic visit\par - Titrate trulicity depending on glucose level \par \par Pallavi Hya MD\par Endocrinology, Diabetes and Metabolism\par [address] [facility] [address]\[place] [address]\par Tel [phone]\par Fax [phone]\par

## 2022-11-18 NOTE — HISTORY OF PRESENT ILLNESS
[FreeTextEntry1] : Patient is a 61 Fwith history od T2D, CVA x3 with L hemiparesis, HTN, HLD here for follow up for T2D.  \par \par FH:grandmother with diabetes\par SH:denies smoking (used to smoke cigarettes, but now stopped), denies alcohol use \par \par # T2DM:\par Diabetes history:\par Diagnosed  in 2018. \par \par Most recent A1C 5.7 2022\par \par Diabetes complications:\par Neuropathy: denies. She has residual hemiparesis for the L side \par Retinopathy: does have retinopathy, gets laser \par Nephropathy: ACR negative 2021 (most recent Cr 0.95 , GFR 69)\par CAD/MI/CVA: CVA in 2018, no CAD or MI\par \par Current DM medications: \par - Basaglar 10 units QHS\par - Humalog 2u if FS >150. She takes it infrequently, last taken 1 month ago\par \par Past DM medications: \par - Previously on Novolin mix bID, then transitioned to glipizide 5 mg daily\par - She was unable to tolerate Metformin.\par \par Finger sticks: \par Fastin-110s\par Before dinner: up to 200s occasionally \par \par Diet: \par Breakfast - bagel or eggs, crackers, coffee, lemon water\par Dinner - Chicken, low carb, vegetables\par No juice, no soda no alcohol\par Does home therapy by herself\par \par # HTN\par - BP today 132/80\par - on losartan 100 mg daily \par \par # HLD\par - On atorvastatin 80 mg daily not taking its consistently \par - Last \par \par  visit events: doing well. No complaints. \par \par

## 2022-11-21 LAB
CREAT SPEC-SCNC: 218 MG/DL
MICROALBUMIN 24H UR DL<=1MG/L-MCNC: 2.8 MG/DL
MICROALBUMIN/CREAT 24H UR-RTO: 13 MG/G

## 2022-11-22 ENCOUNTER — RX RENEWAL (OUTPATIENT)
Age: 62
End: 2022-11-22

## 2022-12-31 ENCOUNTER — RX RENEWAL (OUTPATIENT)
Age: 62
End: 2022-12-31

## 2023-02-13 ENCOUNTER — RX RENEWAL (OUTPATIENT)
Age: 63
End: 2023-02-13

## 2023-02-16 NOTE — PATIENT PROFILE ADULT - NSPROMEDSPUMP_GEN_A_NUR
Specialty Pharmacy Note - Double Check    Temozolomide 200 mg/m2 x 2.13 m2 = 426 mg (Rounded to 420 mg due to capsule size)    Drug: Temozolomide  Strength: 140 mg   Directions: Take 3 tablets by mouth once daily on days 1-5 of a 28 day cycle  QTY: 15  RF:11    Released to pharmacy: [id number]    Completed independent double check on medication order/RX.           
medication pump(s) used

## 2023-04-17 NOTE — PROGRESS NOTE ADULT - PROBLEM SELECTOR PLAN 3
Hydroxyzine Pregnancy And Lactation Text: This medication is not safe during pregnancy and should not be taken. It is also excreted in breast milk and breast feeding isn't recommended. On statin

## 2023-04-27 NOTE — ED ADULT NURSE NOTE - PRO INTERPRETER NEED 2
73 y/o female POD#14 s/p extended Tia's for perforated sigmoid diverticulitis. Failed S&S - NGT replaced 4/24, receiving tube feeds.    PLAN:  - Care per primary team; continue vanco solution  - Plan for rpt speech & swallow tomorrow  - Continue tube feeds  - Daily midline packing changes  - PT, OOB  - Trending labs  - Discussed w/ Dr. Goldberg English

## 2023-05-18 ENCOUNTER — APPOINTMENT (OUTPATIENT)
Dept: ENDOCRINOLOGY | Facility: CLINIC | Age: 63
End: 2023-05-18
Payer: MEDICARE

## 2023-05-18 VITALS
BODY MASS INDEX: 22.73 KG/M2 | OXYGEN SATURATION: 98 % | HEIGHT: 68 IN | SYSTOLIC BLOOD PRESSURE: 120 MMHG | HEART RATE: 78 BPM | DIASTOLIC BLOOD PRESSURE: 60 MMHG | WEIGHT: 150 LBS

## 2023-05-18 DIAGNOSIS — I63.411 CEREBRAL INFARCTION DUE TO EMBOLISM OF RIGHT MIDDLE CEREBRAL ARTERY: ICD-10-CM

## 2023-05-18 DIAGNOSIS — E55.9 VITAMIN D DEFICIENCY, UNSPECIFIED: ICD-10-CM

## 2023-05-18 LAB
GLUCOSE BLDC GLUCOMTR-MCNC: 96
HBA1C MFR BLD HPLC: 5.5

## 2023-05-18 PROCEDURE — 99214 OFFICE O/P EST MOD 30 MIN: CPT

## 2023-05-18 PROCEDURE — 82962 GLUCOSE BLOOD TEST: CPT

## 2023-05-18 PROCEDURE — 83036 HEMOGLOBIN GLYCOSYLATED A1C: CPT | Mod: QW

## 2023-05-18 NOTE — HISTORY OF PRESENT ILLNESS
[FreeTextEntry1] : Patient is a 61 Fwith history of T2D, CVA x3 with L hemiparesis, HTN, HLD here for follow up for T2D.  Last seen me in 2022. \par \par FH:grandmother with diabetes\par SH:denies smoking (used to smoke cigarettes, but now stopped), denies alcohol use \par \par # T2DM:\par Diabetes history:\par Diagnosed  in 2018. \par \par Most recent A1C 5.7 2022--> 5.5 \par \par Diabetes complications:\par Neuropathy: denies. She has residual hemiparesis for the L side \par Retinopathy: does have retinopathy, gets laser \par Nephropathy: ACR negative 2022 (most recent Cr 0.95 , GFR 69)\par CAD/MI/CVA: CVA in , no CAD or MI\par \par Current DM medications: \par - Trulicity 0.75 mg weekly\par \par Past DM medications: \par - Previously on Novolin mix bID, then transitioned to glipizide 5 mg daily\par - She was unable to tolerate Metformin.\par - Stopped insulin in November \par \par Finger sticks: \par Fastin-100\par Before dinner: 178\par \par Diet: \par Breakfast - bagel or eggs, crackers, coffee, lemon water\par Dinner - Chicken, low carb, vegetables\par No juice, no soda no alcohol\par Does home therapy by herself\par \par # HTN\par - BP today 120/60\par - on losartan 100 mg daily \par \par # HLD\par - On atorvastatin 80 mg daily not taking its consistently \par - Last \par \par  visit events: doing well. No complaints. \par \par 2023 visit events: on a diet, lost about 20 pounds since november. Would like to lose more weight. Appetite is okay but she is trying to eat less to lose weight.

## 2023-05-18 NOTE — ASSESSMENT
[FreeTextEntry1] : Patient is a 61 Fwith history od T2D, CVA x3 with L hemiparesis, HTN, HLD here for follow up for T2D.  \par \par #.T2D\par - HgB A1C: 5.7 \par - Complications: CVA, retinopathy  \par - Aspirin: on eliquis \par - Most recent urine microalbumin  negative 11//2022   . ACE-I/ARB: yes \par - Most recent LDL:  154 . On statin: yes but does not take it consistently\par - Opthalmology up to date: yes,  advised for yearly check up\par - Podiatry up to date:  no advised for yearly check up\par - Patient to call for persistent glucose < 70 or > 300\par - Patient counseled on the importance of consistent carbohydrate diet and regular physical activity \par - Advised patient to continue checking FSG and to bring meter to all visits \par - Symptoms of hypoglycemia discussed\par - Medications changes: \par - Patient's A1C at goal. COntinue with  Trulicity 0.75 mg weekly as patient wants to lose more weight. Patient currently without hypoglycemia, thus okay to continue with Trulicity at the current dose. \par \par \par # HTN\par - BP today 120/60 at goal\par - on losartan 100 mg daily \par \par # HLD\par - On atorvastatin 80 mg daily not taking its consistently \par - Last \par - I counseled the patient on the importance of taking lipitor consistently due to her being at high risk fo another CV events (history of CVA)\par \par # vitamin D deficiency \par - Continue with vitamin D supplement \par \par FOLLOW UP: I recommend that next visit for diabetes care be in 6 month\par \par To do at next clinic visit\par - Titrate trulicity depending on glucose level \par \par Pallavi Hay MD\par Endocrinology, Diabetes and Metabolism\par [address] [place]. [address]\[place] [address]\par Tel [phone]\par Fax [phone]\par

## 2023-05-19 LAB
25(OH)D3 SERPL-MCNC: 61.9 NG/ML
ALBUMIN SERPL ELPH-MCNC: 4.1 G/DL
ALP BLD-CCNC: 81 U/L
ALT SERPL-CCNC: 15 U/L
ANION GAP SERPL CALC-SCNC: 14 MMOL/L
AST SERPL-CCNC: 19 U/L
BILIRUB SERPL-MCNC: 0.6 MG/DL
BUN SERPL-MCNC: 21 MG/DL
CALCIUM SERPL-MCNC: 10.2 MG/DL
CHLORIDE SERPL-SCNC: 105 MMOL/L
CHOLEST SERPL-MCNC: 100 MG/DL
CO2 SERPL-SCNC: 23 MMOL/L
CREAT SERPL-MCNC: 0.89 MG/DL
EGFR: 74 ML/MIN/1.73M2
GLUCOSE SERPL-MCNC: 90 MG/DL
HDLC SERPL-MCNC: 44 MG/DL
LDLC SERPL CALC-MCNC: 40 MG/DL
NONHDLC SERPL-MCNC: 55 MG/DL
POTASSIUM SERPL-SCNC: 3.9 MMOL/L
PROT SERPL-MCNC: 7.3 G/DL
SODIUM SERPL-SCNC: 142 MMOL/L
TRIGL SERPL-MCNC: 75 MG/DL
TSH SERPL-ACNC: 0.86 UIU/ML

## 2023-07-18 NOTE — PATIENT PROFILE ADULT - INDICATE TYPE
Diabetic eye exam scanned into patients record.  Health Maintenance updated.   
No Biopsy Colon
Health Care Proxy (HCP)

## 2023-07-21 ENCOUNTER — RX RENEWAL (OUTPATIENT)
Age: 63
End: 2023-07-21

## 2023-08-30 ENCOUNTER — APPOINTMENT (OUTPATIENT)
Dept: INTERNAL MEDICINE | Facility: CLINIC | Age: 63
End: 2023-08-30

## 2023-09-05 ENCOUNTER — RX RENEWAL (OUTPATIENT)
Age: 63
End: 2023-09-05

## 2023-09-05 RX ORDER — LOSARTAN POTASSIUM 100 MG/1
100 TABLET, FILM COATED ORAL DAILY
Qty: 90 | Refills: 2 | Status: ACTIVE | COMMUNITY
Start: 2021-12-17 | End: 1900-01-01

## 2023-09-06 ENCOUNTER — RX RENEWAL (OUTPATIENT)
Age: 63
End: 2023-09-06

## 2023-09-10 NOTE — OCCUPATIONAL THERAPY INITIAL EVALUATION ADULT - PRECAUTIONS/LIMITATIONS, REHAB EVAL
fall precautions/(cont from above) Per family, during episodes, her eyes deviated to L. She reports that shaking was on the left side. Denies any prior history of similar episodes.She reports that in July 2019 she presented to [facility] with TIA-like symptoms on the left side. She was started on antiepileptics at the time, but she does not remember which medication. She is no longer on any AEDs.
no chills/no fever/no rectal pain

## 2023-09-15 NOTE — ED PROVIDER NOTE - CONDITION AT DISCHARGE:
.Refill Request     CONFIRM preferred pharmacy with the patient. If Mail Order Rx - Pend for 90 day refill. Last Seen: Last Seen Department: 10/26/2022  Last Seen by PCP: 10/26/2022    Last Written: 2-2-23 90 with 1     If no future appointment scheduled:  Review the last OV with PCP and review information for follow-up visit,  Route STAFF MESSAGE with patient name to the [facility] for scheduling with the following information:            -  Timing of next visit           -  Visit type ie Physical, OV, etc           -  Diagnoses/Reason ie. COPD, HTN - Do not use MEDICATION, Follow-up or CHECK UP - Give reason for visit      Next Appointment:   No future appointments. Message sent to [address] [place] [address] to schedule appt with patient?   YES      Requested Prescriptions     Pending Prescriptions Disp Refills    buPROPion (WELLBUTRIN XL) 150 MG extended release tablet [Pharmacy Med Name: BUPROPION HCL  MG TABLET] 90 tablet 1     Sig: TAKE 1 TABLET BY MOUTH EVERY DAY IN THE MORNING
L/M to schedule OV
Please help to schedule
Pt due for appointment in October, please help him schedule
Improved

## 2023-10-11 ENCOUNTER — APPOINTMENT (OUTPATIENT)
Dept: INTERNAL MEDICINE | Facility: CLINIC | Age: 63
End: 2023-10-11
Payer: MEDICARE

## 2023-10-11 VITALS
BODY MASS INDEX: 22.73 KG/M2 | SYSTOLIC BLOOD PRESSURE: 118 MMHG | HEART RATE: 72 BPM | DIASTOLIC BLOOD PRESSURE: 70 MMHG | WEIGHT: 150 LBS | TEMPERATURE: 97.3 F | OXYGEN SATURATION: 99 % | HEIGHT: 68 IN

## 2023-10-11 DIAGNOSIS — E11.9 TYPE 2 DIABETES MELLITUS W/OUT COMPLICATIONS: ICD-10-CM

## 2023-10-11 DIAGNOSIS — Z00.00 ENCOUNTER FOR GENERAL ADULT MEDICAL EXAMINATION W/OUT ABNORMAL FINDINGS: ICD-10-CM

## 2023-10-11 PROCEDURE — G0439: CPT

## 2023-10-11 RX ORDER — INSULIN GLARGINE 100 [IU]/ML
100 INJECTION, SOLUTION SUBCUTANEOUS
Qty: 45 | Refills: 0 | Status: DISCONTINUED | COMMUNITY
Start: 2018-12-21 | End: 2023-10-11

## 2023-11-16 ENCOUNTER — APPOINTMENT (OUTPATIENT)
Dept: ENDOCRINOLOGY | Facility: CLINIC | Age: 63
End: 2023-11-16

## 2023-12-26 NOTE — ASSESSMENT
[FreeTextEntry1] : discussed w pt  reviewed updated hx, current rx   reviewed f/u w specialists and recent labs   declines vaccinations   continuing mammography screening   declines colonoscopy screening   RTO 6 months for routine f/u or earlier prn if any new concerns

## 2023-12-26 NOTE — HISTORY OF PRESENT ILLNESS
[de-identified] : 63 y/o woman presents for routine physical exam.   no recent illnesses or new concerns.   reviewed f/u w neurology, endocrinology   recent labs reviewed   s/p Botox inj w Dr Segura PMR which has helped with spasticity   L hemiparesis s/p CVA in 2018 HTN recently controlled, following w her cardiologist Dr Wheeler regularly. continues on Eliquis for CVA prevention, at low dose  type II DM on basal insulin, preprandial only prn , glucose has remained adequately controlled, following w endocrinologist, on basal insulin 10 u qhs   hx of CVA in R MCA distribution in 11/18 w residual L hemiparesis. s/p R RCA stent placement. following w cardiology.  hx of focal seizures, continues on Vimpat, dose was lowered to 100 mg bid. following w Dr Huber neurology chronic constipation unchanged

## 2023-12-27 NOTE — PATIENT PROFILE ADULT - NSPROGENANESREACTION_GEN_A_NUR
Post-Op Assessment Note    CV Status:  Stable    Pain management: satisfactory to patient       Mental Status:  Alert and awake   Hydration Status:  Stable   PONV Controlled:  None   Airway Patency:  Patent     Post Op Vitals Reviewed: Yes      Staff: CRNA               BP   178/80   Temp   97.5   Pulse  62   Resp   14   SpO2   99     
never had anesthesia

## 2024-01-14 ENCOUNTER — RX RENEWAL (OUTPATIENT)
Age: 64
End: 2024-01-14

## 2024-01-14 RX ORDER — DULAGLUTIDE 0.75 MG/.5ML
0.75 INJECTION, SOLUTION SUBCUTANEOUS
Qty: 3 | Refills: 3 | Status: ACTIVE | COMMUNITY
Start: 2022-11-18 | End: 1900-01-01

## 2024-01-15 ENCOUNTER — RX RENEWAL (OUTPATIENT)
Age: 64
End: 2024-01-15

## 2024-01-15 RX ORDER — ATORVASTATIN CALCIUM 80 MG/1
80 TABLET, FILM COATED ORAL
Qty: 90 | Refills: 2 | Status: ACTIVE | COMMUNITY
Start: 2019-01-16 | End: 1900-01-01

## 2024-01-16 LAB
APPEARANCE: ABNORMAL
BACTERIA: ABNORMAL /HPF
BILIRUBIN URINE: ABNORMAL
BLOOD URINE: NEGATIVE
CAST: 14 /LPF
COLOR: NORMAL
EPITHELIAL CELLS: 34 /HPF
GLUCOSE QUALITATIVE U: NEGATIVE MG/DL
HYALINE CASTS: PRESENT
KETONES URINE: NEGATIVE MG/DL
LEUKOCYTE ESTERASE URINE: ABNORMAL
MICROSCOPIC-UA: NORMAL
NITRITE URINE: POSITIVE
PH URINE: 5
PROTEIN URINE: NORMAL MG/DL
RED BLOOD CELLS URINE: 4 /HPF
REVIEW: NORMAL
SPECIFIC GRAVITY URINE: 1.02
UROBILINOGEN URINE: 1 MG/DL
WHITE BLOOD CELLS URINE: 8 /HPF

## 2024-06-12 ENCOUNTER — APPOINTMENT (OUTPATIENT)
Dept: INTERNAL MEDICINE | Facility: CLINIC | Age: 64
End: 2024-06-12
Payer: MEDICARE

## 2024-06-12 VITALS
SYSTOLIC BLOOD PRESSURE: 120 MMHG | DIASTOLIC BLOOD PRESSURE: 62 MMHG | WEIGHT: 152 LBS | OXYGEN SATURATION: 99 % | HEART RATE: 74 BPM | HEIGHT: 68 IN | TEMPERATURE: 98.7 F | BODY MASS INDEX: 23.04 KG/M2

## 2024-06-12 DIAGNOSIS — G81.14 SPASTIC HEMIPLEGIA AFFECTING LEFT NONDOMINANT SIDE: ICD-10-CM

## 2024-06-12 DIAGNOSIS — E78.5 HYPERLIPIDEMIA, UNSPECIFIED: ICD-10-CM

## 2024-06-12 DIAGNOSIS — I63.9 CEREBRAL INFARCTION, UNSPECIFIED: ICD-10-CM

## 2024-06-12 DIAGNOSIS — I10 ESSENTIAL (PRIMARY) HYPERTENSION: ICD-10-CM

## 2024-06-12 DIAGNOSIS — Z02.89 ENCOUNTER FOR OTHER ADMINISTRATIVE EXAMINATIONS: ICD-10-CM

## 2024-06-12 PROCEDURE — 99213 OFFICE O/P EST LOW 20 MIN: CPT

## 2024-06-12 NOTE — HISTORY OF PRESENT ILLNESS
[de-identified] : 63F HTN, DM, CVA, HLD, focal seizures presenting for completion of [employer] insurance disability forms. She reports that she has these forms filled out yearly.   She saw Dr. Arenas for a physical exam in 10/2023. She denies any changes in her medical history since then. She reports that she had in stroke in 11/2018 and she now has left sided hemiparesis as a result. She had seizures afterwards and is on Vimpat with good control. She sees neurology, Dr. Lan Huber. She also follows with endocrinology for her DM, Dr. Margaret Hay. She sees cardiology, Dr. Aly Wheeler, for HTN.  She takes Vimpat for seizures, Trulicity for DM, atenolol and losartan for HTN. She is on Eliquis and atorvastatin for stroke. She requires assistance with standing and walking from her brother who lives with her. She ambulates with a cane. She has difficulty standing for a prolonged period of time because of the weakness. She is also imbalanced when standing still and with walking. She is requiring forms to be filled out for disability. She is right hand dominant and is able to eat and try to change and shower on her own. She is also requesting a script for a shower bench.  She will return for a physial in 10/2023

## 2024-06-12 NOTE — PHYSICAL EXAM
[Normal] : normal rate, regular rhythm, normal S1 and S2 and no murmur heard [Normal Supraclavicular Nodes] : no supraclavicular lymphadenopathy [Normal Posterior Cervical Nodes] : no posterior cervical lymphadenopathy [Normal Anterior Cervical Nodes] : no anterior cervical lymphadenopathy [de-identified] : left sided hemiparesis

## 2024-10-31 ENCOUNTER — APPOINTMENT (OUTPATIENT)
Dept: ENDOCRINOLOGY | Facility: CLINIC | Age: 64
End: 2024-10-31
Payer: MEDICARE

## 2024-10-31 ENCOUNTER — APPOINTMENT (OUTPATIENT)
Dept: INTERNAL MEDICINE | Facility: CLINIC | Age: 64
End: 2024-10-31

## 2024-10-31 VITALS
WEIGHT: 146 LBS | TEMPERATURE: 98 F | HEART RATE: 89 BPM | OXYGEN SATURATION: 98 % | DIASTOLIC BLOOD PRESSURE: 70 MMHG | HEIGHT: 68 IN | SYSTOLIC BLOOD PRESSURE: 132 MMHG | BODY MASS INDEX: 22.13 KG/M2

## 2024-10-31 VITALS
HEART RATE: 95 BPM | BODY MASS INDEX: 22.13 KG/M2 | WEIGHT: 146 LBS | SYSTOLIC BLOOD PRESSURE: 110 MMHG | OXYGEN SATURATION: 99 % | DIASTOLIC BLOOD PRESSURE: 72 MMHG | HEIGHT: 68 IN

## 2024-10-31 DIAGNOSIS — E55.9 VITAMIN D DEFICIENCY, UNSPECIFIED: ICD-10-CM

## 2024-10-31 DIAGNOSIS — Z00.00 ENCOUNTER FOR GENERAL ADULT MEDICAL EXAMINATION W/OUT ABNORMAL FINDINGS: ICD-10-CM

## 2024-10-31 DIAGNOSIS — I69.359 HEMIPLEGIA AND HEMIPARESIS FOLLOWING CEREBRAL INFARCTION AFFECTING UNSPECIFIED SIDE: ICD-10-CM

## 2024-10-31 DIAGNOSIS — I63.9 CEREBRAL INFARCTION, UNSPECIFIED: ICD-10-CM

## 2024-10-31 DIAGNOSIS — I10 ESSENTIAL (PRIMARY) HYPERTENSION: ICD-10-CM

## 2024-10-31 DIAGNOSIS — E11.9 TYPE 2 DIABETES MELLITUS W/OUT COMPLICATIONS: ICD-10-CM

## 2024-10-31 DIAGNOSIS — I63.411 CEREBRAL INFARCTION DUE TO EMBOLISM OF RIGHT MIDDLE CEREBRAL ARTERY: ICD-10-CM

## 2024-10-31 DIAGNOSIS — E78.5 HYPERLIPIDEMIA, UNSPECIFIED: ICD-10-CM

## 2024-10-31 LAB — HBA1C MFR BLD HPLC: 5.5

## 2024-10-31 PROCEDURE — G0439: CPT

## 2024-10-31 PROCEDURE — 99214 OFFICE O/P EST MOD 30 MIN: CPT

## 2024-10-31 PROCEDURE — 83036 HEMOGLOBIN GLYCOSYLATED A1C: CPT | Mod: QW

## 2024-10-31 RX ORDER — DULAGLUTIDE 0.75 MG/.5ML
0.75 INJECTION, SOLUTION SUBCUTANEOUS
Qty: 1 | Refills: 2 | Status: ACTIVE | COMMUNITY
Start: 2024-10-31 | End: 1900-01-01

## 2024-11-01 LAB
CREAT SPEC-SCNC: 303 MG/DL
MICROALBUMIN 24H UR DL<=1MG/L-MCNC: 4.3 MG/DL
MICROALBUMIN/CREAT 24H UR-RTO: 14 MG/G

## 2024-11-04 DIAGNOSIS — D64.9 ANEMIA, UNSPECIFIED: ICD-10-CM

## 2024-11-04 LAB
ALBUMIN SERPL ELPH-MCNC: 4.1 G/DL
ALP BLD-CCNC: 72 U/L
ALT SERPL-CCNC: 32 U/L
ANION GAP SERPL CALC-SCNC: 13 MMOL/L
AST SERPL-CCNC: 27 U/L
BASOPHILS # BLD AUTO: 0.03 K/UL
BASOPHILS NFR BLD AUTO: 0.7 %
BILIRUB SERPL-MCNC: 0.5 MG/DL
BUN SERPL-MCNC: 22 MG/DL
CALCIUM SERPL-MCNC: 10.5 MG/DL
CHLORIDE SERPL-SCNC: 107 MMOL/L
CHOLEST SERPL-MCNC: 111 MG/DL
CO2 SERPL-SCNC: 23 MMOL/L
CREAT SERPL-MCNC: 0.91 MG/DL
EGFR: 71 ML/MIN/1.73M2
EOSINOPHIL # BLD AUTO: 0.06 K/UL
EOSINOPHIL NFR BLD AUTO: 1.4 %
ESTIMATED AVERAGE GLUCOSE: 108 MG/DL
GLUCOSE SERPL-MCNC: 105 MG/DL
HBA1C MFR BLD HPLC: 5.4 %
HCT VFR BLD CALC: 34.5 %
HDLC SERPL-MCNC: 54 MG/DL
HGB BLD-MCNC: 10.9 G/DL
IMM GRANULOCYTES NFR BLD AUTO: 0.2 %
LDLC SERPL CALC-MCNC: 43 MG/DL
LYMPHOCYTES # BLD AUTO: 1.29 K/UL
LYMPHOCYTES NFR BLD AUTO: 29.7 %
MAN DIFF?: NORMAL
MCHC RBC-ENTMCNC: 29.7 PG
MCHC RBC-ENTMCNC: 31.6 G/DL
MCV RBC AUTO: 94 FL
MONOCYTES # BLD AUTO: 0.35 K/UL
MONOCYTES NFR BLD AUTO: 8.1 %
NEUTROPHILS # BLD AUTO: 2.6 K/UL
NEUTROPHILS NFR BLD AUTO: 59.9 %
NONHDLC SERPL-MCNC: 57 MG/DL
PLATELET # BLD AUTO: 212 K/UL
POTASSIUM SERPL-SCNC: 4.2 MMOL/L
PROT SERPL-MCNC: 7.2 G/DL
RBC # BLD: 3.67 M/UL
RBC # FLD: 13 %
SODIUM SERPL-SCNC: 143 MMOL/L
TRIGL SERPL-MCNC: 67 MG/DL
TSH SERPL-ACNC: 1.32 UIU/ML
WBC # FLD AUTO: 4.34 K/UL

## 2024-11-05 LAB
FERRITIN SERPL-MCNC: 70 NG/ML
FOLATE SERPL-MCNC: 7.6 NG/ML
IRON SATN MFR SERPL: 17 %
IRON SERPL-MCNC: 51 UG/DL
TIBC SERPL-MCNC: 290 UG/DL
UIBC SERPL-MCNC: 240 UG/DL
VIT B12 SERPL-MCNC: 526 PG/ML

## 2024-12-30 NOTE — ED ADULT TRIAGE NOTE - HEART RATE (BEATS/MIN)
Nurse saw patient who had concerns about feeling a lump in her right breast. No s/s of infection and all incisions are healed.  Upon exam of patients right breast a palpable area was noted, most likely fat necrosis. Out of an abundance of caution, bilateral ultrasounds were ordered. Nurse will call patient when results come in.    116
